# Patient Record
Sex: FEMALE | Race: BLACK OR AFRICAN AMERICAN | Employment: UNEMPLOYED | URBAN - METROPOLITAN AREA
[De-identification: names, ages, dates, MRNs, and addresses within clinical notes are randomized per-mention and may not be internally consistent; named-entity substitution may affect disease eponyms.]

---

## 2017-03-09 ENCOUNTER — OFFICE VISIT (OUTPATIENT)
Dept: SURGERY | Age: 57
End: 2017-03-09

## 2017-03-09 VITALS
BODY MASS INDEX: 42.17 KG/M2 | DIASTOLIC BLOOD PRESSURE: 76 MMHG | SYSTOLIC BLOOD PRESSURE: 132 MMHG | HEIGHT: 63 IN | TEMPERATURE: 95.6 F | WEIGHT: 238 LBS | HEART RATE: 89 BPM

## 2017-03-09 DIAGNOSIS — E66.01 MORBID OBESITY DUE TO EXCESS CALORIES (HCC): ICD-10-CM

## 2017-03-09 DIAGNOSIS — K21.9 GASTROESOPHAGEAL REFLUX DISEASE WITHOUT ESOPHAGITIS: ICD-10-CM

## 2017-03-09 DIAGNOSIS — R39.15 URINARY URGENCY: ICD-10-CM

## 2017-03-09 DIAGNOSIS — E11.9 CONTROLLED TYPE 2 DIABETES MELLITUS WITHOUT COMPLICATION, WITHOUT LONG-TERM CURRENT USE OF INSULIN (HCC): ICD-10-CM

## 2017-03-09 DIAGNOSIS — J45.20 MILD INTERMITTENT ASTHMA WITHOUT COMPLICATION: ICD-10-CM

## 2017-03-09 DIAGNOSIS — M19.90 ARTHRITIS: ICD-10-CM

## 2017-03-09 DIAGNOSIS — I10 ESSENTIAL HYPERTENSION: ICD-10-CM

## 2017-03-09 DIAGNOSIS — G47.33 OSA (OBSTRUCTIVE SLEEP APNEA): ICD-10-CM

## 2017-03-09 PROBLEM — J45.909 ASTHMA: Status: ACTIVE | Noted: 2017-03-09

## 2017-03-09 RX ORDER — VENLAFAXINE 75 MG/1
25 TABLET ORAL 3 TIMES DAILY
COMMUNITY
End: 2018-07-19 | Stop reason: ALTCHOICE

## 2017-03-09 RX ORDER — IBUPROFEN 800 MG/1
TABLET ORAL
COMMUNITY
End: 2018-08-07 | Stop reason: SDUPTHER

## 2017-03-09 RX ORDER — PIOGLITAZONEHYDROCHLORIDE 30 MG/1
TABLET ORAL DAILY
COMMUNITY
End: 2018-06-21 | Stop reason: SDUPTHER

## 2017-03-09 RX ORDER — PROPRANOLOL HYDROCHLORIDE 20 MG/1
TABLET ORAL 3 TIMES DAILY
COMMUNITY
End: 2018-06-21

## 2017-03-09 RX ORDER — METFORMIN HYDROCHLORIDE 1000 MG/1
1000 TABLET ORAL 2 TIMES DAILY WITH MEALS
COMMUNITY
End: 2018-08-07 | Stop reason: SDUPTHER

## 2017-03-09 NOTE — PROGRESS NOTES
Patient seen and examined independently. I agree with MAGUI Larsen's findings. We will plan to perform a lap gastric bypass.

## 2017-03-09 NOTE — PROGRESS NOTES
Initial Consultation for Bariatric Surgery     Corinne Buttery is a 49 y.o. female who comes into the office today for initial consultation for the surgical options for the treatment of morbid obesity. The patient initially identified obesity in her 21's. Corinne Buttery has tried a variety of unsupervised weight-loss attempts including behavior modification and unsupervised diets, but has yet to meet with lasting success. Maximum weight lost on a diet is about 10 lbs, but that the weight always seems to return. Today, the patient is Height: 5' 3\" (160 cm) , Weight: 108 kg (238 lb) for a BMI of Body mass index is 42.16 kg/(m^2). Candance Huger Maximum weight is 238 pounds. It is due to their severe obesity, which is further complicated by  adult onset diabetes mellitus, hypertension, GERD, DOMINIQUE and asthma that the patient is now seeking out bariatric surgery. Weight Loss Metrics 3/9/2017   Today's Wt 238 lb   BMI 42.16 kg/m2       Body mass index is 42.16 kg/(m^2). Past Medical History:   Diagnosis Date    Arthritis     Depression     [facility] [address]     Hypertension     Morbid obesity ([facility] [address]        Past Surgical History:   Procedure Laterality Date    EXCIS INFRATENT BRAIN TUMOR      HX CHOLECYSTECTOMY      HX GYN      NEUROLOGICAL PROCEDURE UNLISTED         Current Outpatient Prescriptions   Medication Sig Dispense Refill    propranolol (INDERAL) 20 mg tablet Take  by mouth three (3) times daily.  metFORMIN (GLUCOPHAGE) 1,000 mg tablet Take 1,000 mg by mouth two (2) times daily (with meals).  SITagliptin (JANUVIA) 25 mg tablet Take 25 mg by mouth daily.  venlafaxine (EFFEXOR) 75 mg tablet Take 25 mg by mouth three (3) times daily.  ibuprofen (MOTRIN) 800 mg tablet Take  by mouth.  pioglitazone (ACTOS) 30 mg tablet Take  by mouth daily.          No Known Allergies    Social History   Substance Use Topics    Smoking status: Former Smoker    Smokeless tobacco: None    Alcohol use No Family History   Problem Relation Age of Onset   [de-identified] Cancer Mother     Hypertension Mother     Diabetes Mother     Psychiatric Disorder Father     Lupus Sister        ROS, positive where in bold:    General: fevers, chills, night sweats, fatigue, weight loss, weight gain    GI: abdominal pain, nausea, vomiting, change in bowel habits, hematochezia, melena, GERD, constipation    Integumentary: dermatitis or abnormal moles.     HEENT:  visual changes, vertigo, epistaxis, dysphagia, hoarseness    Cardiac: chest pain, palpitations, hypertension, edema, varicosities    Resp:  cough, shortness of breath, wheezing, hemoptysis, snoring, reactive airway disease    : hematuria, dysuria, frequency, urgency, nocturia, stress urinary incontinence    MSK: weakness, joint pain, arthritis    Endocrine: diabetes, thyroid disease, polyuria, polydipsia, polyphagia, poor wound healing, heat intolerance,cold intolerance    Lymph/Heme: anemia, bruising, history of blood transfusions    Neuro: dizziness, headache, fainting, seizures, stroke    Psychiatry:  anxiety, depression, psychosis      Physical Exam:  Visit Vitals    /76    Pulse 89    Temp 95.6 °F (35.3 °C)    Ht 5' 3\" (1.6 m)    Wt 108 kg (238 lb)    BMI 42.16 kg/m2       General: Well developed, well nourished 49 y.o. female in no acute distress  ENMT: normocephalic, atraumatic mouth:clear, no overt lesions, oral mucosa pink and moist  Neck: supple, no masses, no adenopathy or carotid bruits, trachea midline  Skin: warm, smooth, dry and well perfused  Respiratory: clear to auscultation bilaterally, no wheeze, rhonchi or rales, excursions normal and symmetrical  Cardiovascular: Regular rate and rhythm, no murmurs, clicks, gallops or rubs, no edema or varicosities  Gastrointestinal: soft, nontender, nondistended, normoactive bowel sounds, no hernias, no hepatosplenomegaly, easily palpable costal margins, gynecoid distribution  Musculoskeletal: warm, well-perfused, no tenderness or swelling, normal gait/station  Neuro: sensation and strength grossly intact and symmetrical  Psych: alert and oriented to person, place and time      Impression:    Gonzalo Dorsey is a 49 y.o. female who is suffering from morbid obesity and its attendant comorbidities who would benefit from bariatric surgery. We have had an extensive discussion with regard to the risks, benefits and likely outcomes of both the sleeve and the gastric bypass. With regard to bypass, we have discussed the risks including bleeding, infection, need for reoperation, damage to surrounding structure, anastomotic leak, gastrogastric fistula ulcer and stricture, bowel obstruction due to internal hernia or adhesions, DVT, PE, heart attack, stroke and death. Patient also understands risks of inadequate weight loss, excess weight loss, vitamin insufficiency, protein malnutrition, excess skin, and loss of hair. We've discussed the restrictive nature of the sleeve gastrectomy. The patient understands the likelihood of losing approximately 65% of their excess weight in 12 to 18 months. Patient also understands the risks including but not limited to bleeding, infection, need for reoperation, leaks from staple line and strictures, bowel obstruction secondary to adhesions, DVT, PE, heart attack, stroke, and death. Patient also understands risks of inadequate weight loss, excess weight loss, vitamin insufficiency, protein malnutrition, excess skin, and loss of hair. We have reviewed the components of a successful postoperative course including requirement for a high protein, low carbohydrate diet, 60 oz a day of zero calorie liquids, daily vitamin supplementation, daily exercise, regular follow-up, and participation in support groups.  At this time we will enroll the patient in our bariatric program, undertake routine laboratory and radiographic evaluation, EKG, evaluation by nutritionist as well as psychologist and pending their satisfactory completion of the preop evaluation, plan to perform LGBP.

## 2017-03-15 ENCOUNTER — HOSPITAL ENCOUNTER (OUTPATIENT)
Dept: OTHER | Age: 57
Discharge: HOME | End: 2017-03-15
Payer: MEDICAID

## 2017-03-15 ENCOUNTER — HOSPITAL ENCOUNTER (OUTPATIENT)
Dept: LAB | Age: 57
Discharge: HOME | End: 2017-03-15
Payer: MEDICAID

## 2017-03-15 ENCOUNTER — HOSPITAL ENCOUNTER (OUTPATIENT)
Dept: PREADMISSION TESTING | Age: 57
Discharge: HOME | End: 2017-03-15
Payer: MEDICAID

## 2017-03-15 ENCOUNTER — HOSPITAL ENCOUNTER (OUTPATIENT)
Dept: LAB | Age: 57
Discharge: HOME | End: 2017-03-15

## 2017-03-15 DIAGNOSIS — E66.01 MORBID OBESITY DUE TO EXCESS CALORIES (HCC): ICD-10-CM

## 2017-03-15 DIAGNOSIS — K21.9 GASTROESOPHAGEAL REFLUX DISEASE WITHOUT ESOPHAGITIS: ICD-10-CM

## 2017-03-15 DIAGNOSIS — G47.33 OSA (OBSTRUCTIVE SLEEP APNEA): ICD-10-CM

## 2017-03-15 DIAGNOSIS — R39.15 URINARY URGENCY: ICD-10-CM

## 2017-03-15 DIAGNOSIS — M19.90 ARTHRITIS: ICD-10-CM

## 2017-03-15 DIAGNOSIS — J45.20 MILD INTERMITTENT ASTHMA WITHOUT COMPLICATION: ICD-10-CM

## 2017-03-15 DIAGNOSIS — E11.9 CONTROLLED TYPE 2 DIABETES MELLITUS WITHOUT COMPLICATION, WITHOUT LONG-TERM CURRENT USE OF INSULIN (HCC): ICD-10-CM

## 2017-03-15 DIAGNOSIS — I10 ESSENTIAL HYPERTENSION: ICD-10-CM

## 2017-03-15 LAB — SENTARA SPECIMEN COL,SENBCF: NORMAL

## 2017-03-15 PROCEDURE — 93005 ELECTROCARDIOGRAM TRACING: CPT

## 2017-03-15 PROCEDURE — 99001 SPECIMEN HANDLING PT-LAB: CPT | Performed by: PHYSICIAN ASSISTANT

## 2017-03-15 PROCEDURE — 71020 XR CHEST PA LAT: CPT

## 2017-03-16 LAB
ATRIAL RATE: 73 BPM
CALCULATED P AXIS, ECG09: 66 DEGREES
CALCULATED R AXIS, ECG10: 51 DEGREES
CALCULATED T AXIS, ECG11: -45 DEGREES
DIAGNOSIS, 93000: NORMAL
P-R INTERVAL, ECG05: 192 MS
Q-T INTERVAL, ECG07: 400 MS
QRS DURATION, ECG06: 66 MS
QTC CALCULATION (BEZET), ECG08: 440 MS
VENTRICULAR RATE, ECG03: 73 BPM

## 2017-03-29 ENCOUNTER — DOCUMENTATION ONLY (OUTPATIENT)
Dept: SURGERY | Age: 57
End: 2017-03-29

## 2017-03-29 NOTE — PROGRESS NOTES
[facility]  [address], [address] [place]    Patient's Name: Carlos Augustine                                  Age: 49 y.o. YOB: 1960                                          Sex: female  Date: 3/29/2017  Insurance: [employer]                          Session: 1 of 6               Surgeon:  Dr. Red Dallas    Height: 5'3\"                    Weight: 244#           Starting weight with surgeon: 238#      Overall pounds Gained: 6#    Do you smoke?: no    Alcohol Intake:   I do not drink at all:x    Class Guidelines    Pt. Understand that if they miss a month, depending on insurance, they may have to start over. Pt. Also understand that the expectation is to lose  Or maintain weight. Weight gain may result in delaying surgery until the weight is off. EATING HABITS AND BEHAVIORS:  Pt. Struggles With:  Liquid calories: x  Skipping breakfast: x  Eating foods with a high amount of carbohydrates: x  Eating High fat foods: x  Eating large portions: x  Making poor snack choices:x  Eating out frequently: x  Skipping meals: x  Reading labels: x  Drinking >48 oz fluids daily:   Getting sufficient physical activity/exercise:   Night time eating/snacking: x  Binge eating:   Eating often, even when not hungry (grazing):   Giving into peer pressure regarding eating/drinking:   Other:     Each class we spend time discussing the pre-op diet, diet progression and vitamin/mineral requirements as well as the Key Diet Principles. Each class we also cover lowering carbohydrate consumption. Keeping carbohydrates <25 grams per meal and avoiding added sugars is emphasized. Patient is educated on the effects that  carbohydrates and bad fats have on them. PHYSICAL ACTIVITY/EXERCISE:   Patient's activity is increasing because patient plans to or is:  Walking more: Other aerobic activity such as running, swimming, Leanna, kickboxing ect.:   Chair exercises:    Active in resistance training such as weight lifting:   Other:     Each class we spend time discussing the importance of increasing activity. Patient can start with 10 minutes of walking daily or chair exercises and build from there. BEHAVIOR MODIFICATION:  Making modifications to behavior, patient plans to or is:  Eating only when hungry not to treat stress, anger, tiredness or boredom:   Eating away from TV, computer and phone: x  Eating on a small plate:   Eats slowly, chewing food well: Other: We spend time in each class discussing the importance of breaking bad habits and how to do this. I encourage pt.s to self evaluate and look for those crucial moments in which they are making these poor choices. I recommend a food journal which can help identify when/where//why/who we are with when we compromise and make exceptions that are poor choices. ADDITIONAL INFORMATION:  Specific changes patient made since the last class:  1st class.       Ramon Thompson RD

## 2017-04-27 ENCOUNTER — DOCUMENTATION ONLY (OUTPATIENT)
Dept: SURGERY | Age: 57
End: 2017-04-27

## 2017-04-27 NOTE — PROGRESS NOTES
[facility]  [address], [address] [place]    Patient's Name: Maylene Felty                                  Age: 49 y.o. YOB: 1960                                          Sex: female  Date: 4/27/2017  Insurance: [employer]                          Session: 2 of 6               Surgeon:  Dr. Marino Mota    Height: 5'3\"                    Weight: 247#           Starting weight with surgeon: 238#      Overall pounds Gained: 9#    Do you smoke?: no    Alcohol Intake:   I do not drink at all:x      Class Guidelines    Pt. Understand that if they miss a month, depending on insurance, they may have to start over. Pt. Also understand that the expectation is to lose  Or maintain weight. Weight gain may result in delaying surgery until the weight is off. EATING HABITS AND BEHAVIORS:  Pt. Struggles With:  Liquid calories:   Skipping breakfast: x  Eating foods with a high amount of carbohydrates: x  Eating High fat foods: x  Eating large portions: x  Making poor snack choices:x  Eating out frequently: x  Skipping meals:   Reading labels: x  Drinking >48 oz fluids daily: x  Getting sufficient physical activity/exercise: x  Night time eating/snacking: x  Binge eating:   Eating often, even when not hungry (grazing):   Giving into peer pressure regarding eating/drinking:   Other:     Each class we spend time discussing the pre-op diet, diet progression and vitamin/mineral requirements as well as the Key Diet Principles. Each class we also cover lowering carbohydrate consumption. Keeping carbohydrates <25 grams per meal and avoiding added sugars is emphasized. Patient is educated on the effects that  carbohydrates and bad fats have on them. PHYSICAL ACTIVITY/EXERCISE:   Patient's activity is increasing because patient plans to or is:  Walking more: x  Other aerobic activity such as running, swimming, Leanna, kickboxing ect. : x  Chair exercises: x  Active in resistance training such as weight lifting:   Other:     Each class we spend time discussing the importance of increasing activity. Patient can start with 10 minutes of walking daily or chair exercises and build from there. BEHAVIOR MODIFICATION:  Making modifications to behavior, patient plans to or is:  Eating only when hungry not to treat stress, anger, tiredness or boredom: x  Eating away from TV, computer and phone: x  Eating on a small plate: x  Eats slowly, chewing food well: Other: We spend time in each class discussing the importance of breaking bad habits and how to do this. I encourage pt.s to self evaluate and look for those crucial moments in which they are making these poor choices. I recommend a food journal which can help identify when/where//why/who we are with when we compromise and make exceptions that are poor choices. ADDITIONAL INFORMATION:  Specific changes patient made since the last class:  Cutting back on all sweets. RD's concerns/opinion's:  Patient knows she needs to lose weight. She plan on eating on a smaller plate and increasing water.     Bairon Crum RD

## 2017-05-27 ENCOUNTER — DOCUMENTATION ONLY (OUTPATIENT)
Dept: SURGERY | Age: 57
End: 2017-05-27

## 2017-05-28 NOTE — PROGRESS NOTES
[facility]  [address] [facility]    Patient's Name: Maylene Felty                                  Age: 49 y.o. YOB: 1960                                          Sex: female  Date: 05/24/2017  Insurance: [employer]                          Session: 3 of 6               Surgeon:  Dr. Marino Mota    Height: 5'3\"                    Weight: 253#           Starting weight with surgeon: 238#      Overall pounds Gained: 15#    Do you smoke?: no    Alcohol Intake:   I do not drink at all:x      Class Guidelines    Pt. Understand that if they miss a month, depending on insurance, they may have to start over. Pt. Also understand that the expectation is to lose  Or maintain weight. Weight gain may result in delaying surgery until the weight is off. EATING HABITS AND BEHAVIORS:  Pt. Struggles With:  Liquid calories:   Skipping breakfast:   Eating foods with a high amount of carbohydrates: x  Eating High fat foods:   Eating large portions:   Making poor snack choices:x  Eating out frequently: x  Skipping meals:   Reading labels:   Drinking >48 oz fluids daily:   Getting sufficient physical activity/exercise: x  Night time eating/snacking: x  Binge eating: x  Eating often, even when not hungry (grazing): x  Giving into peer pressure regarding eating/drinking:   Other: Emotional eating. Each class we spend time discussing the pre-op diet, diet progression and vitamin/mineral requirements as well as the Key Diet Principles. Each class we also cover lowering carbohydrate consumption. Keeping carbohydrates <25 grams per meal and avoiding added sugars is emphasized. Patient is educated on the effects that  carbohydrates and bad fats have on them. PHYSICAL ACTIVITY/EXERCISE:   Patient's activity is increasing because patient plans to or is:  Walking more:    Other aerobic activity such as running, swimming, Leanna, kickboxing ect.:   Chair exercises: Active in resistance training such as weight lifting:   Other:     Each class we spend time discussing the importance of increasing activity. Patient can start with 10 minutes of walking daily or chair exercises and build from there. BEHAVIOR MODIFICATION:  Making modifications to behavior, patient plans to or is:  Eating only when hungry not to treat stress, anger, tiredness or boredom: x  Eating away from TV, computer and phone:   Eating on a small plate:   Eats slowly, chewing food well: x  Other: We spend time in each class discussing the importance of breaking bad habits and how to do this. I encourage pt.s to self evaluate and look for those crucial moments in which they are making these poor choices. I recommend a food journal which can help identify when/where//why/who we are with when we compromise and make exceptions that are poor choices. ADDITIONAL INFORMATION:  Specific changes patient made since the last class:  Patient states she struggles with emotional eating. RD's concerns/opinion's:  Patient is aware she needs to lose weight. She was highly disappointed in the 15# weight gain. Patient to see psychologists soon. RD recommended she discuss the emotional eating struggles.     Sarah Phan RD  05/24/2017

## 2017-06-29 ENCOUNTER — DOCUMENTATION ONLY (OUTPATIENT)
Dept: SURGERY | Age: 57
End: 2017-06-29

## 2017-06-30 NOTE — PROGRESS NOTES
[facility]  [address] [place]    Patient's Name: Juan Garcia                                  Age: 49 y.o. YOB: 1960                                          Sex: female  Date: 06/28/2017  Insurance: [employer]                          Session: 4 of 6               Surgeon:  Dr. Beau Magallon    Height: 5'3\"                    Weight: 250#           Starting weight with surgeon: 238#      Overall pounds Gained: 12#    Do you smoke?: no    Alcohol Intake:   I do not drink at all:x      Class Guidelines    Pt. Understand that if they miss a month, depending on insurance, they may have to start over. Pt. Also understand that the expectation is to lose  Or maintain weight. Weight gain may result in delaying surgery until the weight is off. EATING HABITS AND BEHAVIORS:  Pt. Struggles With:  Liquid calories:   Skipping breakfast:   Eating foods with a high amount of carbohydrates: x  Eating High fat foods: x  Eating large portions:   Making poor snack choices:x  Eating out frequently: x  Skipping meals: x  Reading labels:   Drinking >48 oz fluids daily:   Getting sufficient physical activity/exercise: x  Night time eating/snacking: x  Binge eating:   Eating often, even when not hungry (grazing): x  Giving into peer pressure regarding eating/drinking:   Other: emotional eating    Each class we spend time discussing the pre-op diet, diet progression and vitamin/mineral requirements as well as the Key Diet Principles. Each class we also cover lowering carbohydrate consumption. Keeping carbohydrates <25 grams per meal and avoiding added sugars is emphasized. Patient is educated on the effects that  carbohydrates and bad fats have on them.       PHYSICAL ACTIVITY/EXERCISE:   Patient's activity is increasing because patient plans to or is:  Walking more: x  Other aerobic activity such as running, swimming, Leanna, kickboxing ect.:   Chair exercises: Active in resistance training such as weight lifting:   Other:     Each class we spend time discussing the importance of increasing activity. Patient can start with 10 minutes of walking daily or chair exercises and build from there. BEHAVIOR MODIFICATION:  Making modifications to behavior, patient plans to or is:  Eating only when hungry not to treat stress, anger, tiredness or boredom: x  Eating away from TV, computer and phone: x  Eating on a small plate: x  Eats slowly, chewing food well: x  Other: We spend time in each class discussing the importance of breaking bad habits and how to do this. I encourage pt.s to self evaluate and look for those crucial moments in which they are making these poor choices. I recommend a food journal which can help identify when/where//why/who we are with when we compromise and make exceptions that are poor choices. ADDITIONAL INFORMATION:  Specific changes patient made since the last class:  Chew slowly, 25 times. RD's concerns/opinion's:  Patient is aware that she needs to lose weight on the WLT.     Patricia Montoya RD  06/28/2017

## 2017-07-24 ENCOUNTER — DOCUMENTATION ONLY (OUTPATIENT)
Dept: SURGERY | Age: 57
End: 2017-07-24

## 2017-07-24 NOTE — PROGRESS NOTES
[facility]  [address]    Patient's Name: Marisol Kellogg                                  Age: 49 y.o. YOB: 1960                                          Sex: female  Date: 07/19/2017  Insurance: [facility]                          Session: 5 of 6               Surgeon:  Dr. Man Dear    Height: 5'3\"                    Weight: 252#           Starting weight with surgeon: 238#      Overall pounds Gained: 14#    Do you smoke?: no    Alcohol Intake:   I do not drink at all:x      Class Guidelines    Pt. Understand that if they miss a month, depending on insurance, they may have to start over. Pt. Also understand that the expectation is to lose  Or maintain weight. Weight gain may result in delaying surgery until the weight is off. EATING HABITS AND BEHAVIORS:  Pt. Struggles With:  Liquid calories:   Skipping breakfast: x  Eating foods with a high amount of carbohydrates:   Eating High fat foods: x  Eating large portions:   Making poor snack choices:x  Eating out frequently:   Skipping meals: x  Reading labels:   Drinking >48 oz fluids daily:   Getting sufficient physical activity/exercise: x  Night time eating/snacking: x  Binge eating:   Eating often, even when not hungry (grazing):   Giving into peer pressure regarding eating/drinking:   Other:     Each class we spend time discussing the pre-op diet, diet progression and vitamin/mineral requirements as well as the Key Diet Principles. Each class we also cover lowering carbohydrate consumption. Keeping carbohydrates <25 grams per meal and avoiding added sugars is emphasized. Patient is educated on the effects that  carbohydrates and bad fats have on them. PHYSICAL ACTIVITY/EXERCISE:   Patient's activity is increasing because patient plans to or is:  Walking more: x  Other aerobic activity such as running, swimming, Leanna, kickboxing ect. : x  Chair exercises:    Active in resistance training such as weight lifting: x  Other:     Each class we spend time discussing the importance of increasing activity. Patient can start with 10 minutes of walking daily or chair exercises and build from there. BEHAVIOR MODIFICATION:  Making modifications to behavior, patient plans to or is:  Eating only when hungry not to treat stress, anger, tiredness or boredom: x  Eating away from TV, computer and phone: x  Eating on a small plate: x  Eats slowly, chewing food well: Other: We spend time in each class discussing the importance of breaking bad habits and how to do this. I encourage pt.s to self evaluate and look for those crucial moments in which they are making these poor choices. I recommend a food journal which can help identify when/where//why/who we are with when we compromise and make exceptions that are poor choices. ADDITIONAL INFORMATION:  Specific changes patient made since the last class:  Eat slower, put fork down, count calories. RD's concerns/opinion's:  Patient has gained 14# since she began the   WLT. She is aware that she needs to lose this weight. She often shares in class the tremendous amount of stress in her family life.       Keyla Figueroa RD  07/19/2017

## 2017-08-03 ENCOUNTER — OFFICE VISIT (OUTPATIENT)
Dept: SURGERY | Age: 57
End: 2017-08-03

## 2017-08-03 ENCOUNTER — HOSPITAL ENCOUNTER (OUTPATIENT)
Dept: LAB | Age: 57
Discharge: HOME | End: 2017-08-03

## 2017-08-03 ENCOUNTER — HOSPITAL ENCOUNTER (OUTPATIENT)
Dept: PREADMISSION TESTING | Age: 57
End: 2017-08-03

## 2017-08-03 VITALS
RESPIRATION RATE: 20 BRPM | BODY MASS INDEX: 44.69 KG/M2 | TEMPERATURE: 96.5 F | OXYGEN SATURATION: 99 % | DIASTOLIC BLOOD PRESSURE: 85 MMHG | SYSTOLIC BLOOD PRESSURE: 135 MMHG | HEIGHT: 63 IN | WEIGHT: 252.2 LBS | HEART RATE: 80 BPM

## 2017-08-03 DIAGNOSIS — I10 ESSENTIAL HYPERTENSION: ICD-10-CM

## 2017-08-03 DIAGNOSIS — E66.01 MORBID OBESITY DUE TO EXCESS CALORIES (HCC): ICD-10-CM

## 2017-08-03 DIAGNOSIS — E66.01 MORBID OBESITY DUE TO EXCESS CALORIES (HCC): Primary | ICD-10-CM

## 2017-08-03 DIAGNOSIS — E11.8 CONTROLLED TYPE 2 DIABETES MELLITUS WITH COMPLICATION, WITHOUT LONG-TERM CURRENT USE OF INSULIN (HCC): ICD-10-CM

## 2017-08-03 LAB — SENTARA SPECIMEN COL,SENBCF: NORMAL

## 2017-08-03 PROCEDURE — 99001 SPECIMEN HANDLING PT-LAB: CPT | Performed by: SURGERY

## 2017-08-03 NOTE — MR AVS SNAPSHOT
Visit Information Date & Time Provider Department Dept. Phone Encounter #  
 8/3/2017  2:00 PM Giovanny Lui MD [facility] [phone] [id number] Upcoming Health Maintenance Date Due Hepatitis C Screening 1960 HEMOGLOBIN A1C Q6M 1960 LIPID PANEL Q1 1960 FOOT EXAM Q1 12/9/1972 MICROALBUMIN Q1 12/9/1972 EYE EXAM RETINAL OR DILATED Q1 12/9/1972 Pneumococcal 19-64 Medium Risk (1 of 1 - PPSV23) 12/9/1981 DTaP/Tdap/Td series (1 - Tdap) 12/9/1983 BREAST CANCER SCRN MAMMOGRAM 12/9/2012 FOBT Q 1 YEAR AGE 50-75 12/9/2012 PAP AKA CERVICAL CYTOLOGY 6/20/2014 INFLUENZA AGE 9 TO ADULT 8/1/2017 Allergies as of 8/3/2017  Review Complete On: 8/3/2017 By: Dalton Avalos LPN No Known Allergies Current Immunizations  Never Reviewed No immunizations on file. Not reviewed this visit You Were Diagnosed With   
  
 Codes Comments Morbid obesity due to excess calories ([facility] [address]    -  Primary ICD-10-CM: E66.01 
ICD-9-CM: 278.01   
 BMI 40.0-44.9, adult (HCC)     ICD-10-CM: Z68.41 
ICD-9-CM: V85.41 Essential hypertension     ICD-10-CM: I10 
ICD-9-CM: 401.9 Controlled type 2 diabetes mellitus with complication, without long-term current use of insulin (HCC)     ICD-10-CM: E11.8 ICD-9-CM: 250.90 Vitals BP Pulse Temp Resp Height(growth percentile) Weight(growth percentile) 135/85 80 96.5 °F (35.8 °C) (Oral) 20 5' 3\" (1.6 m) 252 lb 3.2 oz (114.4 kg) SpO2 BMI OB Status Smoking Status 99% 44.68 kg/m2 Menopause Former Smoker Vitals History BMI and BSA Data Body Mass Index Body Surface Area 44.68 kg/m 2 2.26 m 2 Your Updated Medication List  
  
   
This list is accurate as of: 8/3/17  2:53 PM.  Always use your most recent med list.  
  
  
  
  
 ibuprofen 800 mg tablet Commonly known as:  MOTRIN Take  by mouth. JANUVIA 25 mg tablet Generic drug:  SITagliptin Take 25 mg by mouth daily. metFORMIN 1,000 mg tablet Commonly known as:  GLUCOPHAGE Take 1,000 mg by mouth two (2) times daily (with meals). pioglitazone 30 mg tablet Commonly known as:  ACTOS Take  by mouth daily. propranolol 20 mg tablet Commonly known as:  INDERAL Take  by mouth three (3) times daily. venlafaxine 75 mg tablet Commonly known as:  [facility] Take 25 mg by mouth three (3) times daily. Patient Instructions If you have any questions or concerns about today's appointment, the verbal and/or written instructions you were given for follow up care, please call our office at [phone]. [facility] - [address] 
 
[phone] office [phone] Introducing [facility] & HEALTH [facility]! [facility] introduces [employer] patient portal. Now you can access parts of your medical record, email your doctor's office, and request medication refills online. 1. In your internet browser, go to [email or web address] 2. Click on the First Time User? Click Here link in the Sign In box. You will see the New Member Sign Up page. 3. Enter your [employer] Access Code exactly as it appears below. You will not need to use this code after youve completed the sign-up process. If you do not sign up before the expiration date, you must request a new code. · [employer] Access Code: [email or web address] Expires: 10/17/2017  4:19 PM 
 
4. Enter the last four digits of your Social Security Number (xxxx) and Date of Birth (mm/dd/yyyy) as indicated and click Submit. You will be taken to the next sign-up page. 5. Create a [employer] ID. This will be your [employer] login ID and cannot be changed, so think of one that is secure and easy to remember. 6. Create a [employer] password. You can change your password at any time. 7. Enter your Password Reset Question and Answer. This can be used at a later time if you forget your password. 8. Enter your e-mail address. You will receive e-mail notification when new information is available in [address] 9. Click Sign Up. You can now view and download portions of your medical record. 10. Click the Download Summary menu link to download a portable copy of your medical information. If you have questions, please visit the Frequently Asked Questions section of the [employer] website. Remember, [employer] is NOT to be used for urgent needs. For medical emergencies, dial 911. Now available from your iPhone and Android! Please provide this summary of care documentation to your next provider. Your primary care clinician is listed as Froilan Goyal. If you have any questions after today's visit, please call [phone].

## 2017-08-03 NOTE — PROGRESS NOTES
1. Have you been to the ER, urgent care clinic since your last visit? Hospitalized since your last visit? Sciatic nerve pain     2. Have you seen or consulted any other health care providers outside of the [address] [place] [address] since your last visit? Include any pap smears or colon screening. No     Patient presents for midtrial appointment for [facility].

## 2017-08-03 NOTE — PATIENT INSTRUCTIONS
If you have any questions or concerns about today's appointment, the verbal and/or written instructions you were given for follow up care, please call our office at [phone].     [facility] - Lauren Glover [address] [place] [address]    [phone] office  [phone]

## 2017-08-03 NOTE — PROGRESS NOTES
Bariatric Preoperative Progress note:    Subjective:     Robert Perez is a 49 y.o. female who presents today for followup of their candidacy for bariatric surgery. Since last seen, has been very stressed out and notes that when she is stressed she doesn't eat well. She also had inflammation of her sciatic nerve which caused some pain and required steroids. She notes that since she had her meningioma removed, she doesn't seem to taste things and keeps eating to get herself to taste something. She also notes that she is an emotional eater and stress management is one of her goals. She is working with Jesus Vaca to learn how to eat breakfast, drink more water and learn about serving size. Past Medical History:   Diagnosis Date    Arthritis     Depression     Diabetes ([facility] [address]     Hypertension     Morbid obesity ([facility] [address]        Past Surgical History:   Procedure Laterality Date    EXCIS INFRATENT BRAIN TUMOR      HX CHOLECYSTECTOMY      HX GYN      NEUROLOGICAL PROCEDURE UNLISTED         Current Outpatient Prescriptions   Medication Sig Dispense Refill    propranolol (INDERAL) 20 mg tablet Take  by mouth three (3) times daily.  metFORMIN (GLUCOPHAGE) 1,000 mg tablet Take 1,000 mg by mouth two (2) times daily (with meals).  SITagliptin (JANUVIA) 25 mg tablet Take 25 mg by mouth daily.  venlafaxine (EFFEXOR) 75 mg tablet Take 25 mg by mouth three (3) times daily.  pioglitazone (ACTOS) 30 mg tablet Take  by mouth daily.  ibuprofen (MOTRIN) 800 mg tablet Take  by mouth.          No Known Allergies      Objective:     Physical Exam:  Visit Vitals    /85    Pulse 80    Temp 96.5 °F (35.8 °C) (Oral)    Resp 20    Ht 5' 3\" (1.6 m)    Wt 114.4 kg (252 lb 3.2 oz)    SpO2 99%    BMI 44.68 kg/m2       General: Well developed, well nourished 49 y.o. female in no acute distress  ENMT: normocephalic, atraumatic mouth:clear, no overt lesions, oral mucosa pink and moist  Neck: supple, no masses, no adenopathy or carotid bruits, trachea midline  Skin: warm, smooth, dry and well perfused  Respiratory: clear to auscultation bilaterally, no wheezes, rhonchi or rales, excursions normal and symmetrical  Cardiovascular: Regular rate and rhythm, no murmurs, clicks, gallops or rubs, no edema or varicosities  Gastrointestinal: soft, nontender, nondistended, normoactive bowel sounds, no hernias, no hepatosplenomegaly  Musculoskeletal: warm, well-perfused, no tenderness or swelling, normal gait/station  Neuro: sensation and strength grossly intact and symmetrical  Psych: alert and oriented to person, place and time      Studies to date:    Labs: significant for elevated A1c of 11.9 and H pylori positive (treated)    EKG: WNL    CXR: WNL    Nutritional evaluation: ongoing     Psychiatric evaluation:pending        Assessment:   Dori South is a 49 y.o. female who is progressing through the bariatric preoperative evaluation. At this time, they are not an appropriate candidate for weight loss surgery. We have discussed importance of measuring food and serving size. We have discussed strategies to decrease her consumption of carbs and sweets and processed food. Plan:   -complete remainder of preop evaluation including bringing down her A1c with her PCP and classes with Anahi Ahmadi and evaluation with Dr Basilio De Paz.  -Follow up once has completed entirety of weight loss workup to determine next steps.

## 2017-08-04 LAB
ANION GAP SERPL CALC-SCNC: 16 MMOL/L
BUN SERPL-MCNC: 10 MG/DL (ref 6–22)
CALCIUM SERPL-MCNC: 9.3 MG/DL (ref 8.4–10.5)
CHLORIDE SERPL-SCNC: 96 MMOL/L (ref 98–110)
CO2 SERPL-SCNC: 23 MMOL/L (ref 20–32)
CREAT SERPL-MCNC: 0.6 MG/DL (ref 0.5–1.2)
GFRAA, 66117: >60
GFRNA, 66118: >60
GLUCOSE SERPL-MCNC: 412 MG/DL (ref 65–99)
POTASSIUM SERPL-SCNC: 4.4 MMOL/L (ref 3.5–5.5)
SODIUM SERPL-SCNC: 135 MMOL/L (ref 133–145)

## 2017-08-05 LAB
ANION GAP SERPL CALC-SCNC: 16 MMOL/L
AVG GLU, 10930: 236 MG/DL (ref 91–123)
BUN SERPL-MCNC: 10 MG/DL (ref 6–22)
CALCIUM SERPL-MCNC: 9.3 MG/DL (ref 8.4–10.5)
CHLORIDE SERPL-SCNC: 96 MMOL/L (ref 98–110)
CO2 SERPL-SCNC: 23 MMOL/L (ref 20–32)
CREAT SERPL-MCNC: 0.6 MG/DL (ref 0.5–1.2)
GFRAA, 66117: >60
GFRNA, 66118: >60
GLUCOSE SERPL-MCNC: 412 MG/DL (ref 65–99)
HBA1C MFR BLD HPLC: 9.9 % (ref 4.8–5.9)
POTASSIUM SERPL-SCNC: 4.4 MMOL/L (ref 3.5–5.5)
SODIUM SERPL-SCNC: 135 MMOL/L (ref 133–145)

## 2017-08-07 NOTE — PROGRESS NOTES
Please notify patient she will need to continue to work with PCP to bring down A1c, however, it is much better than it was

## 2017-08-31 ENCOUNTER — DOCUMENTATION ONLY (OUTPATIENT)
Dept: SURGERY | Age: 57
End: 2017-08-31

## 2017-08-31 NOTE — PROGRESS NOTES
[facility]  [address]    Patient's Name: Carlos Reyes                                  Age: 49 y.o. YOB: 1960                                          Sex: female  Date: 08/30/2017  Insurance: [employer]                          Session: 6 of 6               Surgeon:  Dr. Ervin Ho    Height: 5'3\"                    Weight: 247#           Starting weight with surgeon: 238#      Overall pounds Gained: 9#    Do you smoke?: no    Alcohol Intake:   I do not drink at all:x      Class Guidelines    Pt. Understand that if they miss a month, depending on insurance, they may have to start over. Pt. Also understand that the expectation is to lose  Or maintain weight. Weight gain may result in delaying surgery until the weight is off. EATING HABITS AND BEHAVIORS:  Pt. Struggles With:  Liquid calories:   Skipping breakfast: x  Eating foods with a high amount of carbohydrates:   Eating High fat foods:   Eating large portions:   Making poor snack choices:x  Eating out frequently:   Skipping meals: x  Reading labels: x  Drinking >48 oz fluids daily: x  Getting sufficient physical activity/exercise: x  Night time eating/snacking:   Binge eating:   Eating often, even when not hungry (grazing):   Giving into peer pressure regarding eating/drinking:   Other:     Each class we spend time discussing the pre-op diet, diet progression and vitamin/mineral requirements as well as the Key Diet Principles. Each class we also cover lowering carbohydrate consumption. Keeping carbohydrates <25 grams per meal and avoiding added sugars is emphasized. Patient is educated on the effects that  carbohydrates and bad fats have on them. PHYSICAL ACTIVITY/EXERCISE:   Patient's activity is increasing because patient plans to or is:  Walking more: Other aerobic activity such as running, swimming, Leanna, kickboxing ect.:   Chair exercises:    Active in resistance training such as weight lifting:   Other:     Each class we spend time discussing the importance of increasing activity. Patient can start with 10 minutes of walking daily or chair exercises and build from there. BEHAVIOR MODIFICATION:  Making modifications to behavior, patient plans to or is:  Eating only when hungry not to treat stress, anger, tiredness or boredom:   Eating away from TV, computer and phone:   Eating on a small plate:   Eats slowly, chewing food well: x  Other: We spend time in each class discussing the importance of breaking bad habits and how to do this. I encourage pt.s to self evaluate and look for those crucial moments in which they are making these poor choices. I recommend a food journal which can help identify when/where//why/who we are with when we compromise and make exceptions that are poor choices. ADDITIONAL INFORMATION:  Specific changes patient made since the last class:  Patient shares that she has been doing her best to make needed changes. RD covered label reading again. This has been a struggle for her to understand. I was pleased to see a less stressed, more engaged and calm person during the WLT class. I have renewed hope in this client.     Kyle Posadas RD  08/30/2017

## 2017-09-27 ENCOUNTER — DOCUMENTATION ONLY (OUTPATIENT)
Dept: SURGERY | Age: 57
End: 2017-09-27

## 2017-09-27 NOTE — PROGRESS NOTES
Patient visits RD for weight check weighing 250#. Last months weight was 247#. Goal weight is the 1st weight with the surgeon 238#. Patient acted confused and asked specific questions regarding her food intake. Her next visit with me is October 18th.

## 2017-10-24 ENCOUNTER — DOCUMENTATION ONLY (OUTPATIENT)
Dept: SURGERY | Age: 57
End: 2017-10-24

## 2017-10-24 NOTE — PROGRESS NOTES
Patient visits RD weighing 251#. Last month she weighed 250# and the month before she weighed 247#. Goal weight is 238#. Her next visit is November 29th. Patient snacks on chex mix, popcorn, chips, peanuts. She still snacks in front of the TV. Patient shares that she is an emotional eater. I am grateful she will see the Psychologist soon.

## 2017-10-31 ENCOUNTER — TELEPHONE (OUTPATIENT)
Dept: SURGERY | Age: 57
End: 2017-10-31

## 2017-10-31 NOTE — TELEPHONE ENCOUNTER
Called pt. She forgot about her scheduled follow up appointment with Dr. Alden Tello. I gave her the phone number and she will call and reschedule. I reminded pt of her weight check appointment on 11/29/17 with Sharon. Patient will call me back after she reschedules with Dr. Alden Tello. When she calls back I will remind her that we need to get her A1C levels rechecked. Pt was confused and frustrated about missing her appointment with Dr. Marilin Meyer.

## 2017-12-15 ENCOUNTER — TELEPHONE (OUTPATIENT)
Dept: SURGERY | Age: 57
End: 2017-12-15

## 2017-12-15 NOTE — TELEPHONE ENCOUNTER
Called patient to see if she had r/s her appointment with Dr. Kennedy Santos. Patient stated that she has not, she got depressed because she feel as if she is trying her hardest but keep missing appointments. I inform patient that I am here for her and that we will get her back on track. I gave her the phone number to Dr. Kennedy Santos office and told patient to call them and schedule her appointment. Once she has completed that, she can then call me to let me know when it is schedule. I inform the patient that this will show me that she is trying to make sure she is doing everything that is needed for her surgery. Once she call me with this information I will then have her repeat her A1c test. Patient expressed understanding and thanked me for checking in with her.

## 2017-12-22 NOTE — TELEPHONE ENCOUNTER
lvm for patient to return my call to see if she has made her r/s appointment with Dr. Faraz Viramontes.  Can be reached at [phone]

## 2018-01-17 ENCOUNTER — HOSPITAL ENCOUNTER (EMERGENCY)
Age: 58
Discharge: HOME | End: 2018-01-18
Attending: EMERGENCY MEDICINE | Admitting: EMERGENCY MEDICINE
Payer: MEDICARE

## 2018-01-17 DIAGNOSIS — R07.9 ACUTE CHEST PAIN: ICD-10-CM

## 2018-01-17 DIAGNOSIS — R55 SYNCOPE AND COLLAPSE: Primary | ICD-10-CM

## 2018-01-17 DIAGNOSIS — E11.65 POORLY CONTROLLED TYPE 2 DIABETES MELLITUS (HCC): ICD-10-CM

## 2018-01-17 DIAGNOSIS — R73.9 ACUTE HYPERGLYCEMIA: ICD-10-CM

## 2018-01-17 PROCEDURE — 96374 THER/PROPH/DIAG INJ IV PUSH: CPT

## 2018-01-17 PROCEDURE — 99285 EMERGENCY DEPT VISIT HI MDM: CPT

## 2018-01-17 PROCEDURE — 96361 HYDRATE IV INFUSION ADD-ON: CPT

## 2018-01-17 RX ORDER — GUAIFENESIN 100 MG/5ML
162 LIQUID (ML) ORAL
Status: COMPLETED | OUTPATIENT
Start: 2018-01-17 | End: 2018-01-18

## 2018-01-17 NOTE — LETTER
NOTIFICATION RETURN TO WORK / SCHOOL 
 
1/18/2018 3:16 AM 
 
Ms. Helen Benavidez [address] [id number] To Whom It May Concern: 
 
Helen Benavidez is currently under the care of [facility] EMERGENCY DEPT. She will return to work/school on: 1/19/18 If there are questions or concerns please have the patient contact our office. Sincerely, Homero Walker MD

## 2018-01-18 ENCOUNTER — APPOINTMENT (OUTPATIENT)
Dept: GENERAL RADIOLOGY | Age: 58
End: 2018-01-18
Attending: EMERGENCY MEDICINE
Payer: MEDICARE

## 2018-01-18 VITALS
HEIGHT: 65 IN | HEART RATE: 88 BPM | BODY MASS INDEX: 43.32 KG/M2 | RESPIRATION RATE: 14 BRPM | DIASTOLIC BLOOD PRESSURE: 71 MMHG | WEIGHT: 260 LBS | OXYGEN SATURATION: 99 % | SYSTOLIC BLOOD PRESSURE: 123 MMHG | TEMPERATURE: 98 F

## 2018-01-18 LAB
ALBUMIN SERPL-MCNC: 3.6 G/DL (ref 3.4–5)
ALBUMIN/GLOB SERPL: 0.9 {RATIO} (ref 0.8–1.7)
ALP SERPL-CCNC: 124 U/L (ref 45–117)
ALT SERPL-CCNC: 27 U/L (ref 13–56)
ANION GAP SERPL CALC-SCNC: 9 MMOL/L (ref 3–18)
APPEARANCE UR: CLEAR
AST SERPL-CCNC: 18 U/L (ref 15–37)
ATRIAL RATE: 100 BPM
ATRIAL RATE: 81 BPM
BASOPHILS # BLD: 0 K/UL (ref 0–0.06)
BASOPHILS NFR BLD: 1 % (ref 0–2)
BILIRUB SERPL-MCNC: 0.3 MG/DL (ref 0.2–1)
BILIRUB UR QL: NEGATIVE
BUN SERPL-MCNC: 8 MG/DL (ref 7–18)
BUN/CREAT SERPL: 8 (ref 12–20)
CALCIUM SERPL-MCNC: 9.1 MG/DL (ref 8.5–10.1)
CALCULATED P AXIS, ECG09: 63 DEGREES
CALCULATED P AXIS, ECG09: 67 DEGREES
CALCULATED R AXIS, ECG10: 26 DEGREES
CALCULATED R AXIS, ECG10: 44 DEGREES
CALCULATED T AXIS, ECG11: -63 DEGREES
CALCULATED T AXIS, ECG11: 10 DEGREES
CHLORIDE SERPL-SCNC: 100 MMOL/L (ref 100–108)
CO2 SERPL-SCNC: 25 MMOL/L (ref 21–32)
COLOR UR: YELLOW
CREAT SERPL-MCNC: 1.06 MG/DL (ref 0.6–1.3)
D DIMER PPP FEU-MCNC: 0.43 UG/ML(FEU)
DIAGNOSIS, 93000: NORMAL
DIAGNOSIS, 93000: NORMAL
DIFFERENTIAL METHOD BLD: NORMAL
EOSINOPHIL # BLD: 0.1 K/UL (ref 0–0.4)
EOSINOPHIL NFR BLD: 1 % (ref 0–5)
ERYTHROCYTE [DISTWIDTH] IN BLOOD BY AUTOMATED COUNT: 12 % (ref 11.6–14.5)
GLOBULIN SER CALC-MCNC: 3.9 G/DL (ref 2–4)
GLUCOSE BLD STRIP.AUTO-MCNC: 275 MG/DL (ref 70–110)
GLUCOSE BLD STRIP.AUTO-MCNC: 540 MG/DL (ref 70–110)
GLUCOSE SERPL-MCNC: 503 MG/DL (ref 74–99)
GLUCOSE UR STRIP.AUTO-MCNC: >1000 MG/DL
HCG SERPL QL: NEGATIVE
HCG UR QL: NEGATIVE
HCT VFR BLD AUTO: 42.1 % (ref 35–45)
HGB BLD-MCNC: 13.7 G/DL (ref 12–16)
HGB UR QL STRIP: NEGATIVE
KETONES UR QL STRIP.AUTO: NEGATIVE MG/DL
LEUKOCYTE ESTERASE UR QL STRIP.AUTO: NEGATIVE
LYMPHOCYTES # BLD: 3 K/UL (ref 0.9–3.6)
LYMPHOCYTES NFR BLD: 43 % (ref 21–52)
MCH RBC QN AUTO: 26.3 PG (ref 24–34)
MCHC RBC AUTO-ENTMCNC: 32.5 G/DL (ref 31–37)
MCV RBC AUTO: 81 FL (ref 74–97)
MONOCYTES # BLD: 0.4 K/UL (ref 0.05–1.2)
MONOCYTES NFR BLD: 5 % (ref 3–10)
NEUTS SEG # BLD: 3.4 K/UL (ref 1.8–8)
NEUTS SEG NFR BLD: 50 % (ref 40–73)
NITRITE UR QL STRIP.AUTO: NEGATIVE
P-R INTERVAL, ECG05: 174 MS
P-R INTERVAL, ECG05: 186 MS
PH UR STRIP: 5 [PH] (ref 5–8)
PLATELET # BLD AUTO: 341 K/UL (ref 135–420)
PMV BLD AUTO: 9.8 FL (ref 9.2–11.8)
POTASSIUM SERPL-SCNC: 4.3 MMOL/L (ref 3.5–5.5)
PROT SERPL-MCNC: 7.5 G/DL (ref 6.4–8.2)
PROT UR STRIP-MCNC: NEGATIVE MG/DL
Q-T INTERVAL, ECG07: 356 MS
Q-T INTERVAL, ECG07: 380 MS
QRS DURATION, ECG06: 62 MS
QRS DURATION, ECG06: 68 MS
QTC CALCULATION (BEZET), ECG08: 441 MS
QTC CALCULATION (BEZET), ECG08: 459 MS
RBC # BLD AUTO: 5.2 M/UL (ref 4.2–5.3)
SODIUM SERPL-SCNC: 134 MMOL/L (ref 136–145)
SP GR UR REFRACTOMETRY: >1.03 (ref 1–1.03)
TROPONIN I SERPL-MCNC: <0.02 NG/ML (ref 0–0.04)
TROPONIN I SERPL-MCNC: <0.02 NG/ML (ref 0–0.04)
UROBILINOGEN UR QL STRIP.AUTO: 0.2 EU/DL (ref 0.2–1)
VENTRICULAR RATE, ECG03: 100 BPM
VENTRICULAR RATE, ECG03: 81 BPM
WBC # BLD AUTO: 6.8 K/UL (ref 4.6–13.2)

## 2018-01-18 PROCEDURE — 84703 CHORIONIC GONADOTROPIN ASSAY: CPT | Performed by: EMERGENCY MEDICINE

## 2018-01-18 PROCEDURE — 85379 FIBRIN DEGRADATION QUANT: CPT | Performed by: EMERGENCY MEDICINE

## 2018-01-18 PROCEDURE — 74011250636 HC RX REV CODE- 250/636: Performed by: EMERGENCY MEDICINE

## 2018-01-18 PROCEDURE — 82962 GLUCOSE BLOOD TEST: CPT

## 2018-01-18 PROCEDURE — 81025 URINE PREGNANCY TEST: CPT | Performed by: EMERGENCY MEDICINE

## 2018-01-18 PROCEDURE — 81003 URINALYSIS AUTO W/O SCOPE: CPT | Performed by: EMERGENCY MEDICINE

## 2018-01-18 PROCEDURE — 74011250637 HC RX REV CODE- 250/637: Performed by: EMERGENCY MEDICINE

## 2018-01-18 PROCEDURE — 84484 ASSAY OF TROPONIN QUANT: CPT | Performed by: EMERGENCY MEDICINE

## 2018-01-18 PROCEDURE — 80053 COMPREHEN METABOLIC PANEL: CPT | Performed by: EMERGENCY MEDICINE

## 2018-01-18 PROCEDURE — 74011636637 HC RX REV CODE- 636/637

## 2018-01-18 PROCEDURE — 85025 COMPLETE CBC W/AUTO DIFF WBC: CPT | Performed by: EMERGENCY MEDICINE

## 2018-01-18 PROCEDURE — 93005 ELECTROCARDIOGRAM TRACING: CPT

## 2018-01-18 PROCEDURE — 71045 X-RAY EXAM CHEST 1 VIEW: CPT

## 2018-01-18 RX ADMIN — ASPIRIN 81 MG 162 MG: 81 TABLET ORAL at 00:10

## 2018-01-18 RX ADMIN — SODIUM CHLORIDE 2000 ML: 900 INJECTION, SOLUTION INTRAVENOUS at 00:39

## 2018-01-18 RX ADMIN — HUMAN INSULIN 10 UNITS: 100 INJECTION, SOLUTION SUBCUTANEOUS at 00:39

## 2018-01-18 NOTE — ED TRIAGE NOTES
Patient presents to the ED with complaints of chest pain and SOB while in car with . Patient states she, \"passed out\" while in the vehicle.

## 2018-01-18 NOTE — ED NOTES
I have reviewed discharge instructions with the patient. The patient verbalized understanding. Patient armband removed and shredded. Patient discharged ambulatory to [facility] with daughter.

## 2018-01-18 NOTE — ED PROVIDER NOTES
HPI Comments: Brielle Lima is a 56 y.o. Female with h/o niddm who states she was in her car and had some abnl chest pressure, sensation, felt sob, then went to get out of car and got very lightheaded, went to ground, and passed out. No nvd, headache, abd pain. Per family had dec loc for about 5 min then woke up once medics arrived. Elevated glucose in route. ecg done. Pt with no pain currently. No recent cp, sob, cough, fever, polyuria, dipsia, numbness, weakness, edema, h/o vte, cad. States she has been compliant with her meds but not diet. Sx of passing out now resolved along with cp also resolved    The history is provided by the patient and the EMS personnel. Past Medical History:   Diagnosis Date    Arthritis     Depression     Diabetes ([facility]     Hypertension     Morbid obesity ([facility]        Past Surgical History:   Procedure Laterality Date    EXCIS INFRATENT BRAIN TUMOR      HX CHOLECYSTECTOMY      HX GYN      NEUROLOGICAL PROCEDURE UNLISTED           Family History:   Problem Relation Age of Onset   [facility] Cancer Mother     Hypertension Mother     Diabetes Mother     Psychiatric Disorder Father     Lupus Sister        Social History     Social History    Marital status: SINGLE     Spouse name: N/A    Number of children: N/A    Years of education: N/A     Occupational History    Not on file. Social History Main Topics    Smoking status: Former Smoker    Smokeless tobacco: Never Used    Alcohol use No    Drug use: Yes     Special: Cocaine, Marijuana, Heroin, Opiates    Sexual activity: Not on file     Other Topics Concern    Not on file     Social History Narrative         ALLERGIES: Review of patient's allergies indicates no known allergies. Review of Systems   Constitutional: Negative for fever. HENT: Negative for sore throat and trouble swallowing. Eyes: Negative for visual disturbance. Respiratory: Positive for shortness of breath. Negative for cough. Cardiovascular: Positive for chest pain. Negative for leg swelling. Gastrointestinal: Negative for abdominal pain and blood in stool. Endocrine: Negative for polyuria. Genitourinary: Negative for dysuria and hematuria. Musculoskeletal: Negative for gait problem. Skin: Negative for rash. Allergic/Immunologic: Negative for immunocompromised state. Neurological: Positive for syncope and light-headedness. Psychiatric/Behavioral: Negative for sleep disturbance. Vitals:    01/18/18 0025 01/18/18 0030 01/18/18 0153 01/18/18 0250   BP:  119/88 109/78 123/71   Pulse:  97 94 87   Resp:  17 17 20   Temp: 98 °F (36.7 °C)      SpO2:  99% 97% 98%   Weight: 117.9 kg (260 lb)      Height: 5' 5\" (1.651 m)               Physical Exam   Constitutional: She is oriented to person, place, and time. She appears well-developed and well-nourished. No distress. Obese     HENT:   Head: Normocephalic and atraumatic. Right Ear: External ear normal.   Left Ear: External ear normal.   Nose: Nose normal.   Mouth/Throat: Uvula is midline, oropharynx is clear and moist and mucous membranes are normal.   Eyes: Conjunctivae are normal. No scleral icterus. Neck: Neck supple. Cardiovascular: Normal rate, regular rhythm, normal heart sounds and intact distal pulses. Pulmonary/Chest: Effort normal and breath sounds normal.   Abdominal: Soft. There is no tenderness. Musculoskeletal: She exhibits no edema. Neurological: She is alert and oriented to person, place, and time. Gait normal.   Skin: Skin is warm and dry. She is not diaphoretic. Psychiatric: Her behavior is normal.   Nursing note and vitals reviewed.        [facility]  ED Course       Procedures    Vitals:  Patient Vitals for the past 12 hrs:   Temp Pulse Resp BP SpO2   01/18/18 0250 - 87 20 123/71 98 %   01/18/18 0153 - 94 17 109/78 97 %   01/18/18 0030 - 97 17 119/88 99 %   01/18/18 0025 98 °F (36.7 °C) - - - -   01/18/18 0019 - 97 15 - 100 %   01/18/18 0011 - - - 127/79 -         Medications ordered:   Medications   aspirin chewable tablet 162 mg (162 mg Oral Given 1/18/18 0010)   sodium chloride 0.9 % bolus infusion 2,000 mL (2,000 mL IntraVENous New Bag 1/18/18 0039)   insulin regular (NOVOLIN R, HUMULIN R) injection 10 Units (10 Units IntraVENous Given 1/18/18 0039)         Lab findings:  Recent Results (from the past 12 hour(s))   URINALYSIS W/ RFLX MICROSCOPIC    Collection Time: 01/18/18 12:05 AM   Result Value Ref Range    Color YELLOW      Appearance CLEAR      Specific gravity >1.030 (H) 1.005 - 1.030    pH (UA) 5.0 5.0 - 8.0      Protein NEGATIVE  NEG mg/dL    Glucose >1000 (A) NEG mg/dL    Ketone NEGATIVE  NEG mg/dL    Bilirubin NEGATIVE  NEG      Blood NEGATIVE  NEG      Urobilinogen 0.2 0.2 - 1.0 EU/dL    Nitrites NEGATIVE  NEG      Leukocyte Esterase NEGATIVE  NEG     HCG URINE, QL    Collection Time: 01/18/18 12:05 AM   Result Value Ref Range    HCG urine, Ql. NEGATIVE  NEG     CBC WITH AUTOMATED DIFF    Collection Time: 01/18/18 12:10 AM   Result Value Ref Range    WBC 6.8 4.6 - 13.2 K/uL    RBC 5.20 4. 20 - 5.30 M/uL    HGB 13.7 12.0 - 16.0 g/dL    HCT 42.1 35.0 - 45.0 %    MCV 81.0 74.0 - 97.0 FL    MCH 26.3 24.0 - 34.0 PG    MCHC 32.5 31.0 - 37.0 g/dL    RDW 12.0 11.6 - 14.5 %    PLATELET 220 239 - 366 K/uL    MPV 9.8 9.2 - 11.8 FL    NEUTROPHILS 50 40 - 73 %    LYMPHOCYTES 43 21 - 52 %    MONOCYTES 5 3 - 10 %    EOSINOPHILS 1 0 - 5 %    BASOPHILS 1 0 - 2 %    ABS. NEUTROPHILS 3.4 1.8 - 8.0 K/UL    ABS. LYMPHOCYTES 3.0 0.9 - 3.6 K/UL    ABS. MONOCYTES 0.4 0.05 - 1.2 K/UL    ABS. EOSINOPHILS 0.1 0.0 - 0.4 K/UL    ABS.  BASOPHILS 0.0 0.0 - 0.06 K/UL    DF AUTOMATED     METABOLIC PANEL, COMPREHENSIVE    Collection Time: 01/18/18 12:10 AM   Result Value Ref Range    Sodium 134 (L) 136 - 145 mmol/L    Potassium 4.3 3.5 - 5.5 mmol/L    Chloride 100 100 - 108 mmol/L    CO2 25 21 - 32 mmol/L    Anion gap 9 3.0 - 18 mmol/L    Glucose 503 (HH) 74 - 99 mg/dL    BUN 8 7.0 - 18 MG/DL    Creatinine 1.06 0.6 - 1.3 MG/DL    BUN/Creatinine ratio 8 (L) 12 - 20      GFR est AA >60 >60 ml/min/1.73m2    GFR est non-AA 54 (L) >60 ml/min/1.73m2    Calcium 9.1 8.5 - 10.1 MG/DL    Bilirubin, total 0.3 0.2 - 1.0 MG/DL    ALT (SGPT) 27 13 - 56 U/L    AST (SGOT) 18 15 - 37 U/L    Alk.  phosphatase 124 (H) 45 - 117 U/L    Protein, total 7.5 6.4 - 8.2 g/dL    Albumin 3.6 3.4 - 5.0 g/dL    Globulin 3.9 2.0 - 4.0 g/dL    A-G Ratio 0.9 0.8 - 1.7     TROPONIN I    Collection Time: 01/18/18 12:10 AM   Result Value Ref Range    Troponin-I, Qt. <0.02 0.0 - 0.045 NG/ML   D DIMER    Collection Time: 01/18/18 12:10 AM   Result Value Ref Range    D DIMER 0.43 <0.46 ug/ml(FEU)   HCG QL SERUM    Collection Time: 01/18/18 12:10 AM   Result Value Ref Range    HCG, Ql. NEGATIVE  NEG     EKG, 12 LEAD, INITIAL    Collection Time: 01/18/18 12:12 AM   Result Value Ref Range    Ventricular Rate 100 BPM    Atrial Rate 100 BPM    P-R Interval 174 ms    QRS Duration 62 ms    Q-T Interval 356 ms    QTC Calculation (Bezet) 459 ms    Calculated P Axis 63 degrees    Calculated R Axis 26 degrees    Calculated T Axis 10 degrees    Diagnosis       Normal sinus rhythm  Possible Left atrial enlargement  Borderline ECG  When compared with ECG of 15-MAR-2017 15:45,  T wave inversion no longer evident in Inferior leads  Nonspecific T wave abnormality no longer evident in Lateral leads     GLUCOSE, POC    Collection Time: 01/18/18 12:20 AM   Result Value Ref Range    Glucose (POC) 540 (HH) 70 - 110 mg/dL   GLUCOSE, POC    Collection Time: 01/18/18  1:57 AM   Result Value Ref Range    Glucose (POC) 275 (H) 70 - 110 mg/dL   EKG, 12 LEAD, SUBSEQUENT    Collection Time: 01/18/18  2:59 AM   Result Value Ref Range    Ventricular Rate 81 BPM    Atrial Rate 81 BPM    P-R Interval 186 ms    QRS Duration 68 ms    Q-T Interval 380 ms    QTC Calculation (Bezet) 441 ms    Calculated P Axis 67 degrees    Calculated R Axis 44 degrees    Calculated T Axis -63 degrees    Diagnosis       Normal sinus rhythm  Possible Left atrial enlargement  Nonspecific T wave abnormality  Abnormal ECG  When compared with ECG of 18-JAN-2018 00:12,  Nonspecific T wave abnormality now evident in Lateral leads     TROPONIN I    Collection Time: 01/18/18  3:00 AM   Result Value Ref Range    Troponin-I, Qt. <0.02 0.0 - 0.045 NG/ML       EKG interpretation by ED Physician:  Initial: nsr with ns tw abnl. No acute st changes  Rate 100, qtc 459, pr 174  tw appear slightly diff from previous    Repeat: nsr with ns tw changes not sig different  Rate 81, pr 186, qtc 441          X-Ray, CT or other radiology findings or impressions:  XR CHEST PORT    (Results Pending)   nothing acute per my interp    Progress notes, Consult notes or additional Procedure notes:   Feeling better. Serial trop below detectable level. No acute changes on serial ecg. No recurrent pain. Glucose sig improved. Doubt need for admission. Can f/u as outpt for eval.suspect sx sec to severe hyperglycemia  I have discussed with patient and/or family/sig other the results, interpretation of any imaging if performed, suspected diagnosis and treatment plan to include instructions regarding the diagnoses listed to which understanding was expressed with all questions answered      Reevaluation of patient:   Stable for dc    Disposition:  Diagnosis:   1. Syncope and collapse    2. Acute chest pain    3. Acute hyperglycemia    4.  Poorly controlled type 2 diabetes mellitus ([address]        Disposition: home      Follow-up Information     Follow up With Details Comments Contact Info    Lehman Heimlich, MD Schedule an appointment as soon as possible for a visit within next week for follow up on your ER visit to get your diabetes medications rechecked [address] [address]  [phone]      [facility] EMERGENCY DEPT  If symptoms worsen [address]  [phone]            Patient's Medications Start Taking    No medications on file   Continue Taking    IBUPROFEN (MOTRIN) 800 MG TABLET    Take  by mouth. METFORMIN (GLUCOPHAGE) 1,000 MG TABLET    Take 1,000 mg by mouth two (2) times daily (with meals). PIOGLITAZONE (ACTOS) 30 MG TABLET    Take  by mouth daily. PROPRANOLOL (INDERAL) 20 MG TABLET    Take  by mouth three (3) times daily. SITAGLIPTIN (JANUVIA) 25 MG TABLET    Take 25 mg by mouth daily. VENLAFAXINE (EFFEXOR) 75 MG TABLET    Take 25 mg by mouth three (3) times daily.    These Medications have changed    No medications on file   Stop Taking    No medications on file

## 2018-02-15 ENCOUNTER — TELEPHONE (OUTPATIENT)
Dept: SURGERY | Age: 58
End: 2018-02-15

## 2018-06-21 ENCOUNTER — OFFICE VISIT (OUTPATIENT)
Dept: FAMILY MEDICINE CLINIC | Age: 58
End: 2018-06-21

## 2018-06-21 VITALS
RESPIRATION RATE: 18 BRPM | TEMPERATURE: 96.4 F | OXYGEN SATURATION: 94 % | HEIGHT: 65 IN | DIASTOLIC BLOOD PRESSURE: 77 MMHG | SYSTOLIC BLOOD PRESSURE: 121 MMHG | WEIGHT: 246 LBS | BODY MASS INDEX: 40.98 KG/M2 | HEART RATE: 65 BPM

## 2018-06-21 DIAGNOSIS — J30.89 ENVIRONMENTAL AND SEASONAL ALLERGIES: ICD-10-CM

## 2018-06-21 DIAGNOSIS — E11.9 TYPE 2 DIABETES MELLITUS WITHOUT COMPLICATION, WITHOUT LONG-TERM CURRENT USE OF INSULIN (HCC): Primary | ICD-10-CM

## 2018-06-21 DIAGNOSIS — R42 VERTIGO: ICD-10-CM

## 2018-06-21 DIAGNOSIS — I10 ESSENTIAL HYPERTENSION: ICD-10-CM

## 2018-06-21 LAB — HBA1C MFR BLD HPLC: 7.2 %

## 2018-06-21 RX ORDER — PROPRANOLOL HYDROCHLORIDE 60 MG/1
60 CAPSULE, EXTENDED RELEASE ORAL DAILY
Qty: 90 CAP | Refills: 1 | Status: SHIPPED | OUTPATIENT
Start: 2018-06-21 | End: 2018-08-07 | Stop reason: SDUPTHER

## 2018-06-21 RX ORDER — DIAZEPAM 5 MG/1
5 TABLET ORAL
COMMUNITY
End: 2018-06-21 | Stop reason: ALTCHOICE

## 2018-06-21 RX ORDER — PROPRANOLOL HYDROCHLORIDE 60 MG/1
CAPSULE, EXTENDED RELEASE ORAL DAILY
COMMUNITY
End: 2018-06-21 | Stop reason: SDUPTHER

## 2018-06-21 RX ORDER — FLUCONAZOLE 200 MG/1
200 TABLET ORAL DAILY
COMMUNITY
End: 2019-02-14

## 2018-06-21 RX ORDER — MECLIZINE HYDROCHLORIDE 25 MG/1
25 TABLET ORAL 2 TIMES DAILY
Qty: 60 TAB | Refills: 1 | Status: SHIPPED | OUTPATIENT
Start: 2018-06-21 | End: 2019-01-11 | Stop reason: SDUPTHER

## 2018-06-21 RX ORDER — MONTELUKAST SODIUM 10 MG/1
10 TABLET ORAL DAILY
Qty: 90 TAB | Refills: 1 | Status: SHIPPED | OUTPATIENT
Start: 2018-06-21 | End: 2018-08-07 | Stop reason: SDUPTHER

## 2018-06-21 RX ORDER — PIOGLITAZONEHYDROCHLORIDE 30 MG/1
30 TABLET ORAL DAILY
Qty: 90 TAB | Refills: 1 | Status: SHIPPED | OUTPATIENT
Start: 2018-06-21 | End: 2018-08-07 | Stop reason: SDUPTHER

## 2018-06-21 NOTE — LETTER
6/21/2018 11:46 AM 
 
Ms. Clau Lindo [address] [phone] To Whom It May Concern: 
 
Clau Lindo is currently under the care of Shonna Go. She requires transportation to the [facility] on Mondays, Tuesdays, and Thursdays due to medical need. If there are questions or concerns please have the patient contact our office. Sincerely, Agapito Bishop MD

## 2018-06-21 NOTE — LETTER
6/21/2018 12:03 PM 
 
Ms. Carlos Reyes [address] [phone] To Whom It May Concern: 
 
Carlos Reyes is currently under the care of Shonna Go. She requires transportation to the [facility] on Mondays, Tuesdays, and Thursdays due to medical need. If there are questions or concerns please have the patient contact our office. Sincerely, Lakshmi Callahan MD

## 2018-06-21 NOTE — MR AVS SNAPSHOT
Randy Bradley Lima [address] [facility]. [address] [facility] [address] [id number] 
[phone] Patient: Tang Srinivasan MRN: [id number] :1960 Visit Information Date & Time Provider Department Dept. Phone Encounter #  
 2018 11:00 AM Saniya Lopez, [address] [place], [place] [id number] Follow-up Instructions Return in about 1 month (around 2018) for Needs appointment for fasting labs. Upcoming Health Maintenance Date Due Hepatitis C Screening 1960 LIPID PANEL Q1 1962 FOOT EXAM Q1 1972 MICROALBUMIN Q1 1972 EYE EXAM RETINAL OR DILATED Q1 1972 Pneumococcal 19-64 Medium Risk (1 of 1 - PPSV23) 1981 DTaP/Tdap/Td series (1 - Tdap) 1983 BREAST CANCER SCRN MAMMOGRAM 2012 FOBT Q 1 YEAR AGE 50-75 2012 PAP AKA CERVICAL CYTOLOGY 2014 HEMOGLOBIN A1C Q6M 2018 MEDICARE YEARLY EXAM 3/20/2018 Influenza Age 5 to Adult 2018 Allergies as of 2018  Review Complete On: 2018 By: Rachel Damian LPN No Known Allergies Current Immunizations  Never Reviewed No immunizations on file. Not reviewed this visit You Were Diagnosed With   
  
 Codes Comments Type 2 diabetes mellitus without complication, without long-term current use of insulin (HCC)    -  Primary ICD-10-CM: E11.9 ICD-9-CM: 250.00 Essential hypertension     ICD-10-CM: I10 
ICD-9-CM: 401.9 Vertigo     ICD-10-CM: H56 ICD-9-CM: 780.4 Environmental and seasonal allergies     ICD-10-CM: J30.89 ICD-9-CM: 477.8 Vitals BP Pulse Temp Resp Height(growth percentile) Weight(growth percentile) 121/77 (BP 1 Location: Left arm, BP Patient Position: Sitting) 65 96.4 °F (35.8 °C) (Oral) 18 5' 5\" (1.651 m) 246 lb (111.6 kg) SpO2 BMI OB Status Smoking Status 94% 40.94 kg/m2 Menopause Former Smoker Vitals History BMI and BSA Data Body Mass Index Body Surface Area 40.94 kg/m 2 2.26 m 2 Preferred Pharmacy Pharmacy Name Phone [address], [address] [phone] Your Updated Medication List  
  
   
This list is accurate as of 6/21/18 12:05 PM.  Always use your most recent med list.  
  
  
  
  
 diazePAM 5 mg tablet Commonly known as:  VALIUM Take 5 mg by mouth every six (6) hours as needed for Anxiety. fluconazole 200 mg tablet Commonly known as:  DIFLUCAN Take 200 mg by mouth daily. FDA advises cautious prescribing of oral fluconazole in pregnancy. ibuprofen 800 mg tablet Commonly known as:  MOTRIN Take  by mouth.  
  
 meclizine 25 mg tablet Commonly known as:  ANTIVERT Take 1 Tab by mouth two (2) times a day. metFORMIN 1,000 mg tablet Commonly known as:  GLUCOPHAGE Take 1,000 mg by mouth two (2) times daily (with meals). montelukast 10 mg tablet Commonly known as:  SINGULAIR Take 1 Tab by mouth daily. pioglitazone 30 mg tablet Commonly known as:  ACTOS Take 1 Tab by mouth daily. propranolol LA 60 mg SR capsule Commonly known as:  INDERAL LA Take 1 Cap by mouth daily. SITagliptin 100 mg tablet Commonly known as:  [facility] Take 1 Tab by mouth daily. venlafaxine 75 mg tablet Commonly known as:  [facility] Take 25 mg by mouth three (3) times daily. Prescriptions Sent to Pharmacy Refills  
 meclizine (ANTIVERT) 25 mg tablet 1 Sig: Take 1 Tab by mouth two (2) times a day. Class: Normal  
 Pharmacy: [facility] [address] PIYUSH COHN [address], [address] Ph #: [phone] Route: Oral  
 pioglitazone (ACTOS) 30 mg tablet 1 Sig: Take 1 Tab by mouth daily. Class: Normal  
 Pharmacy: [facility] [address]YUSH COHN [address], [address] Ph #: [phone] Route: Oral  
 propranolol LA (INDERAL LA) 60 mg SR capsule 1 Sig: Take 1 Cap by mouth daily. Class: Normal  
 Pharmacy: [facility] [address] PIYUSH COHN [address], [address] Ph #: [phone] Route: Oral  
 SITagliptin (JANUVIA) 100 mg tablet 1 Sig: Take 1 Tab by mouth daily. Class: Normal  
 Pharmacy: [facility] [address] PIYUSH COHN [address], [address] Ph #: [phone] Route: Oral  
 montelukast (SINGULAIR) 10 mg tablet 1 Sig: Take 1 Tab by mouth daily. Class: Normal  
 Pharmacy: [facility] [address] PIYUSH COHN [address], [address] Ph #: [phone] Route: Oral  
  
We Performed the Following AMB POC HEMOGLOBIN A1C [13679 CPT(R)] Follow-up Instructions Return in about 1 month (around 7/21/2018) for Needs appointment for fasting labs. To-Do List   
 06/21/2018 Lab:  CBC WITH AUTOMATED DIFF   
  
 06/21/2018 Lab:  LIPID PANEL   
  
 06/21/2018 Lab:  METABOLIC PANEL, COMPREHENSIVE   
  
 06/21/2018 Lab:  MICROALBUMIN, UR, RAND W/ MICROALB/CREAT RATIO Introducing [facility]! [facility] introduces iversity patient portal. Now you can access parts of your medical record, email your doctor's office, and request medication refills online. 1. In your internet browser, go to [email or web address] 2. Click on the First Time User? Click Here link in the Sign In box. You will see the New Member Sign Up page. 3. Enter your iversity Access Code exactly as it appears below. You will not need to use this code after youve completed the sign-up process. If you do not sign up before the expiration date, you must request a new code. · iversity Access Code: [email or web address] Expires: 9/19/2018 11:45 AM 
 
4. Enter the last four digits of your Social Security Number (xxxx) and Date of Birth (mm/dd/yyyy) as indicated and click Submit. You will be taken to the next sign-up page. 5. Create a MyChart ID. This will be your [employer] login ID and cannot be changed, so think of one that is secure and easy to remember. 6. Create a [facility] password. You can change your password at any time. 7. Enter your Password Reset Question and Answer. This can be used at a later time if you forget your password. 8. Enter your e-mail address. You will receive e-mail notification when new information is available in [address] 9. Click Sign Up. You can now view and download portions of your medical record. 10. Click the Download Summary menu link to download a portable copy of your medical information. If you have questions, please visit the Frequently Asked Questions section of the [employer] website. Remember, [facility] is NOT to be used for urgent needs. For medical emergencies, dial 911. Now available from your iPhone and Android! Please provide this summary of care documentation to your next provider. Your primary care clinician is listed as Del Davis. If you have any questions after today's visit, please call [phone].

## 2018-06-21 NOTE — PROGRESS NOTES
[facility]    CC: EOC for chronic disease management    HPI:     DMII:  - Has not been checking blood sugar at home  - Taking medication as prescribed  - Denies any side effects or issues with her medication  - Not follow any exercise regimen  - Wishes to go to gym to exercise, but needs to be driven to the gym      HTN:  - Taking medication as prescribed  - Denies any side effects or issues with her medication  - Does not check BP at home, but does check BP at [facility]  - Reports BP is in 100s/60s at [facility]      Vertigo:  - Started in 2015 after surgery for brain tumor  - Occurs at random  - Feels like room is spinning  - Occurs twice a week  - Was given diazepam, but she does not feel it works      ROS: Positive items marked in RED  CON: fever, chills  Cardiovascular: palpitations, CP  Resp: cough, SOB  GI: nausea, vomiting, diarrhea  : dysuria, hematuria      Past Medical History:   Diagnosis Date    Arthritis     Depression     [facility]     Hypertension     Morbid obesity ([facility]        Past Surgical History:   Procedure Laterality Date    EXCIS INFRATENT BRAIN TUMOR  08/16/2015    [facility]    HX CHOLECYSTECTOMY  2016    [facility]    HX TUBAL LIGATION Bilateral     NEUROLOGICAL PROCEDURE UNLISTED         Family History   Problem Relation Age of Onset    Cancer Mother 58     colon    Hypertension Mother     Diabetes Mother     Psychiatric Disorder Father     Lupus Sister        Social History     Social History    Marital status: UNKNOWN     Spouse name: N/A    Number of children: N/A    Years of education: N/A     Social History Main Topics    Smoking status: Former Smoker     Quit date: 1998    Smokeless tobacco: Never Used    Alcohol use No    Drug use: Yes     Special: Cocaine, Marijuana, Heroin, Opiates    Sexual activity: Not Asked     Other Topics Concern    None     Social History Narrative       No Known Allergies      Current Outpatient Prescriptions:     diazePAM (VALIUM) 5 mg tablet, Take 5 mg by mouth every six (6) hours as needed for Anxiety. , Disp: , Rfl:     fluconazole (DIFLUCAN) 200 mg tablet, Take 200 mg by mouth daily. FDA advises cautious prescribing of oral fluconazole in pregnancy. , Disp: , Rfl:     propranolol (INDERAL) 20 mg tablet, Take  by mouth three (3) times daily. , Disp: , Rfl:     metFORMIN (GLUCOPHAGE) 1,000 mg tablet, Take 1,000 mg by mouth two (2) times daily (with meals). , Disp: , Rfl:     SITagliptin (JANUVIA) 25 mg tablet, Take 25 mg by mouth daily. , Disp: , Rfl:     venlafaxine (EFFEXOR) 75 mg tablet, Take 25 mg by mouth three (3) times daily. , Disp: , Rfl:     pioglitazone (ACTOS) 30 mg tablet, Take  by mouth daily. , Disp: , Rfl:     ibuprofen (MOTRIN) 800 mg tablet, Take  by mouth., Disp: , Rfl:     Physical Exam:      /77 (BP 1 Location: Left arm, BP Patient Position: Sitting)  Pulse 65  Temp 96.4 °F (35.8 °C) (Oral)   Resp 18  Ht 5' 5\" (1.651 m)  Wt 246 lb (111.6 kg)  SpO2 94%  BMI 40.94 kg/m2    General:  Obese habitus, NAD  Eyes: sclera clear bilaterally, no discharge noted, eyelids normal in appearance  HENT: NCAT, nasal turbinates enlarged bilaterally, oropharynx clear, MMM  Lungs: CTAB, Normal respiratory effort and rate  CV: RRR, no MRGs  ABD: soft, non-tender, non-distended, normal bowel sounds  Ext: no peripheral edema or digital cyanosis noted  Skin: normal temperature, turgor, color, and texture  Psych: alert and oriented to person, place and time, normal affect  Neuro: speech normal, moving all extremities, gait normal    Results for Author Paul (MRN [id number]):   Ref.  Range 6/21/2018 11:51   Hemoglobin A1c (POC) Latest Units: % 7.2       Assessment/Plan     DMII:  - HGBA1c close to goal of <7%  - Encouraged to work on lifestyle changes, as it will help her to reach goal HGBA1c  - Will write letter, so patient can go to gym to exercise  - Will continue current medication regimen  - Will check CBC, CMP, fasting lipid panel, and urine microalbumin/cr  - Follow up in one month      HTN, Well Controlled:  - At goal BP of <130/80  - Will continue current medication regimen  - Will check CBC, CMP, fasting lipid panel, and urine microalbumin/cr  - Follow up in one month      Vertigo:  - Will stop diazepam regimen  - Will start on meclizine regimen  - Follow up in one month      Seasonal/Environmental Allergies:  - Will start on Singulair regimen  - Follow up in one month        Steven Durham MD  6/21/2018, 11:26 AM

## 2018-06-21 NOTE — PROGRESS NOTES
1. Have you been to the ER, urgent care clinic since your last visit? Hospitalized since your last visit? No    2. Have you seen or consulted any other health care providers outside of the [address] [place] [address] since your last visit? Include any pap smears or colon screening. No.     Last PAP in 2017. Last Mammo approximately 2 yrs ago. Colonoscopy date unknown. Approximately 3 yrs ago.     PHQ over the last two weeks 6/21/2018   Little interest or pleasure in doing things Several days   Feeling down, depressed or hopeless Not at all   Total Score PHQ 2 1     Chief Complaint   Patient presents with   [address] [place] [address] Establish Care     Visit Vitals    /77 (BP 1 Location: Left arm, BP Patient Position: Sitting)    Pulse 65    Temp 96.4 °F (35.8 °C) (Oral)    Resp 18    Ht 5' 5\" (1.651 m)    Wt 246 lb (111.6 kg)    SpO2 94%    BMI 40.94 kg/m2

## 2018-06-22 LAB
A-G RATIO,AGRAT: 1.4 RATIO (ref 1.1–2.6)
ABSOLUTE LYMPHOCYTE COUNT, 10803: 2.7 K/UL (ref 1–4.8)
ALBUMIN SERPL-MCNC: 4 G/DL (ref 3.5–5)
ALP SERPL-CCNC: 96 U/L (ref 25–115)
ALT SERPL-CCNC: 17 U/L (ref 5–40)
ANION GAP SERPL CALC-SCNC: 15 MMOL/L
AST SERPL W P-5'-P-CCNC: 15 U/L (ref 10–37)
BASOPHILS # BLD: 0 K/UL (ref 0–0.2)
BASOPHILS NFR BLD: 1 % (ref 0–2)
BILIRUB SERPL-MCNC: 0.4 MG/DL (ref 0.2–1.2)
BUN SERPL-MCNC: 8 MG/DL (ref 6–22)
CALCIUM SERPL-MCNC: 9.7 MG/DL (ref 8.4–10.5)
CHLORIDE SERPL-SCNC: 102 MMOL/L (ref 98–110)
CHOLEST SERPL-MCNC: 215 MG/DL (ref 110–200)
CO2 SERPL-SCNC: 25 MMOL/L (ref 20–32)
CREAT SERPL-MCNC: 0.7 MG/DL (ref 0.5–1.2)
CREATININE, URINE: 219 MG/DL
EOSINOPHIL # BLD: 0.1 K/UL (ref 0–0.5)
EOSINOPHIL NFR BLD: 2 % (ref 0–6)
ERYTHROCYTE [DISTWIDTH] IN BLOOD BY AUTOMATED COUNT: 12.6 % (ref 10–15.5)
GFRAA, 66117: >60
GFRNA, 66118: >60
GLOBULIN,GLOB: 2.8 G/DL (ref 2–4)
GLUCOSE SERPL-MCNC: 158 MG/DL (ref 70–99)
GRANULOCYTES,GRANS: 48 % (ref 40–75)
HCT VFR BLD AUTO: 41.8 % (ref 35.1–48)
HDLC SERPL-MCNC: 4.5 MG/DL (ref 0–5)
HDLC SERPL-MCNC: 48 MG/DL (ref 40–59)
HGB BLD-MCNC: 12.5 G/DL (ref 11.7–16)
LDLC SERPL CALC-MCNC: 141 MG/DL (ref 50–99)
LYMPHOCYTES, LYMLT: 44 % (ref 20–45)
MCH RBC QN AUTO: 26 PG (ref 26–34)
MCHC RBC AUTO-ENTMCNC: 30 G/DL (ref 31–36)
MCV RBC AUTO: 88 FL (ref 80–95)
MICROALB/CREAT RATIO, 140286: NORMAL MCG/MG OF CREATININE (ref 0–30)
MICROALBUMIN,URINE RANDOM 140054: <12 UG/ML (ref 0.1–17)
MONOCYTES # BLD: 0.4 K/UL (ref 0.1–1)
MONOCYTES NFR BLD: 6 % (ref 3–12)
NEUTROPHILS # BLD AUTO: 2.9 K/UL (ref 1.8–7.7)
PLATELET # BLD AUTO: 379 K/UL (ref 140–440)
PMV BLD AUTO: 9.9 FL (ref 9–13)
POTASSIUM SERPL-SCNC: 4.4 MMOL/L (ref 3.5–5.5)
PROT SERPL-MCNC: 6.8 G/DL (ref 6.4–8.3)
RBC # BLD AUTO: 4.75 M/UL (ref 3.8–5.2)
SODIUM SERPL-SCNC: 142 MMOL/L (ref 133–145)
TRIGL SERPL-MCNC: 133 MG/DL (ref 40–149)
VLDLC SERPL CALC-MCNC: 27 MG/DL (ref 8–30)
WBC # BLD AUTO: 6 K/UL (ref 4–11)

## 2018-07-19 ENCOUNTER — OFFICE VISIT (OUTPATIENT)
Dept: FAMILY MEDICINE CLINIC | Age: 58
End: 2018-07-19

## 2018-07-19 VITALS
BODY MASS INDEX: 41.99 KG/M2 | OXYGEN SATURATION: 98 % | TEMPERATURE: 97.1 F | HEIGHT: 65 IN | SYSTOLIC BLOOD PRESSURE: 120 MMHG | DIASTOLIC BLOOD PRESSURE: 74 MMHG | HEART RATE: 96 BPM | RESPIRATION RATE: 16 BRPM | WEIGHT: 252 LBS

## 2018-07-19 DIAGNOSIS — R42 VERTIGO: ICD-10-CM

## 2018-07-19 DIAGNOSIS — I10 ESSENTIAL HYPERTENSION: ICD-10-CM

## 2018-07-19 DIAGNOSIS — E78.5 HYPERLIPIDEMIA, UNSPECIFIED HYPERLIPIDEMIA TYPE: ICD-10-CM

## 2018-07-19 DIAGNOSIS — E11.9 TYPE 2 DIABETES MELLITUS WITHOUT COMPLICATION, WITHOUT LONG-TERM CURRENT USE OF INSULIN (HCC): ICD-10-CM

## 2018-07-19 DIAGNOSIS — F32.A DEPRESSION, UNSPECIFIED DEPRESSION TYPE: ICD-10-CM

## 2018-07-19 DIAGNOSIS — K21.9 GASTROESOPHAGEAL REFLUX DISEASE WITHOUT ESOPHAGITIS: Primary | ICD-10-CM

## 2018-07-19 RX ORDER — GUAIFENESIN 100 MG/5ML
81 LIQUID (ML) ORAL DAILY
Qty: 60 TAB | Refills: 1 | Status: SHIPPED | OUTPATIENT
Start: 2018-07-19 | End: 2018-12-14 | Stop reason: SDUPTHER

## 2018-07-19 RX ORDER — RANITIDINE 150 MG/1
150 TABLET, FILM COATED ORAL 2 TIMES DAILY
Qty: 120 TAB | Refills: 1 | Status: SHIPPED | OUTPATIENT
Start: 2018-07-19 | End: 2018-08-07 | Stop reason: SDUPTHER

## 2018-07-19 RX ORDER — ATORVASTATIN CALCIUM 40 MG/1
40 TABLET, FILM COATED ORAL DAILY
Qty: 60 TAB | Refills: 1 | Status: SHIPPED | OUTPATIENT
Start: 2018-07-19 | End: 2018-08-07 | Stop reason: SDUPTHER

## 2018-07-19 RX ORDER — BUPROPION HYDROCHLORIDE 150 MG/1
150 TABLET, EXTENDED RELEASE ORAL 2 TIMES DAILY
Qty: 60 TAB | Refills: 1 | Status: SHIPPED | OUTPATIENT
Start: 2018-07-19 | End: 2018-08-07 | Stop reason: SDUPTHER

## 2018-07-19 NOTE — PROGRESS NOTES
Chief Complaint   Patient presents with    Follow-up     1 month f/u.  Other     Patient would like to discuss diet pills and foot care for diabetes. 1. Have you been to the ER, urgent care clinic since your last visit? Hospitalized since your last visit? No    2. Have you seen or consulted any other health care providers outside of the [address] [place] [address] since your last visit? Include any pap smears or colon screening.  No     PHQ over the last two weeks 6/21/2018   Little interest or pleasure in doing things Several days   Feeling down, depressed, irritable, or hopeless Not at all   Total Score PHQ 2 1     Visit Vitals    /74 (BP 1 Location: Left arm, BP Patient Position: Sitting)    Pulse 96    Temp 97.1 °F (36.2 °C) (Oral)    Ht 5' 5\" (1.651 m)    Wt 252 lb (114.3 kg)    SpO2 98%    BMI 41.93 kg/m2

## 2018-07-19 NOTE — PATIENT INSTRUCTIONS
Gastroesophageal Reflux Disease (GERD): Care Instructions  Your Care Instructions    Gastroesophageal reflux disease (GERD) is the backward flow of stomach acid into the esophagus. The esophagus is the tube that leads from your throat to your stomach. A one-way valve prevents the stomach acid from moving up into this tube. When you have GERD, this valve does not close tightly enough. If you have mild GERD symptoms including heartburn, you may be able to control the problem with antacids or over-the-counter medicine. Changing your diet, losing weight, and making other lifestyle changes can also help reduce symptoms. Follow-up care is a key part of your treatment and safety. Be sure to make and go to all appointments, and call your doctor if you are having problems. It's also a good idea to know your test results and keep a list of the medicines you take. How can you care for yourself at home? · Take your medicines exactly as prescribed. Call your doctor if you think you are having a problem with your medicine. · Your doctor may recommend over-the-counter medicine. For mild or occasional indigestion, antacids, such as Tums, Gaviscon, Mylanta, or Maalox, may help. Your doctor also may recommend over-the-counter acid reducers, such as Pepcid AC, Tagamet HB, Zantac 75, or Prilosec. Read and follow all instructions on the label. If you use these medicines often, talk with your doctor. · Change your eating habits. ¨ It's best to eat several small meals instead of two or three large meals. ¨ After you eat, wait 2 to 3 hours before you lie down. ¨ Chocolate, mint, and alcohol can make GERD worse. ¨ Spicy foods, foods that have a lot of acid (like tomatoes and oranges), and coffee can make GERD symptoms worse in some people. If your symptoms are worse after you eat a certain food, you may want to stop eating that food to see if your symptoms get better.   · Do not smoke or chew tobacco. Smoking can make GERD worse. If you need help quitting, talk to your doctor about stop-smoking programs and medicines. These can increase your chances of quitting for good. · If you have GERD symptoms at night, raise the head of your bed 6 to 8 inches by putting the frame on blocks or placing a foam wedge under the head of your mattress. (Adding extra pillows does not work.)  · Do not wear tight clothing around your middle. · Lose weight if you need to. Losing just 5 to 10 pounds can help. When should you call for help? Call your doctor now or seek immediate medical care if:    · You have new or different belly pain.     · Your stools are black and tarlike or have streaks of blood.    Watch closely for changes in your health, and be sure to contact your doctor if:    · Your symptoms have not improved after 2 days.     · Food seems to catch in your throat or chest.   Where can you learn more? Go to http://kai-janet.info/. Enter I906 in the search box to learn more about \"Gastroesophageal Reflux Disease (GERD): Care Instructions. \"  Current as of: May 12, 2017  Content Version: 11.7  © 5812-1440 [employer]. Care instructions adapted under license by [employer] (which disclaims liability or warranty for this information). If you have questions about a medical condition or this instruction, always ask your healthcare professional. [address] any warranty or liability for your use of this information. Learning About Diabetes Food Guidelines  Your Care Instructions    Meal planning is important to manage diabetes. It helps keep your blood sugar at a target level (which you set with your doctor). You don't have to eat special foods. You can eat what your family eats, including sweets once in a while. But you do have to pay attention to how often you eat and how much you eat of certain foods.   You may want to work with a dietitian or a certified diabetes educator (CDE) to help you plan meals and snacks. A dietitian or CDE can also help you lose weight if that is one of your goals. What should you know about eating carbs? Managing the amount of carbohydrate (carbs) you eat is an important part of healthy meals when you have diabetes. Carbohydrate is found in many foods. · Learn which foods have carbs. And learn the amounts of carbs in different foods. ¨ Bread, cereal, pasta, and rice have about 15 grams of carbs in a serving. A serving is 1 slice of bread (1 ounce), ½ cup of cooked cereal, or 1/3 cup of cooked pasta or rice. ¨ Fruits have 15 grams of carbs in a serving. A serving is 1 small fresh fruit, such as an apple or orange; ½ of a banana; ½ cup of cooked or canned fruit; ½ cup of fruit juice; 1 cup of melon or raspberries; or 2 tablespoons of dried fruit. ¨ Milk and no-sugar-added yogurt have 15 grams of carbs in a serving. A serving is 1 cup of milk or 2/3 cup of no-sugar-added yogurt. ¨ Starchy vegetables have 15 grams of carbs in a serving. A serving is ½ cup of mashed potatoes or sweet potato; 1 cup winter squash; ½ of a small baked potato; ½ cup of cooked beans; or ½ cup cooked corn or green peas. · Learn how much carbs to eat each day and at each meal. A dietitian or CDE can teach you how to keep track of the amount of carbs you eat. This is called carbohydrate counting. · If you are not sure how to count carbohydrate grams, use the Plate Method to plan meals. It is a good, quick way to make sure that you have a balanced meal. It also helps you spread carbs throughout the day. ¨ Divide your plate by types of foods. Put non-starchy vegetables on half the plate, meat or other protein food on one-quarter of the plate, and a grain or starchy vegetable in the final quarter of the plate.  To this you can add a small piece of fruit and 1 cup of milk or yogurt, depending on how many carbs you are supposed to eat at a meal.  · Try to eat about the same amount of carbs at each meal. Do not \"save up\" your daily allowance of carbs to eat at one meal.  · Proteins have very little or no carbs per serving. Examples of proteins are beef, chicken, turkey, fish, eggs, tofu, cheese, cottage cheese, and peanut butter. A serving size of meat is 3 ounces, which is about the size of a deck of cards. Examples of meat substitute serving sizes (equal to 1 ounce of meat) are 1/4 cup of cottage cheese, 1 egg, 1 tablespoon of peanut butter, and ½ cup of tofu. How can you eat out and still eat healthy? · Learn to estimate the serving sizes of foods that have carbohydrate. If you measure food at home, it will be easier to estimate the amount in a serving of restaurant food. · If the meal you order has too much carbohydrate (such as potatoes, corn, or baked beans), ask to have a low-carbohydrate food instead. Ask for a salad or green vegetables. · If you use insulin, check your blood sugar before and after eating out to help you plan how much to eat in the future. · If you eat more carbohydrate at a meal than you had planned, take a walk or do other exercise. This will help lower your blood sugar. What else should you know? · Limit saturated fat, such as the fat from meat and dairy products. This is a healthy choice because people who have diabetes are at higher risk of heart disease. So choose lean cuts of meat and nonfat or low-fat dairy products. Use olive or canola oil instead of butter or shortening when cooking. · Don't skip meals. Your blood sugar may drop too low if you skip meals and take insulin or certain medicines for diabetes. · Check with your doctor before you drink alcohol. Alcohol can cause your blood sugar to drop too low. Alcohol can also cause a bad reaction if you take certain diabetes medicines. Follow-up care is a key part of your treatment and safety. Be sure to make and go to all appointments, and call your doctor if you are having problems.  It's also a good idea to know your test results and keep a list of the medicines you take. Where can you learn more? Go to http://kai-janet.info/. Enter D616 in the search box to learn more about \"Learning About Diabetes Food Guidelines. \"  Current as of: December 7, 2017  Content Version: 11.7  © 0167-2660 [employer]. Care instructions adapted under license by [employer] (which disclaims liability or warranty for this information). If you have questions about a medical condition or this instruction, always ask your healthcare professional. [address] any warranty or liability for your use of this information. Learning About Meal Planning for Diabetes  Why plan your meals? Meal planning can be a key part of managing diabetes. Planning meals and snacks with the right balance of carbohydrate, protein, and fat can help you keep your blood sugar at the target level you set with your doctor. You don't have to eat special foods. You can eat what your family eats, including sweets once in a while. But you do have to pay attention to how often you eat and how much you eat of certain foods. You may want to work with a dietitian or a certified diabetes educator. He or she can give you tips and meal ideas and can answer your questions about meal planning. This health professional can also help you reach a healthy weight if that is one of your goals. What plan is right for you? Your dietitian or diabetes educator may suggest that you start with the plate format or carbohydrate counting. The plate format  The plate format is a simple way to help you manage how you eat. You plan meals by learning how much space each food should take on a plate. Using the plate format helps you spread carbohydrate throughout the day. It can make it easier to keep your blood sugar level within your target range. It also helps you see if you're eating healthy portion sizes.   To use the plate format, you put non-starchy vegetables on half your plate. Add meat or meat substitutes on one-quarter of the plate. Put a grain or starchy vegetable (such as brown rice or a potato) on the final quarter of the plate. You can add a small piece of fruit and some low-fat or fat-free milk or yogurt, depending on your carbohydrate goal for each meal.  Here are some tips for using the plate format:  · Make sure that you are not using an oversized plate. A 9-inch plate is best. Many restaurants use larger plates. · Get used to using the plate format at home. Then you can use it when you eat out. · Write down your questions about using the plate format. Talk to your doctor, a dietitian, or a diabetes educator about your concerns. Carbohydrate counting  With carbohydrate counting, you plan meals based on the amount of carbohydrate in each food. Carbohydrate raises blood sugar higher and more quickly than any other nutrient. It is found in desserts, breads and cereals, and fruit. It's also found in starchy vegetables such as potatoes and corn, grains such as rice and pasta, and milk and yogurt. Spreading carbohydrate throughout the day helps keep your blood sugar levels within your target range. Your daily amount depends on several things, including your weight, how active you are, which diabetes medicines you take, and what your goals are for your blood sugar levels. A registered dietitian or diabetes educator can help you plan how much carbohydrate to include in each meal and snack. A guideline for your daily amount of carbohydrate is:  · 45 to 60 grams at each meal. That's about the same as 3 to 4 carbohydrate servings. · 15 to 20 grams at each snack. That's about the same as 1 carbohydrate serving. The Nutrition Facts label on packaged foods tells you how much carbohydrate is in a serving of the food. First, look at the serving size on the food label. Is that the amount you eat in a serving?  All of the nutrition information on a food label is based on that serving size. So if you eat more or less than that, you'll need to adjust the other numbers. Total carbohydrate is the next thing you need to look for on the label. If you count carbohydrate servings, one serving of carbohydrate is 15 grams. For foods that don't come with labels, such as fresh fruits and vegetables, you'll need a guide that lists carbohydrate in these foods. Ask your doctor, dietitian, or diabetes educator about books or other nutrition guides you can use. If you take insulin, you need to know how many grams of carbohydrate are in a meal. This lets you know how much rapid-acting insulin to take before you eat. If you use an insulin pump, you get a constant rate of insulin during the day. So the pump must be programmed at meals to give you extra insulin to cover the rise in blood sugar after meals. When you know how much carbohydrate you will eat, you can take the right amount of insulin. Or, if you always use the same amount of insulin, you need to make sure that you eat the same amount of carbohydrate at meals. If you need more help to understand carbohydrate counting and food labels, ask your doctor, dietitian, or diabetes educator. How do you get started with meal planning? Here are some tips to get started:  · Plan your meals a week at a time. Don't forget to include snacks too. · Use cookbooks or online recipes to plan several main meals. Plan some quick meals for busy nights. You also can double some recipes that freeze well. Then you can save half for other busy nights when you don't have time to cook. · Make sure you have the ingredients you need for your recipes. If you're running low on basic items, put these items on your shopping list too. · List foods that you use to make breakfasts, lunches, and snacks. List plenty of fruits and vegetables. · Post this list on the refrigerator.  Add to it as you think of more things you need.  · Take the list to the store to do your weekly shopping. Follow-up care is a key part of your treatment and safety. Be sure to make and go to all appointments, and call your doctor if you are having problems. It's also a good idea to know your test results and keep a list of the medicines you take. Where can you learn more? Go to http://kai-janet.info/. Jayla Necessary in the search box to learn more about \"Learning About Meal Planning for Diabetes. \"  Current as of: December 7, 2017  Content Version: 11.7  © 6527-7014 [employer]. Care instructions adapted under license by [employer] (which disclaims liability or warranty for this information). If you have questions about a medical condition or this instruction, always ask your healthcare professional. [address] any warranty or liability for your use of this information. Nutrition Tips for Diabetes: After Your Visit  Your Care Instructions  A healthy diet is important to manage diabetes. It helps you lose weight (if you need to) and keep it off. It gives you the nutrition and energy your body needs and helps prevent heart disease. But a diet for diabetes does not mean that you have to eat special foods. You can eat what your family eats, including occasional sweets and other favorites. But you do have to pay attention to how often you eat and how much you eat of certain foods. The right plan for you will give you meals that help you keep your blood sugar at healthy levels. Try to eat a variety of foods and to spread carbohydrate throughout the day. Carbohydrate raises blood sugar higher and more quickly than any other nutrient does. Carbohydrate is found in sugar, breads and cereals, fruit, starchy vegetables such as potatoes and corn, and milk and yogurt. You may want to work with a dietitian or diabetes educator to help you plan meals and snacks.  A dietitian or diabetes educator also can help you lose weight if that is one of your goals. The following tips can help you enjoy your meals and stay healthy. Follow-up care is a key part of your treatment and safety. Be sure to make and go to all appointments, and call your doctor if you are having problems. Its also a good idea to know your test results and keep a list of the medicines you take. How can you care for yourself at home? · Learn which foods have carbohydrate and how much carbohydrate to eat. A dietitian or diabetes educator can help you learn to keep track of how much carbohydrate you eat. · Spread carbohydrate throughout the day. Eat some carbohydrate at all meals, but do not eat too much at any one time. · Plan meals to include food from all the food groups. These are the food groups and some example portion sizes:  ¨ Grains: 1 slice of bread (1 ounce), ½ cup of cooked cereal, and 1/3 cup of cooked pasta or rice. These have about 15 grams of carbohydrate in a serving. Choose whole grains such as whole wheat bread or crackers, oatmeal, and brown rice more often than refined grains. ¨ Fruit: 1 small fresh fruit, such as an apple or orange; ½ of a banana; ½ cup of chopped, cooked, or canned fruit; ½ cup of fruit juice; 1 cup of melon or raspberries; and 2 tablespoons of dried fruit. These have about 15 grams of carbohydrate in a serving. ¨ Dairy: 1 cup of nonfat or low-fat milk and 2/3 cup of plain yogurt. These have about 15 grams of carbohydrate in a serving. ¨ Protein foods: Beef, chicken, turkey, fish, eggs, tofu, cheese, cottage cheese, and peanut butter. A serving size of meat is 3 ounces, which is about the size of a deck of cards. Examples of meat substitute serving sizes (equal to 1 ounce of meat) are 1/4 cup of cottage cheese, 1 egg, 1 tablespoon of peanut butter, and ½ cup of tofu. These have very little or no carbohydrate per serving.   ¨ Vegetables: Starchy vegetables such as ½ cup of cooked dried beans, peas, potatoes, or corn have about 15 grams of carbohydrate. Nonstarchy vegetables have very little carbohydrate, such as 1 cup of raw leafy vegetables (such as spinach), ½ cup of other vegetables (cooked or chopped), and 3/4 cup of vegetable juice. · Use the plate format to plan meals. It is a good, quick way to make sure that you have a balanced meal. It also helps you spread carbohydrate throughout the day. You divide your plate by types of foods. Put vegetables on half the plate, meat or meat substitutes on one-quarter of the plate, and a grain or starchy vegetable (such as brown rice or a potato) in the final quarter of the plate. To this you can add a small piece of fruit and 1 cup of milk or yogurt, depending on how much carbohydrate you are supposed to eat at a meal.  · Talk to your dietitian or diabetes educator about ways to add limited amounts of sweets into your meal plan. You can eat these foods now and then, as long as you include the amount of carbohydrate they have in your daily carbohydrate allowance. · If you drink alcohol, limit it to no more than 1 drink a day for women and 2 drinks a day for men. If you are pregnant, no amount of alcohol is known to be safe. · Protein, fat, and fiber do not raise blood sugar as much as carbohydrate does. If you eat a lot of these nutrients in a meal, your blood sugar will rise more slowly than it would otherwise. · Limit saturated fats, such as those from meat and dairy products. Try to replace it with monounsaturated fat, such as olive oil. This is a healthier choice because people who have diabetes are at higher-than-average risk of heart disease. But use a modest amount of olive oil. A tablespoon of olive oil has 14 grams of fat and 120 calories. · Exercise lowers blood sugar. If you take insulin by shots or pump, you can use less than you would if you were not exercising. Keep in mind that timing matters.  If you exercise within 1 hour after a meal, your body may need less insulin for that meal than it would if you exercised 3 hours after the meal. Test your blood sugar to find out how exercise affects your need for insulin. · Exercise on most days of the week. Aim for at least 30 minutes. Exercise helps you stay at a healthy weight and helps your body use insulin. Walking is an easy way to get exercise. Gradually increase the amount you walk every day. You also may want to swim, bike, or do other activities. When you eat out  · Learn to estimate the serving sizes of foods that have carbohydrate. If you measure food at home, it will be easier to estimate the amount in a serving of restaurant food. · If the meal you order has too much carbohydrate (such as potatoes, corn, or baked beans), ask to have a low-carbohydrate food instead. Ask for a salad or green vegetables. · If you use insulin, check your blood sugar before and after eating out to help you plan how much to eat in the future. · If you eat more carbohydrate at a meal than you had planned, take a walk or do other exercise. This will help lower your blood sugar. Where can you learn more? Go to [employer]  Enter W017 in the search box to learn more about \"Nutrition Tips for Diabetes: After Your Visit. \"   © 5943-1399 [employer]. Care instructions adapted under license by [facility] (which disclaims liability or warranty for this information). This care instruction is for use with your licensed healthcare professional. If you have questions about a medical condition or this instruction, always ask your healthcare professional. [place] [address] any warranty or liability for your use of this information.   Content Version: 18.0.648630; Current as of: June 4, 2014

## 2018-07-19 NOTE — MR AVS SNAPSHOT
Randy Bradley [place] [address] [facility] [place]. [address] [id number] 
[phone] Patient: Rhianna Zambrano MRN: [id number] :1960 Visit Information Date & Time Provider Department Dept. Phone Encounter #  
 2018 11:00 AM Jesús BustilloJenniffer [id number] Follow-up Instructions Return in about 2 months (around 2018) for chronic dx managment. Your Appointments 10/17/2018 11:00 AM  
Office Visit with MD Shonna Anna 23 ([facility]) Appt Note:   
 [address] [facility] [place]. [address]  
  
   
 [address] [facility] Upcoming Health Maintenance Date Due Hepatitis C Screening 1960 FOOT EXAM Q1 1972 EYE EXAM RETINAL OR DILATED Q1 1972 Pneumococcal 19-64 Medium Risk (1 of 1 - PPSV23) 1981 DTaP/Tdap/Td series (1 - Tdap) 1983 BREAST CANCER SCRN MAMMOGRAM 2012 FOBT Q 1 YEAR AGE 50-75 2012 PAP AKA CERVICAL CYTOLOGY 2014 Influenza Age 5 to Adult 2018 HEMOGLOBIN A1C Q6M 2018 MICROALBUMIN Q1 2019 LIPID PANEL Q1 2019 Allergies as of 2018  Review Complete On: 2018 By: Luke Null LPN No Known Allergies Current Immunizations  Never Reviewed No immunizations on file. Not reviewed this visit You Were Diagnosed With   
  
 Codes Comments Gastroesophageal reflux disease without esophagitis    -  Primary ICD-10-CM: K21.9 ICD-9-CM: 530.81 Type 2 diabetes mellitus without complication, without long-term current use of insulin (HCC)     ICD-10-CM: E11.9 ICD-9-CM: 250.00 Essential hypertension     ICD-10-CM: I10 
ICD-9-CM: 401.9 Hyperlipidemia, unspecified hyperlipidemia type     ICD-10-CM: E78.5 ICD-9-CM: 272.4 Vertigo     ICD-10-CM: S88 ICD-9-CM: 780.4 Vitals BP Pulse Temp Resp Height(growth percentile) Weight(growth percentile) 120/74 (BP 1 Location: Left arm, BP Patient Position: Sitting) 96 97.1 °F (36.2 °C) (Oral) 16 5' 5\" (1.651 m) 252 lb (114.3 kg) SpO2 BMI OB Status Smoking Status 98% 41.93 kg/m2 Menopause Former Smoker Vitals History BMI and BSA Data Body Mass Index Body Surface Area 41.93 kg/m 2 2.29 m 2 Preferred Pharmacy Pharmacy Name Phone Donnie Soulier [address], [address] [facility] [phone] Your Updated Medication List  
  
   
This list is accurate as of 7/19/18 12:08 PM.  Always use your most recent med list.  
  
  
  
  
 atorvastatin 40 mg tablet Commonly known as:  LIPITOR Take 1 Tab by mouth daily. fluconazole 200 mg tablet Commonly known as:  DIFLUCAN Take 200 mg by mouth daily. FDA advises cautious prescribing of oral fluconazole in pregnancy. ibuprofen 800 mg tablet Commonly known as:  MOTRIN Take  by mouth.  
  
 meclizine 25 mg tablet Commonly known as:  ANTIVERT Take 1 Tab by mouth two (2) times a day. metFORMIN 1,000 mg tablet Commonly known as:  GLUCOPHAGE Take 1,000 mg by mouth two (2) times daily (with meals). montelukast 10 mg tablet Commonly known as:  SINGULAIR Take 1 Tab by mouth daily. pioglitazone 30 mg tablet Commonly known as:  ACTOS Take 1 Tab by mouth daily. propranolol LA 60 mg SR capsule Commonly known as:  INDERAL LA Take 1 Cap by mouth daily. raNITIdine 150 mg tablet Commonly known as:  ZANTAC Take 1 Tab by mouth two (2) times a day. SITagliptin 100 mg tablet Commonly known as:  Debra [place] Take 1 Tab by mouth daily. venlafaxine 75 mg tablet Commonly known as:  [facility] Take 25 mg by mouth three (3) times daily. Prescriptions Sent to Pharmacy Refills  
 raNITIdine (ZANTAC) 150 mg tablet 1 Sig: Take 1 Tab by mouth two (2) times a day. Class: Normal  
 Pharmacy: [address], [address] Ph #: [phone] Route: Oral  
 atorvastatin (LIPITOR) 40 mg tablet 1 Sig: Take 1 Tab by mouth daily. Class: Normal  
 Pharmacy: [address], [address]  Ph #: [phone] Route: Oral  
  
We Performed the Following  DIABETES FOOT EXAM [[facility] Custom] Follow-up Instructions Return in about 2 months (around 9/19/2018) for chronic dx managment. To-Do List   
 07/19/2018 Lab:  MICROALBUMIN, UR, RAND W/ MICROALB/CREAT RATIO Patient Instructions Gastroesophageal Reflux Disease (GERD): Care Instructions Your Care Instructions Gastroesophageal reflux disease (GERD) is the backward flow of stomach acid into the esophagus. The esophagus is the tube that leads from your throat to your stomach. A one-way valve prevents the stomach acid from moving up into this tube. When you have GERD, this valve does not close tightly enough. If you have mild GERD symptoms including heartburn, you may be able to control the problem with antacids or over-the-counter medicine. Changing your diet, losing weight, and making other lifestyle changes can also help reduce symptoms. Follow-up care is a key part of your treatment and safety. Be sure to make and go to all appointments, and call your doctor if you are having problems. It's also a good idea to know your test results and keep a list of the medicines you take. How can you care for yourself at home? · Take your medicines exactly as prescribed. Call your doctor if you think you are having a problem with your medicine. · Your doctor may recommend over-the-counter medicine. For mild or occasional indigestion, antacids, such as Tums, Gaviscon, Mylanta, or Maalox, may help. Your doctor also may recommend over-the-counter acid reducers, such as Pepcid AC, Tagamet HB, Zantac 75, or Prilosec.  Read and follow all instructions on the label. If you use these medicines often, talk with your doctor. · Change your eating habits. ¨ It's best to eat several small meals instead of two or three large meals. ¨ After you eat, wait 2 to 3 hours before you lie down. ¨ Chocolate, mint, and alcohol can make GERD worse. ¨ Spicy foods, foods that have a lot of acid (like tomatoes and oranges), and coffee can make GERD symptoms worse in some people. If your symptoms are worse after you eat a certain food, you may want to stop eating that food to see if your symptoms get better. · Do not smoke or chew tobacco. Smoking can make GERD worse. If you need help quitting, talk to your doctor about stop-smoking programs and medicines. These can increase your chances of quitting for good. · If you have GERD symptoms at night, raise the head of your bed 6 to 8 inches by putting the frame on blocks or placing a foam wedge under the head of your mattress. (Adding extra pillows does not work.) · Do not wear tight clothing around your middle. · Lose weight if you need to. Losing just 5 to 10 pounds can help. When should you call for help? Call your doctor now or seek immediate medical care if: 
  · You have new or different belly pain.  
  · Your stools are black and tarlike or have streaks of blood.  
 Watch closely for changes in your health, and be sure to contact your doctor if: 
  · Your symptoms have not improved after 2 days.  
  · Food seems to catch in your throat or chest.  
Where can you learn more? Go to http://kai-janet.info/. Enter C844 in the search box to learn more about \"Gastroesophageal Reflux Disease (GERD): Care Instructions. \" Current as of: May 12, 2017 Content Version: 11.7 © 8371-4282 [employer]. Care instructions adapted under license by [employer] (which disclaims liability or warranty for this information).  If you have questions about a medical condition or this instruction, always ask your healthcare professional. [place] [address] any warranty or liability for your use of this information. Learning About Diabetes Food Guidelines Your Care Instructions Meal planning is important to manage diabetes. It helps keep your blood sugar at a target level (which you set with your doctor). You don't have to eat special foods. You can eat what your family eats, including sweets once in a while. But you do have to pay attention to how often you eat and how much you eat of certain foods. You may want to work with a dietitian or a certified diabetes educator (CDE) to help you plan meals and snacks. A dietitian or CDE can also help you lose weight if that is one of your goals. What should you know about eating carbs? Managing the amount of carbohydrate (carbs) you eat is an important part of healthy meals when you have diabetes. Carbohydrate is found in many foods. · Learn which foods have carbs. And learn the amounts of carbs in different foods. ¨ Bread, cereal, pasta, and rice have about 15 grams of carbs in a serving. A serving is 1 slice of bread (1 ounce), ½ cup of cooked cereal, or 1/3 cup of cooked pasta or rice. ¨ Fruits have 15 grams of carbs in a serving. A serving is 1 small fresh fruit, such as an apple or orange; ½ of a banana; ½ cup of cooked or canned fruit; ½ cup of fruit juice; 1 cup of melon or raspberries; or 2 tablespoons of dried fruit. ¨ Milk and no-sugar-added yogurt have 15 grams of carbs in a serving. A serving is 1 cup of milk or 2/3 cup of no-sugar-added yogurt. ¨ Starchy vegetables have 15 grams of carbs in a serving. A serving is ½ cup of mashed potatoes or sweet potato; 1 cup winter squash; ½ of a small baked potato; ½ cup of cooked beans; or ½ cup cooked corn or green peas. · Learn how much carbs to eat each day and at each meal. A dietitian or CDE can teach you how to keep track of the amount of carbs you eat.  This is called carbohydrate counting. · If you are not sure how to count carbohydrate grams, use the Plate Method to plan meals. It is a good, quick way to make sure that you have a balanced meal. It also helps you spread carbs throughout the day. ¨ Divide your plate by types of foods. Put non-starchy vegetables on half the plate, meat or other protein food on one-quarter of the plate, and a grain or starchy vegetable in the final quarter of the plate. To this you can add a small piece of fruit and 1 cup of milk or yogurt, depending on how many carbs you are supposed to eat at a meal. 
· Try to eat about the same amount of carbs at each meal. Do not \"save up\" your daily allowance of carbs to eat at one meal. 
· Proteins have very little or no carbs per serving. Examples of proteins are beef, chicken, turkey, fish, eggs, tofu, cheese, cottage cheese, and peanut butter. A serving size of meat is 3 ounces, which is about the size of a deck of cards. Examples of meat substitute serving sizes (equal to 1 ounce of meat) are 1/4 cup of cottage cheese, 1 egg, 1 tablespoon of peanut butter, and ½ cup of tofu. How can you eat out and still eat healthy? · Learn to estimate the serving sizes of foods that have carbohydrate. If you measure food at home, it will be easier to estimate the amount in a serving of restaurant food. · If the meal you order has too much carbohydrate (such as potatoes, corn, or baked beans), ask to have a low-carbohydrate food instead. Ask for a salad or green vegetables. · If you use insulin, check your blood sugar before and after eating out to help you plan how much to eat in the future. · If you eat more carbohydrate at a meal than you had planned, take a walk or do other exercise. This will help lower your blood sugar. What else should you know? · Limit saturated fat, such as the fat from meat and dairy products.  This is a healthy choice because people who have diabetes are at higher risk of heart disease. So choose lean cuts of meat and nonfat or low-fat dairy products. Use olive or canola oil instead of butter or shortening when cooking. · Don't skip meals. Your blood sugar may drop too low if you skip meals and take insulin or certain medicines for diabetes. · Check with your doctor before you drink alcohol. Alcohol can cause your blood sugar to drop too low. Alcohol can also cause a bad reaction if you take certain diabetes medicines. Follow-up care is a key part of your treatment and safety. Be sure to make and go to all appointments, and call your doctor if you are having problems. It's also a good idea to know your test results and keep a list of the medicines you take. Where can you learn more? Go to http://kai-janet.info/. Enter G106 in the search box to learn more about \"Learning About Diabetes Food Guidelines. \" Current as of: December 7, 2017 Content Version: 11.7 © 6388-8355 [employer]. Care instructions adapted under license by [employer] (which disclaims liability or warranty for this information). If you have questions about a medical condition or this instruction, always ask your healthcare professional. [address] any warranty or liability for your use of this information. Learning About Meal Planning for Diabetes Why plan your meals? Meal planning can be a key part of managing diabetes. Planning meals and snacks with the right balance of carbohydrate, protein, and fat can help you keep your blood sugar at the target level you set with your doctor. You don't have to eat special foods. You can eat what your family eats, including sweets once in a while. But you do have to pay attention to how often you eat and how much you eat of certain foods. You may want to work with a dietitian or a certified diabetes educator.  He or she can give you tips and meal ideas and can answer your questions about meal planning. This health professional can also help you reach a healthy weight if that is one of your goals. What plan is right for you? Your dietitian or diabetes educator may suggest that you start with the plate format or carbohydrate counting. The plate format The plate format is a simple way to help you manage how you eat. You plan meals by learning how much space each food should take on a plate. Using the plate format helps you spread carbohydrate throughout the day. It can make it easier to keep your blood sugar level within your target range. It also helps you see if you're eating healthy portion sizes. To use the plate format, you put non-starchy vegetables on half your plate. Add meat or meat substitutes on one-quarter of the plate. Put a grain or starchy vegetable (such as brown rice or a potato) on the final quarter of the plate. You can add a small piece of fruit and some low-fat or fat-free milk or yogurt, depending on your carbohydrate goal for each meal. 
Here are some tips for using the plate format: · Make sure that you are not using an oversized plate. A 9-inch plate is best. Many restaurants use larger plates. · Get used to using the plate format at home. Then you can use it when you eat out. · Write down your questions about using the plate format. Talk to your doctor, a dietitian, or a diabetes educator about your concerns. Carbohydrate counting With carbohydrate counting, you plan meals based on the amount of carbohydrate in each food. Carbohydrate raises blood sugar higher and more quickly than any other nutrient. It is found in desserts, breads and cereals, and fruit. It's also found in starchy vegetables such as potatoes and corn, grains such as rice and pasta, and milk and yogurt. Spreading carbohydrate throughout the day helps keep your blood sugar levels within your target range.  
Your daily amount depends on several things, including your weight, how active you are, which diabetes medicines you take, and what your goals are for your blood sugar levels. A registered dietitian or diabetes educator can help you plan how much carbohydrate to include in each meal and snack. A guideline for your daily amount of carbohydrate is: · 45 to 60 grams at each meal. That's about the same as 3 to 4 carbohydrate servings. · 15 to 20 grams at each snack. That's about the same as 1 carbohydrate serving. The Nutrition Facts label on packaged foods tells you how much carbohydrate is in a serving of the food. First, look at the serving size on the food label. Is that the amount you eat in a serving? All of the nutrition information on a food label is based on that serving size. So if you eat more or less than that, you'll need to adjust the other numbers. Total carbohydrate is the next thing you need to look for on the label. If you count carbohydrate servings, one serving of carbohydrate is 15 grams. For foods that don't come with labels, such as fresh fruits and vegetables, you'll need a guide that lists carbohydrate in these foods. Ask your doctor, dietitian, or diabetes educator about books or other nutrition guides you can use. If you take insulin, you need to know how many grams of carbohydrate are in a meal. This lets you know how much rapid-acting insulin to take before you eat. If you use an insulin pump, you get a constant rate of insulin during the day. So the pump must be programmed at meals to give you extra insulin to cover the rise in blood sugar after meals. When you know how much carbohydrate you will eat, you can take the right amount of insulin. Or, if you always use the same amount of insulin, you need to make sure that you eat the same amount of carbohydrate at meals. If you need more help to understand carbohydrate counting and food labels, ask your doctor, dietitian, or diabetes educator. How do you get started with meal planning? Here are some tips to get started: 
· Plan your meals a week at a time. Don't forget to include snacks too. · Use cookbooks or online recipes to plan several main meals. Plan some quick meals for busy nights. You also can double some recipes that freeze well. Then you can save half for other busy nights when you don't have time to cook. · Make sure you have the ingredients you need for your recipes. If you're running low on basic items, put these items on your shopping list too. · List foods that you use to make breakfasts, lunches, and snacks. List plenty of fruits and vegetables. · Post this list on the refrigerator. Add to it as you think of more things you need. · Take the list to the store to do your weekly shopping. Follow-up care is a key part of your treatment and safety. Be sure to make and go to all appointments, and call your doctor if you are having problems. It's also a good idea to know your test results and keep a list of the medicines you take. Where can you learn more? Go to http://kaiQzzrjanet.info/. Anselmo Joshi in the search box to learn more about \"Learning About Meal Planning for Diabetes. \" Current as of: December 7, 2017 Content Version: 11.7 © 3739-2090 [employer]. Care instructions adapted under license by [employer] (which disclaims liability or warranty for this information). If you have questions about a medical condition or this instruction, always ask your healthcare professional. [address] any warranty or liability for your use of this information. Nutrition Tips for Diabetes: After Your Visit Your Care Instructions A healthy diet is important to manage diabetes. It helps you lose weight (if you need to) and keep it off. It gives you the nutrition and energy your body needs and helps prevent heart disease. But a diet for diabetes does not mean that you have to eat special foods.  You can eat what your family eats, including occasional sweets and other favorites. But you do have to pay attention to how often you eat and how much you eat of certain foods. The right plan for you will give you meals that help you keep your blood sugar at healthy levels. Try to eat a variety of foods and to spread carbohydrate throughout the day. Carbohydrate raises blood sugar higher and more quickly than any other nutrient does. Carbohydrate is found in sugar, breads and cereals, fruit, starchy vegetables such as potatoes and corn, and milk and yogurt. You may want to work with a dietitian or diabetes educator to help you plan meals and snacks. A dietitian or diabetes educator also can help you lose weight if that is one of your goals. The following tips can help you enjoy your meals and stay healthy. Follow-up care is a key part of your treatment and safety. Be sure to make and go to all appointments, and call your doctor if you are having problems. Its also a good idea to know your test results and keep a list of the medicines you take. How can you care for yourself at home? · Learn which foods have carbohydrate and how much carbohydrate to eat. A dietitian or diabetes educator can help you learn to keep track of how much carbohydrate you eat. · Spread carbohydrate throughout the day. Eat some carbohydrate at all meals, but do not eat too much at any one time. · Plan meals to include food from all the food groups. These are the food groups and some example portion sizes: ¨ Grains: 1 slice of bread (1 ounce), ½ cup of cooked cereal, and 1/3 cup of cooked pasta or rice. These have about 15 grams of carbohydrate in a serving. Choose whole grains such as whole wheat bread or crackers, oatmeal, and brown rice more often than refined grains.  
¨ Fruit: 1 small fresh fruit, such as an apple or orange; ½ of a banana; ½ cup of chopped, cooked, or canned fruit; ½ cup of fruit juice; 1 cup of melon or raspberries; and 2 tablespoons of dried fruit. These have about 15 grams of carbohydrate in a serving. ¨ Dairy: 1 cup of nonfat or low-fat milk and 2/3 cup of plain yogurt. These have about 15 grams of carbohydrate in a serving. ¨ Protein foods: Beef, chicken, turkey, fish, eggs, tofu, cheese, cottage cheese, and peanut butter. A serving size of meat is 3 ounces, which is about the size of a deck of cards. Examples of meat substitute serving sizes (equal to 1 ounce of meat) are 1/4 cup of cottage cheese, 1 egg, 1 tablespoon of peanut butter, and ½ cup of tofu. These have very little or no carbohydrate per serving. ¨ Vegetables: Starchy vegetables such as ½ cup of cooked dried beans, peas, potatoes, or corn have about 15 grams of carbohydrate. Nonstarchy vegetables have very little carbohydrate, such as 1 cup of raw leafy vegetables (such as spinach), ½ cup of other vegetables (cooked or chopped), and 3/4 cup of vegetable juice. · Use the plate format to plan meals. It is a good, quick way to make sure that you have a balanced meal. It also helps you spread carbohydrate throughout the day. You divide your plate by types of foods. Put vegetables on half the plate, meat or meat substitutes on one-quarter of the plate, and a grain or starchy vegetable (such as brown rice or a potato) in the final quarter of the plate. To this you can add a small piece of fruit and 1 cup of milk or yogurt, depending on how much carbohydrate you are supposed to eat at a meal. 
· Talk to your dietitian or diabetes educator about ways to add limited amounts of sweets into your meal plan. You can eat these foods now and then, as long as you include the amount of carbohydrate they have in your daily carbohydrate allowance. · If you drink alcohol, limit it to no more than 1 drink a day for women and 2 drinks a day for men. If you are pregnant, no amount of alcohol is known to be safe. · Protein, fat, and fiber do not raise blood sugar as much as carbohydrate does. If you eat a lot of these nutrients in a meal, your blood sugar will rise more slowly than it would otherwise. · Limit saturated fats, such as those from meat and dairy products. Try to replace it with monounsaturated fat, such as olive oil. This is a healthier choice because people who have diabetes are at higher-than-average risk of heart disease. But use a modest amount of olive oil. A tablespoon of olive oil has 14 grams of fat and 120 calories. · Exercise lowers blood sugar. If you take insulin by shots or pump, you can use less than you would if you were not exercising. Keep in mind that timing matters. If you exercise within 1 hour after a meal, your body may need less insulin for that meal than it would if you exercised 3 hours after the meal. Test your blood sugar to find out how exercise affects your need for insulin. · Exercise on most days of the week. Aim for at least 30 minutes. Exercise helps you stay at a healthy weight and helps your body use insulin. Walking is an easy way to get exercise. Gradually increase the amount you walk every day. You also may want to swim, bike, or do other activities. When you eat out · Learn to estimate the serving sizes of foods that have carbohydrate. If you measure food at home, it will be easier to estimate the amount in a serving of restaurant food. · If the meal you order has too much carbohydrate (such as potatoes, corn, or baked beans), ask to have a low-carbohydrate food instead. Ask for a salad or green vegetables. · If you use insulin, check your blood sugar before and after eating out to help you plan how much to eat in the future. · If you eat more carbohydrate at a meal than you had planned, take a walk or do other exercise. This will help lower your blood sugar. Where can you learn more? Go to DealExplorer.be Enter W349 in the search box to learn more about \"Nutrition Tips for Diabetes: After Your Visit. \"  
© 7276-8259 Healthwise, Incorporated. Care instructions adapted under license by [employer] (which disclaims liability or warranty for this information). This care instruction is for use with your licensed healthcare professional. If you have questions about a medical condition or this instruction, always ask your healthcare professional. [place] [address] any warranty or liability for your use of this information. Content Version: 43.1.942608; Current as of: June 4, 2014 Introducing [facility]! [address] [place] [address] introduces Guruji patient portal. Now you can access parts of your medical record, email your doctor's office, and request medication refills online. 1. In your internet browser, go to [email or web address] [email or web address]. [email or web address]Your Truman Show 2. Click on the First Time User? Click Here link in the Sign In box. You will see the New Member Sign Up page. 3. Enter your Guruji Access Code exactly as it appears below. You will not need to use this code after youve completed the sign-up process. If you do not sign up before the expiration date, you must request a new code. · Guruji Access Code: [email or web address] Expires: 9/19/2018 11:45 AM 
 
4. Enter the last four digits of your Social Security Number (xxxx) and Date of Birth (mm/dd/yyyy) as indicated and click Submit. You will be taken to the next sign-up page. 5. Create a Guruji ID. This will be your Guruji login ID and cannot be changed, so think of one that is secure and easy to remember. 6. Create a Guruji password. You can change your password at any time. 7. Enter your Password Reset Question and Answer. This can be used at a later time if you forget your password. 8. Enter your e-mail address. You will receive e-mail notification when new information is available in [address] 9. Click Sign Up. You can now view and download portions of your medical record. 10. Click the Download Summary menu link to download a portable copy of your medical information. If you have questions, please visit the Frequently Asked Questions section of the zPerfectGift website. Remember, zPerfectGift is NOT to be used for urgent needs. For medical emergencies, dial 911. Now available from your iPhone and Android! Please provide this summary of care documentation to your next provider. Your primary care clinician is listed as Nadege Milian. If you have any questions after today's visit, please call [phone].

## 2018-07-19 NOTE — PROGRESS NOTES
[facility]    CC: Chronic Disease Management    HPI:     DM II:  - Got lab work as requested  - Checks blood sugar at home and reports range of 120-220  - Currently exercising 3-4 days a week for 1 hour   - Currently taking all medications as prescribed  - No side effects or other issues with medications  - Has never had diabetic foot exam   - Reports eye exam last year  - Planning to schedule eye exam for this year        Depression:  - Pt thinks mood is playing a role in over eating  - Reports food provides her comfort  - Mood does seem to get worse during seasonal change  - Would like to start medicine to help with her problem given desire to lose weight  - Is currently on venlafaxine, primarily to address post menopausal symptoms        HTN:  - Got lab work as requested  - Does not check her bp at home  - Currently taking all medications as prescribed  - No side effects or other issues with medications        Vertigo  - Reports that vertigo is well controlled with meclizine  - Taking meclizine as prescribed  - No side effects or other issues with medication        ROS: Positive items marked in RED  CON: fever, chills  Cardiovascular: palpitations, non exertional L sided chest discomfort, does not radiate, pain is sharp and lasts for a long time (was told she has reflux, not currently taking medication)  Resp: WALSH, cough  GI: vomiting, diarrhea  : dysuria, hematuria      Past Medical History:   Diagnosis Date    Arthritis     Depression     Diabetes ([address]     Hot flashes     Hypertension     Morbid obesity ([address]     Vertigo        Past Surgical History:   Procedure Laterality Date    EXCIS INFRATENT BRAIN TUMOR  08/16/2015    [facility]    [facility]    HX TUBAL LIGATION Bilateral     NEUROLOGICAL PROCEDURE UNLISTED         Family History   Problem Relation Age of Onset    Cancer Mother 58     colon    Hypertension Mother     Diabetes Mother     Psychiatric Disorder Father     Lupus Sister        Social History     Social History    Marital status:      Spouse name: N/A    Number of children: N/A    Years of education: N/A     Social History Main Topics    Smoking status: Former Smoker     Quit date: 1998    Smokeless tobacco: Never Used    Alcohol use No    Drug use: Yes     Special: Cocaine, Marijuana, Heroin, Opiates    Sexual activity: Not Asked     Other Topics Concern    None     Social History Narrative       No Known Allergies      Current Outpatient Prescriptions:     fluconazole (DIFLUCAN) 200 mg tablet, Take 200 mg by mouth daily. FDA advises cautious prescribing of oral fluconazole in pregnancy. , Disp: , Rfl:     pioglitazone (ACTOS) 30 mg tablet, Take 1 Tab by mouth daily. , Disp: 90 Tab, Rfl: 1    propranolol LA (INDERAL LA) 60 mg SR capsule, Take 1 Cap by mouth daily. , Disp: 90 Cap, Rfl: 1    SITagliptin (JANUVIA) 100 mg tablet, Take 1 Tab by mouth daily. , Disp: 90 Tab, Rfl: 1    montelukast (SINGULAIR) 10 mg tablet, Take 1 Tab by mouth daily. , Disp: 90 Tab, Rfl: 1    metFORMIN (GLUCOPHAGE) 1,000 mg tablet, Take 1,000 mg by mouth two (2) times daily (with meals). , Disp: , Rfl:     venlafaxine (EFFEXOR) 75 mg tablet, Take 25 mg by mouth three (3) times daily. , Disp: , Rfl:     meclizine (ANTIVERT) 25 mg tablet, Take 1 Tab by mouth two (2) times a day., Disp: 60 Tab, Rfl: 1    ibuprofen (MOTRIN) 800 mg tablet, Take  by mouth., Disp: , Rfl:     Physical Exam:      /74 (BP 1 Location: Left arm, BP Patient Position: Sitting)  Pulse 96  Temp 97.1 °F (36.2 °C) (Oral)   Ht 5' 5\" (1.651 m)  Wt 252 lb (114.3 kg)  SpO2 98%  BMI 41.93 kg/m2    General: obese habitus, NAD, conversant  HENT: NCAT  Lungs: CTAB, normal respiratory effort and rate  CV: RRR, no MRGs  ABD: soft, non-tender, non-distended, normal bowel sounds  Ext: no peripheral edema or digital cyanosis noted  Skin: normal temperature, turgor, color, and texture  Psych: alert and oriented to person, place and time, normal affect  Neuro: speech normal, moving all extremities, gait normal    Diabetic foot exam:     Left Foot:   Visual Exam: normal      Pulse DP: 2+ (normal)   Filament test: normal sensation    Vibratory sensation: normal    Proprioception: normal      Right Foot:   Visual Exam: normal    Pulse DP: 2+ (normal)   Filament test: normal sensation    Vibratory sensation: normal    Proprioception: normal      Assessment/Plan     DM II, likely inadequately controlled:  - Check Hgb A1c at next visit  - Pt encouraged to follow dietary recommendations  - Gave pt several handouts on diabetic diet  - Diabetic foot exam done today  - Pt encouraged to make diabetic eye exam appointment  - F/u 2 mo        Hyperlipidemia, inadequately controlled:  - ASCVD risk calculated today of 11.1%  - Begin high intensity statin therapy, as recommended by AHA/ACC  - Begin daily aspirin   - F/u in 2 mo        GERD:  - Suspected cause of reported chest discomfort  - Begin pt on ranitidine  - Handout given on care of GERD  - F/u 2 mo        Depression:  - D/C venlafaxine  - Will start bupropion (advised to take one pill daily for first week; if well tolerated, take BID after that)  - F/u 2 mo         HTN, well controlled:  - At goal of <130/80  - Continue current regimen  - F/u in 2 mo        Vertigo, well controlled:  - Continue with current meclizine regimen  - F/u as needed        Francisco Kitchen MD  7/19/2018, 11:30 AM

## 2018-07-20 ENCOUNTER — TELEPHONE (OUTPATIENT)
Dept: FAMILY MEDICINE CLINIC | Age: 58
End: 2018-07-20

## 2018-07-20 DIAGNOSIS — N64.4 PAIN OF RIGHT BREAST: Primary | ICD-10-CM

## 2018-07-20 NOTE — TELEPHONE ENCOUNTER
During appointment yesterday patient forgot to mention to you that she has been experiencing right breast pain that started a couple of months ago. Patient denies feeling any lumps. Patient would like a referral to have a mammogram.    Patient said, \"when I'm in a cold environment, it feels like something is sticking to my head\". Patient also said when she is in the heat, she said \"it feels like something is sticking to my head and I'm sweating too\". Patient would  like to know what are the cause of those symptoms. Nurse unable to get a clear understanding of what patient is trying to describe regarding exposure to heat or cold temperatures. Do you want patient to schedule a appointment to discuss breast pain and cold/heat issue? Please advise.

## 2018-07-25 ENCOUNTER — TELEPHONE (OUTPATIENT)
Dept: FAMILY MEDICINE CLINIC | Age: 58
End: 2018-07-25

## 2018-07-31 NOTE — TELEPHONE ENCOUNTER
Spoke with patient to inform her that she must follow up with Dr. Ambar Aden to discuss weight loss medication. Patient stated understanding.

## 2018-08-07 DIAGNOSIS — I10 ESSENTIAL HYPERTENSION: ICD-10-CM

## 2018-08-07 DIAGNOSIS — E78.5 HYPERLIPIDEMIA, UNSPECIFIED HYPERLIPIDEMIA TYPE: ICD-10-CM

## 2018-08-07 DIAGNOSIS — K21.9 GASTROESOPHAGEAL REFLUX DISEASE WITHOUT ESOPHAGITIS: ICD-10-CM

## 2018-08-07 DIAGNOSIS — E11.9 TYPE 2 DIABETES MELLITUS WITHOUT COMPLICATION, WITHOUT LONG-TERM CURRENT USE OF INSULIN (HCC): ICD-10-CM

## 2018-08-07 DIAGNOSIS — F32.A DEPRESSION, UNSPECIFIED DEPRESSION TYPE: ICD-10-CM

## 2018-08-07 DIAGNOSIS — J30.89 ENVIRONMENTAL AND SEASONAL ALLERGIES: ICD-10-CM

## 2018-08-07 RX ORDER — ATORVASTATIN CALCIUM 40 MG/1
40 TABLET, FILM COATED ORAL DAILY
Qty: 60 TAB | Refills: 1 | Status: SHIPPED | OUTPATIENT
Start: 2018-08-07 | End: 2018-08-30 | Stop reason: SDUPTHER

## 2018-08-07 RX ORDER — METFORMIN HYDROCHLORIDE 1000 MG/1
1000 TABLET ORAL 2 TIMES DAILY WITH MEALS
Qty: 180 TAB | Refills: 1 | Status: SHIPPED | OUTPATIENT
Start: 2018-08-07 | End: 2018-12-19 | Stop reason: SDUPTHER

## 2018-08-07 RX ORDER — RANITIDINE 150 MG/1
150 TABLET, FILM COATED ORAL 2 TIMES DAILY
Qty: 120 TAB | Refills: 1 | Status: SHIPPED | OUTPATIENT
Start: 2018-08-07 | End: 2018-12-19 | Stop reason: SDUPTHER

## 2018-08-07 RX ORDER — MONTELUKAST SODIUM 10 MG/1
10 TABLET ORAL DAILY
Qty: 90 TAB | Refills: 1 | Status: SHIPPED | OUTPATIENT
Start: 2018-08-07 | End: 2018-08-30 | Stop reason: SDUPTHER

## 2018-08-07 RX ORDER — PIOGLITAZONEHYDROCHLORIDE 30 MG/1
30 TABLET ORAL DAILY
Qty: 90 TAB | Refills: 1 | Status: SHIPPED | OUTPATIENT
Start: 2018-08-07 | End: 2018-08-30 | Stop reason: SDUPTHER

## 2018-08-07 RX ORDER — IBUPROFEN 800 MG/1
800 TABLET ORAL
Qty: 180 TAB | Refills: 1 | Status: SHIPPED | OUTPATIENT
Start: 2018-08-07 | End: 2018-12-19 | Stop reason: SDUPTHER

## 2018-08-07 RX ORDER — BUPROPION HYDROCHLORIDE 150 MG/1
150 TABLET, EXTENDED RELEASE ORAL 2 TIMES DAILY
Qty: 60 TAB | Refills: 1 | Status: SHIPPED | OUTPATIENT
Start: 2018-08-07 | End: 2018-12-19 | Stop reason: SDUPTHER

## 2018-08-07 RX ORDER — PROPRANOLOL HYDROCHLORIDE 60 MG/1
60 CAPSULE, EXTENDED RELEASE ORAL DAILY
Qty: 90 CAP | Refills: 1 | Status: SHIPPED | OUTPATIENT
Start: 2018-08-07 | End: 2018-10-19 | Stop reason: SDUPTHER

## 2018-08-07 NOTE — TELEPHONE ENCOUNTER
Fax came through from [email or web address] (patient's new pharmacy), requesting that refills be faxed to [phone] so that patient can receive medications through mail order.

## 2018-08-09 ENCOUNTER — OFFICE VISIT (OUTPATIENT)
Dept: FAMILY MEDICINE CLINIC | Age: 58
End: 2018-08-09

## 2018-08-09 ENCOUNTER — HOSPITAL ENCOUNTER (OUTPATIENT)
Dept: LAB | Age: 58
Discharge: HOME | End: 2018-08-09
Payer: MEDICAID

## 2018-08-09 VITALS
DIASTOLIC BLOOD PRESSURE: 76 MMHG | SYSTOLIC BLOOD PRESSURE: 117 MMHG | HEART RATE: 79 BPM | BODY MASS INDEX: 41.65 KG/M2 | TEMPERATURE: 96.9 F | WEIGHT: 250 LBS | RESPIRATION RATE: 16 BRPM | OXYGEN SATURATION: 97 % | HEIGHT: 65 IN

## 2018-08-09 DIAGNOSIS — R60.0 BILATERAL LEG EDEMA: Primary | ICD-10-CM

## 2018-08-09 DIAGNOSIS — R60.0 BILATERAL LEG EDEMA: ICD-10-CM

## 2018-08-09 LAB
ALBUMIN SERPL-MCNC: 3.5 G/DL (ref 3.4–5)
ALBUMIN/GLOB SERPL: 1.1 {RATIO} (ref 0.8–1.7)
ALP SERPL-CCNC: 132 U/L (ref 45–117)
ALT SERPL-CCNC: 22 U/L (ref 13–56)
ANION GAP SERPL CALC-SCNC: 7 MMOL/L (ref 3–18)
AST SERPL-CCNC: 10 U/L (ref 15–37)
BASOPHILS # BLD: 0 K/UL (ref 0–0.1)
BASOPHILS NFR BLD: 1 % (ref 0–2)
BILIRUB SERPL-MCNC: 0.3 MG/DL (ref 0.2–1)
BNP SERPL-MCNC: 65 PG/ML (ref 0–900)
BUN SERPL-MCNC: 9 MG/DL (ref 7–18)
BUN/CREAT SERPL: 12 (ref 12–20)
CALCIUM SERPL-MCNC: 9 MG/DL (ref 8.5–10.1)
CHLORIDE SERPL-SCNC: 103 MMOL/L (ref 100–108)
CO2 SERPL-SCNC: 28 MMOL/L (ref 21–32)
CREAT SERPL-MCNC: 0.75 MG/DL (ref 0.6–1.3)
DIFFERENTIAL METHOD BLD: NORMAL
EOSINOPHIL # BLD: 0.1 K/UL (ref 0–0.4)
EOSINOPHIL NFR BLD: 1 % (ref 0–5)
ERYTHROCYTE [DISTWIDTH] IN BLOOD BY AUTOMATED COUNT: 12 % (ref 11.6–14.5)
GLOBULIN SER CALC-MCNC: 3.3 G/DL (ref 2–4)
GLUCOSE SERPL-MCNC: 328 MG/DL (ref 74–99)
HCT VFR BLD AUTO: 38.7 % (ref 35–45)
HGB BLD-MCNC: 12.4 G/DL (ref 12–16)
LYMPHOCYTES # BLD: 2.3 K/UL (ref 0.9–3.6)
LYMPHOCYTES NFR BLD: 37 % (ref 21–52)
MCH RBC QN AUTO: 26.7 PG (ref 24–34)
MCHC RBC AUTO-ENTMCNC: 32 G/DL (ref 31–37)
MCV RBC AUTO: 83.4 FL (ref 74–97)
MONOCYTES # BLD: 0.4 K/UL (ref 0.05–1.2)
MONOCYTES NFR BLD: 6 % (ref 3–10)
NEUTS SEG # BLD: 3.5 K/UL (ref 1.8–8)
NEUTS SEG NFR BLD: 55 % (ref 40–73)
PLATELET # BLD AUTO: 345 K/UL (ref 135–420)
PMV BLD AUTO: 9.8 FL (ref 9.2–11.8)
POTASSIUM SERPL-SCNC: 4.5 MMOL/L (ref 3.5–5.5)
PROT SERPL-MCNC: 6.8 G/DL (ref 6.4–8.2)
RBC # BLD AUTO: 4.64 M/UL (ref 4.2–5.3)
SODIUM SERPL-SCNC: 138 MMOL/L (ref 136–145)
WBC # BLD AUTO: 6.3 K/UL (ref 4.6–13.2)

## 2018-08-09 PROCEDURE — 83880 ASSAY OF NATRIURETIC PEPTIDE: CPT | Performed by: FAMILY MEDICINE

## 2018-08-09 PROCEDURE — 36415 COLL VENOUS BLD VENIPUNCTURE: CPT | Performed by: FAMILY MEDICINE

## 2018-08-09 PROCEDURE — 80053 COMPREHEN METABOLIC PANEL: CPT | Performed by: FAMILY MEDICINE

## 2018-08-09 PROCEDURE — 85025 COMPLETE CBC W/AUTO DIFF WBC: CPT | Performed by: FAMILY MEDICINE

## 2018-08-09 NOTE — PATIENT INSTRUCTIONS
Leg and Ankle Edema: Care Instructions  Your Care Instructions  Swelling in the legs, ankles, and feet is called edema. It is common after you sit or stand for a while. Long plane flights or car rides often cause swelling in the legs and feet. You may also have swelling if you have to stand for long periods of time at your job. Problems with the veins in the legs (varicose veins) and changes in hormones can also cause swelling. Sometimes the swelling in the ankles and feet is caused by a more serious problem, such as heart failure, infection, blood clots, or liver or kidney disease. Follow-up care is a key part of your treatment and safety. Be sure to make and go to all appointments, and call your doctor if you are having problems. It's also a good idea to know your test results and keep a list of the medicines you take. How can you care for yourself at home? · If your doctor gave you medicine, take it as prescribed. Call your doctor if you think you are having a problem with your medicine. · Whenever you are resting, raise your legs up. Try to keep the swollen area higher than the level of your heart. · Take breaks from standing or sitting in one position. ¨ Walk around to increase the blood flow in your lower legs. ¨ Move your feet and ankles often while you stand, or tighten and relax your leg muscles. · Wear support stockings. Put them on in the morning, before swelling gets worse. · Eat a balanced diet. Lose weight if you need to. · Limit the amount of salt (sodium) in your diet. Salt holds fluid in the body and may increase swelling. When should you call for help? Call 911 anytime you think you may need emergency care. For example, call if:    · You have symptoms of a blood clot in your lung (called a pulmonary embolism). These may include:  ¨ Sudden chest pain. ¨ Trouble breathing.   ¨ Coughing up blood.    Call your doctor now or seek immediate medical care if:    · You have signs of a blood clot, such as:  ¨ Pain in your calf, back of the knee, thigh, or groin. ¨ Redness and swelling in your leg or groin.     · You have symptoms of infection, such as:  ¨ Increased pain, swelling, warmth, or redness. ¨ Red streaks or pus. ¨ A fever.    Watch closely for changes in your health, and be sure to contact your doctor if:    · Your swelling is getting worse.     · You have new or worsening pain in your legs.     · You do not get better as expected. Where can you learn more? Go to http://kai-janet.info/. Enter Y938 in the search box to learn more about \"Leg and Ankle Edema: Care Instructions. \"  Current as of: November 20, 2017  Content Version: 11.7  © 0027-1391 Touch[employer]. Care instructions adapted under license by [employer] (which disclaims liability or warranty for this information). If you have questions about a medical condition or this instruction, always ask your healthcare professional. [address] any warranty or liability for your use of this information.

## 2018-08-09 NOTE — MR AVS SNAPSHOT
Randy Beebe [address] [place]. [phone] 
[phone] Patient: Roger Barth MRN: [id number] :1960 Visit Information Date & Time Provider Department Dept. Phone Encounter #  
 2018 10:00 AM Shukri Zaidi [id number] Follow-up Instructions Return in about 1 week (around 2018). Your Appointments 2018 10:15 AM  
Follow Up with MD Shonna Zaidi 23 ([address]) Appt Note: follow-up 30 min [facility] [place]. [address]  
  
   
 [address] [phone]  
  
    
 10/17/2018 11:00 AM  
Office Visit with MD Shonna Zaidi 23 [address]) Appt Note:   
 [address] [facility] [place]. [address]  
  
   
 [address] [place] Upcoming Health Maintenance Date Due Hepatitis C Screening 1960 EYE EXAM RETINAL OR DILATED Q1 1972 Pneumococcal 19-64 Medium Risk (1 of 1 - PPSV23) 1981 DTaP/Tdap/Td series (1 - Tdap) 1983 BREAST CANCER SCRN MAMMOGRAM 2012 FOBT Q 1 YEAR AGE 50-75 2012 PAP AKA CERVICAL CYTOLOGY 2014 MEDICARE YEARLY EXAM 2018 Influenza Age 5 to Adult 2018 HEMOGLOBIN A1C Q6M 2018 MICROALBUMIN Q1 2019 LIPID PANEL Q1 2019 FOOT EXAM Q1 2019 Allergies as of 2018  Review Complete On: 2018 By: Anthony Hussein LPN No Known Allergies Current Immunizations  Never Reviewed No immunizations on file. Not reviewed this visit You Were Diagnosed With   
  
 Codes Comments Bilateral leg edema    -  Primary ICD-10-CM: R60.0 ICD-9-CM: 858. 3 Vitals BP Pulse Temp Resp Height(growth percentile) Weight(growth percentile) 117/76 (BP 1 Location: Left arm, BP Patient Position: Sitting) 79 96.9 °F (36.1 °C) (Oral) 16 5' 5\" (1.651 m) 250 lb (113.4 kg) SpO2 BMI OB Status Smoking Status 97% 41.6 kg/m2 Menopause Former Smoker Vitals History BMI and BSA Data Body Mass Index Body Surface Area  
 41.6 kg/m 2 2.28 m 2 Preferred Pharmacy Pharmacy Name Phone [address], [address] [phone] Your Updated Medication List  
  
   
This list is accurate as of 8/9/18 10:28 AM.  Always use your most recent med list.  
  
  
  
  
 aspirin 81 mg chewable tablet Take 1 Tab by mouth daily. atorvastatin 40 mg tablet Commonly known as:  LIPITOR Take 1 Tab by mouth daily. buPROPion  mg SR tablet Commonly known as:  Isis Gilson Take 1 Tab by mouth two (2) times a day. fluconazole 200 mg tablet Commonly known as:  DIFLUCAN Take 200 mg by mouth daily. FDA advises cautious prescribing of oral fluconazole in pregnancy. ibuprofen 800 mg tablet Commonly known as:  MOTRIN Take 1 Tab by mouth every six (6) hours as needed for Pain. meclizine 25 mg tablet Commonly known as:  ANTIVERT Take 1 Tab by mouth two (2) times a day. metFORMIN 1,000 mg tablet Commonly known as:  GLUCOPHAGE Take 1 Tab by mouth two (2) times daily (with meals). montelukast 10 mg tablet Commonly known as:  SINGULAIR Take 1 Tab by mouth daily. pioglitazone 30 mg tablet Commonly known as:  ACTOS Take 1 Tab by mouth daily. propranolol LA 60 mg SR capsule Commonly known as:  INDERAL LA Take 1 Cap by mouth daily. raNITIdine 150 mg tablet Commonly known as:  ZANTAC Take 1 Tab by mouth two (2) times a day. SITagliptin 100 mg tablet Commonly known as:  Merline Pesa Take 1 Tab by mouth daily. Follow-up Instructions Return in about 1 week (around 8/16/2018). To-Do List   
 08/09/2018 Lab:  CBC WITH AUTOMATED DIFF   
  
 08/09/2018 Lab:  METABOLIC PANEL, COMPREHENSIVE   
  
 08/09/2018 Lab:  NT-PRO BNP Patient Instructions Leg and Ankle Edema: Care Instructions Your Care Instructions Swelling in the legs, ankles, and feet is called edema. It is common after you sit or stand for a while. Long plane flights or car rides often cause swelling in the legs and feet. You may also have swelling if you have to stand for long periods of time at your job. Problems with the veins in the legs (varicose veins) and changes in hormones can also cause swelling. Sometimes the swelling in the ankles and feet is caused by a more serious problem, such as heart failure, infection, blood clots, or liver or kidney disease. Follow-up care is a key part of your treatment and safety. Be sure to make and go to all appointments, and call your doctor if you are having problems. It's also a good idea to know your test results and keep a list of the medicines you take. How can you care for yourself at home? · If your doctor gave you medicine, take it as prescribed. Call your doctor if you think you are having a problem with your medicine. · Whenever you are resting, raise your legs up. Try to keep the swollen area higher than the level of your heart. · Take breaks from standing or sitting in one position. ¨ Walk around to increase the blood flow in your lower legs. ¨ Move your feet and ankles often while you stand, or tighten and relax your leg muscles. · Wear support stockings. Put them on in the morning, before swelling gets worse. · Eat a balanced diet. Lose weight if you need to. · Limit the amount of salt (sodium) in your diet. Salt holds fluid in the body and may increase swelling. When should you call for help? Call 911 anytime you think you may need emergency care.  For example, call if: 
  · You have symptoms of a blood clot in your lung (called a pulmonary embolism). These may include: 
¨ Sudden chest pain. ¨ Trouble breathing. ¨ Coughing up blood.  
 Call your doctor now or seek immediate medical care if: 
  · You have signs of a blood clot, such as: 
¨ Pain in your calf, back of the knee, thigh, or groin. ¨ Redness and swelling in your leg or groin.  
  · You have symptoms of infection, such as: 
¨ Increased pain, swelling, warmth, or redness. ¨ Red streaks or pus. ¨ A fever.  
 Watch closely for changes in your health, and be sure to contact your doctor if: 
  · Your swelling is getting worse.  
  · You have new or worsening pain in your legs.  
  · You do not get better as expected. Where can you learn more? Go to http://kai-janet.info/. Enter F607 in the search box to learn more about \"Leg and Ankle Edema: Care Instructions. \" Current as of: November 20, 2017 Content Version: 11.7 © 2685-3271 Brandfolder. Care instructions adapted under license by [employer] (which disclaims liability or warranty for this information). If you have questions about a medical condition or this instruction, always ask your healthcare professional. [place] [address] any warranty or liability for your use of this information. Introducing [facility]! [employer] introduces [employer] patient portal. Now you can access parts of your medical record, email your doctor's office, and request medication refills online. 1. In your internet browser, go to [email or web address] 2. Click on the First Time User? Click Here link in the Sign In box. You will see the New Member Sign Up page. 3. Enter your [employer] Access Code exactly as it appears below. You will not need to use this code after youve completed the sign-up process. If you do not sign up before the expiration date, you must request a new code. · [employer] Access Code: [email or web address] Expires: 9/19/2018 11:45 AM 
 
 4. Enter the last four digits of your Social Security Number (xxxx) and Date of Birth (mm/dd/yyyy) as indicated and click Submit. You will be taken to the next sign-up page. 5. Create a Sirin Mobile Technologies ID. This will be your [employer] login ID and cannot be changed, so think of one that is secure and easy to remember. 6. Create a [employer] password. You can change your password at any time. 7. Enter your Password Reset Question and Answer. This can be used at a later time if you forget your password. 8. Enter your e-mail address. You will receive e-mail notification when new information is available in [address] 9. Click Sign Up. You can now view and download portions of your medical record. 10. Click the Download Summary menu link to download a portable copy of your medical information. If you have questions, please visit the Frequently Asked Questions section of the [employer] website. Remember, [employer] is NOT to be used for urgent needs. For medical emergencies, dial 911. Now available from your iPhone and Android! Please provide this summary of care documentation to your next provider. Your primary care clinician is listed as Lauren Shay. If you have any questions after today's visit, please call [phone].

## 2018-08-09 NOTE — PROGRESS NOTES
Chief Complaint   Patient presents with    Other     Fluid in legs. 1. Have you been to the ER, urgent care clinic since your last visit? Hospitalized since your last visit? No    2. Have you seen or consulted any other health care providers outside of the [facility] since your last visit? Include any pap smears or colon screening. Patient  Mammogram was performed 1 week ago.     Visit Vitals    /76 (BP 1 Location: Left arm, BP Patient Position: Sitting)    Pulse 79    Temp 96.9 °F (36.1 °C) (Oral)    Resp 16    Ht 5' 5\" (1.651 m)    Wt 250 lb (113.4 kg)    SpO2 97%    BMI 41.6 kg/m2

## 2018-08-09 NOTE — PROGRESS NOTES
[facility]    CC: Leg Swelling    HPI:     - Timing/onset: Grandnghia squeezed her leg last week and she noticed they were swollen (Unable to say when it started exactly)  - Location: Lower legs bilaterally  - Quality: Pitting  - Progression/Course: Unchanged  - Associated Symptoms/signs: None  - Has not experienced anything like this in the past      ROS: Positive items marked in RED  CON: fever, chills  Cardiovascular: palpitations, CP  Resp: cough, wheezing  GI: nausea, vomiting, diarrhea  : dysuria, hematuria      Past Medical History:   Diagnosis Date    Arthritis     Depression     [facility]     Hot flashes     Hypertension     Morbid obesity ([facility]     Vertigo        Past Surgical History:   Procedure Laterality Date    EXCIS INFRATENT BRAIN TUMOR  08/16/2015    SNG    HX CHOLECYSTECTOMY  2016    [address]    HX TUBAL LIGATION Bilateral     NEUROLOGICAL PROCEDURE UNLISTED         Family History   Problem Relation Age of Onset    Cancer Mother 58     colon    Hypertension Mother     Diabetes Mother     Psychiatric Disorder Father     Lupus Sister        Social History     Social History    Marital status:      Spouse name: N/A    Number of children: N/A    Years of education: N/A     Social History Main Topics    Smoking status: Former Smoker     Quit date: 1998    Smokeless tobacco: Never Used    Alcohol use No    Drug use: Yes     Special: Cocaine, Marijuana, Heroin, Opiates    Sexual activity: Not Asked     Other Topics Concern    None     Social History Narrative       No Known Allergies      Current Outpatient Prescriptions:     SITagliptin (JANUVIA) 100 mg tablet, Take 1 Tab by mouth daily. , Disp: 90 Tab, Rfl: 1    metFORMIN (GLUCOPHAGE) 1,000 mg tablet, Take 1 Tab by mouth two (2) times daily (with meals). , Disp: 180 Tab, Rfl: 1    buPROPion SR (WELLBUTRIN SR) 150 mg SR tablet, Take 1 Tab by mouth two (2) times a day., Disp: 60 Tab, Rfl: 1   atorvastatin (LIPITOR) 40 mg tablet, Take 1 Tab by mouth daily. , Disp: 60 Tab, Rfl: 1    pioglitazone (ACTOS) 30 mg tablet, Take 1 Tab by mouth daily. , Disp: 90 Tab, Rfl: 1    raNITIdine (ZANTAC) 150 mg tablet, Take 1 Tab by mouth two (2) times a day., Disp: 120 Tab, Rfl: 1    montelukast (SINGULAIR) 10 mg tablet, Take 1 Tab by mouth daily. , Disp: 90 Tab, Rfl: 1    propranolol LA (INDERAL LA) 60 mg SR capsule, Take 1 Cap by mouth daily. , Disp: 90 Cap, Rfl: 1    fluconazole (DIFLUCAN) 200 mg tablet, Take 200 mg by mouth daily. FDA advises cautious prescribing of oral fluconazole in pregnancy. , Disp: , Rfl:     meclizine (ANTIVERT) 25 mg tablet, Take 1 Tab by mouth two (2) times a day., Disp: 60 Tab, Rfl: 1    ibuprofen (MOTRIN) 800 mg tablet, Take 1 Tab by mouth every six (6) hours as needed for Pain., Disp: 180 Tab, Rfl: 1    aspirin 81 mg chewable tablet, Take 1 Tab by mouth daily. , Disp: 60 Tab, Rfl: 1    Physical Exam:      /76 (BP 1 Location: Left arm, BP Patient Position: Sitting)  Pulse 79  Temp 96.9 °F (36.1 °C) (Oral)   Resp 16  Ht 5' 5\" (1.651 m)  Wt 250 lb (113.4 kg)  SpO2 97%  BMI 41.6 kg/m2    General:  WD, WN, NAD, conversant  Eyes: sclera clear bilaterally, no discharge noted, eyelids normal in appearance  HENT: NCAT  Lungs: CTAB, normal respiratory effort and rate  CV: RRR, no MRGs  ABD: soft, non-tender, non-distended, normal bowel sounds  Ext: 2+ pitting edema in LE bilaterally, no calf tenderness bilaterally, no digital cyanosis noted  Skin: normal temperature, color, and texture  Psych: alert and oriented to person, place and time, normal affect  Neuro: speech normal, moving all extremities, gait normal      Assessment/Plan     LE Edema:  - Suspect venous insufficiency      - DDx includes cardiac, renal, hepatic, and infectious etiology  - Will check NT- proBNP, CBC, and CMP  - Counseled on suspected cause and management of venous insufficiency, including elevation, compression stockings, etc.  - Plan to discuss vascular evaluation at follow up if blood work is negative and patient has had no improvement in edema with conservative management  - Handout given on leg edema care  - F/U in 1 week to review results of blood work and re-evaluate status of LE edema         Mariposa Alvarez MD  8/9/2018, 10:16 AM

## 2018-08-15 ENCOUNTER — OFFICE VISIT (OUTPATIENT)
Dept: FAMILY MEDICINE CLINIC | Age: 58
End: 2018-08-15

## 2018-08-15 VITALS
TEMPERATURE: 97.9 F | DIASTOLIC BLOOD PRESSURE: 74 MMHG | RESPIRATION RATE: 18 BRPM | HEIGHT: 65 IN | SYSTOLIC BLOOD PRESSURE: 116 MMHG | HEART RATE: 73 BPM | OXYGEN SATURATION: 98 %

## 2018-08-15 DIAGNOSIS — E66.01 MORBID OBESITY DUE TO EXCESS CALORIES (HCC): Primary | ICD-10-CM

## 2018-08-15 DIAGNOSIS — M79.604 RIGHT LEG PAIN: ICD-10-CM

## 2018-08-15 DIAGNOSIS — E11.8 CONTROLLED TYPE 2 DIABETES MELLITUS WITH COMPLICATION, WITHOUT LONG-TERM CURRENT USE OF INSULIN (HCC): ICD-10-CM

## 2018-08-15 DIAGNOSIS — I87.2 EDEMA OF BOTH LOWER EXTREMITIES DUE TO PERIPHERAL VENOUS INSUFFICIENCY: ICD-10-CM

## 2018-08-15 RX ORDER — INSULIN PUMP SYRINGE, 3 ML
EACH MISCELLANEOUS
Qty: 1 KIT | Refills: 0 | Status: SHIPPED | OUTPATIENT
Start: 2018-08-15 | End: 2021-05-26

## 2018-08-15 RX ORDER — LANCETS
EACH MISCELLANEOUS
Qty: 1 EACH | Refills: 11 | Status: SHIPPED | OUTPATIENT
Start: 2018-08-15 | End: 2018-10-23 | Stop reason: SDUPTHER

## 2018-08-15 NOTE — MR AVS SNAPSHOT
Randy Beebe [address] [facility] [place]. [address] [id number] 
[phone] Patient: Brayden Rhoades MRN: [id number] :1960 Visit Information Date & Time Provider Department Dept. Phone Encounter #  
 8/15/2018  3:15 [id number] Follow-up Instructions Return in about 1 week (around 2018) for or after leg tests are done. Your Appointments 10/17/2018 11:00 AM  
Office Visit with Kelli Sanford MD  
[facility] ([facility]) Appt Note:   
 [address] [place]  
  
   
 [address] Upcoming Health Maintenance Date Due Hepatitis C Screening 1962 EYE EXAM RETINAL OR DILATED Q1 1972 Pneumococcal 19-64 Medium Risk (1 of 1 - PPSV23) 1981 DTaP/Tdap/Td series (1 - Tdap) 1983 BREAST CANCER SCRN MAMMOGRAM 2012 FOBT Q 1 YEAR AGE 50-75 2012 PAP AKA CERVICAL CYTOLOGY 2014 MEDICARE YEARLY EXAM 2018 Influenza Age 5 to Adult 2018 HEMOGLOBIN A1C Q6M 2018 MICROALBUMIN Q1 2019 LIPID PANEL Q1 2019 FOOT EXAM Q1 2019 Allergies as of 8/15/2018  Review Complete On: 2018 By: Kelli Sanford MD  
 No Known Allergies Current Immunizations  Never Reviewed No immunizations on file. Not reviewed this visit You Were Diagnosed With   
  
 Codes Comments Morbid obesity due to excess calories ([facility]    -  Primary ICD-10-CM: E66.01 
ICD-9-CM: 278.01 Right leg pain     ICD-10-CM: M79.604 ICD-9-CM: 729.5 Edema of both lower extremities due to peripheral venous insufficiency     ICD-10-CM: I87.2, R60.0 ICD-9-CM: 459.81, 782.3 Controlled type 2 diabetes mellitus with complication, without long-term current use of insulin (HCC)     ICD-10-CM: E11.8 ICD-9-CM: 250.90 Vitals BP Pulse Temp Resp Height(growth percentile) SpO2  
 116/74 (BP 1 Location: Right arm, BP Patient Position: Sitting) 73 97.9 °F (36.6 °C) (Oral) 18 5' 5\" (1.651 m) 98% OB Status Smoking Status Menopause Former Smoker Preferred Pharmacy Pharmacy Name Phone [address], [place] [phone] Your Updated Medication List  
  
   
This list is accurate as of 8/15/18  4:23 PM.  Always use your most recent med list.  
  
  
  
  
 aspirin 81 mg chewable tablet Take 1 Tab by mouth daily. atorvastatin 40 mg tablet Commonly known as:  LIPITOR Take 1 Tab by mouth daily. Blood-Glucose Meter monitoring kit As allowed by insurance buPROPion  mg SR tablet Commonly known as:  [place] Shayne Take 1 Tab by mouth two (2) times a day. fluconazole 200 mg tablet Commonly known as:  DIFLUCAN Take 200 mg by mouth daily. FDA advises cautious prescribing of oral fluconazole in pregnancy. glucose blood VI test strips strip Commonly known as:  ASCENSIA AUTODISC VI, ONE TOUCH ULTRA TEST VI Compatible with glucose meter  
  
 ibuprofen 800 mg tablet Commonly known as:  MOTRIN Take 1 Tab by mouth every six (6) hours as needed for Pain. Lancets Misc Check glucose before meals and at bedtime  
  
 meclizine 25 mg tablet Commonly known as:  ANTIVERT Take 1 Tab by mouth two (2) times a day. metFORMIN 1,000 mg tablet Commonly known as:  GLUCOPHAGE Take 1 Tab by mouth two (2) times daily (with meals). montelukast 10 mg tablet Commonly known as:  SINGULAIR Take 1 Tab by mouth daily. pioglitazone 30 mg tablet Commonly known as:  ACTOS Take 1 Tab by mouth daily. propranolol LA 60 mg SR capsule Commonly known as:  INDERAL LA Take 1 Cap by mouth daily. raNITIdine 150 mg tablet Commonly known as:  ZANTAC Take 1 Tab by mouth two (2) times a day. SITagliptin 100 mg tablet Commonly known as:  Bill Legato Take 1 Tab by mouth daily. Prescriptions Sent to Pharmacy Refills Blood-Glucose Meter monitoring kit 0 Sig: As allowed by insurance Class: Normal  
 Pharmacy: [address], [address] Ph #: [phone] Lancets misc 11 Sig: Check glucose before meals and at bedtime Class: Normal  
 Pharmacy: [address], [address] Ph #: [phone]  
 glucose blood VI test strips (ASCENSIA AUTODISC VI, ONE TOUCH ULTRA TEST VI) strip 6 Sig: Compatible with glucose meter Class: Normal  
 Pharmacy: [address], [address] Ph #: [phone] We Performed the Following AMB SUPPLY ORDER [[id number] Custom] Comments:  
 Compression hose Follow-up Instructions Return in about 1 week (around 8/22/2018) for or after leg tests are done. To-Do List   
 08/15/2018 Vascular/US:  DUPLEX LOWER EXT VENOUS BILAT Introducing [facility]! Carol Amos introduces ARI patient portal. Now you can access parts of your medical record, email your doctor's office, and request medication refills online. 1. In your internet browser, go to [email or web address] 2. Click on the First Time User? Click Here link in the Sign In box. You will see the New Member Sign Up page. 3. Enter your ARI Access Code exactly as it appears below. You will not need to use this code after youve completed the sign-up process. If you do not sign up before the expiration date, you must request a new code. · ARI Access Code: [email or web address] Expires: 9/19/2018 11:45 AM 
 
4. Enter the last four digits of your Social Security Number (xxxx) and Date of Birth (mm/dd/yyyy) as indicated and click Submit. You will be taken to the next sign-up page. 5. Create a reBounces ID. This will be your reBounces login ID and cannot be changed, so think of one that is secure and easy to remember. 6. Create a reBounces password. You can change your password at any time. 7. Enter your Password Reset Question and Answer. This can be used at a later time if you forget your password. 8. Enter your e-mail address. You will receive e-mail notification when new information is available in [address] 9. Click Sign Up. You can now view and download portions of your medical record. 10. Click the Download Summary menu link to download a portable copy of your medical information. If you have questions, please visit the Frequently Asked Questions section of the reBounces website. Remember, reBounces is NOT to be used for urgent needs. For medical emergencies, dial 911. Now available from your iPhone and Android! Please provide this summary of care documentation to your next provider. Your primary care clinician is listed as Tessa Maravilla. If you have any questions after today's visit, please call [phone].

## 2018-08-15 NOTE — PROGRESS NOTES
Chief Complaint   Patient presents with    Follow-up     1 week f/u regarding leg swelling    Other     Review of mammogram results.      Visit Vitals    /74 (BP 1 Location: Right arm, BP Patient Position: Sitting)    Pulse 73    Temp 97.9 °F (36.6 °C) (Oral)    Resp 18    Ht 5' 5\" (1.651 m)    SpO2 98%

## 2018-08-20 ENCOUNTER — RESEARCH ENCOUNTER (OUTPATIENT)
Dept: FAMILY MEDICINE CLINIC | Age: 58
End: 2018-08-20

## 2018-08-20 NOTE — PROGRESS NOTES
Heath Lowery is a 56 y.o. female and presents with Follow-up (1 week f/u regarding leg swelling) and Other (Review of mammogram results.)         Subjective:    Did not get the compression stockings that were recommended on her last visit. States her legs are still with edema but stated on the right leg she felt a pain with a pulling sensation. ROS:     Review of Systems   Respiratory: Negative. Cardiovascular: Positive for leg swelling. Musculoskeletal:        Pain, pulling sensation when walking on RLE   Skin: Negative. Neurological: Negative. The problem list was updated as a part of today's visit. Patient Active Problem List   Diagnosis Code    Essential hypertension I10    GERD (gastroesophageal reflux disease) K21.9    DOMINIQUE (obstructive sleep apnea) G47.33    Arthritis M19.90    Diabetes mellitus type 2, controlled ([facility] E11.9    Asthma J45.909    Urinary urgency R39.15    Morbid obesity due to excess calories (HCC) E66.01    BMI 40.0-44.9, adult (HCC) Z68.41       The PSH, FH were reviewed. SH:  Social History   Substance Use Topics    Smoking status: Former Smoker     Quit date: 1998    Smokeless tobacco: Never Used    Alcohol use No       Medications/Allergies:  Current Outpatient Prescriptions on File Prior to Visit   Medication Sig Dispense Refill    SITagliptin (JANUVIA) 100 mg tablet Take 1 Tab by mouth daily. 90 Tab 1    metFORMIN (GLUCOPHAGE) 1,000 mg tablet Take 1 Tab by mouth two (2) times daily (with meals). 180 Tab 1    buPROPion SR (WELLBUTRIN SR) 150 mg SR tablet Take 1 Tab by mouth two (2) times a day. 60 Tab 1    ibuprofen (MOTRIN) 800 mg tablet Take 1 Tab by mouth every six (6) hours as needed for Pain. 180 Tab 1    atorvastatin (LIPITOR) 40 mg tablet Take 1 Tab by mouth daily. 60 Tab 1    pioglitazone (ACTOS) 30 mg tablet Take 1 Tab by mouth daily. 90 Tab 1    raNITIdine (ZANTAC) 150 mg tablet Take 1 Tab by mouth two (2) times a day.  120 Tab 1  montelukast (SINGULAIR) 10 mg tablet Take 1 Tab by mouth daily. 90 Tab 1    propranolol LA (INDERAL LA) 60 mg SR capsule Take 1 Cap by mouth daily. 90 Cap 1    aspirin 81 mg chewable tablet Take 1 Tab by mouth daily. 60 Tab 1    fluconazole (DIFLUCAN) 200 mg tablet Take 200 mg by mouth daily. FDA advises cautious prescribing of oral fluconazole in pregnancy.  meclizine (ANTIVERT) 25 mg tablet Take 1 Tab by mouth two (2) times a day. 60 Tab 1     No current facility-administered medications on file prior to visit. No Known Allergies    Objective:  Visit Vitals    /74 (BP 1 Location: Right arm, BP Patient Position: Sitting)    Pulse 73    Temp 97.9 °F (36.6 °C) (Oral)    Resp 18    Ht 5' 5\" (1.651 m)    SpO2 98%    There is no height or weight on file to calculate BMI. Physical assessment  Physical Exam   Constitutional: She is oriented to person, place, and time and well-developed, well-nourished, and in no distress. Cardiovascular: Normal rate, regular rhythm, normal heart sounds and intact distal pulses. Pulmonary/Chest: Effort normal and breath sounds normal.   Musculoskeletal: She exhibits edema. Neurological: She is alert and oriented to person, place, and time. She has normal strength.  Gait normal.         Labwork and Ancillary Studies:    CBC w/Diff  Lab Results   Component Value Date/Time    WBC 6.3 08/09/2018 10:41 AM    HGB 12.4 08/09/2018 10:41 AM    PLATELET 611 81/38/0128 10:41 AM         Basic Metabolic Profile  Lab Results   Component Value Date/Time    Sodium 138 08/09/2018 10:41 AM    Potassium 4.5 08/09/2018 10:41 AM    Chloride 103 08/09/2018 10:41 AM    CO2 28 08/09/2018 10:41 AM    Anion gap 7 08/09/2018 10:41 AM    Glucose 328 (H) 08/09/2018 10:41 AM    BUN 9 08/09/2018 10:41 AM    Creatinine 0.75 08/09/2018 10:41 AM    BUN/Creatinine ratio 12 08/09/2018 10:41 AM    GFR est AA >60 08/09/2018 10:41 AM    GFR est non-AA >60 08/09/2018 10:41 AM    Calcium 9.0 08/09/2018 10:41 AM        Cholesterol  Lab Results   Component Value Date/Time    Cholesterol, total 215 (H) 06/21/2018 12:10 PM    HDL Cholesterol 48 06/21/2018 12:10 PM    LDL, calculated 141 (H) 06/21/2018 12:10 PM    Triglyceride 133 06/21/2018 12:10 PM       Assessment/Plan:    Diagnoses and all orders for this visit:    1. Morbid obesity due to excess calories ([address]    2. Right leg pain  -     DUPLEX LOWER EXT VENOUS BILAT; Future    3. Edema of both lower extremities due to peripheral venous insufficiency  -     AMB SUPPLY ORDER  -     DUPLEX LOWER EXT VENOUS BILAT; Future    4. Controlled type 2 diabetes mellitus with complication, without long-term current use of insulin ([facility])  -     Blood-Glucose Meter monitoring kit; As allowed by insurance  -     Lancets misc; Check glucose before meals and at bedtime  -     glucose blood VI test strips (ASCENSIA AUTODISC VI, ONE TOUCH ULTRA TEST VI) strip; Compatible with glucose meter    still with LE edema, she did not get the compression stocking as suggested at her last visit. Re educated on the purpose and need for compression stockings and encouraged her to get and use them. Now with pain/pulling in RLE- will get PVL to r/o DVT  Glucose monitoring supplies ordered. Health Maintenance:   Health Maintenance   Topic Date Due    Hepatitis C Screening  1960    EYE EXAM RETINAL OR DILATED Q1  12/09/1972    Pneumococcal 19-64 Medium Risk (1 of 1 - PPSV23) 12/09/1981    DTaP/Tdap/Td series (1 - Tdap) 12/09/1983    BREAST CANCER SCRN MAMMOGRAM  12/09/2012    FOBT Q 1 YEAR AGE 50-75  12/09/2012    PAP AKA CERVICAL CYTOLOGY  06/20/2014    MEDICARE YEARLY EXAM  07/19/2018    Influenza Age 5 to Adult  08/01/2018    HEMOGLOBIN A1C Q6M  12/21/2018    MICROALBUMIN Q1  06/21/2019    LIPID PANEL Q1  06/21/2019    FOOT EXAM Q1  07/19/2019         I have discussed the diagnosis with the patient and the intended plan as seen in the above orders.   The patient has received an After-Visit Summary and questions were answered concerning future plans. An After Visit Summary was printed and given to the patient. All diagnosis have been discussed with the patient and all of the patient's questions have been answered. Follow-up Disposition:  Return in about 1 week (around 8/22/2018) for or after leg tests are done. Ygnacio Peabody, AGNP-BC  [address]  [address]

## 2018-08-30 ENCOUNTER — TELEPHONE (OUTPATIENT)
Dept: FAMILY MEDICINE CLINIC | Age: 58
End: 2018-08-30

## 2018-08-30 DIAGNOSIS — J30.89 ENVIRONMENTAL AND SEASONAL ALLERGIES: ICD-10-CM

## 2018-08-30 DIAGNOSIS — E11.9 TYPE 2 DIABETES MELLITUS WITHOUT COMPLICATION, WITHOUT LONG-TERM CURRENT USE OF INSULIN (HCC): ICD-10-CM

## 2018-08-30 DIAGNOSIS — E78.5 HYPERLIPIDEMIA, UNSPECIFIED HYPERLIPIDEMIA TYPE: ICD-10-CM

## 2018-08-30 RX ORDER — MONTELUKAST SODIUM 10 MG/1
10 TABLET ORAL DAILY
Qty: 90 TAB | Refills: 1 | Status: SHIPPED | OUTPATIENT
Start: 2018-08-30 | End: 2018-12-19 | Stop reason: SDUPTHER

## 2018-08-30 RX ORDER — ATORVASTATIN CALCIUM 40 MG/1
40 TABLET, FILM COATED ORAL DAILY
Qty: 60 TAB | Refills: 1 | Status: SHIPPED | OUTPATIENT
Start: 2018-08-30 | End: 2018-12-19 | Stop reason: SDUPTHER

## 2018-08-30 RX ORDER — PIOGLITAZONEHYDROCHLORIDE 30 MG/1
30 TABLET ORAL DAILY
Qty: 90 TAB | Refills: 1 | Status: SHIPPED | OUTPATIENT
Start: 2018-08-30 | End: 2018-12-19 | Stop reason: SDUPTHER

## 2018-08-30 NOTE — TELEPHONE ENCOUNTER
[address] faxed over a \"Need For Clarification\" form requesting a new Rx for Lipitor 40mg for 90 days to increase compliance.

## 2018-09-07 ENCOUNTER — TELEPHONE (OUTPATIENT)
Dept: FAMILY MEDICINE CLINIC | Age: 58
End: 2018-09-07

## 2018-09-07 ENCOUNTER — HOSPITAL ENCOUNTER (OUTPATIENT)
Dept: VASCULAR SURGERY | Age: 58
Discharge: HOME | End: 2018-09-07
Attending: NURSE PRACTITIONER
Payer: MEDICARE

## 2018-09-07 DIAGNOSIS — I87.2 EDEMA OF BOTH LOWER EXTREMITIES DUE TO PERIPHERAL VENOUS INSUFFICIENCY: ICD-10-CM

## 2018-09-07 DIAGNOSIS — M79.604 RIGHT LEG PAIN: ICD-10-CM

## 2018-09-07 PROCEDURE — 93970 EXTREMITY STUDY: CPT

## 2018-09-07 NOTE — PROGRESS NOTES
Procedure Technique   Duplex images were obtained using 2-D gray scale, color flow, and spectral Doppler analysis. Vitals   Weight Height BSA (calculated - sq m) BP         Interpretation Summary   No evidence of deep venous thrombosis in the lower extremities bilaterally. Lower Extremity Venous Findings   Right Lower Venous   The common femoral, greater saphenous, small saphenous, profunda femoral, proximal femoral, mid femoral, distal femoral, popliteal, posterior tibial, peroneal and saphenous femoral junction vein(s) were imaged in the transverse and longitudinal planes. The vessels showed normal color filling and compressibility. Doppler interrogation of the veins showed phasic and spontaneous flow. Left Lower Venous   The common femoral, greater saphenous, saphenous femoral junction, small saphenous, profunda femoral, proximal femoral, mid femoral, distal femoral, popliteal, posterior tibial and peroneal vein(s) were imaged in the transverse and longitudinal planes. The vessels showed normal color filling and compressibility. Doppler interrogation of the veins showed phasic and spontaneous flow.           Procedure Staff   Technologist/Clinician: Maria Alfredo  Supporting Staff: None  Performing Physician/Midlevel: None

## 2018-09-07 NOTE — TELEPHONE ENCOUNTER
Called patient at [phone] per MAR Sandra NP; patient made aware venous doppler results negative and to continue to wear compression stocking during waking hours. Patient verbalize she understand.

## 2018-09-07 NOTE — PROGRESS NOTES
Please let mrs trammell know her test was negative for blood clots in her legs.  Tell her to continue to wear the compression stockings during her waking hours

## 2018-10-05 ENCOUNTER — OFFICE VISIT (OUTPATIENT)
Dept: FAMILY MEDICINE CLINIC | Age: 58
End: 2018-10-05

## 2018-10-05 VITALS
HEART RATE: 67 BPM | BODY MASS INDEX: 41.38 KG/M2 | TEMPERATURE: 97.4 F | DIASTOLIC BLOOD PRESSURE: 63 MMHG | OXYGEN SATURATION: 98 % | SYSTOLIC BLOOD PRESSURE: 112 MMHG | HEIGHT: 65 IN | WEIGHT: 248.4 LBS | RESPIRATION RATE: 18 BRPM

## 2018-10-05 DIAGNOSIS — R35.0 URINARY FREQUENCY: ICD-10-CM

## 2018-10-05 DIAGNOSIS — E11.42 TYPE 2 DIABETES MELLITUS WITH DIABETIC POLYNEUROPATHY, WITHOUT LONG-TERM CURRENT USE OF INSULIN (HCC): Primary | ICD-10-CM

## 2018-10-05 LAB
BILIRUB UR QL STRIP: NORMAL
GLUCOSE UR-MCNC: NORMAL MG/DL
HBA1C MFR BLD HPLC: 10.3 %
KETONES P FAST UR STRIP-MCNC: NEGATIVE MG/DL
PH UR STRIP: 6 [PH] (ref 4.6–8)
PROT UR QL STRIP: NORMAL
SP GR UR STRIP: 1.03 (ref 1–1.03)
UA UROBILINOGEN AMB POC: NORMAL (ref 0.2–1)
URINALYSIS CLARITY POC: CLEAR
URINALYSIS COLOR POC: YELLOW
URINE BLOOD POC: NEGATIVE
URINE LEUKOCYTES POC: NEGATIVE
URINE NITRITES POC: NEGATIVE

## 2018-10-05 NOTE — PROGRESS NOTES
Ragini Russo    CC: Toe Pain    HPI:     - Timing/onset: 2 weeks:   - Location: big toes bilaterally  - Quality: Tingling  - Context: Has not been taking diabetic medication as prescribed or following diabetic diet  - Associated Symptoms/signs: polydipsia, polyuria, and blurry vision      ROS: Positive items marked in RED  CON: fever, chills  Cardiovascular: palpitations, CP  Resp: SOB, cough  GI: nausea, vomiting, diarrhea  : dysuria, hematuria      Past Medical History:   Diagnosis Date    Arthritis     Depression     Diabetes ([address]     Hot flashes     Hypertension     Morbid obesity ([address]     Vertigo        Past Surgical History:   Procedure Laterality Date    EXCIS INFRATENT BRAIN TUMOR  08/16/2015    [facility]    [facility]LE[facility]    HX TUBAL LIGATION Bilateral     NEUROLOGICAL PROCEDURE UNLISTED         Family History   Problem Relation Age of Onset    Cancer Mother 58     colon    Hypertension Mother     Diabetes Mother     Psychiatric Disorder Father     Lupus Sister        Social History     Social History    Marital status:      Spouse name: N/A    Number of children: N/A    Years of education: N/A     Social History Main Topics    Smoking status: Former Smoker     Quit date: 1998    Smokeless tobacco: Never Used    Alcohol use No    Drug use: Yes     Special: Cocaine, Marijuana, Heroin, Opiates    Sexual activity: Not Asked     Other Topics Concern    None     Social History Narrative       No Known Allergies      Current Outpatient Prescriptions:     atorvastatin (LIPITOR) 40 mg tablet, Take 1 Tab by mouth daily. , Disp: 60 Tab, Rfl: 1    pioglitazone (ACTOS) 30 mg tablet, Take 1 Tab by mouth daily. , Disp: 90 Tab, Rfl: 1    Blood-Glucose Meter monitoring kit, As allowed by insurance, Disp: 1 Kit, Rfl: 0    Lancets misc, Check glucose before meals and at bedtime, Disp: 1 Each, Rfl: 11    glucose blood VI test strips (ASCENSIA AUTODISC VI, ONE TOUCH ULTRA TEST VI) strip, Compatible with glucose meter, Disp: 100 Strip, Rfl: 6    SITagliptin (JANUVIA) 100 mg tablet, Take 1 Tab by mouth daily. , Disp: 90 Tab, Rfl: 1    metFORMIN (GLUCOPHAGE) 1,000 mg tablet, Take 1 Tab by mouth two (2) times daily (with meals). , Disp: 180 Tab, Rfl: 1    buPROPion SR (WELLBUTRIN SR) 150 mg SR tablet, Take 1 Tab by mouth two (2) times a day., Disp: 60 Tab, Rfl: 1    propranolol LA (INDERAL LA) 60 mg SR capsule, Take 1 Cap by mouth daily. , Disp: 90 Cap, Rfl: 1    aspirin 81 mg chewable tablet, Take 1 Tab by mouth daily. , Disp: 60 Tab, Rfl: 1    meclizine (ANTIVERT) 25 mg tablet, Take 1 Tab by mouth two (2) times a day., Disp: 60 Tab, Rfl: 1    montelukast (SINGULAIR) 10 mg tablet, Take 1 Tab by mouth daily. , Disp: 90 Tab, Rfl: 1    ibuprofen (MOTRIN) 800 mg tablet, Take 1 Tab by mouth every six (6) hours as needed for Pain., Disp: 180 Tab, Rfl: 1    raNITIdine (ZANTAC) 150 mg tablet, Take 1 Tab by mouth two (2) times a day., Disp: 120 Tab, Rfl: 1    fluconazole (DIFLUCAN) 200 mg tablet, Take 200 mg by mouth daily. FDA advises cautious prescribing of oral fluconazole in pregnancy. , Disp: , Rfl:     Physical Exam:      /63 (BP 1 Location: Left arm, BP Patient Position: Sitting)  Pulse 67  Temp 97.4 °F (36.3 °C) (Oral)   Resp 18  Ht 5' 5\" (1.651 m)  Wt 248 lb 6.4 oz (112.7 kg)  SpO2 98%  BMI 41.34 kg/m2    General: obese habitus, NAD, conversant  Eyes: sclera clear bilaterally, no discharge noted, eyelids normal in appearance  HENT: NCAT  Lungs: CTAB, normal respiratory effort and rate  CV: RRR, no MRGs  ABD: soft, non-tender, non-distended, normal bowel sounds  Ext: no peripheral edema or digital cyanosis noted  Skin: normal temperature, turgor, color, and texture  Psych: alert and oriented to person, place and situation, normal affect  Neuro: speech normal, moving all extremities, gait normal    Results for Lucia Villegas (MRN [id number]):   Ref.  Range 10/5/2018 15:20   Color (UA POC) Unknown Yellow   Clarity (UA POC) Unknown Clear   Specific gravity (UA POC) Latest Ref Range: 1.001 - 1.035  1.030   pH (UA POC) Latest Ref Range: 4.6 - 8.0  6.0   Protein (UA POC) Latest Ref Range: Negative  1+   Glucose (UA POC) Latest Ref Range: Negative  1+   Ketones (UA POC) Latest Ref Range: Negative  Negative   Blood (UA POC) Latest Ref Range: Negative  Negative   Bilirubin (UA POC) Latest Ref Range: Negative  1+   Urobilinogen (UA POC) Latest Ref Range: 0.2 - 1  1 mg/dL   Nitrites (UA POC) Latest Ref Range: Negative  Negative   Leukocyte esterase (UA POC) Latest Ref Range: Negative  Negative      Ref. Range 10/5/2018 15:30   Hemoglobin A1c (POC) Latest Units: % 10.3       Assessment/Plan     Uncontrolled DMII presenting with peripheral neuropathy:  - Diabetes is uncontrolled due to poor compliance w/ her medication  - Discussed with patient that current symptoms were due to her diabetes being uncontrolled  - Advised patient to resume her prior medication regimen as it had her diabetes better controlled.  She was also instructed to follow a diabetic diet.  - Handout given on diabetic neuropathy care  - F/U in 2 weeks        Verna Landa MD  10/5/2018, 3:11 PM

## 2018-10-05 NOTE — MR AVS SNAPSHOT
[address] [place]. [address] [id number] 
[phone] Patient: Karan Golden MRN: [id number] :1960 Visit Information Date & Time Provider Department Dept. Phone Encounter #  
 10/5/2018  2:30 PM Chela Pacheco, [address] [facility] [phone] [id number] Follow-up Instructions Return in about 2 weeks (around 10/19/2018). Your Appointments 10/8/2018  1:00 PM  
GASTRIC BYPASS VISIT with Katherin Ramos MD  
[address] [place] [address]) Appt Note: Would like to re-enter program-[facility]medic[facility]-video sent [address] [address]  
  
   
 [address] [facility] [address]  
  
    
 10/17/2018 11:00 AM  
Office Visit with Chela Pacheco MD  
[place] [address] [address] [facility]) Appt Note:   
 [address] [facility] [place]. [address]  
  
   
 [address] Upcoming Health Maintenance Date Due Hepatitis C Screening 1960 EYE EXAM RETINAL OR DILATED Q1 1972 Pneumococcal 19-64 Medium Risk (1 of 1 - PPSV23) 1981 DTaP/Tdap/Td series (1 - Tdap) 1983 Shingrix Vaccine Age 50> (1 of 2) 2012 BREAST CANCER SCRN MAMMOGRAM 2012 FOBT Q 1 YEAR AGE 50-75 2012 PAP AKA CERVICAL CYTOLOGY 2014 MEDICARE YEARLY EXAM 2018 Influenza Age 5 to Adult 2018 HEMOGLOBIN A1C Q6M 2018 MICROALBUMIN Q1 2019 LIPID PANEL Q1 2019 FOOT EXAM Q1 2019 Allergies as of 10/5/2018  Review Complete On: 10/5/2018 By: Sixto Rivas LPN No Known Allergies Current Immunizations  Never Reviewed No immunizations on file. Not reviewed this visit You Were Diagnosed With   
  
 Codes Comments Type 2 diabetes mellitus with diabetic polyneuropathy, without long-term current use of insulin (HCC)    -  Primary ICD-10-CM: E11.42 
ICD-9-CM: 250.60, 357.2 Urinary frequency     ICD-10-CM: R35.0 ICD-9-CM: 788.41 Vitals BP Pulse Temp Resp Height(growth percentile) Weight(growth percentile) 112/63 (BP 1 Location: Left arm, BP Patient Position: Sitting) 67 97.4 °F (36.3 °C) (Oral) 18 5' 5\" (1.651 m) 248 lb 6.4 oz (112.7 kg) SpO2 BMI OB Status Smoking Status 98% 41.34 kg/m2 Menopause Former Smoker Vitals History BMI and BSA Data Body Mass Index Body Surface Area  
 41.34 kg/m 2 2.27 m 2 Preferred Pharmacy Pharmacy Name Phone [address], [address] [phone] Your Updated Medication List  
  
   
This list is accurate as of 10/5/18  3:32 PM.  Always use your most recent med list.  
  
  
  
  
 aspirin 81 mg chewable tablet Take 1 Tab by mouth daily. atorvastatin 40 mg tablet Commonly known as:  LIPITOR Take 1 Tab by mouth daily. Blood-Glucose Meter monitoring kit As allowed by insurance buPROPion  mg SR tablet Commonly known as:  Jayy Comment Take 1 Tab by mouth two (2) times a day. fluconazole 200 mg tablet Commonly known as:  DIFLUCAN Take 200 mg by mouth daily. FDA advises cautious prescribing of oral fluconazole in pregnancy. glucose blood VI test strips strip Commonly known as:  ASCENSIA AUTODISC VI, ONE TOUCH ULTRA TEST VI Compatible with glucose meter  
  
 ibuprofen 800 mg tablet Commonly known as:  MOTRIN Take 1 Tab by mouth every six (6) hours as needed for Pain. Lancets Misc Check glucose before meals and at bedtime  
  
 meclizine 25 mg tablet Commonly known as:  ANTIVERT Take 1 Tab by mouth two (2) times a day. metFORMIN 1,000 mg tablet Commonly known as:  GLUCOPHAGE  
 Take 1 Tab by mouth two (2) times daily (with meals). montelukast 10 mg tablet Commonly known as:  SINGULAIR Take 1 Tab by mouth daily. pioglitazone 30 mg tablet Commonly known as:  ACTOS Take 1 Tab by mouth daily. propranolol LA 60 mg SR capsule Commonly known as:  INDERAL LA Take 1 Cap by mouth daily. raNITIdine 150 mg tablet Commonly known as:  ZANTAC Take 1 Tab by mouth two (2) times a day. SITagliptin 100 mg tablet Commonly known as:  Orrie Seashore Take 1 Tab by mouth daily. We Performed the Following AMB POC HEMOGLOBIN A1C [04001 CPT(R)] AMB POC URINALYSIS DIP STICK AUTO W/O MICRO [33652 CPT(R)] Follow-up Instructions Return in about 2 weeks (around 10/19/2018). Patient Instructions Diabetic Neuropathy: Care Instructions Your Care Instructions When you have diabetes, your blood sugar level may get too high. Over time, high blood sugar levels can damage nerves. This is called diabetic neuropathy. Nerve damage can cause pain, burning, tingling, and numbness and may leave you feeling weak. The feet are often affected. When you have nerve damage in your feet, you cannot feel your feet and toes as well as normal and may not notice cuts or sores. Even a small injury can lead to a serious infection. It is very important that you follow your doctor's advice on foot care. Sometimes diabetes damages nerves that help the body function. If this happens, your blood pressure, sweating, digestion, and urination might be affected. Your doctor may give you a target blood sugar level that is higher or lower than you are used to. Try to keep your blood sugar very close to this target level to prevent more damage. Follow-up care is a key part of your treatment and safety. Be sure to make and go to all appointments, and call your doctor if you are having problems.  It's also a good idea to know your test results and keep a list of the medicines you take. How can you care for yourself at home? · Take your medicines exactly as prescribed. Call your doctor if you think you are having a problem with your medicine. It is very important that you take your insulin or diabetes pills as your doctor tells you. · Try to keep blood sugar at your target level. ¨ Eat a variety of healthy foods, with carbohydrate spread out in your meals. A dietitian can help you plan meals. ¨ Try to get at least 30 minutes of exercise on most days. ¨ Check your blood sugar as many times each day as your doctor recommends. · Take and record your blood pressure at home if your doctor tells you to. Learn the importance of the two measures of blood pressure (such as 130 over 80, or 130/80). To take your blood pressure at home: ¨ Ask your doctor to check your blood pressure monitor to be sure it is accurate and the cuff fits you. Also ask your doctor to watch you to make sure that you are using it right. ¨ Do not use medicine known to raise blood pressure (such as some nasal decongestant sprays) before taking your blood pressure. ¨ Avoid taking your blood pressure if you have just exercised or are nervous or upset. Rest at least 15 minutes before you take a reading. · Take pain medicines exactly as directed. ¨ If the doctor gave you a prescription medicine for pain, take it as prescribed. ¨ If you are not taking a prescription pain medicine, ask your doctor if you can take an over-the-counter medicine. · Do not smoke. Smoking can increase your chance for a heart attack or stroke. If you need help quitting, talk to your doctor about stop-smoking programs and medicines. These can increase your chances of quitting for good. · Limit alcohol to 2 drinks a day for men and 1 drink a day for women. Too much alcohol can cause health problems. · Eat small meals often, rather than 2 or 3 large meals a day. To care for your feet · Prevent injury by wearing shoes at all times, even when you are indoors. · Do foot care as part of your daily routine. Wash your feet and then rub lotion on your feet, but not between your toes. Use a handheld mirror or magnifying mirror to inspect your feet for blisters, cuts, cracks, or sores. · Have your toenails trimmed and filed straight across. · Wear shoes and socks that fit well. Soft shoes that have good support and that fit well (such as tennis shoes) are best for your feet. · Check your shoes for any loose objects or rough edges before you put them on. · Ask your doctor to check your feet during each visit. Your doctor may notice a foot problem you have missed. · Get early treatment for any foot problem, even a minor one. When should you call for help? Call your doctor now or seek immediate medical care if: 
  · You have symptoms of infection, such as: 
¨ Increased pain, swelling, warmth, or redness. ¨ Red streaks leading from the area. ¨ Pus draining from the area. ¨ A fever.  
  · You have new or worse numbness, pain, or tingling in any part of your body.  
 Watch closely for changes in your health, and be sure to contact your doctor if: 
  · You have a new problem with your feet, such as: ¨ A new sore or ulcer. ¨ A break in the skin that is not healing after several days. ¨ Bleeding corns or calluses. ¨ An ingrown toenail.  
  · You do not get better as expected. Where can you learn more? Go to http://kai-janet.info/. Enter D974 in the search box to learn more about \"Diabetic Neuropathy: Care Instructions. \" Current as of: December 7, 2017 Content Version: 11.8 © 3211-5206 [employer]. Care instructions adapted under license by [employer] (which disclaims liability or warranty for this information).  If you have questions about a medical condition or this instruction, always ask your healthcare professional. [employer] disclaims any warranty or liability for your use of this information. Introducing [facility]! [address] introduces SeeMe patient portal. Now you can access parts of your medical record, email your doctor's office, and request medication refills online. 1. In your internet browser, go to [email or web address] 2. Click on the First Time User? Click Here link in the Sign In box. You will see the New Member Sign Up page. 3. Enter your SeeMe Access Code exactly as it appears below. You will not need to use this code after youve completed the sign-up process. If you do not sign up before the expiration date, you must request a new code. · SeeMe Access Code: [email or web address] Expires: 1/3/2019  3:20 PM 
 
4. Enter the last four digits of your Social Security Number (xxxx) and Date of Birth (mm/dd/yyyy) as indicated and click Submit. You will be taken to the next sign-up page. 5. Create a SeeMe ID. This will be your SeeMe login ID and cannot be changed, so think of one that is secure and easy to remember. 6. Create a SeeMe password. You can change your password at any time. 7. Enter your Password Reset Question and Answer. This can be used at a later time if you forget your password. 8. Enter your e-mail address. You will receive e-mail notification when new information is available in [address] 9. Click Sign Up. You can now view and download portions of your medical record. 10. Click the Download Summary menu link to download a portable copy of your medical information. If you have questions, please visit the Frequently Asked Questions section of the SeeMe website. Remember, SeeMe is NOT to be used for urgent needs. For medical emergencies, dial 911. Now available from your iPhone and Android! Please provide this summary of care documentation to your next provider. Your primary care clinician is listed as Liana Crandall. If you have any questions after today's visit, please call [phone].

## 2018-10-05 NOTE — PATIENT INSTRUCTIONS
Diabetic Neuropathy: Care Instructions  Your Care Instructions    When you have diabetes, your blood sugar level may get too high. Over time, high blood sugar levels can damage nerves. This is called diabetic neuropathy. Nerve damage can cause pain, burning, tingling, and numbness and may leave you feeling weak. The feet are often affected. When you have nerve damage in your feet, you cannot feel your feet and toes as well as normal and may not notice cuts or sores. Even a small injury can lead to a serious infection. It is very important that you follow your doctor's advice on foot care. Sometimes diabetes damages nerves that help the body function. If this happens, your blood pressure, sweating, digestion, and urination might be affected. Your doctor may give you a target blood sugar level that is higher or lower than you are used to. Try to keep your blood sugar very close to this target level to prevent more damage. Follow-up care is a key part of your treatment and safety. Be sure to make and go to all appointments, and call your doctor if you are having problems. It's also a good idea to know your test results and keep a list of the medicines you take. How can you care for yourself at home? · Take your medicines exactly as prescribed. Call your doctor if you think you are having a problem with your medicine. It is very important that you take your insulin or diabetes pills as your doctor tells you. · Try to keep blood sugar at your target level. ¨ Eat a variety of healthy foods, with carbohydrate spread out in your meals. A dietitian can help you plan meals. ¨ Try to get at least 30 minutes of exercise on most days. ¨ Check your blood sugar as many times each day as your doctor recommends. · Take and record your blood pressure at home if your doctor tells you to. Learn the importance of the two measures of blood pressure (such as 130 over 80, or 130/80).  To take your blood pressure at home:  ¨ Ask your doctor to check your blood pressure monitor to be sure it is accurate and the cuff fits you. Also ask your doctor to watch you to make sure that you are using it right. ¨ Do not use medicine known to raise blood pressure (such as some nasal decongestant sprays) before taking your blood pressure. ¨ Avoid taking your blood pressure if you have just exercised or are nervous or upset. Rest at least 15 minutes before you take a reading. · Take pain medicines exactly as directed. ¨ If the doctor gave you a prescription medicine for pain, take it as prescribed. ¨ If you are not taking a prescription pain medicine, ask your doctor if you can take an over-the-counter medicine. · Do not smoke. Smoking can increase your chance for a heart attack or stroke. If you need help quitting, talk to your doctor about stop-smoking programs and medicines. These can increase your chances of quitting for good. · Limit alcohol to 2 drinks a day for men and 1 drink a day for women. Too much alcohol can cause health problems. · Eat small meals often, rather than 2 or 3 large meals a day. To care for your feet  · Prevent injury by wearing shoes at all times, even when you are indoors. · Do foot care as part of your daily routine. Wash your feet and then rub lotion on your feet, but not between your toes. Use a handheld mirror or magnifying mirror to inspect your feet for blisters, cuts, cracks, or sores. · Have your toenails trimmed and filed straight across. · Wear shoes and socks that fit well. Soft shoes that have good support and that fit well (such as tennis shoes) are best for your feet. · Check your shoes for any loose objects or rough edges before you put them on. · Ask your doctor to check your feet during each visit. Your doctor may notice a foot problem you have missed. · Get early treatment for any foot problem, even a minor one. When should you call for help?   Call your doctor now or seek immediate medical care if:    · You have symptoms of infection, such as:  ¨ Increased pain, swelling, warmth, or redness. ¨ Red streaks leading from the area. ¨ Pus draining from the area. ¨ A fever.     · You have new or worse numbness, pain, or tingling in any part of your body.    Watch closely for changes in your health, and be sure to contact your doctor if:    · You have a new problem with your feet, such as:  ¨ A new sore or ulcer. ¨ A break in the skin that is not healing after several days. ¨ Bleeding corns or calluses. ¨ An ingrown toenail.     · You do not get better as expected. Where can you learn more? Go to http://kai-janet.info/. Enter A013 in the search box to learn more about \"Diabetic Neuropathy: Care Instructions. \"  Current as of: December 7, 2017  Content Version: 11.8  © 9897-1773 [employer]. Care instructions adapted under license by [employer] (which disclaims liability or warranty for this information). If you have questions about a medical condition or this instruction, always ask your healthcare professional. [place] [address] any warranty or liability for your use of this information.

## 2018-10-05 NOTE — PROGRESS NOTES
Chief Complaint   Patient presents with    Toe Pain    Leg Pain    Head Pain    Abdominal Pain     RLQ      1. Have you been to the ER, urgent care clinic since your last visit? Hospitalized since your last visit? No    2. Have you seen or consulted any other health care providers outside of the [address] [place] [address] since your last visit? Include any pap smears or colon screening. No     Visit Vitals    /63 (BP 1 Location: Left arm, BP Patient Position: Sitting)    Pulse 67    Temp 97.4 °F (36.3 °C) (Oral)    Resp 18    Ht 5' 5\" (1.651 m)    Wt 248 lb 6.4 oz (112.7 kg)    SpO2 98%    BMI 41.34 kg/m2     * Patient declined flu vaccination.

## 2018-10-08 ENCOUNTER — TELEPHONE (OUTPATIENT)
Dept: FAMILY MEDICINE CLINIC | Age: 58
End: 2018-10-08

## 2018-10-08 NOTE — TELEPHONE ENCOUNTER
Pt. Wants to talk to someone about her pain and why Dr. Nazanin Jauregui did not send her any pain medication to the pharmacy and wants an answer today. Please call pt.

## 2018-10-10 NOTE — TELEPHONE ENCOUNTER
Spoke with patient on 10/9/2018 regarding request for pain medication. Patient was informed that PCP will be out of the office for 1 week. Patient stated understanding. PCP office note is not complete. On 10/5/18, Patient was informed that PCP stated that he cannot go over all complaints or concerns within 15 minute for visit. Patient stated understanding. Unsure if patient was supposed to schedule another appointment to address pain issue.

## 2018-10-17 ENCOUNTER — TELEPHONE (OUTPATIENT)
Dept: FAMILY MEDICINE CLINIC | Age: 58
End: 2018-10-17

## 2018-10-17 ENCOUNTER — OFFICE VISIT (OUTPATIENT)
Dept: FAMILY MEDICINE CLINIC | Age: 58
End: 2018-10-17

## 2018-10-17 VITALS
SYSTOLIC BLOOD PRESSURE: 126 MMHG | HEART RATE: 55 BPM | OXYGEN SATURATION: 100 % | BODY MASS INDEX: 41.52 KG/M2 | HEIGHT: 65 IN | TEMPERATURE: 97.5 F | RESPIRATION RATE: 16 BRPM | WEIGHT: 249.2 LBS | DIASTOLIC BLOOD PRESSURE: 80 MMHG

## 2018-10-17 DIAGNOSIS — Z28.21 PNEUMOCOCCAL VACCINATION DECLINED BY PATIENT: ICD-10-CM

## 2018-10-17 DIAGNOSIS — Z11.59 SCREENING FOR VIRAL DISEASE: ICD-10-CM

## 2018-10-17 DIAGNOSIS — E11.42 DIABETIC POLYNEUROPATHY ASSOCIATED WITH TYPE 2 DIABETES MELLITUS (HCC): ICD-10-CM

## 2018-10-17 DIAGNOSIS — Z00.00 INITIAL MEDICARE ANNUAL WELLNESS VISIT: Primary | ICD-10-CM

## 2018-10-17 DIAGNOSIS — E11.40 TYPE 2 DIABETES MELLITUS WITH DIABETIC NEUROPATHY, WITHOUT LONG-TERM CURRENT USE OF INSULIN (HCC): ICD-10-CM

## 2018-10-17 DIAGNOSIS — Z28.21 TETANUS, DIPHTHERIA, AND ACELLULAR PERTUSSIS (TDAP) VACCINATION DECLINED: ICD-10-CM

## 2018-10-17 DIAGNOSIS — Z28.21 INFLUENZA VACCINATION DECLINED: ICD-10-CM

## 2018-10-17 RX ORDER — PROMETHAZINE HYDROCHLORIDE 25 MG/1
25 TABLET ORAL
COMMUNITY
End: 2019-02-07 | Stop reason: SDUPTHER

## 2018-10-17 RX ORDER — GABAPENTIN 300 MG/1
300 CAPSULE ORAL 3 TIMES DAILY
Qty: 90 CAP | Refills: 1 | Status: SHIPPED | OUTPATIENT
Start: 2018-10-17 | End: 2019-02-07 | Stop reason: SDUPTHER

## 2018-10-17 NOTE — PROGRESS NOTES
This is an Initial Medicare Annual Wellness Exam (AWV) (Performed 12 months after IPPE or effective date of Medicare Part B enrollment, Once in a lifetime)    I have reviewed the patient's medical history in detail and updated the computerized patient record. History     Past Medical History:   Diagnosis Date    Arthritis     Depression     Diabetes [facility] [facility]     Hot flashes     Hypertension     Morbid obesity ([facility] [address] [facility]     Vertigo       Past Surgical History:   Procedure Laterality Date    EXCIS INFRATENT BRAIN TUMOR  08/16/2015    [facility]    [facility] CHOLECY[facility]ECTOMY  2016    [facility]    HX TUBAL LIGATION Bilateral     NEUROLOGICAL PROCEDURE UNLISTED       Current Outpatient Medications   Medication Sig Dispense Refill    atorvastatin (LIPITOR) 40 mg tablet Take 1 Tab by mouth daily. 60 Tab 1    pioglitazone (ACTOS) 30 mg tablet Take 1 Tab by mouth daily. 90 Tab 1    montelukast (SINGULAIR) 10 mg tablet Take 1 Tab by mouth daily. 90 Tab 1    Blood-Glucose Meter monitoring kit As allowed by insurance 1 Kit 0    Lancets misc Check glucose before meals and at bedtime 1 Each 11    glucose blood VI test strips (ASCENSIA AUTODISC VI, ONE TOUCH ULTRA TEST VI) strip Compatible with glucose meter 100 Strip 6    SITagliptin (JANUVIA) 100 mg tablet Take 1 Tab by mouth daily. 90 Tab 1    metFORMIN (GLUCOPHAGE) 1,000 mg tablet Take 1 Tab by mouth two (2) times daily (with meals). 180 Tab 1    buPROPion SR (WELLBUTRIN SR) 150 mg SR tablet Take 1 Tab by mouth two (2) times a day. 60 Tab 1    ibuprofen (MOTRIN) 800 mg tablet Take 1 Tab by mouth every six (6) hours as needed for Pain. 180 Tab 1    raNITIdine (ZANTAC) 150 mg tablet Take 1 Tab by mouth two (2) times a day. 120 Tab 1    propranolol LA (INDERAL LA) 60 mg SR capsule Take 1 Cap by mouth daily. 90 Cap 1    aspirin 81 mg chewable tablet Take 1 Tab by mouth daily. 60 Tab 1    fluconazole (DIFLUCAN) 200 mg tablet Take 200 mg by mouth daily.  FDA advises cautious prescribing of oral fluconazole in pregnancy.  meclizine (ANTIVERT) 25 mg tablet Take 1 Tab by mouth two (2) times a day. 60 Tab 1    promethazine (PHENERGAN) 25 mg tablet Take 25 mg by mouth every six (6) hours as needed for Nausea. No Known Allergies  Family History   Problem Relation Age of Onset   [facility] Cancer Mother 58        colon   [facility] Hypertension Mother     Diabetes Mother     Psychiatric Disorder Father     Lupus Sister      Social History     Tobacco Use    Smoking status: Former Smoker     Last attempt to quit:      Years since quittin.8    Smokeless tobacco: Never Used   Substance Use Topics    Alcohol use: No     Patient Active Problem List   Diagnosis Code    Essential hypertension I10    GERD (gastroesophageal reflux disease) K21.9    DOMINIQUE (obstructive sleep apnea) G47.33    Arthritis M19.90    Diabetes mellitus type 2, controlled ([facility] [address] E11.9    Asthma J45.909    Urinary urgency R39.15    Morbid obesity due to excess calories ([facility] [address] E66.01    BMI 40.0-44.9, adult ([facility] [address] Z68.41       Depression Risk Factor Screening:     PHQ over the last two weeks 2018   Little interest or pleasure in doing things Several days   Feeling down, depressed, irritable, or hopeless Not at all   Total Score PHQ 2 1     Alcohol Risk Factor Screening: You do not drink alcohol or very rarely. Functional Ability and Level of Safety:     Hearing Loss  Hearing is good. Activities of Daily Living  The home contains: no safety equipment.   Patient does total self care    Fall Risk  Low risk    Abuse Screen  Patient is not abused    Cognitive Screening   Evaluation of Cognitive Function:  Has your family/caregiver stated any concerns about your memory: yes  Normal    Patient Care Team   Patient Care Team:  Nola Boggs MD as PCP - General (Family Practice)  Sommer Khan MD (General Surgery)    Assessment/Plan   Education and counseling provided:  Are appropriate based on today's review and evaluation  End-of-Life planning (with patient's consent)  Pneumococcal Vaccine  Influenza Vaccine    Diagnoses and all orders for this visit:    1. Initial Medicare annual wellness visit  -     FULL CODE    2. Diabetic polyneuropathy associated with type 2 diabetes mellitus (HCC)  -     gabapentin (NEURONTIN) 300 mg capsule; Take 1 Cap by mouth three (3) times daily. 3. Type 2 diabetes mellitus with diabetic neuropathy, without long-term current use of insulin ([facility] [address]  -     REFERRAL TO OPHTHALMOLOGY    4. Screening for viral disease  -     HEPATITIS C AB; Future    5. Influenza vaccination declined    6. Pneumococcal vaccination declined by patient    7.  Tetanus, diphtheria, and acellular pertussis (Tdap) vaccination declined         Health Maintenance Due   Topic Date Due    Hepatitis C Screening  1960    EYE EXAM RETINAL OR DILATED Q1  12/09/1972    Pneumococcal 19-64 Medium Risk (1 of 1 - PPSV23) 12/09/1981    DTaP/Tdap/Td series (1 - Tdap) 12/09/1983    Shingrix Vaccine Age 50> (1 of 2) 12/09/2012    BREAST CANCER SCRN MAMMOGRAM  12/09/2012    FOBT Q 1 YEAR AGE 50-75  12/09/2012    PAP AKA CERVICAL CYTOLOGY  06/20/2014    MEDICARE YEARLY EXAM  07/19/2018    Influenza Age 9 to Adult  08/01/2018     Declined flu shot  Has not had her Diabetic eye exam   Mammogram this year  TDAP none  Had colonoscpoy done across from hospitals

## 2018-10-17 NOTE — ACP (ADVANCE CARE PLANNING)
Advance Care Planning (ACP) Provider Note - Comprehensive     Date of ACP Conversation: 10/17/18  Persons included in Conversation:  patient  Length of ACP Conversation in minutes:  <16 minutes (Non-Billable)    Authorized Decision Maker (if patient is incapable of making informed decisions):    This person is:    Gill Melara ACP for ALL Patients with Decision Making Capacity:   Importance of advance care planning, including choosing a healthcare agent to communicate patient's healthcare decisions if patient lost the ability to make decisions, such as after a sudden illness or accident    Review of Existing Advance Directive:  N/A    For Serious or Chronic Illness:  No known illness    Interventions Provided:  Recommended completion of Advance Directive form after review of ACP materials and conversation with prospective healthcare agent

## 2018-10-17 NOTE — PROGRESS NOTES
Chief Complaint   Patient presents with   Fidencio Leong Annual Wellness Visit     1. Have you been to the ER, urgent care clinic since your last visit? Hospitalized since your last visit? Yes When: 10/15/2018 at [facility]    2. Have you seen or consulted any other health care providers outside of the [address] [place] [address] since your last visit? Include any pap smears or colon screening.  No     Visit Vitals  /80 (BP 1 Location: Left arm, BP Patient Position: Sitting)   Pulse (!) 55   Temp 97.5 °F (36.4 °C) (Oral)   Resp 16   Ht 5' 5\" (1.651 m)   Wt 249 lb 3.2 oz (113 kg)   SpO2 100%   BMI 41.47 kg/m²

## 2018-10-17 NOTE — PATIENT INSTRUCTIONS
Medicare Wellness Visit, Female     The best way to live healthy is to have a lifestyle where you eat a well-balanced diet, exercise regularly, limit alcohol use, and quit all forms of tobacco/nicotine, if applicable. Regular preventive services are another way to keep healthy. Preventive services (vaccines, screening tests, monitoring & exams) can help personalize your care plan, which helps you manage your own care. Screening tests can find health problems at the earliest stages, when they are easiest to treat. Gigi Jose follows the current, evidence-based guidelines published by the [facility] Anast[facility] Chris ([facility]) when recommending preventive services for our patients. Because we follow these guidelines, sometimes recommendations change over time as research supports it. (For example, mammograms used to be recommended annually. Even though Medicare will still pay for an annual mammogram, the newer guidelines recommend a mammogram every two years for women of average risk.)  Of course, you and your doctor may decide to screen more often for some diseases, based on your risk and your health status. Preventive services for you include:  - Medicare offers their members a free annual wellness visit, which is time for you and your primary care provider to discuss and plan for your preventive service needs. Take advantage of this benefit every year!  -All adults over the age of 72 should receive the recommended pneumonia vaccines. Current USPSTF guidelines recommend a series of two vaccines for the best pneumonia protection.   -All adults should have a flu vaccine yearly and a tetanus vaccine every 10 years. All adults age 61 and older should receive a shingles vaccine once in their lifetime.    -A bone mass density test is recommended when a woman turns 65 to screen for osteoporosis. This test is only recommended one time, as a screening.  Some providers will use this same test as a disease monitoring tool if you already have osteoporosis. -All adults age 38-68 who are overweight should have a diabetes screening test once every three years.   -Other screening tests and preventive services for persons with diabetes include: an eye exam to screen for diabetic retinopathy, a kidney function test, a foot exam, and stricter control over your cholesterol.   -Cardiovascular screening for adults with routine risk involves an electrocardiogram (ECG) at intervals determined by your doctor.   -Colorectal cancer screenings should be done for adults age 54-65 with no increased risk factors for colorectal cancer. There are a number of acceptable methods of screening for this type of cancer. Each test has its own benefits and drawbacks. Discuss with your doctor what is most appropriate for you during your annual wellness visit. The different tests include: colonoscopy (considered the best screening method), a fecal occult blood test, a fecal DNA test, and sigmoidoscopy. -Breast cancer screenings are recommended every other year for women of normal risk, age 54-69.  -Cervical cancer screenings for women over age 72 are only recommended with certain risk factors.   -All adults born between [facility] should be screened once for Hepatitis C.      Here is a list of your current Health Maintenance items (your personalized list of preventive services) with a due date:  Health Maintenance Due   Topic Date Due    Hepatitis C Test  1960    Eye Exam  12/09/1972    Pneumococcal Vaccine (1 of 1 - PPSV23) 12/09/1981    DTaP/Tdap/Td  (1 - Tdap) 12/09/1983    Shingles Vaccine (1 of 2) 12/09/2012    Breast Cancer Screening  12/09/2012    Stool testing for trace blood  12/09/2012    Cervical Cancer Screening  06/20/2014    Annual Well Visit  07/19/2018    Flu Vaccine  08/01/2018            Advance Directives: Care Instructions  Your Care Instructions  An advance directive is a legal way to state your wishes at the end of your life. It tells your family and your doctor what to do if you can no longer say what you want. There are two main types of advance directives. You can change them any time that your wishes change. · A living will tells your family and your doctor your wishes about life support and other treatment. · A durable power of  for health care lets you name a person to make treatment decisions for you when you can't speak for yourself. This person is called a health care agent. If you do not have an advance directive, decisions about your medical care may be made by a doctor or a  who doesn't know you. It may help to think of an advance directive as a gift to the people who care for you. If you have one, they won't have to make tough decisions by themselves. Follow-up care is a key part of your treatment and safety. Be sure to make and go to all appointments, and call your doctor if you are having problems. It's also a good idea to know your test results and keep a list of the medicines you take. How can you care for yourself at home? · Discuss your wishes with your loved ones and your doctor. This way, there are no surprises. · Many states have a unique form. Or you might use a universal form that has been approved by many states. This kind of form can sometimes be completed and stored online. Your electronic copy will then be available wherever you have a connection to the Internet. In most cases, doctors will respect your wishes even if you have a form from a different state. · You don't need a  to do an advance directive. But you may want to get legal advice. · Think about these questions when you prepare an advance directive:  ? Who do you want to make decisions about your medical care if you are not able to? Many people choose a family member or close friend. ? Do you know enough about life support methods that might be used?  If not, talk to your doctor so you understand. ? What are you most afraid of that might happen? You might be afraid of having pain, losing your independence, or being kept alive by machines. ? Where would you prefer to die? Choices include your home, a hospital, or a nursing home. ? Would you like to have information about hospice care to support you and your family? ? Do you want to donate organs when you die? ? Do you want certain Yazdanism practices performed before you die? If so, put your wishes in the advance directive. · Read your advance directive every year, and make changes as needed. When should you call for help? Be sure to contact your doctor if you have any questions. Where can you learn more? Go to http://kai-janet.info/. Enter R264 in the search box to learn more about \"Advance Directives: Care Instructions. \"  Current as of: April 19, 2018  Content Version: 11.8  © 2790-6128 [employer]. Care instructions adapted under license by [employer] (which disclaims liability or warranty for this information). If you have questions about a medical condition or this instruction, always ask your healthcare professional. [place] [address] any warranty or liability for your use of this information. Learning About Living Kathie Castellanos  What is a living will? A living will is a legal form you use to write down the kind of care you want at the end of your life. It is used by the health professionals who will treat you if you aren't able to decide for yourself. If you put your wishes in writing, your loved ones and others will know what kind of care you want. They won't need to guess. This can ease your mind and be helpful to others. A living will is not the same as an estate or property will. An estate will explains what you want to happen with your money and property after you die. Is a living will a legal document? A living will is a legal document.  Each state has its own laws about living morgan. If you move to another state, make sure that your living will is legal in the state where you now live. Or you might use a universal form that has been approved by many states. This kind of form can sometimes be completed and stored online. Your electronic copy will then be available wherever you have a connection to the Internet. In most cases, doctors will respect your wishes even if you have a form from a different state. · You don't need an  to complete a living will. But legal advice can be helpful if your state's laws are unclear, your health history is complicated, or your family can't agree on what should be in your living will. · You can change your living will at any time. Some people find that their wishes about end-of-life care change as their health changes. · In addition to making a living will, think about completing a medical power of  form. This form lets you name the person you want to make end-of-life treatment decisions for you (your \"health care agent\") if you're not able to. Many hospitals and nursing homes will give you the forms you need to complete a living will and a medical power of . · Your living will is used only if you can't make or communicate decisions for yourself anymore. If you become able to make decisions again, you can accept or refuse any treatment, no matter what you wrote in your living will. · Your state may offer an online registry. This is a place where you can store your living will online so the doctors and nurses who need to treat you can find it right away. What should you think about when creating a living will? Talk about your end-of-life wishes with your family members and your doctor. Let them know what you want. That way the people making decisions for you won't be surprised by your choices.   Think about these questions as you make your living will:  · Do you know enough about life support methods that might be used? If not, talk to your doctor so you know what might be done if you can't breathe on your own, your heart stops, or you're unable to swallow. · What things would you still want to be able to do after you receive life-support methods? Would you want to be able to walk? To speak? To eat on your own? To live without the help of machines? · If you have a choice, where do you want to be cared for? In your home? At a hospital or nursing home? · Do you want certain Episcopalian practices performed if you become very ill? · If you have a choice at the end of your life, where would you prefer to die? At home? In a hospital or nursing home? Somewhere else? · Would you prefer to be buried or cremated? · Do you want your organs to be donated after you die? What should you do with your living will? · Make sure that your family members and your health care agent have copies of your living will. · Give your doctor a copy of your living will to keep in your medical record. If you have more than one doctor, make sure that each one has a copy. · You may want to put a copy of your living will where it can be easily found. Where can you learn more? Go to http://kai-janet.info/. Enter T866 in the search box to learn more about \"Learning About Living [place]. \"  Current as of: April 19, 2018  Content Version: 11.8  © 4995-6515 [employer]. Care instructions adapted under license by [employer] (which disclaims liability or warranty for this information). If you have questions about a medical condition or this instruction, always ask your healthcare professional. [place] [address] any warranty or liability for your use of this information. Learning About Durable Power of  for Health Care  What is a durable power of  for health care?     A durable power of  for health care is one type of the legal forms called advance directives. It lets you decide who you want to make treatment decisions for you if you cannot speak or decide for yourself. The person you choose is called your health care agent. Another type of advance directive is a living will. It lets you write down what kinds of treatment or life support you want or do not want. What should you think about when choosing a health care agent? Choose your health care agent carefully. This person may or may not be a family member. Talk to the person before you make your final decision. Make sure he or she is comfortable with this responsibility. It's a good idea to choose someone who:  · Is at least 25years old. · Knows you well and understands what makes life meaningful for you. · Understands your Baptism and moral values. · Will do what you want, not what he or she wants. · Will be able to make difficult choices at a stressful time. · Will be able to refuse or stop treatment, if that is what you would want, even if you could die. · Will be firm and confident with health professionals if needed. · Will ask questions to get necessary information. · Lives near you or agrees to travel to you if needed. Your family may help you make medical decisions while you can still be part of that process. But it is important to choose one person to be your health care agent in case you are not able to make decisions for yourself. If you don't fill out the legal form and name a health care agent, the decisions your family can make may be limited. Who will make decisions for you if you do not have a health care agent? If you don't have a health care agent or a living will, your family members may disagree about your medical care. And then some medical professionals who may not know you as well might have to make decisions for you. In some cases, a  makes the decisions.   When you name a health care agent, it is very clear who has the power to make health decisions for you.  How do you name a health care agent? You name your health care agent on a legal form. It is usually called a durable power of  for health care. Ask your hospital, state bar association, or office on aging where to find these forms. You must sign the form to make it legal. Some states require you to get the form notarized. This means that a person called a  watches you sign the form and then he or she signs the form. Some states also require that two or more witnesses sign the form. Be sure to tell your family members and doctors who your health care agent is. Keep your forms in a safe place. But make sure that your loved ones know where the forms are. This could be in your desk where you keep other important papers. Make sure your doctor has a copy of your forms. Where can you learn more? Go to http://kai-janet.info/. Enter [id number] in the search box to learn more about \"Learning About Durable Power of  for [facility]. \"  Current as of: April 19, 2018  Content Version: 11.8  © 9741-5669 [employer]. Care instructions adapted under license by [employer] (which disclaims liability or warranty for this information). If you have questions about a medical condition or this instruction, always ask your healthcare professional. [address] any warranty or liability for your use of this information.

## 2018-10-19 ENCOUNTER — OFFICE VISIT (OUTPATIENT)
Dept: FAMILY MEDICINE CLINIC | Age: 58
End: 2018-10-19

## 2018-10-19 VITALS
HEART RATE: 70 BPM | WEIGHT: 248 LBS | RESPIRATION RATE: 18 BRPM | OXYGEN SATURATION: 98 % | SYSTOLIC BLOOD PRESSURE: 128 MMHG | HEIGHT: 65 IN | BODY MASS INDEX: 41.32 KG/M2 | TEMPERATURE: 97.5 F | DIASTOLIC BLOOD PRESSURE: 70 MMHG

## 2018-10-19 DIAGNOSIS — E11.40 TYPE 2 DIABETES MELLITUS WITH DIABETIC NEUROPATHY, WITHOUT LONG-TERM CURRENT USE OF INSULIN (HCC): Primary | ICD-10-CM

## 2018-10-19 DIAGNOSIS — I10 ESSENTIAL HYPERTENSION: ICD-10-CM

## 2018-10-19 NOTE — PROGRESS NOTES
Chief Complaint   Patient presents with    Follow-up     1. Have you been to the ER, urgent care clinic since your last visit? Hospitalized since your last visit? No    2. Have you seen or consulted any other health care providers outside of the [address] [place] [address] since your last visit? Include any pap smears or colon screening.  No     Visit Vitals  /70 (BP 1 Location: Left arm, BP Patient Position: Sitting)   Pulse 70   Temp 97.5 °F (36.4 °C) (Oral)   Resp 18   Ht 5' 5\" (1.651 m)   Wt 248 lb (112.5 kg)   SpO2 98%   BMI 41.27 kg/m²

## 2018-10-19 NOTE — PROGRESS NOTES
[facility]    CC: F/U of diabetes    HPI:     - Taking diabetic medication as prescribed  - Denies any side effects to her medications  - Reports FBS of 161-189  - Would like to start Victoza, as she is interested in weight loss    ROS: Positive items marked in RED  CON: fever, chills  Cardiovascular: palpitations, CP  Resp: SOB, cough  GI: nausea, vomiting, diarrhea  : dysuria, hematuria      Past Medical History:   Diagnosis Date    Arthritis     Depression     [facility]     Hot flashes     Hypertension     Morbid obesity ([facility]     Vertigo        Past Surgical History:   Procedure Laterality Date    EXC[facility]FRATENT [facility]IN [facility]UMOR  2015    [facility]    [facility]    HX TUBAL LIGATION Bilateral     NEUROLOGICAL PROCEDURE UNLISTED         Family History   Problem Relation Age of Onset    Cancer Mother 58        colon    Hypertension Mother     Diabetes Mother     Psychiatric Disorder Father     Lupus Sister        Social History     Socioeconomic History    Marital status:      Spouse name: Not on file    Number of children: Not on file    Years of education: Not on file    Highest education level: Not on file   Social Needs    Financial resource strain: Not on file    Food insecurity - worry: Not on file    Food insecurity - inability: Not on file   Aternity needs - medical: Not on file   Aternity needs - non-medical: Not on file   Occupational History    Not on file   Tobacco Use    Smoking status: Former Smoker     Last attempt to quit:      Years since quittin.8    Smokeless tobacco: Never Used   Substance and Sexual Activity    Alcohol use: No    Drug use: Yes     Types: Cocaine, Marijuana, Heroin, Opiates    Sexual activity: Not on file   Other Topics Concern    Not on file   Social History Narrative    Not on file       No Known Allergies      Current Outpatient Medications:     promethazine (PHENERGAN) 25 mg tablet, Take 25 mg by mouth every six (6) hours as needed for Nausea., Disp: , Rfl:     gabapentin (NEURONTIN) 300 mg capsule, Take 1 Cap by mouth three (3) times daily. , Disp: 90 Cap, Rfl: 1    atorvastatin (LIPITOR) 40 mg tablet, Take 1 Tab by mouth daily. , Disp: 60 Tab, Rfl: 1    pioglitazone (ACTOS) 30 mg tablet, Take 1 Tab by mouth daily. , Disp: 90 Tab, Rfl: 1    montelukast (SINGULAIR) 10 mg tablet, Take 1 Tab by mouth daily. , Disp: 90 Tab, Rfl: 1    Blood-Glucose Meter monitoring kit, As allowed by insurance, Disp: 1 Kit, Rfl: 0    Lancets misc, Check glucose before meals and at bedtime, Disp: 1 Each, Rfl: 11    glucose blood VI test strips (ASCENSIA AUTODISC VI, ONE TOUCH ULTRA TEST VI) strip, Compatible with glucose meter, Disp: 100 Strip, Rfl: 6    SITagliptin (JANUVIA) 100 mg tablet, Take 1 Tab by mouth daily. , Disp: 90 Tab, Rfl: 1    metFORMIN (GLUCOPHAGE) 1,000 mg tablet, Take 1 Tab by mouth two (2) times daily (with meals). , Disp: 180 Tab, Rfl: 1    buPROPion SR (WELLBUTRIN SR) 150 mg SR tablet, Take 1 Tab by mouth two (2) times a day., Disp: 60 Tab, Rfl: 1    ibuprofen (MOTRIN) 800 mg tablet, Take 1 Tab by mouth every six (6) hours as needed for Pain., Disp: 180 Tab, Rfl: 1    raNITIdine (ZANTAC) 150 mg tablet, Take 1 Tab by mouth two (2) times a day., Disp: 120 Tab, Rfl: 1    propranolol LA (INDERAL LA) 60 mg SR capsule, Take 1 Cap by mouth daily. , Disp: 90 Cap, Rfl: 1    aspirin 81 mg chewable tablet, Take 1 Tab by mouth daily. , Disp: 60 Tab, Rfl: 1    fluconazole (DIFLUCAN) 200 mg tablet, Take 200 mg by mouth daily. FDA advises cautious prescribing of oral fluconazole in pregnancy. , Disp: , Rfl:     meclizine (ANTIVERT) 25 mg tablet, Take 1 Tab by mouth two (2) times a day., Disp: 60 Tab, Rfl: 1    Physical Exam:      /70 (BP 1 Location: Left arm, BP Patient Position: Sitting)   Pulse 70   Temp 97.5 °F (36.4 °C) (Oral)   Resp 18   Ht 5' 5\" (1.651 m)   Wt 248 lb (112.5 kg)   SpO2 98%   BMI 41.27 kg/m²     General:  Obese habitus, NAD, conversant  Eyes: sclera clear bilaterally, no discharge noted, eyelids normal in appearance  HENT: NCAT  Lungs: CTAB, normal respiratory effort and rate  CV: RRR, no MRGs  ABD: soft, non-tender, non-distended, normal bowel sounds  Ext: no peripheral edema or digital cyanosis noted  Skin: normal temperature, turgor, color, and texture  Psych: alert and oriented to person, place and situation, normal affect  Neuro: speech normal, moving all extremities, gait normal      Assessment/Plan     DMII, Improving:  - Will stop Januvia and start on Victoza  - Follow up in one month      Makenzie King MD  10/19/2018, 10:30 AM

## 2018-10-21 RX ORDER — PROPRANOLOL HYDROCHLORIDE 60 MG/1
60 CAPSULE, EXTENDED RELEASE ORAL DAILY
Qty: 90 CAP | Refills: 1 | Status: SHIPPED | OUTPATIENT
Start: 2018-10-21 | End: 2018-12-14 | Stop reason: SDUPTHER

## 2018-10-22 ENCOUNTER — DOCUMENTATION ONLY (OUTPATIENT)
Dept: FAMILY MEDICINE CLINIC | Age: 58
End: 2018-10-22

## 2018-10-22 NOTE — PROGRESS NOTES
[address] to let them know that Dr Marcus Nicholson approved patient to receive 1 box with 3 pens syringe.

## 2018-10-23 DIAGNOSIS — E11.40 TYPE 2 DIABETES MELLITUS WITH DIABETIC NEUROPATHY, WITHOUT LONG-TERM CURRENT USE OF INSULIN (HCC): Primary | ICD-10-CM

## 2018-10-23 DIAGNOSIS — E11.8 CONTROLLED TYPE 2 DIABETES MELLITUS WITH COMPLICATION, WITHOUT LONG-TERM CURRENT USE OF INSULIN (HCC): ICD-10-CM

## 2018-10-23 RX ORDER — LANCETS
EACH MISCELLANEOUS
Qty: 1 EACH | Refills: 11 | Status: SHIPPED | OUTPATIENT
Start: 2018-10-23 | End: 2019-02-07 | Stop reason: SDUPTHER

## 2018-11-19 ENCOUNTER — HOSPITAL ENCOUNTER (OUTPATIENT)
Dept: LAB | Age: 58
Discharge: HOME | End: 2018-11-19
Payer: MEDICARE

## 2018-11-19 ENCOUNTER — OFFICE VISIT (OUTPATIENT)
Dept: FAMILY MEDICINE CLINIC | Age: 58
End: 2018-11-19

## 2018-11-19 VITALS
OXYGEN SATURATION: 99 % | HEART RATE: 71 BPM | BODY MASS INDEX: 41.82 KG/M2 | HEIGHT: 65 IN | RESPIRATION RATE: 16 BRPM | WEIGHT: 251 LBS | DIASTOLIC BLOOD PRESSURE: 64 MMHG | SYSTOLIC BLOOD PRESSURE: 122 MMHG | TEMPERATURE: 97.4 F

## 2018-11-19 DIAGNOSIS — E11.40 TYPE 2 DIABETES MELLITUS WITH DIABETIC NEUROPATHY, WITHOUT LONG-TERM CURRENT USE OF INSULIN (HCC): Primary | ICD-10-CM

## 2018-11-19 DIAGNOSIS — Z11.59 NEED FOR HEPATITIS C SCREENING TEST: ICD-10-CM

## 2018-11-19 DIAGNOSIS — R22.9 SINGLE SKIN NODULE: ICD-10-CM

## 2018-11-19 PROCEDURE — 86803 HEPATITIS C AB TEST: CPT

## 2018-11-19 PROCEDURE — 36415 COLL VENOUS BLD VENIPUNCTURE: CPT

## 2018-11-19 NOTE — PROGRESS NOTES
[facility]    CC: F/U of diabetes    HPI:   Patient is a poor historian. Has difficultly remember dates and doesn't know the names of any of her doctors, other than her prior PCP Dr. Gerardo Mares. DMII:  - Did not make appointment for diabetic eye as she forgot  - Taking diabetic medication as prescribed  - Denies any side effects or issues with her medication  - Has been checking her blood sugar at home. No log brought in for review. - Reports her FBS has been in low 100s   - Has been trying to follow diabetic diet  - Has not been exercising      Skin Nodule:  - Painful left shoulder nodule  - Has been an issue for more than 6 months  - Has been applying heat and ice to it with no improvement  - Reports that Dr. Lola Escobar told her it was an abscess and to apply heat to it so it can drain  - Patient states that it never drained      Health Maintenance:  - Reports she had a colonoscopy a year ago. Does not know name of Physician that did procedure. Prior PCP's office contacted and reported the only GI specialist she was seeing was Dr. Kee Hernandez. Dr. Janell Coon office contacted and reported they did not do any colonoscopy  - Reports PAP smear 6 months ago. Contacted gynecologist's office and they reported last PAP smear was in 12/22/2016.       ROS: Positive items marked in RED  CON: fever, chills  Cardiovascular: palpitations, CP  Resp: SOB, cough  GI: nausea, vomiting, diarrhea  : dysuria, hematuria        Past Medical History:   Diagnosis Date    Arthritis     Depression     Diabetes ([address]     Hot flashes     Hypertension     Morbid obesity ([address]     Vertigo        Past Surgical History:   Procedure Laterality Date    [facility]    [facility]    HX TUBAL LIGATION Bilateral     NEUROLOGICAL PROCEDURE UNLISTED         Family History   Problem Relation Age of Onset   [facility] Cancer Mother 58        colon    Hypertension Mother  Diabetes Mother     Psychiatric Disorder Father     Lupus Sister        Social History     Socioeconomic History    Marital status:      Spouse name: Not on file    Number of children: Not on file    Years of education: Not on file    Highest education level: Not on file   Social Needs    Financial resource strain: Not on file    Food insecurity - worry: Not on file    Food insecurity - inability: Not on file    Transportation needs - medical: Not on file   [employer] needs - non-medical: Not on file   Occupational History    Not on file   Tobacco Use    Smoking status: Former Smoker     Last attempt to quit:      Years since quittin.8    Smokeless tobacco: Never Used   Substance and Sexual Activity    Alcohol use: No    Drug use: Yes     Types: Cocaine, Marijuana, Heroin, Opiates    Sexual activity: Not on file   Other Topics Concern    Not on file   Social History Narrative    Not on file       No Known Allergies      Current Outpatient Medications:     glucose blood VI test strips (ASCENSIA AUTODISC VI, ONE TOUCH ULTRA TEST VI) strip, Use to check blood sugar daily, Disp: 100 Strip, Rfl: 6    Lancets misc, Check glucose before meals and at bedtime, Disp: 1 Each, Rfl: 11    pen needle, diabetic (NOVOFINE PLUS) 32 gauge x 1/6\" ndle, Use daily to inject Victoza subcutaneously, Disp: 100 Pen Needle, Rfl: 12    propranolol LA (INDERAL LA) 60 mg SR capsule, Take 1 Cap by mouth daily. , Disp: 90 Cap, Rfl: 1    Liraglutide (VICTOZA) 0.6 mg/0.1 mL (18 mg/3 mL) pnij, 1.2 mg by SubCUTAneous route daily. Initially 0.6 mg once daily for 1 week; then increase to 1.2 mg once daily. , Disp: 1 Adjustable Dose Pre-filled Pen Syringe, Rfl: 6    promethazine (PHENERGAN) 25 mg tablet, Take 25 mg by mouth every six (6) hours as needed for Nausea., Disp: , Rfl:     gabapentin (NEURONTIN) 300 mg capsule, Take 1 Cap by mouth three (3) times daily. , Disp: 90 Cap, Rfl: 1    atorvastatin (LIPITOR) 40 mg tablet, Take 1 Tab by mouth daily. , Disp: 60 Tab, Rfl: 1    pioglitazone (ACTOS) 30 mg tablet, Take 1 Tab by mouth daily. , Disp: 90 Tab, Rfl: 1    montelukast (SINGULAIR) 10 mg tablet, Take 1 Tab by mouth daily. , Disp: 90 Tab, Rfl: 1    Blood-Glucose Meter monitoring kit, As allowed by insurance, Disp: 1 Kit, Rfl: 0    metFORMIN (GLUCOPHAGE) 1,000 mg tablet, Take 1 Tab by mouth two (2) times daily (with meals). , Disp: 180 Tab, Rfl: 1    buPROPion SR (WELLBUTRIN SR) 150 mg SR tablet, Take 1 Tab by mouth two (2) times a day., Disp: 60 Tab, Rfl: 1    ibuprofen (MOTRIN) 800 mg tablet, Take 1 Tab by mouth every six (6) hours as needed for Pain., Disp: 180 Tab, Rfl: 1    raNITIdine (ZANTAC) 150 mg tablet, Take 1 Tab by mouth two (2) times a day., Disp: 120 Tab, Rfl: 1    aspirin 81 mg chewable tablet, Take 1 Tab by mouth daily. , Disp: 60 Tab, Rfl: 1    fluconazole (DIFLUCAN) 200 mg tablet, Take 200 mg by mouth daily. FDA advises cautious prescribing of oral fluconazole in pregnancy. , Disp: , Rfl:     meclizine (ANTIVERT) 25 mg tablet, Take 1 Tab by mouth two (2) times a day., Disp: 60 Tab, Rfl: 1    Physical Exam:      Ht 5' 5\" (1.651 m)   Wt 251 lb (113.9 kg)   BMI 41.77 kg/m²     General: obese habitus, NAD, conversant  Eyes: sclera clear bilaterally, no discharge noted, eyelids normal in appearance  HENT: NCAT  Lungs: CTAB, normal respiratory effort and rate  CV: RRR, no MRGs  ABD: soft, non-tender, non-distended, normal bowel sounds  Skin: Left shoulder with tender nodule.  normal temperature, turgor, color, and texture  Psych: alert and oriented to person, place and situation, normal affect  Neuro: speech normal, moving all extremities, gait normal      Assessment/Plan     DMII, Well Controlled:  - Reported FBS at goal of < 130  - Will continue current medication regimen  - Patient instructed to make appointment for her diabetic eye exam  - Plan for HGBA1c at next appointment  - F/U in 2 months      Skin Nodule:  - Possible cyst  - Will refer to dermatology for evaluation  - F/U as needed      Health Maintenance:  - Hepatitis C screening test ordered today  - Obtained copy of last PAP smear and mammogram. Results sent for scanning and HM updated.   - [facility] contacted and patient's Care Everywhere ID was obtained        Windy Gould MD  11/19/2018, 11:12 AM

## 2018-11-19 NOTE — PROGRESS NOTES
Chief Complaint   Patient presents with    Pain (Chronic)     left shoulder    Follow Up Chronic Condition     wants to change the monitor for blood sugar         1. Have you been to the ER, urgent care clinic since your last visit? Hospitalized since your last visit? No    2. Have you seen or consulted any other health care providers outside of the [address] [place] [address] since your last visit? Include any pap smears or colon screening.  No     Visit Vitals  Ht 5' 5\" (1.651 m)   Wt 251 lb (113.9 kg)   BMI 41.77 kg/m²

## 2018-11-20 LAB
HCV AB SER IA-ACNC: 0.34 INDEX
HCV AB SERPL QL IA: NEGATIVE
HCV COMMENT,HCGAC: NORMAL

## 2018-11-29 DIAGNOSIS — N64.4 PAIN OF RIGHT BREAST: ICD-10-CM

## 2018-12-14 ENCOUNTER — OFFICE VISIT (OUTPATIENT)
Dept: FAMILY MEDICINE CLINIC | Age: 58
End: 2018-12-14

## 2018-12-14 VITALS
DIASTOLIC BLOOD PRESSURE: 73 MMHG | OXYGEN SATURATION: 99 % | RESPIRATION RATE: 22 BRPM | HEART RATE: 75 BPM | TEMPERATURE: 97.7 F | HEIGHT: 65 IN | SYSTOLIC BLOOD PRESSURE: 107 MMHG | WEIGHT: 255.6 LBS | BODY MASS INDEX: 42.59 KG/M2

## 2018-12-14 DIAGNOSIS — I10 ESSENTIAL HYPERTENSION: ICD-10-CM

## 2018-12-14 DIAGNOSIS — J31.0 RHINITIS, UNSPECIFIED TYPE: Primary | ICD-10-CM

## 2018-12-14 DIAGNOSIS — E11.40 TYPE 2 DIABETES MELLITUS WITH DIABETIC NEUROPATHY, WITHOUT LONG-TERM CURRENT USE OF INSULIN (HCC): ICD-10-CM

## 2018-12-14 RX ORDER — PROPRANOLOL HYDROCHLORIDE 60 MG/1
60 CAPSULE, EXTENDED RELEASE ORAL DAILY
Qty: 90 CAP | Refills: 1 | Status: SHIPPED | OUTPATIENT
Start: 2018-12-14 | End: 2019-02-07 | Stop reason: SDUPTHER

## 2018-12-14 RX ORDER — FLUTICASONE PROPIONATE 50 MCG
2 SPRAY, SUSPENSION (ML) NASAL DAILY
Qty: 1 BOTTLE | Refills: 6 | Status: SHIPPED | OUTPATIENT
Start: 2018-12-14 | End: 2020-04-09 | Stop reason: SDDI

## 2018-12-14 RX ORDER — BLOOD-GLUCOSE METER
EACH MISCELLANEOUS
Qty: 1 EACH | Refills: 0 | Status: SHIPPED | OUTPATIENT
Start: 2018-12-14 | End: 2021-10-13 | Stop reason: SDUPTHER

## 2018-12-14 RX ORDER — GUAIFENESIN 100 MG/5ML
81 LIQUID (ML) ORAL DAILY
Qty: 60 TAB | Refills: 1 | Status: SHIPPED | OUTPATIENT
Start: 2018-12-14 | End: 2019-03-27 | Stop reason: SDUPTHER

## 2018-12-14 RX ORDER — AZELASTINE 1 MG/ML
1 SPRAY, METERED NASAL 2 TIMES DAILY
Qty: 1 BOTTLE | Refills: 6 | Status: SHIPPED | OUTPATIENT
Start: 2018-12-14 | End: 2018-12-14 | Stop reason: SDUPTHER

## 2018-12-14 RX ORDER — AZELASTINE 1 MG/ML
1 SPRAY, METERED NASAL 2 TIMES DAILY
Qty: 1 BOTTLE | Refills: 6 | Status: SHIPPED | OUTPATIENT
Start: 2018-12-14 | End: 2020-04-09 | Stop reason: SDDI

## 2018-12-14 RX ORDER — LANCETS
EACH MISCELLANEOUS
Qty: 100 EACH | Refills: 11 | Status: SHIPPED | OUTPATIENT
Start: 2018-12-14 | End: 2021-06-02 | Stop reason: SDUPTHER

## 2018-12-14 RX ORDER — FLUTICASONE PROPIONATE 50 MCG
2 SPRAY, SUSPENSION (ML) NASAL DAILY
Qty: 1 BOTTLE | Refills: 6 | Status: SHIPPED | OUTPATIENT
Start: 2018-12-14 | End: 2018-12-14 | Stop reason: SDUPTHER

## 2018-12-14 NOTE — PROGRESS NOTES
[facility]    CC: Loss of Taste (has multiple complaints)    HPI:   Patient notably a poor historian. Loss of Taste:  - Has been an issue for 1-2 weeks (has difficulty specifying specifically when issue began)  - Associated with tinnitus, runny nose, headache, muffled hearing, occasional ear pain, and decreased smell  -Went to ED on 2018 due to concern of her multiple symptoms  -CT scan was done and nothing was found   -Patient advised to follow-up with neurology      ROS: Positive items marked in RED  CON: fever, chills  Cardiovascular: palpitations, CP  Resp: SOB, cough  GI: nausea, vomiting, diarrhea  : dysuria, hematuria      Past Medical History:   Diagnosis Date    Arthritis     Depression     Diabetes ([address]     Hot flashes     Hypertension     Morbid obesity ([address]     Vertigo        Past Surgical History:   Procedure Laterality Date    [facility]OR  [facility]    [facility]    HX TUBAL LIGATION Bilateral     NEUROLOGICAL PROCEDURE UNLISTED         Family History   Problem Relation Age of Onset    Cancer Mother 58        colon    Hypertension Mother     Diabetes Mother     Psychiatric Disorder Father     Lupus Sister        Social History     Socioeconomic History    Marital status:      Spouse name: Not on file    Number of children: Not on file    Years of education: Not on file    Highest education level: Not on file   Tobacco Use    Smoking status: Former Smoker     Last attempt to quit:      Years since quittin.9    Smokeless tobacco: Never Used   Substance and Sexual Activity    Alcohol use: No    Drug use: Yes     Types: Cocaine, Marijuana, Heroin, Opiates       No Known Allergies      Current Outpatient Medications:     propranolol LA (INDERAL LA) 60 mg SR capsule, Take 1 Cap by mouth daily. , Disp: 90 Cap, Rfl: 1    aspirin 81 mg chewable tablet, Take 1 Tab by mouth daily. , Disp: 60 Tab, Rfl: 1    Blood-Glucose Meter (ACCU-CHEK JUNI PLUS METER) [facility], Use to check blood sugar twice a day, Disp: 1 Each, Rfl: 0    glucose blood VI test strips (ACCU-CHEK JUNI PLUS TEST STRP) strip, Use to check blood sugar twice a day, Disp: 100 Strip, Rfl: 1    lancets (ACCU-CHEK SOFTCLIX LANCETS) misc, Use to check blood sugar twice a day, Disp: 100 Each, Rfl: 11    azelastine (ASTELIN) 137 mcg (0.1 %) nasal spray, 1 [place] by Both Nostrils route two (2) times a day. Use in each nostril as directed, Disp: 1 Bottle, Rfl: 6    fluticasone (FLONASE) 50 mcg/actuation nasal spray, 2 Sprays by Both Nostrils route daily. , Disp: 1 Bottle, Rfl: 6    glucose blood VI test strips (ASCENSIA AUTODISC VI, ONE TOUCH ULTRA TEST VI) strip, Use to check blood sugar daily, Disp: 100 Strip, Rfl: 6    Lancets misc, Check glucose before meals and at bedtime, Disp: 1 Each, Rfl: 11    pen needle, diabetic (NOVOFINE PLUS) 32 gauge x 1/6\" ndle, Use daily to inject Victoza subcutaneously, Disp: 100 Pen Needle, Rfl: 12    Liraglutide (VICTOZA) 0.6 mg/0.1 mL (18 mg/3 mL) pnij, 1.2 mg by SubCUTAneous route daily. Initially 0.6 mg once daily for 1 week; then increase to 1.2 mg once daily. , Disp: 1 Adjustable Dose Pre-filled Pen Syringe, Rfl: 6    promethazine (PHENERGAN) 25 mg tablet, Take 25 mg by mouth every six (6) hours as needed for Nausea., Disp: , Rfl:     gabapentin (NEURONTIN) 300 mg capsule, Take 1 Cap by mouth three (3) times daily. , Disp: 90 Cap, Rfl: 1    atorvastatin (LIPITOR) 40 mg tablet, Take 1 Tab by mouth daily. , Disp: 60 Tab, Rfl: 1    pioglitazone (ACTOS) 30 mg tablet, Take 1 Tab by mouth daily. , Disp: 90 Tab, Rfl: 1    montelukast (SINGULAIR) 10 mg tablet, Take 1 Tab by mouth daily. , Disp: 90 Tab, Rfl: 1    Blood-Glucose Meter monitoring kit, As allowed by insurance, Disp: 1 Kit, Rfl: 0    metFORMIN (GLUCOPHAGE) 1,000 mg tablet, Take 1 Tab by mouth two (2) times daily (with meals). , Disp: 180 Tab, Rfl: 1   buPROPion SR (WELLBUTRIN SR) 150 mg SR tablet, Take 1 Tab by mouth two (2) times a day., Disp: 60 Tab, Rfl: 1    ibuprofen (MOTRIN) 800 mg tablet, Take 1 Tab by mouth every six (6) hours as needed for Pain., Disp: 180 Tab, Rfl: 1    raNITIdine (ZANTAC) 150 mg tablet, Take 1 Tab by mouth two (2) times a day., Disp: 120 Tab, Rfl: 1    fluconazole (DIFLUCAN) 200 mg tablet, Take 200 mg by mouth daily. FDA advises cautious prescribing of oral fluconazole in pregnancy. , Disp: , Rfl:     meclizine (ANTIVERT) 25 mg tablet, Take 1 Tab by mouth two (2) times a day., Disp: 60 Tab, Rfl: 1    Physical Exam:      /73   Pulse 75   Temp 97.7 °F (36.5 °C) (Oral)   Resp 22   Ht 5' 5\" (1.651 m)   Wt 255 lb 9.6 oz (115.9 kg)   SpO2 99%   BMI 42.53 kg/m²     General: Obese habitus, NAD, conversant  Eyes: sclera clear bilaterally, no discharge noted, eyelids normal in appearance  HENT: NCAT, no sinus tenderness, tympanic membranes clear bilaterally, nasal turbinates significantly enlarged bilaterally, MMM  Lungs: CTAB, normal respiratory effort and rate  CV: RRR, no MRGs  ABD: soft, non-tender, non-distended, normal bowel sounds  Skin: normal temperature, turgor, color, and texture  Psych: alert and oriented to person, place and situation, normal affect  Neuro: speech normal, moving all extremities, gait normal      Assessment/Plan     Rhinitis:  -Allergic versus viral etiology  -Discussed with patient that her symptoms are all likely related directly to her rhinitis. Educated on how nasal congestion impairs her sense of smell, which thus affects her taste. Explained that her rhinitis likely has been affecting her sinuses, which is why she has been having headaches.   Rhinitis is likely also causing eustachian tube dysfunction, thus causing her ear symptoms.  -At this time would not recommend evaluation by neurologist  -Recommended that treatment be focused on her notable rhinitis  -Will start on Azelastine and Flonase regimen  -Handouts given on rhinitis care, eustachian tube disorder care, and sinus rinse  -Follow-up as needed if symptoms fail to improve or worsen        Windy Gould MD  12/14/2018, 2:37 PM

## 2018-12-14 NOTE — PATIENT INSTRUCTIONS
Rhinitis: Care Instructions  Your Care Instructions  Rhinitis is swelling and irritation in the nose. Allergies and infections are often the cause. Your nose may run or feel stuffy. Other symptoms are itchy and sore eyes, ears, throat, and mouth. If allergies are the cause, your doctor may do tests to find out what you are allergic to. You may be able to stop symptoms if you avoid the things that cause them. Your doctor may suggest or prescribe medicine to ease your symptoms. Follow-up care is a key part of your treatment and safety. Be sure to make and go to all appointments, and call your doctor if you are having problems. It's also a good idea to know your test results and keep a list of the medicines you take. How can you care for yourself at home? · If your rhinitis is caused by allergies, try to find out what sets off (triggers) your symptoms. Take steps to avoid these triggers. ? Avoid yard work. It can stir up both pollen and mold. ? Do not smoke or allow others to smoke around you. If you need help quitting, talk to your doctor about stop-smoking programs and medicines. These can increase your chances of quitting for good. ? Do not use aerosol sprays, cleaning products, or perfumes. ? If pollen is one of your triggers, close your house and car windows during blooming season. ? Clean your house often to control dust.  ? Keep pets outside. · If your doctor recommends over-the-counter medicines to relieve symptoms, take your medicines exactly as prescribed. Call your doctor if you think you are having a problem with your medicine. · Use saline (saltwater) nasal washes to help keep your nasal passages open and wash out mucus and bacteria. You can buy saline nose drops at a grocery store or drugstore. Or you can make your own at home by adding 1 teaspoon of salt and 1 teaspoon of baking soda to 2 cups of distilled water.  If you make your own, fill a bulb syringe with the solution, insert the tip into your nostril, and squeeze gently. Caroline Mon your nose. When should you call for help? Call your doctor now or seek immediate medical care if:    · You are having trouble breathing.    Watch closely for changes in your health, and be sure to contact your doctor if:    · Mucus from your nose gets thicker (like pus) or has new blood in it.     · You have new or worse symptoms.     · You do not get better as expected. Where can you learn more? Go to http://kai-janet.info/. Enter M030 in the search box to learn more about \"Rhinitis: Care Instructions. \"  Current as of: March 28, 2018  Content Version: 11.8  © 5584-1590 [employer]. Care instructions adapted under license by [employer] (which disclaims liability or warranty for this information). If you have questions about a medical condition or this instruction, always ask your healthcare professional. [place] [address] any warranty or liability for your use of this information. Eustachian Tube Problems: Care Instructions  Your Care Instructions    The eustachian (say \"you-STAY-shee-un\") tubes run between the inside of the ears and the throat. They keep air pressure stable in the ears. If your eustachian tubes become blocked, the air pressure in your ears changes. The fluids from a cold can clog eustachian tubes, causing pain in the ears. A quick change in air pressure can cause eustachian tubes to close up. This might happen when an airplane changes altitude or when a  goes up or down underwater. Eustachian tube problems often clear up on their own or after antibiotic treatment. If your tubes continue to be blocked, you may need surgery. Follow-up care is a key part of your treatment and safety. Be sure to make and go to all appointments, and call your doctor if you are having problems. It's also a good idea to know your test results and keep a list of the medicines you take.   How can you care for yourself at home? · To ease ear pain, apply a warm washcloth or a heating pad set on low. There may be some drainage from the ear when the heat melts earwax. Put a cloth between the heat source and your skin. Do not use a heating pad with children. · If your doctor prescribed antibiotics, take them as directed. Do not stop taking them just because you feel better. You need to take the full course of antibiotics. · Your doctor may recommend over-the-counter medicine. Be safe with medicines. Oral or nasal decongestants may relieve ear pain. Avoid decongestants that are combined with antihistamines, which tend to cause more blockage. But if allergies seem to be the problem, your doctor may recommend a combination. Be careful with cough and cold medicines. Don't give them to children younger than 6, because they don't work for children that age and can even be harmful. For children 6 and older, always follow all the instructions carefully. Make sure you know how much medicine to give and how long to use it. And use the dosing device if one is included. When should you call for help? Call your doctor now or seek immediate medical care if:    · You develop sudden, complete hearing loss.     · You have severe pain or feel dizzy.     · You have new or increasing pus or blood draining from your ear.     · You have redness, swelling, or pain around or behind the ear.    Watch closely for changes in your health, and be sure to contact your doctor if:    · You do not get better after 2 weeks.     · You have any new symptoms, such as itching or a feeling of fullness in the ear. Where can you learn more? Go to http://kai-janet.info/. Enter Y822 in the search box to learn more about \"Eustachian Tube Problems: Care Instructions. \"  Current as of: March 28, 2018  Content Version: 11.8  © 4795-3152 [employer].  Care instructions adapted under license by [facility] (which disclaims liability or warranty for this information). If you have questions about a medical condition or this instruction, always ask your healthcare professional. [address] any warranty or liability for your use of this information. Saline Nasal Washes: Care Instructions  Your Care Instructions  Saline nasal washes help keep the nasal passages open by washing out thick or dried mucus. This simple remedy can help relieve symptoms of allergies, sinusitis, and colds. It also can make the nose feel more comfortable by keeping the mucous membranes moist. You may notice a little burning sensation in your nose the first few times you use the solution, but this usually gets better in a few days. Follow-up care is a key part of your treatment and safety. Be sure to make and go to all appointments, and call your doctor if you are having problems. It's also a good idea to know your test results and keep a list of the medicines you take. How can you care for yourself at home? · You can buy premixed saline solution in a squeeze bottle or other sinus rinse products at a drugstore. Read and follow the instructions on the label. · You also can make your own saline solution by adding 1 teaspoon of salt and 1 teaspoon of baking soda to 2 cups of distilled water. · If you use a homemade solution, pour a small amount into a clean bowl. Using a rubber bulb syringe, squeeze the syringe and place the tip in the salt water. Pull a small amount of the salt water into the syringe by relaxing your hand. · Sit down with your head tilted slightly back. Do not lie down. Put the tip of the bulb syringe or the squeeze bottle a little way into one of your nostrils. Gently drip or squirt a few drops into the nostril. Repeat with the other nostril. Some sneezing and gagging are normal at first.  · Gently blow your nose. · Wipe the syringe or bottle tip clean after each use.   · Repeat this 2 or 3 times a day. · Use nasal washes gently if you have nosebleeds often. When should you call for help? Watch closely for changes in your health, and be sure to contact your doctor if:    · You often get nosebleeds.     · You have problems doing the nasal washes. Where can you learn more? Go to http://kai-janet.info/. Enter 206 [id number] in the search box to learn more about \"Saline Nasal Washes: Care Instructions. \"  Current as of: March 28, 2018  Content Version: 11.8  © 7976-2749 [employer]. Care instructions adapted under license by [employer] (which disclaims liability or warranty for this information). If you have questions about a medical condition or this instruction, always ask your healthcare professional. [address] any warranty or liability for your use of this information.

## 2018-12-14 NOTE — PROGRESS NOTES
1. Have you been to the ER, urgent care clinic since your last visit? Hospitalized since your last visit? Yes [facility] ER for headache last week. 2. Have you seen or consulted any other health care providers outside of the [address] [place] [address] since your last visit? Include any pap smears or colon screening.  No     Chief Complaint   Patient presents with    Migraine     Lost simms, smell, noise in her eyes     Visit Vitals  /73   Pulse 75   Temp 97.7 °F (36.5 °C) (Oral)   Resp 22   Ht 5' 5\" (1.651 m)   Wt 255 lb 9.6 oz (115.9 kg)   SpO2 99%   BMI 42.53 kg/m²

## 2018-12-19 DIAGNOSIS — K21.9 GASTROESOPHAGEAL REFLUX DISEASE WITHOUT ESOPHAGITIS: ICD-10-CM

## 2018-12-19 DIAGNOSIS — J30.89 ENVIRONMENTAL AND SEASONAL ALLERGIES: ICD-10-CM

## 2018-12-19 DIAGNOSIS — E11.9 TYPE 2 DIABETES MELLITUS WITHOUT COMPLICATION, WITHOUT LONG-TERM CURRENT USE OF INSULIN (HCC): ICD-10-CM

## 2018-12-19 DIAGNOSIS — E78.5 HYPERLIPIDEMIA, UNSPECIFIED HYPERLIPIDEMIA TYPE: ICD-10-CM

## 2018-12-19 DIAGNOSIS — F32.A DEPRESSION, UNSPECIFIED DEPRESSION TYPE: ICD-10-CM

## 2018-12-19 RX ORDER — PIOGLITAZONEHYDROCHLORIDE 30 MG/1
30 TABLET ORAL DAILY
Qty: 90 TAB | Refills: 1 | Status: SHIPPED | OUTPATIENT
Start: 2018-12-19 | End: 2019-02-07 | Stop reason: SDUPTHER

## 2018-12-19 RX ORDER — MONTELUKAST SODIUM 10 MG/1
10 TABLET ORAL DAILY
Qty: 90 TAB | Refills: 1 | Status: SHIPPED | OUTPATIENT
Start: 2018-12-19 | End: 2019-02-07 | Stop reason: SDUPTHER

## 2018-12-19 RX ORDER — IBUPROFEN 800 MG/1
800 TABLET ORAL
Qty: 180 TAB | Refills: 1 | Status: SHIPPED | OUTPATIENT
Start: 2018-12-19 | End: 2020-08-14 | Stop reason: ALTCHOICE

## 2018-12-19 RX ORDER — BUPROPION HYDROCHLORIDE 150 MG/1
150 TABLET, EXTENDED RELEASE ORAL 2 TIMES DAILY
Qty: 180 TAB | Refills: 1 | Status: SHIPPED | OUTPATIENT
Start: 2018-12-19 | End: 2019-02-07

## 2018-12-19 RX ORDER — METFORMIN HYDROCHLORIDE 1000 MG/1
1000 TABLET ORAL 2 TIMES DAILY WITH MEALS
Qty: 180 TAB | Refills: 1 | Status: SHIPPED | OUTPATIENT
Start: 2018-12-19 | End: 2019-02-07 | Stop reason: SDUPTHER

## 2018-12-19 RX ORDER — RANITIDINE 150 MG/1
150 TABLET, FILM COATED ORAL 2 TIMES DAILY
Qty: 180 TAB | Refills: 1 | Status: SHIPPED | OUTPATIENT
Start: 2018-12-19 | End: 2019-02-07 | Stop reason: SDUPTHER

## 2018-12-19 RX ORDER — ATORVASTATIN CALCIUM 40 MG/1
40 TABLET, FILM COATED ORAL DAILY
Qty: 90 TAB | Refills: 1 | Status: SHIPPED | OUTPATIENT
Start: 2018-12-19 | End: 2019-02-07 | Stop reason: SDUPTHER

## 2018-12-20 ENCOUNTER — OFFICE VISIT (OUTPATIENT)
Dept: FAMILY MEDICINE CLINIC | Age: 58
End: 2018-12-20

## 2018-12-20 VITALS
WEIGHT: 256 LBS | BODY MASS INDEX: 42.65 KG/M2 | OXYGEN SATURATION: 98 % | DIASTOLIC BLOOD PRESSURE: 70 MMHG | HEIGHT: 65 IN | HEART RATE: 79 BPM | SYSTOLIC BLOOD PRESSURE: 128 MMHG | TEMPERATURE: 97.2 F | RESPIRATION RATE: 22 BRPM

## 2018-12-20 DIAGNOSIS — E11.40 TYPE 2 DIABETES MELLITUS WITH DIABETIC NEUROPATHY, WITHOUT LONG-TERM CURRENT USE OF INSULIN (HCC): ICD-10-CM

## 2018-12-20 DIAGNOSIS — M17.0 PRIMARY OSTEOARTHRITIS OF BOTH KNEES: Primary | ICD-10-CM

## 2018-12-20 DIAGNOSIS — M79.661 TENDERNESS OF RIGHT CALF: ICD-10-CM

## 2018-12-20 DIAGNOSIS — E66.01 MORBID OBESITY (HCC): ICD-10-CM

## 2018-12-20 RX ORDER — CYCLOBENZAPRINE HCL 10 MG
10 TABLET ORAL ONCE
COMMUNITY
Start: 2018-12-17 | End: 2019-02-07 | Stop reason: SDUPTHER

## 2018-12-20 RX ORDER — DICLOFENAC SODIUM 10 MG/G
4 GEL TOPICAL AS NEEDED
COMMUNITY
Start: 2018-12-17 | End: 2020-04-09 | Stop reason: SDDI

## 2018-12-20 RX ORDER — TRAMADOL HYDROCHLORIDE 50 MG/1
50 TABLET ORAL ONCE
COMMUNITY
Start: 2018-12-17 | End: 2018-12-20 | Stop reason: ALTCHOICE

## 2018-12-20 RX ORDER — DICLOFENAC POTASSIUM 50 MG/1
50 TABLET, FILM COATED ORAL 3 TIMES DAILY
Qty: 90 TAB | Refills: 5 | Status: SHIPPED | OUTPATIENT
Start: 2018-12-20 | End: 2019-02-07 | Stop reason: SDUPTHER

## 2018-12-20 RX ORDER — CEPHALEXIN 500 MG/1
500 CAPSULE ORAL ONCE
COMMUNITY
Start: 2018-12-11 | End: 2019-02-14

## 2018-12-20 NOTE — PROGRESS NOTES
1. Have you been to the ER, urgent care clinic since your last visit? Hospitalized since your last visit? Yes [facility] ER on dec 17th    2. Have you seen or consulted any other health care providers outside of the [address] [place] [address] since your last visit? Include any pap smears or colon screening.  No     Chief Complaint   Patient presents with    Leg Pain     Visit Vitals  /70   Pulse 79   Temp 97.2 °F (36.2 °C) (Oral)   Resp 22   Ht 5' 5\" (1.651 m)   Wt 256 lb (116.1 kg)   SpO2 98%   BMI 42.60 kg/m²

## 2018-12-20 NOTE — PROGRESS NOTES
Shukri Russo    CC: OA of knee    HPI:   Patient notably a poor historian.     OA of Knee:  - Has been an issue since the 2000s  - Reports having osteoarthritis of both knees  - Has been worsening  - Reports that the pain in her right knee is worse than the left  - Went to ED on 12/17/2018 due to how severe her right knee pain has become  - Reports that ED discharged her with tramadol regimen, but she states that tramadol has not helped with her pain  - States it is aggravated by the cold weather  - Has notably affected her ability to go up stairs      Right Calf Pain:  - Noticed it today with examination of her knee  - States the pain is isolated to the medial side of the back of her calf  - Denies having this issue when she was seen at the ED  - Feels like a pulling  - Pain is severe  - Triggered/aggravated by touch      Morbid Obesity:  - Is disappointed that she has not been losing weight on Victoza  - Has been gaining weight since her last visit  - Trying to eat more healthy  - Would like to have bariatric surgery  - Wishes to enter weight loss program  - Thinks her mood is possibly causing her to eat more recently      ROS: Positive items marked in RED  CON: fever, chills  Cardiovascular: palpitations, CP  Resp: SOB, cough  GI: nausea, vomiting, diarrhea  : dysuria, hematuria      Past Medical History:   Diagnosis Date    Arthritis     Depression     Diabetes ([address]     Hot flashes     Hypertension     Morbid obesity ([address]     Vertigo        Past Surgical History:   Procedure Laterality Date    EXCIS INFRATENT BRAIN TUMOR  08/16/2015    [facility]    HX CHOLECYSTECTOMY  2016    [address] [address]    HX TUBAL LIGATION Bilateral     NEUROLOGICAL PROCEDURE UNLISTED         Family History   Problem Relation Age of Onset    Cancer Mother 58        colon    Hypertension Mother     Diabetes Mother     Psychiatric Disorder Father     Lupus Sister        Social History     Socioeconomic History    Marital status:      Spouse name: Not on file    Number of children: Not on file    Years of education: Not on file    Highest education level: Not on file   Tobacco Use    Smoking status: Former Smoker     Last attempt to quit:      Years since quittin.9    Smokeless tobacco: Never Used   Substance and Sexual Activity    Alcohol use: No    Drug use: Yes     Types: Cocaine, Marijuana, Heroin, Opiates       No Known Allergies      Current Outpatient Medications:     cephALEXin (KEFLEX) 500 mg capsule, Take 500 mg by mouth once., Disp: , Rfl:     cyclobenzaprine (FLEXERIL) 10 mg tablet, Take 10 mg by mouth once., Disp: , Rfl:     diclofenac (VOLTAREN) 1 % gel, Apply 4 g to affected area as needed. , Disp: , Rfl:     traMADol (ULTRAM) 50 mg tablet, Take 50 mg by mouth once., Disp: , Rfl:     metFORMIN (GLUCOPHAGE) 1,000 mg tablet, Take 1 Tab by mouth two (2) times daily (with meals). , Disp: 180 Tab, Rfl: 1    buPROPion SR (WELLBUTRIN SR) 150 mg SR tablet, Take 1 Tab by mouth two (2) times a day., Disp: 180 Tab, Rfl: 1    atorvastatin (LIPITOR) 40 mg tablet, Take 1 Tab by mouth daily. , Disp: 90 Tab, Rfl: 1    ibuprofen (MOTRIN) 800 mg tablet, Take 1 Tab by mouth every six (6) hours as needed for Pain., Disp: 180 Tab, Rfl: 1    montelukast (SINGULAIR) 10 mg tablet, Take 1 Tab by mouth daily. , Disp: 90 Tab, Rfl: 1    pioglitazone (ACTOS) 30 mg tablet, Take 1 Tab by mouth daily. , Disp: 90 Tab, Rfl: 1    raNITIdine (ZANTAC) 150 mg tablet, Take 1 Tab by mouth two (2) times a day., Disp: 180 Tab, Rfl: 1    propranolol LA (INDERAL LA) 60 mg SR capsule, Take 1 Cap by mouth daily. , Disp: 90 Cap, Rfl: 1    aspirin 81 mg chewable tablet, Take 1 Tab by mouth daily. , Disp: 60 Tab, Rfl: 1    azelastine (ASTELIN) 137 mcg (0.1 %) nasal spray, 1 [place] by Both Nostrils route two (2) times a day.  Use in each nostril as directed, Disp: 1 Bottle, Rfl: 6    fluticasone (FLONASE) 50 mcg/actuation nasal spray, 2 Sprays by Both Nostrils route daily. , Disp: 1 Bottle, Rfl: 6    pen needle, diabetic (NOVOFINE PLUS) 32 gauge x 1/6\" ndle, Use daily to inject Victoza subcutaneously, Disp: 100 Pen Needle, Rfl: 12    Liraglutide (VICTOZA) 0.6 mg/0.1 mL (18 mg/3 mL) pnij, 1.2 mg by SubCUTAneous route daily. Initially 0.6 mg once daily for 1 week; then increase to 1.2 mg once daily. , Disp: 1 Adjustable Dose Pre-filled Pen Syringe, Rfl: 6    promethazine (PHENERGAN) 25 mg tablet, Take 25 mg by mouth every six (6) hours as needed for Nausea., Disp: , Rfl:     gabapentin (NEURONTIN) 300 mg capsule, Take 1 Cap by mouth three (3) times daily. , Disp: 90 Cap, Rfl: 1    fluconazole (DIFLUCAN) 200 mg tablet, Take 200 mg by mouth daily. FDA advises cautious prescribing of oral fluconazole in pregnancy. , Disp: , Rfl:     meclizine (ANTIVERT) 25 mg tablet, Take 1 Tab by mouth two (2) times a day., Disp: 60 Tab, Rfl: 1    Blood-Glucose Meter (ACCU-CHEK JUNI PLUS METER) misc, Use to check blood sugar twice a day, Disp: 1 Each, Rfl: 0    glucose blood VI test strips (ACCU-CHEK JUNI PLUS TEST STRP) strip, Use to check blood sugar twice a day, Disp: 100 Strip, Rfl: 1    lancets (ACCU-CHEK SOFTCLIX LANCETS) misc, Use to check blood sugar twice a day, Disp: 100 Each, Rfl: 11    glucose blood VI test strips (ASCENSIA AUTODISC VI, ONE TOUCH ULTRA TEST VI) strip, Use to check blood sugar daily, Disp: 100 Strip, Rfl: 6    Lancets misc, Check glucose before meals and at bedtime, Disp: 1 Each, Rfl: 11    Blood-Glucose Meter monitoring kit, As allowed by insurance, Disp: 1 Kit, Rfl: 0    Physical Exam:      /70   Pulse 79   Temp 97.2 °F (36.2 °C) (Oral)   Resp 22   Ht 5' 5\" (1.651 m)   Wt 256 lb (116.1 kg)   SpO2 98%   BMI 42.60 kg/m²     General: obese habitus, NAD, conversant  Eyes: sclera clear bilaterally, no discharge noted, eyelids normal in appearance  HENT: NCAT  Lungs: CTAB, normal respiratory effort and rate  CV: RRR, no MRGs  ABD: soft, non-tender, non-distended, normal bowel sounds  Ext: no peripheral edema or digital cyanosis noted. Mild crepitus noted in both knees. Pain reported with passive extension of right knee. Notable point tenderness on back of right calf on the medial side. (Patient began to cry from pain)  Skin: normal temperature, turgor, color, and texture  Psych: alert and oriented to person, place and situation, normal affect  Neuro: speech normal, moving all extremities, antalgic gait      KNEE COMPLETE RT 4 VIEWS (12/17/2018): Final Result     Moderate osteoarthritic changes of the right knee perhaps with minimal progression from 2016. Small right knee effusion. No acute fracture. Assessment/Plan     Bilateral OA of Knees:  - Counseled on importance of weight loss to help reduce progression of condition  - Will D/C Tramadol and start on diclofenac regimen  - Will refer to Sports Medicine  -Handouts given knee arthritis care knee arthritis exercises  -Follow-up to be determined based on results of PVL      Right Calf Tenderness:  -Etiology unclear.   Concern for possible DVT considering risk factors of obesity and inactivity secondary to knee pain  -PVL ordered of right lower extremity  -Follow-up to be determined based on results of PVL      Morbid Obesity, Worsening:  -Has gained 8 pounds since 11/19/2018  -Discussed the importance of reducing the amount of calories in her diet  -Will refer to nutrition for counseling  -Advised to get information on [facility] weight loss program  -Follow-up to be determined based on results of PVL        Ankita Arias MD  12/20/2018, 12:59 PM

## 2018-12-20 NOTE — PATIENT INSTRUCTIONS
Knee Arthritis: Care Instructions  Your Care Instructions    Knee arthritis is a breakdown of the cartilage that cushions your knee joint. When the cartilage wears down, your bones rub against each other. This causes pain and stiffness. Knee arthritis tends to get worse with time. Treatment for knee arthritis involves reducing pain, making the leg muscles stronger, and staying at a healthy body weight. The treatment usually does not improve the health of the cartilage, but it can reduce pain and improve how well your knee works. You can take simple measures to protect your knee joints, ease your pain, and help you stay active. Follow-up care is a key part of your treatment and safety. Be sure to make and go to all appointments, and call your doctor if you are having problems. It's also a good idea to know your test results and keep a list of the medicines you take. How can you care for yourself at home? · Know that knee arthritis will cause more pain on some days than on others. · Stay at a healthy weight. Lose weight if you are overweight. When you stand up, the pressure on your knees from every pound of body weight is multiplied four times. So if you lose 10 pounds, you will reduce the pressure on your knees by 40 pounds. · Talk to your doctor or physical therapist about exercises that will help ease joint pain. ? Stretch to help prevent stiffness and to prevent injury before you exercise. You may enjoy gentle forms of yoga to help keep your knee joints and muscles flexible. ? Walk instead of jog.  ? Ride a bike. This makes your thigh muscles stronger and takes pressure off your knee. ? Wear well-fitting and comfortable shoes. ? Exercise in chest-deep water. This can help you exercise longer with less pain. ? Avoid exercises that include squatting or kneeling. They can put a lot of strain on your knees.   ? Talk to your doctor to make sure that the exercise you do is not making the arthritis worse.  · Do not sit for long periods of time. Try to walk once in a while to keep your knee from getting stiff. · Ask your doctor or physical therapist whether shoe inserts may reduce your arthritis pain. · If you can afford it, get new athletic shoes at least every year. This can help reduce the strain on your knees. · Use a device to help you do everyday activities. ? A cane or walking stick can help you keep your balance when you walk. Hold the cane or walking stick in the hand opposite the painful knee. ? If you feel like you may fall when you walk, try using crutches or a front-wheeled walker. These can prevent falls that could cause more damage to your knee. ? A knee brace may help keep your knee stable and prevent pain. ? You also can use other things to make life easier, such as a higher toilet seat and handrails in the bathtub or shower. · Take pain medicines exactly as directed. ? Do not wait until you are in severe pain. You will get better results if you take it sooner. ? If you are not taking a prescription pain medicine, take an over-the-counter medicine such as acetaminophen (Tylenol), ibuprofen (Advil, Motrin), or naproxen (Aleve). Read and follow all instructions on the label. ? Do not take two or more pain medicines at the same time unless the doctor told you to. Many pain medicines have acetaminophen, which is Tylenol. Too much acetaminophen (Tylenol) can be harmful. ? Tell your doctor if you take a blood thinner, have diabetes, or have allergies to shellfish. · Ask your doctor if you might benefit from a shot of steroid medicine into your knee. This may provide pain relief for several months. · Many people take the supplements glucosamine and chondroitin for osteoarthritis. Some people feel they help, but the medical research does not show that they work. Talk to your doctor before you take these supplements. When should you call for help?   Call your doctor now or seek immediate medical care if:    · You have sudden swelling, warmth, or pain in your knee.     · You have knee pain and a fever or rash.     · You have such bad pain that you cannot use your knee.    Watch closely for changes in your health, and be sure to contact your doctor if you have any problems. Where can you learn more? Go to http://kai-janet.info/. Enter N189 in the search box to learn more about \"Knee Arthritis: Care Instructions. \"  Current as of: June 11, 2018  Content Version: 11.8  © 1320-3855 [employer]. Care instructions adapted under license by [employer] (which disclaims liability or warranty for this information). If you have questions about a medical condition or this instruction, always ask your healthcare professional. [address] any warranty or liability for your use of this information. Knee Arthritis: Exercises  Your Care Instructions  Here are some examples of exercises for knee arthritis. Start each exercise slowly. Ease off the exercise if you start to have pain. Your doctor or physical therapist will tell you when you can start these exercises and which ones will work best for you. How to do the exercises  Knee flexion with heel slide    1. Lie on your back with your knees bent. 2. Slide your heel back by bending your affected knee as far as you can. Then hook your other foot around your ankle to help pull your heel even farther back. 3. Hold for about 6 seconds, then rest for up to 10 seconds. 4. Repeat 8 to 12 times. 5. Switch legs and repeat steps 1 through 4, even if only one knee is sore. Quad sets    1. Sit with your affected leg straight and supported on the floor or a firm bed. Place a small, rolled-up towel under your knee. Your other leg should be bent, with that foot flat on the floor. 2. Tighten the thigh muscles of your affected leg by pressing the back of your knee down into the towel.   3. Hold for about 6 seconds, then rest for up to 10 seconds. 4. Repeat 8 to 12 times. 5. Switch legs and repeat steps 1 through 4, even if only one knee is sore. Straight-leg raises to the front    1. Lie on your back with your good knee bent so that your foot rests flat on the floor. Your affected leg should be straight. Make sure that your low back has a normal curve. You should be able to slip your hand in between the floor and the small of your back, with your palm touching the floor and your back touching the back of your hand. 2. Tighten the thigh muscles in your affected leg by pressing the back of your knee flat down to the floor. Hold your knee straight. 3. Keeping the thigh muscles tight and your leg straight, lift your affected leg up so that your heel is about 12 inches off the floor. Hold for about 6 seconds, then lower slowly. 4. Relax for up to 10 seconds between repetitions. 5. Repeat 8 to 12 times. 6. Switch legs and repeat steps 1 through 5, even if only one knee is sore. Active knee flexion    1. Lie on your stomach with your knees straight. If your kneecap is uncomfortable, roll up a washcloth and put it under your leg just above your kneecap. 2. Lift the foot of your affected leg by bending the knee so that you bring the foot up toward your buttock. If this motion hurts, try it without bending your knee quite as far. This may help you avoid any painful motion. 3. Slowly move your leg up and down. 4. Repeat 8 to 12 times. 5. Switch legs and repeat steps 1 through 4, even if only one knee is sore. Quadriceps stretch (facedown)    1. Lie flat on your stomach, and rest your face on the floor. 2. Wrap a towel or belt strap around the lower part of your affected leg. Then use the towel or belt strap to slowly pull your heel toward your buttock until you feel a stretch. 3. Hold for about 15 to 30 seconds, then relax your leg against the towel or belt strap.   4. Repeat 2 to 4 times.  5. Switch legs and repeat steps 1 through 4, even if only one knee is sore. Stationary exercise bike    1. If you do not have a stationary exercise bike at home, you can find one to ride at your local health club or community center. 2. Adjust the height of the bike seat so that your knee is slightly bent when your leg is extended downward. If your knee hurts when the pedal reaches the top, you can raise the seat so that your knee does not bend as much. 3. Start slowly. At first, try to do 5 to 10 minutes of cycling with little to no resistance. Then increase your time and the resistance bit by bit until you can do 20 to 30 minutes without pain. 4. If you start to have pain, rest your knee until your pain gets back to the level that is normal for you. Or cycle for less time or with less effort. Follow-up care is a key part of your treatment and safety. Be sure to make and go to all appointments, and call your doctor if you are having problems. It's also a good idea to know your test results and keep a list of the medicines you take. Where can you learn more? Go to http://kai-janet.info/. Enter C159 in the search box to learn more about \"Knee Arthritis: Exercises. \"  Current as of: November 29, 2017  Content Version: 11.8  © 4972-4382 [employer]. Care instructions adapted under license by [employer] (which disclaims liability or warranty for this information). If you have questions about a medical condition or this instruction, always ask your healthcare professional. [address] any warranty or liability for your use of this information.

## 2019-01-03 ENCOUNTER — HOSPITAL ENCOUNTER (OUTPATIENT)
Dept: VASCULAR SURGERY | Age: 59
Discharge: HOME | End: 2019-01-03
Attending: FAMILY MEDICINE
Payer: MEDICARE

## 2019-01-03 DIAGNOSIS — M79.661 TENDERNESS OF RIGHT CALF: ICD-10-CM

## 2019-01-03 PROCEDURE — 93971 EXTREMITY STUDY: CPT

## 2019-01-11 DIAGNOSIS — R42 VERTIGO: ICD-10-CM

## 2019-01-15 RX ORDER — MECLIZINE HYDROCHLORIDE 25 MG/1
25 TABLET ORAL 2 TIMES DAILY
Qty: 60 TAB | Refills: 1 | Status: SHIPPED | OUTPATIENT
Start: 2019-01-15 | End: 2019-02-07 | Stop reason: SDUPTHER

## 2019-02-07 ENCOUNTER — OFFICE VISIT (OUTPATIENT)
Dept: FAMILY MEDICINE CLINIC | Age: 59
End: 2019-02-07

## 2019-02-07 VITALS
BODY MASS INDEX: 42.49 KG/M2 | OXYGEN SATURATION: 97 % | RESPIRATION RATE: 22 BRPM | HEART RATE: 72 BPM | DIASTOLIC BLOOD PRESSURE: 85 MMHG | TEMPERATURE: 97.7 F | HEIGHT: 65 IN | SYSTOLIC BLOOD PRESSURE: 131 MMHG | WEIGHT: 255 LBS

## 2019-02-07 DIAGNOSIS — K21.9 GASTROESOPHAGEAL REFLUX DISEASE WITHOUT ESOPHAGITIS: ICD-10-CM

## 2019-02-07 DIAGNOSIS — F32.A DEPRESSION, UNSPECIFIED DEPRESSION TYPE: ICD-10-CM

## 2019-02-07 DIAGNOSIS — R42 VERTIGO: ICD-10-CM

## 2019-02-07 DIAGNOSIS — J30.89 ENVIRONMENTAL AND SEASONAL ALLERGIES: ICD-10-CM

## 2019-02-07 DIAGNOSIS — R47.89 WORD FINDING PROBLEM: Primary | ICD-10-CM

## 2019-02-07 DIAGNOSIS — E11.42 DIABETIC POLYNEUROPATHY ASSOCIATED WITH TYPE 2 DIABETES MELLITUS (HCC): ICD-10-CM
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
## 2019-02-07 DIAGNOSIS — M17.0 BILATERAL PRIMARY OSTEOARTHRITIS OF KNEE: ICD-10-CM

## 2019-02-07 DIAGNOSIS — I10 ESSENTIAL HYPERTENSION: ICD-10-CM

## 2019-02-07 DIAGNOSIS — E11.40 TYPE 2 DIABETES MELLITUS WITH DIABETIC NEUROPATHY, WITHOUT LONG-TERM CURRENT USE OF INSULIN (HCC): ICD-10-CM

## 2019-02-07 DIAGNOSIS — M62.838 MUSCLE SPASM: ICD-10-CM

## 2019-02-07 DIAGNOSIS — E78.5 HYPERLIPIDEMIA, UNSPECIFIED HYPERLIPIDEMIA TYPE: ICD-10-CM

## 2019-02-07 RX ORDER — MECLIZINE HYDROCHLORIDE 25 MG/1
25 TABLET ORAL 2 TIMES DAILY
Qty: 60 TAB | Refills: 1 | Status: SHIPPED | OUTPATIENT
Start: 2019-02-07 | End: 2019-03-27 | Stop reason: SDUPTHER

## 2019-02-07 RX ORDER — METFORMIN HYDROCHLORIDE 1000 MG/1
1000 TABLET ORAL 2 TIMES DAILY WITH MEALS
Qty: 180 TAB | Refills: 1 | Status: SHIPPED | OUTPATIENT
Start: 2019-02-07 | End: 2020-07-31 | Stop reason: SDUPTHER

## 2019-02-07 RX ORDER — CYCLOBENZAPRINE HCL 10 MG
10 TABLET ORAL
Qty: 90 TAB | Refills: 1 | Status: SHIPPED | OUTPATIENT
Start: 2019-02-07 | End: 2019-03-27 | Stop reason: SDUPTHER

## 2019-02-07 RX ORDER — BUPROPION HYDROCHLORIDE 150 MG/1
150 TABLET, EXTENDED RELEASE ORAL 2 TIMES DAILY
Qty: 180 TAB | Refills: 1 | Status: CANCELLED | OUTPATIENT
Start: 2019-02-07

## 2019-02-07 RX ORDER — LANCETS
EACH MISCELLANEOUS
Qty: 1 EACH | Refills: 11 | Status: SHIPPED | OUTPATIENT
Start: 2019-02-07 | End: 2019-10-04 | Stop reason: SDUPTHER

## 2019-02-07 RX ORDER — CYCLOBENZAPRINE HCL 10 MG
10 TABLET ORAL ONCE
Qty: 1 TAB | Refills: 0 | Status: CANCELLED | OUTPATIENT
Start: 2019-02-07 | End: 2019-02-07

## 2019-02-07 RX ORDER — GABAPENTIN 300 MG/1
300 CAPSULE ORAL 3 TIMES DAILY
Qty: 90 CAP | Refills: 1 | Status: SHIPPED | OUTPATIENT
Start: 2019-02-07 | End: 2019-03-27 | Stop reason: SDUPTHER

## 2019-02-07 RX ORDER — PROPRANOLOL HYDROCHLORIDE 60 MG/1
60 CAPSULE, EXTENDED RELEASE ORAL DAILY
Qty: 90 CAP | Refills: 1 | Status: SHIPPED | OUTPATIENT
Start: 2019-02-07 | End: 2019-08-13 | Stop reason: ALTCHOICE

## 2019-02-07 RX ORDER — RANITIDINE 150 MG/1
150 TABLET, FILM COATED ORAL 2 TIMES DAILY
Qty: 180 TAB | Refills: 1 | Status: SHIPPED | OUTPATIENT
Start: 2019-02-07 | End: 2019-11-10 | Stop reason: SDUPTHER

## 2019-02-07 RX ORDER — DICLOFENAC SODIUM 10 MG/G
4 GEL TOPICAL AS NEEDED
Status: CANCELLED | OUTPATIENT
Start: 2019-02-07

## 2019-02-07 RX ORDER — PIOGLITAZONEHYDROCHLORIDE 30 MG/1
30 TABLET ORAL DAILY
Qty: 90 TAB | Refills: 1 | Status: SHIPPED | OUTPATIENT
Start: 2019-02-07 | End: 2019-12-06 | Stop reason: SDUPTHER

## 2019-02-07 RX ORDER — PROMETHAZINE HYDROCHLORIDE 25 MG/1
25 TABLET ORAL
Qty: 90 TAB | Refills: 1 | Status: SHIPPED | OUTPATIENT
Start: 2019-02-07 | End: 2019-03-07 | Stop reason: ALTCHOICE

## 2019-02-07 RX ORDER — MONTELUKAST SODIUM 10 MG/1
10 TABLET ORAL DAILY
Qty: 90 TAB | Refills: 1 | Status: SHIPPED | OUTPATIENT
Start: 2019-02-07 | End: 2020-03-10 | Stop reason: SDUPTHER

## 2019-02-07 RX ORDER — ATORVASTATIN CALCIUM 40 MG/1
40 TABLET, FILM COATED ORAL DAILY
Qty: 90 TAB | Refills: 1 | Status: SHIPPED | OUTPATIENT
Start: 2019-02-07 | End: 2020-03-10 | Stop reason: SDUPTHER

## 2019-02-07 RX ORDER — DICLOFENAC POTASSIUM 50 MG/1
50 TABLET, FILM COATED ORAL 3 TIMES DAILY
Qty: 90 TAB | Refills: 5 | Status: SHIPPED | OUTPATIENT
Start: 2019-02-07 | End: 2019-10-04

## 2019-02-07 NOTE — PROGRESS NOTES
[facility]    CC: Confusion    HPI:   Patient is a poor historian (Chronic issue)    Confusion:  - Has trouble finding her words (Does word substitution)  - Feels like she is in a tube  - Has a lot on her mind  - Reports two friends recently passed away  - Admits to depressed mood (Chronic issue)  - Reports fall (Difficulty clarifying cause).  Denies any head injury, but did hit back of knee  PMH significant for vertigo, DMII, HLD, and HTN      OA in Knees:  - Bilateral  - Taking diclofenac for pain  - Pain in right is worse than left  - Knee pain has been gradually getting worse  - Trying to lose weight  - Wishes to see a specialists      HTN:  - Does not check her BP at home  - Currently taking all medications as prescribed  - No side effects or other issues with medications      ROS: Positive items marked in RED  CON: fever, chills  Cardiovascular: palpitations, CP  Resp: SOB, cough  GI: nausea, vomiting, diarrhea  : dysuria, hematuria      Past Medical History:   Diagnosis Date    Arthritis     Depression     [facility] [address]     Hot flashes     Hypertension     Morbid obesity ([facility] [address]     Vertigo        Past Surgical History:   Procedure Laterality Date    EXCIS INFRATENT BRAIN TUMOR  2015    [facility]    HX CHOLECYSTECTOMY  2016    [address]    HX TUBAL LIGATION Bilateral     NEUROLOGICAL PROCEDURE UNLISTED         Family History   Problem Relation Age of Onset    Cancer Mother 58        colon    Hypertension Mother     Diabetes Mother     Psychiatric Disorder Father     Lupus Sister        Social History     Socioeconomic History    Marital status:      Spouse name: Not on file    Number of children: Not on file    Years of education: Not on file    Highest education level: Not on file   Tobacco Use    Smoking status: Former Smoker     Last attempt to quit:      Years since quittin.1    Smokeless tobacco: Never Used   Substance and Sexual Activity    Alcohol use: No    Drug use: Yes     Types: Cocaine, Marijuana, Heroin, Opiates       No Known Allergies      Current Outpatient Medications:     meclizine (ANTIVERT) 25 mg tablet, Take 1 Tab by mouth two (2) times a day., Disp: 60 Tab, Rfl: 1    cephALEXin (KEFLEX) 500 mg capsule, Take 500 mg by mouth once., Disp: , Rfl:     cyclobenzaprine (FLEXERIL) 10 mg tablet, Take 10 mg by mouth once., Disp: , Rfl:     diclofenac (VOLTAREN) 1 % gel, Apply 4 g to affected area as needed. , Disp: , Rfl:     diclofenac potassium (CATAFLAM) 50 mg tablet, Take 1 Tab by mouth three (3) times daily. , Disp: 90 Tab, Rfl: 5    metFORMIN (GLUCOPHAGE) 1,000 mg tablet, Take 1 Tab by mouth two (2) times daily (with meals). , Disp: 180 Tab, Rfl: 1    buPROPion SR (WELLBUTRIN SR) 150 mg SR tablet, Take 1 Tab by mouth two (2) times a day., Disp: 180 Tab, Rfl: 1    atorvastatin (LIPITOR) 40 mg tablet, Take 1 Tab by mouth daily. , Disp: 90 Tab, Rfl: 1    ibuprofen (MOTRIN) 800 mg tablet, Take 1 Tab by mouth every six (6) hours as needed for Pain., Disp: 180 Tab, Rfl: 1    montelukast (SINGULAIR) 10 mg tablet, Take 1 Tab by mouth daily. , Disp: 90 Tab, Rfl: 1    pioglitazone (ACTOS) 30 mg tablet, Take 1 Tab by mouth daily. , Disp: 90 Tab, Rfl: 1    raNITIdine (ZANTAC) 150 mg tablet, Take 1 Tab by mouth two (2) times a day., Disp: 180 Tab, Rfl: 1    propranolol LA (INDERAL LA) 60 mg SR capsule, Take 1 Cap by mouth daily. , Disp: 90 Cap, Rfl: 1    aspirin 81 mg chewable tablet, Take 1 Tab by mouth daily. , Disp: 60 Tab, Rfl: 1    Blood-Glucose Meter (ACCU-CHEK JUNI PLUS METER) misc, Use to check blood sugar twice a day, Disp: 1 Each, Rfl: 0    glucose blood VI test strips (ACCU-CHEK JUNI PLUS TEST STRP) strip, Use to check blood sugar twice a day, Disp: 100 Strip, Rfl: 1    lancets (ACCU-CHEK SOFTCLIX LANCETS) misc, Use to check blood sugar twice a day, Disp: 100 Each, Rfl: 11    azelastine (ASTELIN) 137 mcg (0.1 %) nasal spray, 1 Spray by Both Nostrils route two (2) times a day. Use in each nostril as directed, Disp: 1 Bottle, Rfl: 6    fluticasone (FLONASE) 50 mcg/actuation nasal spray, 2 Sprays by Both Nostrils route daily. , Disp: 1 Bottle, Rfl: 6    glucose blood VI test strips (ASCENSIA AUTODISC VI, ONE TOUCH ULTRA TEST VI) strip, Use to check blood sugar daily, Disp: 100 Strip, Rfl: 6    Lancets misc, Check glucose before meals and at bedtime, Disp: 1 Each, Rfl: 11    pen needle, diabetic (NOVOFINE PLUS) 32 gauge x 1/6\" ndle, Use daily to inject Victoza subcutaneously, Disp: 100 Pen Needle, Rfl: 12    Liraglutide (VICTOZA) 0.6 mg/0.1 mL (18 mg/3 mL) pnij, 1.2 mg by SubCUTAneous route daily. Initially 0.6 mg once daily for 1 week; then increase to 1.2 mg once daily. , Disp: 1 Adjustable Dose Pre-filled Pen Syringe, Rfl: 6    promethazine (PHENERGAN) 25 mg tablet, Take 25 mg by mouth every six (6) hours as needed for Nausea., Disp: , Rfl:     gabapentin (NEURONTIN) 300 mg capsule, Take 1 Cap by mouth three (3) times daily. , Disp: 90 Cap, Rfl: 1    Blood-Glucose Meter monitoring kit, As allowed by insurance, Disp: 1 Kit, Rfl: 0    fluconazole (DIFLUCAN) 200 mg tablet, Take 200 mg by mouth daily. FDA advises cautious prescribing of oral fluconazole in pregnancy. , Disp: , Rfl:     Physical Exam:      /85   Pulse 72   Temp 97.7 °F (36.5 °C) (Oral)   Resp 22   Ht 5' 5\" (1.651 m)   Wt 255 lb (115.7 kg)   SpO2 97%   BMI 42.43 kg/m²     General: obese habitus, NAD, conversant  Eyes: sclera clear bilaterally, no discharge noted, eyelids normal in appearance  HENT: NCAT  Lungs: CTAB, normal respiratory effort and rate  CV: RRR, no MRGs  ABD: soft, non-tender, non-distended, normal bowel sounds  Ext: Bilateral crepitus of knees noted.  Pain reported with passive ROM (Right notable worse than left)  Skin: normal temperature, turgor, color, and texture  Psych: alert and oriented to person, place and situation, normal affect  Neuro: speech normal, moving all extremities      Assessment/Plan     Word Finding Problem:  - Suspect it is symptom of depression due to deaths of friends  -DDX includes CVA (Patient with multiple risk factors of HTN, HLD, DMII, and Obesity)  -CT of head ordered  -F/U in one month      OA of Knees:  -Encouraged to continue working on weight loss  -Will continue diclofenac regimen  -Will refer to orthopedists  -F/U in one month      HTN:  -Currently not at goal BP of <130/80 (Previously has been at goal on current regimen  -BP likely exacerbated by pain and/or mood  -Will continue current BP medication regimen  - F/U in one month        Lakshmi Callahan MD  2/7/2019, 10:54 AM

## 2019-02-07 NOTE — PROGRESS NOTES
1. Have you been to the ER, urgent care clinic since your last visit? Hospitalized since your last visit? No    2. Have you seen or consulted any other health care providers outside of the [address] [place] [address] since your last visit? Include any pap smears or colon screening. No       Visit Vitals  /85   Pulse 72   Temp 97.7 °F (36.5 °C) (Oral)   Resp 22   Ht 5' 5\" (1.651 m)   Wt 255 lb (115.7 kg)   SpO2 97%   BMI 42.43 kg/m²       Chief Complaint   Patient presents with    Follow-up    Other     fall and vertigo, confussion when talks, medication refills.

## 2019-02-14 PROBLEM — M17.0 BILATERAL PRIMARY OSTEOARTHRITIS OF KNEE: Status: ACTIVE | Noted: 2019-02-14

## 2019-02-14 PROBLEM — R42 VERTIGO: Status: ACTIVE | Noted: 2019-02-14

## 2019-02-14 PROBLEM — J30.89 ENVIRONMENTAL AND SEASONAL ALLERGIES: Status: ACTIVE | Noted: 2019-02-14

## 2019-02-14 PROBLEM — E78.5 HYPERLIPIDEMIA: Status: ACTIVE | Noted: 2019-02-14

## 2019-02-14 PROBLEM — E11.42 DIABETIC POLYNEUROPATHY ASSOCIATED WITH TYPE 2 DIABETES MELLITUS (HCC): Status: ACTIVE | Noted: 2019-02-14

## 2019-02-19 ENCOUNTER — HOSPITAL ENCOUNTER (OUTPATIENT)
Dept: CT IMAGING | Age: 59
Discharge: HOME | End: 2019-02-19
Attending: FAMILY MEDICINE
Payer: MEDICARE
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
## 2019-02-19 PROCEDURE — 70450 CT HEAD/BRAIN W/O DYE: CPT

## 2019-03-05 ENCOUNTER — CLINICAL SUPPORT (OUTPATIENT)
Dept: FAMILY MEDICINE CLINIC | Age: 59
End: 2019-03-05

## 2019-03-05 DIAGNOSIS — Z11.1 PPD SCREENING TEST: Primary | ICD-10-CM

## 2019-03-05 NOTE — PROGRESS NOTES
Tuberculin skin test applied to Left ventral forearm. Explained how to read the test, measuring induration not just erythema; she will come into office if test appears positive. Patient instructed to come back for PPD reading on 3/7/2019 at 3:30pm. Patient verbalized understanding.      Manufactured by: [employer]  Lot #: [id number]  Expiration Date: 3/14/2021  [address] #: [id number]

## 2019-03-07 ENCOUNTER — CLINICAL SUPPORT (OUTPATIENT)
Dept: FAMILY MEDICINE CLINIC | Age: 59
End: 2019-03-07

## 2019-03-07 ENCOUNTER — OFFICE VISIT (OUTPATIENT)
Dept: FAMILY MEDICINE CLINIC | Age: 59
End: 2019-03-07

## 2019-03-07 VITALS
HEART RATE: 77 BPM | HEIGHT: 65 IN | RESPIRATION RATE: 16 BRPM | DIASTOLIC BLOOD PRESSURE: 87 MMHG | TEMPERATURE: 96.9 F | OXYGEN SATURATION: 99 % | BODY MASS INDEX: 41.82 KG/M2 | WEIGHT: 251 LBS | SYSTOLIC BLOOD PRESSURE: 136 MMHG

## 2019-03-07 DIAGNOSIS — F32.9 REACTIVE DEPRESSION: Primary | ICD-10-CM

## 2019-03-07 DIAGNOSIS — Z11.1 ENCOUNTER FOR PPD SKIN TEST READING: Primary | ICD-10-CM

## 2019-03-07 DIAGNOSIS — M17.0 PRIMARY OSTEOARTHRITIS OF BOTH KNEES: ICD-10-CM

## 2019-03-07 DIAGNOSIS — I10 ESSENTIAL HYPERTENSION: ICD-10-CM

## 2019-03-07 LAB
MM INDURATION POC: 0 MM (ref 0–5)
PPD POC: NEGATIVE NEGATIVE

## 2019-03-07 RX ORDER — LISINOPRIL 2.5 MG/1
2.5 TABLET ORAL DAILY
Qty: 30 TAB | Refills: 1 | Status: SHIPPED | OUTPATIENT
Start: 2019-03-07 | End: 2019-07-30 | Stop reason: SDUPTHER

## 2019-03-07 RX ORDER — ONDANSETRON 4 MG/1
4 TABLET, ORALLY DISINTEGRATING ORAL
Qty: 30 TAB | Refills: 1 | Status: SHIPPED | OUTPATIENT
Start: 2019-03-07 | End: 2019-03-07 | Stop reason: SDUPTHER

## 2019-03-07 RX ORDER — ONDANSETRON 4 MG/1
4 TABLET, ORALLY DISINTEGRATING ORAL
Qty: 30 TAB | Refills: 1 | Status: SHIPPED | OUTPATIENT
Start: 2019-03-07 | End: 2020-12-01

## 2019-03-07 NOTE — PROGRESS NOTES
Chief Complaint   Patient presents with    Follow Up Chronic Condition     1 month follow up on HTN, OA of knees and word finding problem     1. Have you been to the ER, urgent care clinic since your last visit? Hospitalized since your last visit? No.    2. Have you seen or consulted any other health care providers outside of the [address] [place] [address] since your last visit? Include any pap smears or colon screening. No.    Health Maintenance:  · Dilated eye exam not scheduled  · Colon cancer screening was 2 years ago. Patient stated that she is due for another screening.     Visit Vitals  /86   Pulse 77   Temp 96.9 °F (36.1 °C) (Oral)   Resp 16   Ht 5' 5\" (1.651 m)   Wt 251 lb (113.9 kg)   SpO2 99%   BMI 41.77 kg/m²     Visit Vitals  /87   Pulse 77   Temp 96.9 °F (36.1 °C) (Oral)   Resp 16   Ht 5' 5\" (1.651 m)   Wt 251 lb (113.9 kg)   SpO2 99%   BMI 41.77 kg/m²

## 2019-03-07 NOTE — PATIENT INSTRUCTIONS
Recovering From Depression: Care Instructions  Your Care Instructions    Taking good care of yourself is important as you recover from depression. In time, your symptoms will fade as your treatment takes hold. Do not give up. Instead, focus your energy on getting better. Your mood will improve. It just takes some time. Focus on things that can help you feel better, such as being with friends and family, eating well, and getting enough rest. But take things slowly. Do not do too much too soon. You will begin to feel better gradually. Follow-up care is a key part of your treatment and safety. Be sure to make and go to all appointments, and call your doctor if you are having problems. It's also a good idea to know your test results and keep a list of the medicines you take. How can you care for yourself at home? Be realistic  · If you have a large task to do, break it up into smaller steps you can handle, and just do what you can. · You may want to put off important decisions until your depression has lifted. If you have plans that will have a major impact on your life, such as marriage, divorce, or a job change, try to wait a bit. Talk it over with friends and loved ones who can help you look at the overall picture first.  · Reaching out to people for help is important. Do not isolate yourself. Let your family and friends help you. Find someone you can trust and confide in, and talk to that person. · Be patient, and be kind to yourself. Remember that depression is not your fault and is not something you can overcome with willpower alone. Treatment is necessary for depression, just like for any other illness. Feeling better takes time, and your mood will improve little by little. Stay active  · Stay busy and get outside. Take a walk, or try some other light exercise. · Talk with your doctor about an exercise program. Exercise can help with mild depression. · Go to a movie or concert.  Take part in a [employer] activity or other social gathering. Go to a [employer] game. · Ask a friend to have dinner with you. Take care of yourself  · Eat a balanced diet with plenty of fresh fruits and vegetables, whole grains, and lean protein. If you have lost your appetite, eat small snacks rather than large meals. · Avoid drinking alcohol or using illegal drugs. Do not take medicines that have not been prescribed for you. They may interfere with medicines you may be taking for depression, or they may make your depression worse. · Take your medicines exactly as they are prescribed. You may start to feel better within 1 to 3 weeks of taking antidepressant medicine. But it can take as many as 6 to 8 weeks to see more improvement. If you have questions or concerns about your medicines, or if you do not notice any improvement by 3 weeks, talk to your doctor. · If you have any side effects from your medicine, tell your doctor. Antidepressants can make you feel tired, dizzy, or nervous. Some people have dry mouth, constipation, headaches, sexual problems, or diarrhea. Many of these side effects are mild and will go away on their own after you have been taking the medicine for a few weeks. Some may last longer. Talk to your doctor if side effects are bothering you too much. You might be able to try a different medicine. · Get enough sleep. If you have problems sleeping:  ? Go to bed at the same time every night, and get up at the same time every morning. ? Keep your bedroom dark and quiet. ? Do not exercise after 5:00 p.m.  ? Avoid drinks with caffeine after 5:00 p.m. · Avoid sleeping pills unless they are prescribed by the doctor treating your depression. Sleeping pills may make you groggy during the day, and they may interact with other medicine you are taking. · If you have any other illnesses, such as diabetes, heart disease, or high blood pressure, make sure to continue with your treatment.  Tell your doctor about all of the medicines you take, including those with or without a prescription. · Keep the numbers for these national suicide hotlines: 8-524-230-TALK (7-980.682.4950) and 4-954-FEAONVC (8-258.324.3009). If you or someone you know talks about suicide or feeling hopeless, get help right away. When should you call for help? Call 911 anytime you think you may need emergency care. For example, call if:    · You feel like hurting yourself or someone else.     · Someone you know has depression and is about to attempt or is attempting suicide.   [facility] your doctor now or seek immediate medical care if:    · You hear voices.     · Someone you know has depression and:  ? Starts to give away his or her possessions. ? Uses illegal drugs or drinks alcohol heavily. ? Talks or writes about death, including writing suicide notes or talking about guns, knives, or pills. ? Starts to spend a lot of time alone. ? Acts very aggressively or suddenly appears calm.    Watch closely for changes in your health, and be sure to contact your doctor if:    · You do not get better as expected. Where can you learn more? Go to http://kai-janet.info/. Enter W879 in the search box to learn more about \"Recovering From Depression: Care Instructions. \"  Current as of: September 11, 2018  Content Version: 11.9  © 1327-7835 [employer]. Care instructions adapted under license by [employer] (which disclaims liability or warranty for this information). If you have questions about a medical condition or this instruction, always ask your healthcare professional. [place] [address] any warranty or liability for your use of this information. Grief (Actual/Anticipated): Care Instructions  Your Care Instructions    Grief is your emotional reaction to a major loss. The words \"sorrow\" and \"heartache\" often are used to describe feelings of grief.  You feel grief when you lose a beloved person, pet, place, or thing. It is also natural to feel grief when you lose a valued way of life, such as a job, marriage, or good health. You may begin to grieve before a loss occurs. You may grieve for a loved one who is sick and dying. Children and adults often feel the pain of loss before a big move or divorce. This type of grief helps you get ready for a loss. Grief is different for each person. There is no \"normal\" or \"expected\" period of time for grieving. Some people adjust to their loss within a couple of months. Others may take 2 years or longer, especially if their lives were changed a lot or if the loss was sudden and shocking. Grieving can cause problems such as headaches, loss of appetite, and trouble with thinking or sleeping. You may withdraw from friends and family and behave in ways that are unusual for you. Grief may cause you to question your beliefs and views about life. Grief is natural and does not require medical treatment. But if you have trouble sleeping, it may help to take sleeping pills for a short time. It may help to talk with people who have been through or are going through similar losses. You may also want to talk to a counselor about your feelings. Talking about your loss, sharing your cares and concerns, and getting support from others are important parts of healthy grieving. Follow-up care is a key part of your treatment and safety. Be sure to make and go to all appointments, and call your doctor if you are having problems. It's also a good idea to know your test results and keep a list of the medicines you take. How can you care for yourself at home? · Get enough sleep. Your mind helps make sense of your life while you sleep. Missing sleep can lead to illness and make it harder for you to deal with your grief. · Eat healthy foods. Try to avoid eating only foods that give you comfort. Ask someone to join you for a meal if you do not like eating alone.  Consider taking a multivitamin every day.  · Get some exercise every day. Even a walk can help you deal with your grief. Other exercises, such as yoga, can also help you manage stress. · Comfort yourself. Take time to look at photos or use special items that make you feel better. · Stay involved in your life. Do not withdraw from the activities you enjoy. People you know at work, [employer], clubs, or other groups can help you get through your period of grief. · Think about joining a support group to help you deal with your grief. There are many support groups to help people recover from grief. When should you call for help? Call 911 anytime you think you may need emergency care. For example, call if:    · You feel you cannot stop from hurting yourself or someone else.    Watch closely for changes in your health, and be sure to contact your doctor if:    · You think you may be depressed.     · You do not get better as expected. Where can you learn more? Go to http://kai-janet.info/. Enter H249 in the search box to learn more about \"Grief (Actual/Anticipated): Care Instructions. \"  Current as of: April 18, 2018  Content Version: 11.9  © 2992-0020 [employer]. Care instructions adapted under license by [employer] (which disclaims liability or warranty for this information). If you have questions about a medical condition or this instruction, always ask your healthcare professional. [place] [address] any warranty or liability for your use of this information.

## 2019-03-07 NOTE — PROGRESS NOTES
[facility]    CC: Follow-up for word finding problem, OA, and HTN    HPI:     Word Finding Problem:  -Got requested Head CT  -States it is unchanged  -Admits to still being depressed  -Currently does not want to start medication for mood and would like to see if it improves with time        OA of Knees:  -Has appointment with orthopedist on 3/14/2019  -Taking diclofenac as prescribed  -Denies any issues or side effects of the medication  -States her pain has been well controlled with the medication      Hypertension:  -Taking blood pressure medication as prescribed  -Denies any side effects or issues the medication  -Has not been exercising  -Does not check BP at home      ROS: Positive items marked in RED  CON: fever, chills  Cardiovascular: palpitations, CP  Resp: SOB, cough  GI: nausea, vomiting, diarrhea  : dysuria, hematuria      Past Medical History:   Diagnosis Date    Arthritis     Depression     Diabetes [facility]     Hot flashes     Hypertension     Morbid obesity ([facility]     Vertigo        Past Surgical History:   Procedure Laterality Date    EXCIS INFRATENT BRAIN TUMOR  2015    [facility]    [facility]    HX TUBAL LIGATION Bilateral     NEUROLOGICAL PROCEDURE UNLISTED         Family History   Problem Relation Age of Onset    Cancer Mother 58        colon    Hypertension Mother     Diabetes Mother     Psychiatric Disorder Father     Lupus Sister        Social History     Socioeconomic History    Marital status:      Spouse name: Not on file    Number of children: Not on file    Years of education: Not on file    Highest education level: Not on file   Tobacco Use    Smoking status: Former Smoker     Last attempt to quit:      Years since quittin.1    Smokeless tobacco: Never Used   Substance and Sexual Activity    Alcohol use: No    Drug use: Yes     Types: Cocaine, Marijuana, Heroin, Opiates       No Known Allergies      Current Outpatient Medications:     raNITIdine (ZANTAC) 150 mg tablet, Take 1 Tab by mouth two (2) times a day., Disp: 180 Tab, Rfl: 1    propranolol LA (INDERAL LA) 60 mg SR capsule, Take 1 Cap by mouth daily. , Disp: 90 Cap, Rfl: 1    promethazine (PHENERGAN) 25 mg tablet, Take 1 Tab by mouth every six (6) hours as needed for Nausea., Disp: 90 Tab, Rfl: 1    pioglitazone (ACTOS) 30 mg tablet, Take 1 Tab by mouth daily. , Disp: 90 Tab, Rfl: 1    pen needle, diabetic (NOVOFINE PLUS) 32 gauge x 1/6\" ndle, Use daily to inject Victoza subcutaneously, Disp: 100 Pen Needle, Rfl: 12    montelukast (SINGULAIR) 10 mg tablet, Take 1 Tab by mouth daily. , Disp: 90 Tab, Rfl: 1    metFORMIN (GLUCOPHAGE) 1,000 mg tablet, Take 1 Tab by mouth two (2) times daily (with meals). , Disp: 180 Tab, Rfl: 1    meclizine (ANTIVERT) 25 mg tablet, Take 1 Tab by mouth two (2) times a day., Disp: 60 Tab, Rfl: 1    liraglutide (VICTOZA) 0.6 mg/0.1 mL (18 mg/3 mL) pnij, 1.2 mg by SubCUTAneous route daily. , Disp: 1 Adjustable Dose Pre-filled Pen Syringe, Rfl: 6    lancets misc, Check glucose before meals and at bedtime, Disp: 1 Each, Rfl: 11    glucose blood VI test strips (ASCENSIA AUTODISC VI, ONE TOUCH ULTRA TEST VI) strip, Use to check blood sugar daily, Disp: 100 Strip, Rfl: 6    glucose blood VI test strips (ACCU-CHEK JUNI PLUS TEST STRP) strip, Use to check blood sugar twice a day, Disp: 100 Strip, Rfl: 1    gabapentin (NEURONTIN) 300 mg capsule, Take 1 Cap by mouth three (3) times daily. , Disp: 90 Cap, Rfl: 1    diclofenac potassium (CATAFLAM) 50 mg tablet, Take 1 Tab by mouth three (3) times daily. , Disp: 90 Tab, Rfl: 5    atorvastatin (LIPITOR) 40 mg tablet, Take 1 Tab by mouth daily. , Disp: 90 Tab, Rfl: 1    cyclobenzaprine (FLEXERIL) 10 mg tablet, Take 1 Tab by mouth three (3) times daily (with meals). , Disp: 90 Tab, Rfl: 1    diclofenac (VOLTAREN) 1 % gel, Apply 4 g to affected area as needed. , Disp: , Rfl:     ibuprofen (MOTRIN) 800 mg tablet, Take 1 Tab by mouth every six (6) hours as needed for Pain., Disp: 180 Tab, Rfl: 1    aspirin 81 mg chewable tablet, Take 1 Tab by mouth daily. , Disp: 60 Tab, Rfl: 1    Blood-Glucose Meter (ACCU-CHEK JUNI PLUS METER) [facility], Use to check blood sugar twice a day, Disp: 1 Each, Rfl: 0    lancets (ACCU-CHEK SOFTCLIX LANCETS) misc, Use to check blood sugar twice a day, Disp: 100 Each, Rfl: 11    azelastine (ASTELIN) 137 mcg (0.1 %) nasal spray, 1 [place] by Both Nostrils route two (2) times a day. Use in each nostril as directed, Disp: 1 Bottle, Rfl: 6    fluticasone (FLONASE) 50 mcg/actuation nasal spray, 2 Sprays by Both Nostrils route daily. , Disp: 1 Bottle, Rfl: 6    Blood-Glucose Meter monitoring kit, As allowed by insurance, Disp: 1 Kit, Rfl: 0    Physical Exam:      /86   Pulse 77   Temp 96.9 °F (36.1 °C) (Oral)   Resp 16   Ht 5' 5\" (1.651 m)   Wt 251 lb (113.9 kg)   SpO2 99%   BMI 41.77 kg/m²     General: Obese habitus, NAD, conversant  Eyes: sclera clear bilaterally, no discharge noted, eyelids normal in appearance  HENT: NCAT  Lungs: CTAB, normal respiratory effort and rate  CV: RRR, no MRGs  ABD: soft, non-tender, non-distended, normal bowel sounds  Skin: normal temperature, turgor, color, and texture  Psych: alert and oriented to person, place and situation, normal affect  Neuro: speech normal, moving all extremities, gait normal      CT HEAD WO CONTRAST (2/19/2019): IMPRESSION:   1.No acute intracranial abnormalities. No hemorrhage, mass effect or CT evident  acute cortical infarction. 2. Left-sided craniotomy. Likely chronic surgical changes anterior left temporal  lobe. Comparison to any previous examination is recommended if available. 3. No other significant intracranial abnormality is appreciated.       Assessment/Plan     Reactive Depression, Unchanged/Stable:  -Secondary to recent deaths  -Likely cause of concentration/memory/word finding issues  -Patient will try to control mood with lifestyle changes and will discuss whether she feels need to start medication at next appointment  -Handout given on depression care and grief care  -Follow-up in 1 month for chronic disease management      Hypertension, inadequately controlled:  -Currently not at goal BP of <130/80 (Previously has been at goal on current regimen_  -Unclear why BP has worsened  -Will start on low-dose of lisinopril  -Patient advised to start logging her home blood pressure and to bring log to next appointment  - F/U in one month for chronic disease management      Osteoarthritis in knees:  -Will continue current diclofenac regimen  -Will defer further evaluation/management to sports medicine  -Follow-up in 1 month for chronic disease management        Mariela Hough MD  3/7/2019, 10:00 AM

## 2019-03-27 DIAGNOSIS — E11.42 DIABETIC POLYNEUROPATHY ASSOCIATED WITH TYPE 2 DIABETES MELLITUS (HCC): ICD-10-CM

## 2019-03-27 DIAGNOSIS — I10 ESSENTIAL HYPERTENSION: ICD-10-CM

## 2019-03-27 DIAGNOSIS — M62.838 MUSCLE SPASM: ICD-10-CM

## 2019-03-27 DIAGNOSIS — R42 VERTIGO: ICD-10-CM

## 2019-03-27 RX ORDER — MECLIZINE HYDROCHLORIDE 25 MG/1
25 TABLET ORAL 2 TIMES DAILY
Qty: 60 TAB | Refills: 0 | Status: SHIPPED | OUTPATIENT
Start: 2019-03-27 | End: 2020-03-10 | Stop reason: SDUPTHER

## 2019-03-27 RX ORDER — GABAPENTIN 300 MG/1
300 CAPSULE ORAL 3 TIMES DAILY
Qty: 90 CAP | Refills: 0 | Status: SHIPPED | OUTPATIENT
Start: 2019-03-27 | End: 2020-04-09

## 2019-03-27 RX ORDER — CYCLOBENZAPRINE HCL 10 MG
10 TABLET ORAL
Qty: 90 TAB | Refills: 0 | Status: SHIPPED | OUTPATIENT
Start: 2019-03-27 | End: 2020-03-10 | Stop reason: ALTCHOICE

## 2019-03-27 RX ORDER — GUAIFENESIN 100 MG/5ML
81 LIQUID (ML) ORAL DAILY
Qty: 60 TAB | Refills: 0 | Status: SHIPPED | OUTPATIENT
Start: 2019-03-27 | End: 2020-03-10 | Stop reason: SDUPTHER

## 2019-07-23 DIAGNOSIS — I10 ESSENTIAL HYPERTENSION: ICD-10-CM

## 2019-07-25 ENCOUNTER — OFFICE VISIT (OUTPATIENT)
Dept: FAMILY MEDICINE CLINIC | Age: 59
End: 2019-07-25

## 2019-07-25 VITALS
TEMPERATURE: 96.7 F | OXYGEN SATURATION: 95 % | SYSTOLIC BLOOD PRESSURE: 135 MMHG | HEART RATE: 104 BPM | HEIGHT: 65 IN | RESPIRATION RATE: 16 BRPM | WEIGHT: 245 LBS | BODY MASS INDEX: 40.82 KG/M2 | DIASTOLIC BLOOD PRESSURE: 73 MMHG

## 2019-07-25 DIAGNOSIS — R11.0 NAUSEA: ICD-10-CM

## 2019-07-25 NOTE — PROGRESS NOTES
Basia Sweeney is a 66 y.o. female and presents with Nausea (upset stomach. Gets Full fast. ) and Abdominal Pain       Subjective:    Has nausea- chronic for months. Come and goes. No n/v. No f/c. Takes nausea medication (zofran) which does not help much. Unable to relate to time of day or food or other factors. Concerned about medications affecting her stomach lining. No fevers or chills. No melena. No diarrhea. No hematochezia. Diabetes- uncontrolled- last a1c 10.5% 10/2018. bs last checked over a week ago- 190. No polyuria or polydipsia. Assessment/Plan:      Diagnoses and all orders for this visit:    1. Diabetes mellitus type 2, uncontrolled, without complications (HCC)-uncontrolled-importance of regular follow-ups and A1c checks every 3 to 6 months stressed with patient and she indicates understanding. We reviewed potential complications of blindness heart attack limb loss that kidney failure nerve damage from diabetes and significantly increased chance with uncontrolled blood sugars. Importance of regular sugar checks stressed and patient asked to make a log of her blood sugar daily and write them down to bring to her next visit. -     METABOLIC PANEL, COMPREHENSIVE; Future  -     LIPID PANEL; Future  -     HEMOGLOBIN A1C WITH EAG; Future  -     MICROALBUMIN, UR, RAND W/ MICROALB/CREAT RATIO; Future    2. Nausea discussed in detail with patient- multiple potential causes including uncontrolled diabetes and Victoza. We discussed considering switching the Victoza to insulin but as she has not had a hemoglobin A1c done in 9 months or so we need labs prior to proceeding  -     CBC W/O DIFF;  Future         Orders Placed This Encounter    METABOLIC PANEL, COMPREHENSIVE     Standing Status:   Future     Standing Expiration Date:   7/25/2020    LIPID PANEL     Standing Status:   Future     Standing Expiration Date:   7/25/2020    HEMOGLOBIN A1C WITH EAG     Standing Status:   Future     Standing Expiration Date:   2020    CBC W/O DIFF     Standing Status:   Future     Standing Expiration Date:   2020    MICROALBUMIN, UR, RAND W/ MICROALB/CREAT RATIO     Standing Status:   Future     Standing Expiration Date:   2020               ROS:  Negative except as mentioned above  Cardiac-  Pulmonary-  GI-    SH:  Social History     Tobacco Use    Smoking status: Former Smoker     Last attempt to quit:      Years since quittin.5    Smokeless tobacco: Never Used   Substance Use Topics    Alcohol use: No    Drug use: Yes     Types: Cocaine, Marijuana, Heroin, Opiates         Medications/Allergies:  Current Outpatient Medications on File Prior to Visit   Medication Sig Dispense Refill    aspirin 81 mg chewable tablet Take 1 Tab by mouth daily. 60 Tab 0    cyclobenzaprine (FLEXERIL) 10 mg tablet Take 1 Tab by mouth three (3) times daily (with meals). 90 Tab 0    meclizine (ANTIVERT) 25 mg tablet Take 1 Tab by mouth two (2) times a day. 60 Tab 0    gabapentin (NEURONTIN) 300 mg capsule Take 1 Cap by mouth three (3) times daily. 90 Cap 0    lisinopril (PRINIVIL, ZESTRIL) 2.5 mg tablet Take 1 Tab by mouth daily. 30 Tab 1    ondansetron (ZOFRAN ODT) 4 mg disintegrating tablet Take 1 Tab by mouth every eight (8) hours as needed for Nausea. 30 Tab 1    raNITIdine (ZANTAC) 150 mg tablet Take 1 Tab by mouth two (2) times a day. 180 Tab 1    propranolol LA (INDERAL LA) 60 mg SR capsule Take 1 Cap by mouth daily. 90 Cap 1    pioglitazone (ACTOS) 30 mg tablet Take 1 Tab by mouth daily. 90 Tab 1    pen needle, diabetic (NOVOFINE PLUS) 32 gauge x 1/6\" ndle Use daily to inject Victoza subcutaneously 100 Pen Needle 12    montelukast (SINGULAIR) 10 mg tablet Take 1 Tab by mouth daily. 90 Tab 1    metFORMIN (GLUCOPHAGE) 1,000 mg tablet Take 1 Tab by mouth two (2) times daily (with meals). 180 Tab 1    liraglutide (VICTOZA) 0.6 mg/0.1 mL (18 mg/3 mL) pnij 1.2 mg by SubCUTAneous route daily.  1 Adjustable Dose Pre-filled Pen Syringe 6    lancets misc Check glucose before meals and at bedtime 1 Each 11    glucose blood VI test strips (ASCENSIA AUTODISC VI, ONE TOUCH ULTRA TEST VI) strip Use to check blood sugar daily 100 Strip 6    glucose blood VI test strips (ACCU-CHEK JUNI PLUS TEST STRP) strip Use to check blood sugar twice a day 100 Strip 1    diclofenac potassium (CATAFLAM) 50 mg tablet Take 1 Tab by mouth three (3) times daily. 90 Tab 5    atorvastatin (LIPITOR) 40 mg tablet Take 1 Tab by mouth daily. 90 Tab 1    diclofenac (VOLTAREN) 1 % gel Apply 4 g to affected area as needed.  ibuprofen (MOTRIN) 800 mg tablet Take 1 Tab by mouth every six (6) hours as needed for Pain. 180 Tab 1    Blood-Glucose Meter (ACCU-CHEK JUNI PLUS METER) misc Use to check blood sugar twice a day 1 Each 0    lancets (ACCU-CHEK SOFTCLIX LANCETS) misc Use to check blood sugar twice a day 100 Each 11    azelastine (ASTELIN) 137 mcg (0.1 %) nasal spray 1 [place] by Both Nostrils route two (2) times a day. Use in each nostril as directed 1 Bottle 6    fluticasone (FLONASE) 50 mcg/actuation nasal spray 2 Sprays by Both Nostrils route daily. 1 Bottle 6    Blood-Glucose Meter monitoring kit As allowed by insurance 1 Kit 0     No current facility-administered medications on file prior to visit. No Known Allergies    Objective:  Visit Vitals  /73   Pulse (!) 104   Temp 96.7 °F (35.9 °C) (Oral)   Resp 16   Ht 5' 5\" (1.651 m)   Wt 245 lb (111.1 kg)   SpO2 95%   BMI 40.77 kg/m²    Body mass index is 40.77 kg/m². Constitutional: Well developed, nourished, no distress, alert   HENT: Exterior ears and tympanic membranes normal bilaterally. Supple neck. No thyromegaly or lymphadenopathy. Oropharynx clear and moist mucous membranes. Eyes: Conjunctiva normal. PERRL. CV: S1, S2.  RRR. No murmurs/rubs. No thrills palpated. No carotid bruits. Intact distal pulses. No edema.    Pulm: No abnormalities on inspection. Clear to auscultation bilaterally. No wheezing/rhonchi. Normal effort. GI: Soft, nontender, nondistended. Normal active bowel sounds. No  masses on palpation. No hepatosplenomegaly.

## 2019-07-25 NOTE — PROGRESS NOTES
1. Have you been to the ER, urgent care clinic since your last visit? Hospitalized since your last visit? No.     2. Have you seen or consulted any other health care providers outside of the [address] [place] [address] since your last visit? Include any pap smears or colon screening. Yes, saw her Ophthalmologist on 7/17/2019. Chief Complaint   Patient presents with    Nausea     upset stomach.  Gets Full fast.     Abdominal Pain

## 2019-07-25 NOTE — PATIENT INSTRUCTIONS
Type 2 Diabetes: Care Instructions  Your Care Instructions    Type 2 diabetes is a disease that develops when the body's tissues cannot use insulin properly. Over time, the pancreas cannot make enough insulin. Insulin is a hormone that helps the body's cells use sugar (glucose) for energy. It also helps the body store extra sugar in muscle, fat, and liver cells. Without insulin, the sugar cannot get into the cells to do its work. It stays in the blood instead. This can cause high blood sugar levels. A person has diabetes when the blood sugar stays too high too much of the time. Over time, diabetes can lead to diseases of the heart, blood vessels, nerves, kidneys, and eyes. You may be able to control your blood sugar by losing weight, eating a healthy diet, and getting daily exercise. You may also have to take insulin or other diabetes medicine. Follow-up care is a key part of your treatment and safety. Be sure to make and go to all appointments. Call your doctor if you are having problems. It's also a good idea to know your test results and keep a list of the medicines you take. How can you care for yourself at home? · Keep your blood sugar at a target level (which you set with your doctor). ? Eat a good diet that spreads carbohydrate throughout the day. Carbohydrate--the body's main source of fuel--affects blood sugar more than any other nutrient. Carbohydrate is in fruits, vegetables, milk, and yogurt. It also is in breads, cereals, vegetables such as potatoes and corn, and sugary foods such as candy and cakes. ? Aim for 30 minutes of exercise on most, preferably all, days of the week. Walking is a good choice. You also may want to do other activities, such as running, swimming, cycling, or playing tennis or team sports. If your doctor says it's okay, do muscle-strengthening exercises at least 2 times a week. ? Take your medicines exactly as prescribed.  Call your doctor if you think you are having a problem with your medicine. You will get more details on the specific medicines your doctor prescribes. · Check your blood sugar as often as your doctor recommends. It is important to keep track of any symptoms you have, such as low blood sugar. Also tell your doctor if you have any changes in your activities, diet, or insulin use. · Talk to your doctor before you start taking aspirin every day. Aspirin can help certain people lower their risk of a heart attack or stroke. But taking aspirin isn't right for everyone, because it can cause serious bleeding. · Do not smoke. If you need help quitting, talk to your doctor about stop-smoking programs and medicines. These can increase your chances of quitting for good. · Keep your cholesterol and blood pressure at normal levels. You may need to take one or more medicines to reach your goals. Take them exactly as directed. Do not stop or change a medicine without talking to your doctor first.  When should you call for help? Call 911 anytime you think you may need emergency care. For example, call if:    · You passed out (lost consciousness), or you suddenly become very sleepy or confused. (You may have very low blood sugar.)    Call your doctor now or seek immediate medical care if:    · Your blood sugar is 300 mg/dL or is higher than the level your doctor has set for you.     · You have symptoms of low blood sugar, such as:  ? Sweating. ? Feeling nervous, shaky, and weak. ? Extreme hunger and slight nausea. ? Dizziness and headache.  ? Blurred vision. ? Confusion.    Watch closely for changes in your health, and be sure to contact your doctor if:    · You often have problems controlling your blood sugar.     · You have symptoms of long-term diabetes problems, such as:  ? New vision changes. ? New pain, numbness, or tingling in your hands or feet. ? Skin problems. Where can you learn more? Go to http://kai-janet.info/.   Enter C553 in the search box to learn more about \"Type 2 Diabetes: Care Instructions. \"  Current as of: July 25, 2018  Content Version: 12.1  © 8663-0646 [employer]. Care instructions adapted under license by [employer] (which disclaims liability or warranty for this information). If you have questions about a medical condition or this instruction, always ask your healthcare professional. [address] any warranty or liability for your use of this information. Learning About Type 2 Diabetes  What is type 2 diabetes? Insulin is a hormone that helps your body use sugar from your food as energy. Type 2 diabetes happens when your body can't use insulin the right way. Over time, the pancreas can't make enough insulin. If you don't have enough insulin, too much sugar stays in your blood. If you are overweight, get little or no exercise, or have type 2 diabetes in your family, you are more likely to have problems with the way insulin works in your body.  Americans, Hispanics, Native Americans,  Americans, and Pacific Islanders have a higher risk for type 2 diabetes. Type 2 diabetes can be prevented or delayed with a healthy lifestyle, which includes staying at a healthy weight, making smart food choices, and getting regular exercise. What can you expect with type 2 diabetes? Beth Civil keep hearing about how important it is to keep your blood sugar within a target range. That's because over time, high blood sugar can lead to serious problems. It can:  · Harm your eyes, nerves, and kidneys. · Damage your blood vessels, leading to heart disease and stroke. · Reduce blood flow and cause nerve damage to parts of your body, especially your feet. This can cause slow healing and pain when you walk. · Make your immune system weak and less able to fight infections. When people hear the word \"diabetes,\" they often think of problems like these.  But daily care and treatment can help prevent or delay these problems. The goal is to keep your blood sugar in a target range. That's the best way to reduce your chance of having more problems from diabetes. What are the symptoms? Some people who have type 2 diabetes may not have any symptoms early on. Many people with the disease don't even know they have it at first. But with time, diabetes starts to cause symptoms. You experience most symptoms of type 2 diabetes when your blood sugar is either too high or too low. The most common symptoms of high blood sugar include:  · Thirst.  · Frequent urination. · Weight loss. · Blurry vision. The symptoms of low blood sugar include:  · Sweating. · Shakiness. · Weakness. · Hunger. · Confusion. How can you prevent type 2 diabetes? The best way to prevent or delay type 2 diabetes is to adopt healthy habits, which include:  · Staying at a healthy weight. · Exercising regularly. · Eating healthy foods. How is type 2 diabetes treated? If you have type 2 diabetes, here are the most important things you can do. · Take your diabetes medicines. · Check your blood sugar as often as your doctor recommends. Also, get a hemoglobin A1c test at least every 6 months. · Try to eat a variety of foods and to spread carbohydrate throughout the day. Carbohydrate raises blood sugar higher and more quickly than any other nutrient does. Carbohydrate is found in sugar, breads and cereals, fruit, starchy vegetables such as potatoes and corn, and milk and yogurt. · Get at least 30 minutes of exercise on most days of the week. Walking is a good choice. You also may want to do other activities, such as running, swimming, cycling, or playing tennis or team sports. If your doctor says it's okay, do muscle-strengthening exercises at least 2 times a week. · See your doctor for checkups and tests on a regular schedule.   · If you have high blood pressure or high cholesterol, take the medicines as prescribed by your doctor. · Do not smoke. Smoking can make health problems worse. This includes problems you might have with type 2 diabetes. If you need help quitting, talk to your doctor about stop-smoking programs and medicines. These can increase your chances of quitting for good. Follow-up care is a key part of your treatment and safety. Be sure to make and go to all appointments, and call your doctor if you are having problems. It's also a good idea to know your test results and keep a list of the medicines you take. Where can you learn more? Go to http://kai-janet.info/. Enter P384 in the search box to learn more about \"Learning About Type 2 Diabetes. \"  Current as of: July 25, 2018  Content Version: 12.1  © 9139-0038 [employer]. Care instructions adapted under license by [employer] (which disclaims liability or warranty for this information). If you have questions about a medical condition or this instruction, always ask your healthcare professional. [place] [address] any warranty or liability for your use of this information.

## 2019-07-26 ENCOUNTER — HOSPITAL ENCOUNTER (OUTPATIENT)
Dept: LAB | Age: 59
Discharge: HOME | End: 2019-07-26
Payer: MEDICARE

## 2019-07-26 DIAGNOSIS — R11.0 NAUSEA: ICD-10-CM

## 2019-07-26 LAB
ALBUMIN SERPL-MCNC: 3.6 G/DL (ref 3.4–5)
ALBUMIN/GLOB SERPL: 1.3 {RATIO} (ref 0.8–1.7)
ALP SERPL-CCNC: 131 U/L (ref 45–117)
ALT SERPL-CCNC: 25 U/L (ref 13–56)
ANION GAP SERPL CALC-SCNC: 5 MMOL/L (ref 3–18)
AST SERPL-CCNC: 13 U/L (ref 10–38)
BILIRUB SERPL-MCNC: 0.6 MG/DL (ref 0.2–1)
BUN SERPL-MCNC: 12 MG/DL (ref 7–18)
BUN/CREAT SERPL: 16 (ref 12–20)
CALCIUM SERPL-MCNC: 9 MG/DL (ref 8.5–10.1)
CHLORIDE SERPL-SCNC: 105 MMOL/L (ref 100–111)
CHOLEST SERPL-MCNC: 154 MG/DL
CO2 SERPL-SCNC: 28 MMOL/L (ref 21–32)
CREAT SERPL-MCNC: 0.73 MG/DL (ref 0.6–1.3)
ERYTHROCYTE [DISTWIDTH] IN BLOOD BY AUTOMATED COUNT: 12.2 % (ref 11.6–14.5)
EST. AVERAGE GLUCOSE BLD GHB EST-MCNC: 229 MG/DL
GLOBULIN SER CALC-MCNC: 2.8 G/DL (ref 2–4)
GLUCOSE SERPL-MCNC: 195 MG/DL (ref 74–99)
HBA1C MFR BLD: 9.6 % (ref 4.2–5.6)
HCT VFR BLD AUTO: 38.7 % (ref 35–45)
HDLC SERPL-MCNC: 52 MG/DL (ref 40–60)
HDLC SERPL: 3 {RATIO} (ref 0–5)
HGB BLD-MCNC: 11.8 G/DL (ref 12–16)
LDLC SERPL CALC-MCNC: 83.8 MG/DL (ref 0–100)
LIPID PROFILE,FLP: NORMAL
MCH RBC QN AUTO: 26.3 PG (ref 24–34)
MCHC RBC AUTO-ENTMCNC: 30.5 G/DL (ref 31–37)
MCV RBC AUTO: 86.2 FL (ref 74–97)
PLATELET # BLD AUTO: 373 K/UL (ref 135–420)
PMV BLD AUTO: 9.7 FL (ref 9.2–11.8)
POTASSIUM SERPL-SCNC: 4.2 MMOL/L (ref 3.5–5.5)
PROT SERPL-MCNC: 6.4 G/DL (ref 6.4–8.2)
RBC # BLD AUTO: 4.49 M/UL (ref 4.2–5.3)
SODIUM SERPL-SCNC: 138 MMOL/L (ref 136–145)
TRIGL SERPL-MCNC: 91 MG/DL (ref ?–150)
VLDLC SERPL CALC-MCNC: 18.2 MG/DL
WBC # BLD AUTO: 7.1 K/UL (ref 4.6–13.2)

## 2019-07-26 PROCEDURE — 85027 COMPLETE CBC AUTOMATED: CPT

## 2019-07-26 PROCEDURE — 80053 COMPREHEN METABOLIC PANEL: CPT

## 2019-07-26 PROCEDURE — 80061 LIPID PANEL: CPT

## 2019-07-26 PROCEDURE — 83036 HEMOGLOBIN GLYCOSYLATED A1C: CPT

## 2019-07-26 PROCEDURE — 82043 UR ALBUMIN QUANTITATIVE: CPT

## 2019-07-26 PROCEDURE — 36415 COLL VENOUS BLD VENIPUNCTURE: CPT

## 2019-07-27 LAB
CREAT UR-MCNC: 181 MG/DL (ref 30–125)
MICROALBUMIN UR-MCNC: 0.9 MG/DL (ref 0–3)
MICROALBUMIN/CREAT UR-RTO: 5 MG/G (ref 0–30)

## 2019-07-30 DIAGNOSIS — I10 ESSENTIAL HYPERTENSION: ICD-10-CM

## 2019-07-30 NOTE — TELEPHONE ENCOUNTER
Pharmacy Refill request -   Lisinopril 2.5mg last filled 7/16/19    Last seen -7/25/2019  Next appt - 8/13/2019

## 2019-08-01 RX ORDER — LISINOPRIL 2.5 MG/1
2.5 TABLET ORAL DAILY
Qty: 30 TAB | Refills: 1 | Status: SHIPPED | OUTPATIENT
Start: 2019-08-01 | End: 2019-08-13 | Stop reason: ALTCHOICE

## 2019-08-01 RX ORDER — LISINOPRIL 2.5 MG/1
TABLET ORAL
Qty: 90 TAB | Refills: 1 | Status: SHIPPED | OUTPATIENT
Start: 2019-08-01 | End: 2019-08-13 | Stop reason: ALTCHOICE

## 2019-08-13 ENCOUNTER — OFFICE VISIT (OUTPATIENT)
Dept: FAMILY MEDICINE CLINIC | Age: 59
End: 2019-08-13

## 2019-08-13 VITALS
WEIGHT: 247 LBS | DIASTOLIC BLOOD PRESSURE: 83 MMHG | TEMPERATURE: 97.2 F | HEART RATE: 84 BPM | SYSTOLIC BLOOD PRESSURE: 123 MMHG | RESPIRATION RATE: 14 BRPM | BODY MASS INDEX: 41.15 KG/M2 | OXYGEN SATURATION: 99 % | HEIGHT: 65 IN

## 2019-08-13 DIAGNOSIS — I10 ESSENTIAL HYPERTENSION WITH GOAL BLOOD PRESSURE LESS THAN 130/80: ICD-10-CM

## 2019-08-13 DIAGNOSIS — R45.89 DEPRESSED MOOD: ICD-10-CM

## 2019-08-13 DIAGNOSIS — Z23 NEED FOR 23-POLYVALENT PNEUMOCOCCAL POLYSACCHARIDE VACCINE: ICD-10-CM

## 2019-08-13 DIAGNOSIS — F43.9 STRESS AT HOME: ICD-10-CM

## 2019-08-13 RX ORDER — LISINOPRIL 5 MG/1
5 TABLET ORAL DAILY
Qty: 30 TAB | Refills: 3 | Status: SHIPPED | OUTPATIENT
Start: 2019-08-13 | End: 2019-11-06 | Stop reason: SDUPTHER

## 2019-08-13 NOTE — PROGRESS NOTES
1. Have you been to the ER, urgent care clinic since your last visit? Hospitalized since your last visit? No    2. Have you seen or consulted any other health care providers outside of the [address] [place] [address] since your last visit? Include any pap smears or colon screening.  No

## 2019-08-13 NOTE — PATIENT INSTRUCTIONS
Recovering From Depression: Care Instructions  Your Care Instructions    Taking good care of yourself is important as you recover from depression. In time, your symptoms will fade as your treatment takes hold. Do not give up. Instead, focus your energy on getting better. Your mood will improve. It just takes some time. Focus on things that can help you feel better, such as being with friends and family, eating well, and getting enough rest. But take things slowly. Do not do too much too soon. You will begin to feel better gradually. Follow-up care is a key part of your treatment and safety. Be sure to make and go to all appointments, and call your doctor if you are having problems. It's also a good idea to know your test results and keep a list of the medicines you take. How can you care for yourself at home? Be realistic  · If you have a large task to do, break it up into smaller steps you can handle, and just do what you can. · You may want to put off important decisions until your depression has lifted. If you have plans that will have a major impact on your life, such as marriage, divorce, or a job change, try to wait a bit. Talk it over with friends and loved ones who can help you look at the overall picture first.  · Reaching out to people for help is important. Do not isolate yourself. Let your family and friends help you. Find someone you can trust and confide in, and talk to that person. · Be patient, and be kind to yourself. Remember that depression is not your fault and is not something you can overcome with willpower alone. Treatment is necessary for depression, just like for any other illness. Feeling better takes time, and your mood will improve little by little. Stay active  · Stay busy and get outside. Take a walk, or try some other light exercise. · Talk with your doctor about an exercise program. Exercise can help with mild depression. · Go to a movie or concert.  Take part in a Judaism activity or other social gathering. Go to a [employer] game. · Ask a friend to have dinner with you. Take care of yourself  · Eat a balanced diet with plenty of fresh fruits and vegetables, whole grains, and lean protein. If you have lost your appetite, eat small snacks rather than large meals. · Avoid drinking alcohol or using illegal drugs. Do not take medicines that have not been prescribed for you. They may interfere with medicines you may be taking for depression, or they may make your depression worse. · Take your medicines exactly as they are prescribed. You may start to feel better within 1 to 3 weeks of taking antidepressant medicine. But it can take as many as 6 to 8 weeks to see more improvement. If you have questions or concerns about your medicines, or if you do not notice any improvement by 3 weeks, talk to your doctor. · If you have any side effects from your medicine, tell your doctor. Antidepressants can make you feel tired, dizzy, or nervous. Some people have dry mouth, constipation, headaches, sexual problems, or diarrhea. Many of these side effects are mild and will go away on their own after you have been taking the medicine for a few weeks. Some may last longer. Talk to your doctor if side effects are bothering you too much. You might be able to try a different medicine. · Get enough sleep. If you have problems sleeping:  ? Go to bed at the same time every night, and get up at the same time every morning. ? Keep your bedroom dark and quiet. ? Do not exercise after 5:00 p.m.  ? Avoid drinks with caffeine after 5:00 p.m. · Avoid sleeping pills unless they are prescribed by the doctor treating your depression. Sleeping pills may make you groggy during the day, and they may interact with other medicine you are taking. · If you have any other illnesses, such as diabetes, heart disease, or high blood pressure, make sure to continue with your treatment.  Tell your doctor about all of the medicines you take, including those with or without a prescription. · Keep the numbers for these national suicide hotlines: 1-668-031-TALK (1-229.881.2123) and 0-398-DDQDDJV (9-438.134.2689). If you or someone you know talks about suicide or feeling hopeless, get help right away. When should you call for help? Call 911 anytime you think you may need emergency care. For example, call if:    · You feel like hurting yourself or someone else.     · Someone you know has depression and is about to attempt or is attempting suicide.   [facility] your doctor now or seek immediate medical care if:    · You hear voices.     · Someone you know has depression and:  ? Starts to give away his or her possessions. ? Uses illegal drugs or drinks alcohol heavily. ? Talks or writes about death, including writing suicide notes or talking about guns, knives, or pills. ? Starts to spend a lot of time alone. ? Acts very aggressively or suddenly appears calm.    Watch closely for changes in your health, and be sure to contact your doctor if:    · You do not get better as expected. Where can you learn more? Go to http://kai-janet.info/. Enter M615 in the search box to learn more about \"Recovering From Depression: Care Instructions. \"  Current as of: September 11, 2018  Content Version: 12.1  © 6311-2499 Healthwise, Incorporated. Care instructions adapted under license by [employer] (which disclaims liability or warranty for this information). If you have questions about a medical condition or this instruction, always ask your healthcare professional. [address] any warranty or liability for your use of this information. Learning About Stress  What is stress? Stress is what you feel when you have to handle more than you are used to. Stress is a fact of life for most people, and it affects everyone differently. What causes stress for you may not be stressful for someone else.   A lot of things can cause stress. You may feel stress when you go on a job interview, take a test, or run a race. This kind of short-term stress is normal and even useful. It can help you if you need to work hard or react quickly. For example, stress can help you finish an important job on time. Stress also can last a long time. Long-term stress is caused by stressful situations or events. Examples of long-term stress include long-term health problems, ongoing problems at work, or conflicts in your family. Long-term stress can harm your health. How does stress affect your health? When you are stressed, your body responds as though you are in danger. It makes hormones that speed up your heart, make you breathe faster, and give you a burst of energy. This is called the fight-or-flight stress response. If the stress is over quickly, your body goes back to normal and no harm is done. But if stress happens too often or lasts too long, it can have bad effects. Long-term stress can make you more likely to get sick, and it can make symptoms of some diseases worse. If you tense up when you are stressed, you may develop neck, shoulder, or low back pain. Stress is linked to high blood pressure and heart disease. Stress also harms your emotional health. It can make you lance, tense, or depressed. Your relationships may suffer, and you may not do well at work or school. What can you do to manage stress? How to relax your mind  · Write. It may help to write about things that are bothering you. This helps you find out how much stress you feel and what is causing it. When you know this, you can find better ways to cope. · Let your feelings out. Talk, laugh, cry, and express anger when you need to. Talking with friends, family, a counselor, or a member of the clergy about your feelings is a healthy way to relieve stress. · Do something you enjoy. For example, listen to music or go to a movie.  Practice your hobby or do volunteer work. · Meditate. This can help you relax, because you are not worrying about what happened before or what may happen in the future. · Do guided imagery. Imagine yourself in any setting that helps you feel calm. You can use audiotapes, books, or a teacher to guide you. How to relax your body  · Do something active. Exercise or activity can help reduce stress. Walking is a great way to get started. Even everyday activities such as housecleaning or yard work can help. · Do breathing exercises. For example:  ? From a standing position, bend forward from the waist with your knees slightly bent. Let your arms dangle close to the floor. ? Breathe in slowly and deeply as you return to a standing position. Roll up slowly and lift your head last.  ? Hold your breath for just a few seconds in the standing position. ? Breathe out slowly and bend forward from the waist.  · Try yoga or elizabeth chi. These techniques combine exercise and meditation. You may need some training at first to learn them. What can you do to prevent stress? · Manage your time. This helps you find time to do the things you want and need to do. · Get enough sleep. Your body recovers from the stresses of the day while you are sleeping. · Get support. Your family, friends, and community can make a difference in how you experience stress. Where can you learn more? Go to http://kai-janet.info/. Enter D064 in the search box to learn more about \"Learning About Stress. \"  Current as of: June 28, 2018  Content Version: 12.1  © 0242-2421 Healthwise, Incorporated. Care instructions adapted under license by [employer] (which disclaims liability or warranty for this information). If you have questions about a medical condition or this instruction, always ask your healthcare professional. [address] any warranty or liability for your use of this information.          Learning About Guided Imagery for Stress  What are guided imagery and stress? Stress is what you feel when you have to handle more than you are used to. A lot of things can cause stress. You may feel stress when you go on a job interview, take a test, or run a race. This kind of short-term stress is normal and even useful. It can help you if you need to work hard or react quickly. Stress also can last a long time. Long-term stress is caused by stressful situations or events. Examples of long-term stress include long-term health problems, ongoing problems at work, and conflicts in your family. Long-term stress can harm your health. Guided imagery is a technique of directed thoughts and suggestions that guide your mind toward a relaxed, focused state. This technique helps you use your mind to take you to a calm, peaceful place. You can use it to relax, which can lower blood pressure and reduce other problems related to stress. How does guided imagery help to relieve stress? Because of the way the mind and body are connected, guided imagery can make you feel like you are experiencing something just by imagining it. You can achieve a relaxed state when you imagine all the details of a safe, comfortable place, such as a beach or a garden. This relaxed state may help you feel more in control of your emotions and thought processes. Feeling in control may improve your attitude, health, and sense of well-being. How do you do guided imagery? You can use a smartphone christos or a video to lead you through the process. You use all of your senses in guided imagery. For example, if you want a tropical setting, you can imagine the warm breeze on your skin, the bright blue of the water, the sound of the surf, the sweet scent of tropical flowers, and the taste of coconut. Imagining those things can make you actually feel like you're there. But you don't have to imagine the tropics to feel peace.  Guided imagery can take you to your own restful place. To give guided imagery a try, follow these steps:  · Lean back comfortably in your chair. If you can, close your eyes. Put your arms on the armrests, or fold your hands in your lap. · Take a deep breath through your nose. Breathe in slowly, and then let the air out completely through your mouth. · Do it again slowly. Keep breathing like this. Gather up any tension in your body, and send it out with every breath. · Let a feeling of warmth spread from your lungs to your neck and head, down your arms to your fingertips, through your body and into your legs, all the way down to your toes. Stay this way for a moment. · Now imagine a pleasant day. You're at a park or at a place you've visited in the past where you felt at peace. · In your mind's eye, look at what lies in front of you. Maybe you see the sun, filtered through trees. Maybe clouds are drifting by. · Look to one side, and then the other. Notice the feel of the air around you. Notice how it feels on your face and on your arms. · Stay here for a while. Let it become real for you. Follow-up care is a key part of your treatment and safety. Be sure to make and go to all appointments, and call your doctor if you are having problems. It's also a good idea to know your test results and keep a list of the medicines you take. Where can you learn more? Go to http://kai-janet.info/. Enter N291 in the search box to learn more about \"Learning About Guided Imagery for Stress. \"  Current as of: June 28, 2018  Content Version: 12.1  © 3782-6875 Healthwise, Incorporated. Care instructions adapted under license by "[employer] (which disclaims liability or warranty for this information). If you have questions about a medical condition or this instruction, always ask your healthcare professional. [place] [address] any warranty or liability for your use of this information.          Learning About Mindfulness for Stress  What are mindfulness and stress? Stress is what you feel when you have to handle more than you are used to. A lot of things can cause stress. You may feel stress when you go on a job interview, take a test, or run a race. This kind of short-term stress is normal and even useful. It can help you if you need to work hard or react quickly. Stress also can last a long time. Long-term stress is caused by stressful situations or events. Examples of long-term stress include long-term health problems, ongoing problems at work, and conflicts in your family. Long-term stress can harm your health. Mindfulness is a focus only on things happening in the present moment. It's a process of purposefully paying attention to and being aware of your surroundings, your emotions, your thoughts, and how your body feels. You are aware of these things, but you aren't judging these experiences as \"good\" or \"bad. \" Mindfulness can help you learn to calm your mind and body to help you cope with illness, pain, and stress. How does mindfulness help to relieve stress? Mindfulness can help quiet your mind and relax your body. Studies show that it can help some people sleep better, feel less anxious, and bring their blood pressure down. And it's been shown to help some people live and cope better with certain health problems like heart disease, depression, chronic pain, and cancer. How do you practice mindfulness? To be mindful is to pay attention, to be present, and to be accepting. · When you're mindful, you do just one thing and you pay close attention to that one thing. For example, you may sit quietly and notice your emotions or how your food tastes and smells. · When you're present, you focus on the things that are happening right now. You let go of your thoughts about the past and the future. When you dwell on the past or the future, you miss moments that can heal and strengthen you.  You may miss moments like hearing a child laugh or seeing a friendly face when you think you're all alone. · When you're accepting, you don't  the present moment. Instead you accept your thoughts and feelings as they come. You can practice anytime, anywhere, and in any way you choose. You can practice in many ways. Here are a few ideas:  · While doing your chores, like washing the dishes, let your mind focus on what's in your hand. What does the dish feel like? Is the water warm or cold? · Go outside and take a few deep breaths. What is the air like? Is it warm or cold? · When you can, take some time at the start of your day to sit alone and think. · Take a slow walk by yourself. Count your steps while you breathe in and out. · Try yoga breathing exercises, stretches, and poses to strengthen and relax your muscles. · At work, if you can, try to stop for a few moments each hour. Note how your body feels. Let yourself regroup and let your mind settle before you return to what you were doing. · If you struggle with anxiety or \"worry thoughts,\" imagine your mind as a blue donovan and your worry thoughts as clouds. Now imagine those worry thoughts floating across your mind's donovan. Just let them pass by as you watch. Follow-up care is a key part of your treatment and safety. Be sure to make and go to all appointments, and call your doctor if you are having problems. It's also a good idea to know your test results and keep a list of the medicines you take. Where can you learn more? Go to http://kai-janet.info/. Enter G804 in the search box to learn more about \"Learning About Mindfulness for Stress. \"  Current as of: June 28, 2018  Content Version: 12.1  © 6789-3834 Healthwise, Incorporated. Care instructions adapted under license by [employer] (which disclaims liability or warranty for this information).  If you have questions about a medical condition or this instruction, always ask your healthcare professional. [employer], [facility] disclaims any warranty or liability for your use of this information. Learning About Progressive Muscle Relaxation for Stress  What are progressive muscle relaxation and stress? Stress is what you feel when you have to handle more than you are used to. A lot of things can cause stress. You may feel stress when you go on a job interview, take a test, or run a race. This kind of short-term stress is normal and even useful. It can help you if you need to work hard or react quickly. Stress also can last a long time. Long-term stress is caused by stressful situations or events. Examples of long-term stress include long-term health problems, ongoing problems at work, and conflicts in your family. Long-term stress can harm your health. When you have anxiety or stress in your life, one of the ways your body responds is with muscle tension. Progressive muscle relaxation is a method that helps relieve that tension. How does progressive muscle relaxation reduce stress? The body responds to stress with muscle tension, which can cause pain or discomfort. In turn, tense muscles relay to the body that it's stressed. That keeps the cycle of stress and muscle tension going. Progressive muscle relaxation helps break this cycle by reducing muscle tension and general mental anxiety. This method often helps people get to sleep. How do you do progressive muscle relaxation? To do progressive muscle relaxation, first you tense a group of muscles as you breathe in. Then you relax them as you breathe out. Notice how your muscles feel when you relax them. You work on your muscle groups in a certain order. Through practice, you can learn to feel the difference between a tensed muscle and a relaxed muscle. Then you can learn how to turn on this relaxed state at the first sign of a muscle tensing up due to stress. Practicing this method for a few weeks will help you get better at this skill.  In time you'll be able to use this method to relieve stress. When you first start, it may help to use an audio recording until you learn all the muscle groups in order. Check online for these recordings. Choose a place where you won't be interrupted. It should have space for you to lie down on your back and stretch out comfortably, such as on a carpeted floor. Follow-up care is a key part of your treatment and safety. Be sure to make and go to all appointments, and call your doctor if you are having problems. It's also a good idea to know your test results and keep a list of the medicines you take. Where can you learn more? Go to http://kai-janet.info/. Enter N400 in the search box to learn more about \"Learning About Progressive Muscle Relaxation for Stress. \"  Current as of: June 28, 2018  Content Version: 12.1  © 8413-8611 Healthwise, Incorporated. Care instructions adapted under license by [employer] (which disclaims liability or warranty for this information). If you have questions about a medical condition or this instruction, always ask your healthcare professional. [address] any warranty or liability for your use of this information.

## 2019-08-13 NOTE — PROGRESS NOTES
[facility]    CC: DMII    HPI:     DMII:  -Not taking diabetic medication as prescribed  -Denies any side effects from her medication  -Does not check blood sugar at home  -Does not follow a regular exercise regimen  -Diet is unchanged since last visit      HTN:  -Not taking lisinopril  -Wants to be on fewer medications  -Does not follow a regular exercise regimen  -Diet is unchanged since last visit      Depressed:  -Stressed due to housing issues and dealing with family issue  -States that she does not feel motivated      ROS: Positive items marked in RED  CON: fever, chills  Cardiovascular: palpitations, CP  Resp: SOB, cough  GI: nausea, vomiting, diarrhea  : dysuria, hematuria      Past Medical History:   Diagnosis Date    Arthritis     Depression     [facility] [address]     Hot flashes     Hypertension     Morbid obesity ([facility] [address]     Vertigo        Past Surgical History:   Procedure Laterality Date    EXCIS INFRATENT BRAIN TUMOR  2015    [facility]    [facility]    HX TUBAL LIGATION Bilateral     NEUROLOGICAL PROCEDURE UNLISTED         Family History   Problem Relation Age of Onset    Cancer Mother 58        colon    Hypertension Mother     Diabetes Mother     Psychiatric Disorder Father     Lupus Sister        Social History     Socioeconomic History    Marital status:      Spouse name: Not on file    Number of children: Not on file    Years of education: Not on file    Highest education level: Not on file   Tobacco Use    Smoking status: Former Smoker     Last attempt to quit:      Years since quittin.6    Smokeless tobacco: Never Used   Substance and Sexual Activity    Alcohol use: No    Drug use: Yes     Types: Cocaine, Marijuana, Heroin, Opiates       No Known Allergies      Current Outpatient Medications:     lisinopril (PRINIVIL, ZESTRIL) 2.5 mg tablet, TAKE 1 TABLET BY MOUTH ONCE DAILY, Disp: 90 Tab, Rfl: 1    lisinopril (PRINIVIL, ZESTRIL) 2.5 mg tablet, Take 1 Tab by mouth daily. , Disp: 30 Tab, Rfl: 1    aspirin 81 mg chewable tablet, Take 1 Tab by mouth daily. , Disp: 60 Tab, Rfl: 0    cyclobenzaprine (FLEXERIL) 10 mg tablet, Take 1 Tab by mouth three (3) times daily (with meals). , Disp: 90 Tab, Rfl: 0    meclizine (ANTIVERT) 25 mg tablet, Take 1 Tab by mouth two (2) times a day., Disp: 60 Tab, Rfl: 0    gabapentin (NEURONTIN) 300 mg capsule, Take 1 Cap by mouth three (3) times daily. , Disp: 90 Cap, Rfl: 0    ondansetron (ZOFRAN ODT) 4 mg disintegrating tablet, Take 1 Tab by mouth every eight (8) hours as needed for Nausea., Disp: 30 Tab, Rfl: 1    raNITIdine (ZANTAC) 150 mg tablet, Take 1 Tab by mouth two (2) times a day., Disp: 180 Tab, Rfl: 1    propranolol LA (INDERAL LA) 60 mg SR capsule, Take 1 Cap by mouth daily. , Disp: 90 Cap, Rfl: 1    pioglitazone (ACTOS) 30 mg tablet, Take 1 Tab by mouth daily. , Disp: 90 Tab, Rfl: 1    pen needle, diabetic (NOVOFINE PLUS) 32 gauge x 1/6\" ndle, Use daily to inject Victoza subcutaneously, Disp: 100 Pen Needle, Rfl: 12    montelukast (SINGULAIR) 10 mg tablet, Take 1 Tab by mouth daily. , Disp: 90 Tab, Rfl: 1    metFORMIN (GLUCOPHAGE) 1,000 mg tablet, Take 1 Tab by mouth two (2) times daily (with meals). , Disp: 180 Tab, Rfl: 1    liraglutide (VICTOZA) 0.6 mg/0.1 mL (18 mg/3 mL) pnij, 1.2 mg by SubCUTAneous route daily. , Disp: 1 Adjustable Dose Pre-filled Pen Syringe, Rfl: 6    lancets misc, Check glucose before meals and at bedtime, Disp: 1 Each, Rfl: 11    glucose blood VI test strips (ASCENSIA AUTODISC VI, ONE TOUCH ULTRA TEST VI) strip, Use to check blood sugar daily, Disp: 100 Strip, Rfl: 6    glucose blood VI test strips (ACCU-CHEK JUNI PLUS TEST STRP) strip, Use to check blood sugar twice a day, Disp: 100 Strip, Rfl: 1    diclofenac potassium (CATAFLAM) 50 mg tablet, Take 1 Tab by mouth three (3) times daily. , Disp: 90 Tab, Rfl: 5    atorvastatin (LIPITOR) 40 mg tablet, Take 1 Tab by mouth daily. , Disp: 90 Tab, Rfl: 1    diclofenac (VOLTAREN) 1 % gel, Apply 4 g to affected area as needed. , Disp: , Rfl:     ibuprofen (MOTRIN) 800 mg tablet, Take 1 Tab by mouth every six (6) hours as needed for Pain., Disp: 180 Tab, Rfl: 1    Blood-Glucose Meter (ACCU-CHEK JUNI PLUS METER) misc, Use to check blood sugar twice a day, Disp: 1 Each, Rfl: 0    lancets (ACCU-CHEK SOFTCLIX LANCETS) misc, Use to check blood sugar twice a day, Disp: 100 Each, Rfl: 11    azelastine (ASTELIN) 137 mcg (0.1 %) nasal spray, 1 [place] by Both Nostrils route two (2) times a day. Use in each nostril as directed, Disp: 1 Bottle, Rfl: 6    fluticasone (FLONASE) 50 mcg/actuation nasal spray, 2 Sprays by Both Nostrils route daily. , Disp: 1 Bottle, Rfl: 6    Blood-Glucose Meter monitoring kit, As allowed by insurance, Disp: 1 Kit, Rfl: 0    Physical Exam:      /83   Pulse 84   Temp 97.2 °F (36.2 °C) (Oral)   Resp 14   Ht 5' 5\" (1.651 m)   Wt 247 lb (112 kg)   SpO2 99%   BMI 41.10 kg/m²     General:  WD, WN, NAD, conversant  Eyes: sclera clear bilaterally, no discharge noted, eyelids normal in appearance  HENT: NCAT, nasal turbinates, oropharynx clear, MMM  Neck: supple, no lymphadenopathy  Lungs: CTAB, normal respiratory effort and rate  CV: RRR, no MRGs  ABD: soft, non-tender, non-distended, normal bowel sounds  Ext: no peripheral edema or digital cyanosis noted  Skin: normal temperature, turgor, color, and texture  Psych: alert and oriented to person, place and situation, normal affect  Neuro: speech normal, moving all extremities, gait normal    Diabetic foot exam:     Left Foot:   Visual Exam: normal    Pulse DP: 2+ (normal)   Filament test: normal sensation    Vibratory sensation: normal    Proprioception: Normal    Right Foot:   Visual Exam: normal    Pulse DP: 2+ (normal)   Filament test: normal sensation    Vibratory sensation: normal   Perception: Normal    Results for Deonna Spring (MRN [id number]):   Ref. Range 7/26/2019 10:00   WBC Latest Ref Range: 4.6 - 13.2 K/uL 7.1   RBC Latest Ref Range: 4.20 - 5.30 M/uL 4.49   HGB Latest Ref Range: 12.0 - 16.0 g/dL 11.8 (L)   HCT Latest Ref Range: 35.0 - 45.0 % 38.7   MCV Latest Ref Range: 74.0 - 97.0 FL 86.2   MCH Latest Ref Range: 24.0 - 34.0 PG 26.3   MCHC Latest Ref Range: 31.0 - 37.0 g/dL 30.5 (L)   RDW Latest Ref Range: 11.6 - 14.5 % 12.2   PLATELET Latest Ref Range: 135 - 420 K/uL 373   MPV Latest Ref Range: 9.2 - 11.8 FL 9.7   Sodium Latest Ref Range: 136 - 145 mmol/L 138   Potassium Latest Ref Range: 3.5 - 5.5 mmol/L 4.2   Chloride Latest Ref Range: 100 - 111 mmol/L 105   CO2 Latest Ref Range: 21 - 32 mmol/L 28   Anion gap Latest Ref Range: 3.0 - 18 mmol/L 5   Glucose Latest Ref Range: 74 - 99 mg/dL 195 (H)   BUN Latest Ref Range: 7.0 - 18 MG/DL 12   Creatinine Latest Ref Range: 0.6 - 1.3 MG/DL 0.73   BUN/Creatinine ratio Latest Ref Range: 12 - 20   16   Calcium Latest Ref Range: 8.5 - 10.1 MG/DL 9.0   GFR est non-AA Latest Ref Range: >60 ml/min/1.73m2 >60   GFR est AA Latest Ref Range: >60 ml/min/1.73m2 >60   Bilirubin, total Latest Ref Range: 0.2 - 1.0 MG/DL 0.6   Protein, total Latest Ref Range: 6.4 - 8.2 g/dL 6.4   Albumin Latest Ref Range: 3.4 - 5.0 g/dL 3.6   Globulin Latest Ref Range: 2.0 - 4.0 g/dL 2.8   A-G Ratio Latest Ref Range: 0.8 - 1.7   1.3   ALT (SGPT) Latest Ref Range: 13 - 56 U/L 25   AST Latest Ref Range: 10 - 38 U/L 13   Alk.  phosphatase Latest Ref Range: 45 - 117 U/L 131 (H)   Triglyceride Latest Ref Range: <150 MG/DL 91   Cholesterol, total Latest Ref Range: <200 MG/   HDL Cholesterol Latest Ref Range: 40 - 60 MG/DL 52   CHOL/HDL Ratio Latest Ref Range: 0 - 5.0   3.0   LDL, calculated Latest Ref Range: 0 - 100 MG/DL 83.8   VLDL, calculated Latest Units: MG/DL 18.2   Hemoglobin A1c, (calculated) Latest Ref Range: 4.2 - 5.6 % 9.6 (H)   Est. average glucose Latest Units: mg/dL 229   Creatinine, urine Latest Ref Range: 30 - 125 mg/dL 181.00 (H)   Microalbumin,urine random Latest Ref Range: 0 - 3.0 MG/DL 0.90   Microalbumin/Creat.  Ratio Latest Ref Range: 0 - 30 mg/g 5       Assessment/Plan     DMII, inadequately controlled:  -Hemoglobin A1c not at goal of less than 7%  -Discussed with patient importance of taking medications prescribed  -Diabetic foot exam done  -Pneumovax 23 given  -Follow-up in 1 month      HTN, Inadequately Controlled:  -Not at goal BP of <130/80  -Advised on importance of good BP control   -Started on 5 mg of lisinopril, as she has not be at goal all year  -Follow-up in 1 month      Depressed Mood   -Secondary to stress at home  -Depressed mood likely cause of her poor compliance with medications  -Patient declined starting on any medication for her mood  -Handout given on depression care, stress, guided imagery, mindfulness, and progressive muscle relaxation  -Follow-up in 1 month        Kana Mercado MD  8/13/2019, 1:26 PM

## 2019-10-04 ENCOUNTER — OFFICE VISIT (OUTPATIENT)
Dept: FAMILY MEDICINE CLINIC | Age: 59
End: 2019-10-04

## 2019-10-04 VITALS
WEIGHT: 247 LBS | BODY MASS INDEX: 41.15 KG/M2 | RESPIRATION RATE: 12 BRPM | TEMPERATURE: 97.2 F | SYSTOLIC BLOOD PRESSURE: 118 MMHG | HEART RATE: 78 BPM | OXYGEN SATURATION: 99 % | DIASTOLIC BLOOD PRESSURE: 67 MMHG | HEIGHT: 65 IN

## 2019-10-04 DIAGNOSIS — I10 ESSENTIAL HYPERTENSION WITH GOAL BLOOD PRESSURE LESS THAN 130/80: ICD-10-CM

## 2019-10-04 DIAGNOSIS — R45.89 DEPRESSED MOOD: ICD-10-CM

## 2019-10-04 NOTE — PROGRESS NOTES
[facility]    CC: F/U for Chronic Disease Management    HPI:     DMII:  -Taking diabetic medication as prescribed  -Denies any side effects or issues with diabetic medication  -Has not been checking blood sugar at home  -Has improved her diet since her last visit  -Has purchased an elliptical machine and plans to start exercising      HTN:  -Taking BP medication as prescribed denies any side effects or issue with BP medication  -Does not check BP at home\\  -Has improved her diet since her last visit  -Has purchased an elliptical machine and plans to start exercising      Depressed Mood:  -On no medication for issue  -Stressors are no longer an issue  -States that her depressed mood has resolved      ROS: Positive items marked in RED  CON: fever, chills  Cardiovascular: palpitations, CP  Resp: SOB, cough  GI: nausea, vomiting, diarrhea  : dysuria, hematuria      Past Medical History:   Diagnosis Date    Arthritis     Depression     [facility] [address]     Hot flashes     Hypertension     Morbid obesity ([facility] [address]     Vertigo        Past Surgical History:   Procedure Laterality Date    [facility]UMOR  [facility]    [facility]    [facility]    HX TUBAL LIGATION Bilateral     NEUROLOGICAL PROCEDURE UNLISTED         Family History   Problem Relation Age of Onset    Cancer Mother 58        colon    Hypertension Mother     Diabetes Mother     Psychiatric Disorder Father     Lupus Sister        Social History     Socioeconomic History    Marital status:      Spouse name: Not on file    Number of children: Not on file    Years of education: Not on file    Highest education level: Not on file   Tobacco Use    Smoking status: Former Smoker     Last attempt to quit:      Years since quittin.7    Smokeless tobacco: Never Used   Substance and Sexual Activity    Alcohol use: No    Drug use: Yes     Types: Cocaine, Marijuana, Heroin, Opiates       No Known Allergies      Current Outpatient Medications:     lisinopril (PRINIVIL, ZESTRIL) 5 mg tablet, Take 1 Tab by mouth daily. , Disp: 30 Tab, Rfl: 3    aspirin 81 mg chewable tablet, Take 1 Tab by mouth daily. , Disp: 60 Tab, Rfl: 0    cyclobenzaprine (FLEXERIL) 10 mg tablet, Take 1 Tab by mouth three (3) times daily (with meals). , Disp: 90 Tab, Rfl: 0    meclizine (ANTIVERT) 25 mg tablet, Take 1 Tab by mouth two (2) times a day., Disp: 60 Tab, Rfl: 0    gabapentin (NEURONTIN) 300 mg capsule, Take 1 Cap by mouth three (3) times daily. , Disp: 90 Cap, Rfl: 0    ondansetron (ZOFRAN ODT) 4 mg disintegrating tablet, Take 1 Tab by mouth every eight (8) hours as needed for Nausea., Disp: 30 Tab, Rfl: 1    raNITIdine (ZANTAC) 150 mg tablet, Take 1 Tab by mouth two (2) times a day., Disp: 180 Tab, Rfl: 1    pioglitazone (ACTOS) 30 mg tablet, Take 1 Tab by mouth daily. , Disp: 90 Tab, Rfl: 1    pen needle, diabetic (NOVOFINE PLUS) 32 gauge x 1/6\" ndle, Use daily to inject Victoza subcutaneously, Disp: 100 Pen Needle, Rfl: 12    montelukast (SINGULAIR) 10 mg tablet, Take 1 Tab by mouth daily. , Disp: 90 Tab, Rfl: 1    metFORMIN (GLUCOPHAGE) 1,000 mg tablet, Take 1 Tab by mouth two (2) times daily (with meals). , Disp: 180 Tab, Rfl: 1    liraglutide (VICTOZA) 0.6 mg/0.1 mL (18 mg/3 mL) pnij, 1.2 mg by SubCUTAneous route daily. , Disp: 1 Adjustable Dose Pre-filled Pen Syringe, Rfl: 6    glucose blood VI test strips (ASCENSIA AUTODISC VI, ONE TOUCH ULTRA TEST VI) strip, Use to check blood sugar daily, Disp: 100 Strip, Rfl: 6    glucose blood VI test strips (ACCU-CHEK JUNI PLUS TEST STRP) strip, Use to check blood sugar twice a day, Disp: 100 Strip, Rfl: 1    diclofenac (VOLTAREN) 1 % gel, Apply 4 g to affected area as needed. , Disp: , Rfl:     Blood-Glucose Meter (ACCU-CHEK JUNI PLUS METER) misc, Use to check blood sugar twice a day, Disp: 1 Each, Rfl: 0    lancets (ACCU-CHEK SOFTCLIX LANCETS) misc, Use to check blood sugar twice a day, Disp: 100 Each, Rfl: 11    azelastine (ASTELIN) 137 mcg (0.1 %) nasal spray, 1 [place] by Both Nostrils route two (2) times a day. Use in each nostril as directed, Disp: 1 Bottle, Rfl: 6    fluticasone (FLONASE) 50 mcg/actuation nasal spray, 2 Sprays by Both Nostrils route daily. , Disp: 1 Bottle, Rfl: 6    Blood-Glucose Meter monitoring kit, As allowed by insurance, Disp: 1 Kit, Rfl: 0    atorvastatin (LIPITOR) 40 mg tablet, Take 1 Tab by mouth daily. , Disp: 90 Tab, Rfl: 1    ibuprofen (MOTRIN) 800 mg tablet, Take 1 Tab by mouth every six (6) hours as needed for Pain., Disp: 180 Tab, Rfl: 1    Physical Exam:      /67   Pulse 78   Temp 97.2 °F (36.2 °C) (Oral)   Resp 12   Ht 5' 5\" (1.651 m)   Wt 247 lb (112 kg)   SpO2 99%   BMI 41.10 kg/m²     General: obese habitus, NAD, conversant  Eyes: sclera clear bilaterally, no discharge noted, eyelids normal in appearance  HENT: NCAT  Lungs: CTAB, normal respiratory effort and rate  CV: RRR, no MRGs  ABD: soft, non-tender, non-distended, normal bowel sounds  Skin: normal temperature, turgor, color, and texture  Psych: alert and oriented to person, place and situation, normal affect  Neuro: speech normal, moving all extremities, gait normal      Assessment/Plan     DMII, Likely Improved:  -Suspect significant improvement now that she has resumed taking her diabetic medication  -Patient encouraged to continue taking diabetic medication as prescribed and to continue working on recommended lifestyle changes  -Plan to check HGBA1c at next visit  -Follow up in 3 months      HTN, Well Controlled:  -BP at goal of <130/80  -Will continue current BP medication regimen  -Follow up in 3 months      Depressed Mood, resolved:  -No indication for further intervention  -Advised to follow up as needed if mood becomes an issue again  -Follow up in 3 months        Lolis Avelar MD  10/4/2019, 9:01 AM

## 2019-10-04 NOTE — PROGRESS NOTES
1. Have you been to the ER, urgent care clinic since your last visit? Hospitalized since your last visit? No    2. Have you seen or consulted any other health care providers outside of the [address] [place] [address] since your last visit? Include any pap smears or colon screening.  No     Patient seen Dr. Jose Malcolm on 8-31-19

## 2019-10-29 ENCOUNTER — PATIENT OUTREACH (OUTPATIENT)
Dept: FAMILY MEDICINE CLINIC | Age: 59
End: 2019-10-29

## 2019-11-01 DIAGNOSIS — E11.40 TYPE 2 DIABETES MELLITUS WITH DIABETIC NEUROPATHY, WITHOUT LONG-TERM CURRENT USE OF INSULIN (HCC): ICD-10-CM

## 2019-11-01 DIAGNOSIS — I10 ESSENTIAL HYPERTENSION WITH GOAL BLOOD PRESSURE LESS THAN 130/80: ICD-10-CM

## 2019-11-08 DIAGNOSIS — K21.9 GASTROESOPHAGEAL REFLUX DISEASE WITHOUT ESOPHAGITIS: ICD-10-CM

## 2019-11-10 RX ORDER — RANITIDINE 150 MG/1
TABLET, FILM COATED ORAL
Qty: 120 TAB | Refills: 1 | Status: SHIPPED | OUTPATIENT
Start: 2019-11-10 | End: 2020-04-09

## 2019-11-10 RX ORDER — LIRAGLUTIDE 6 MG/ML
INJECTION SUBCUTANEOUS
Qty: 18 PEN | Refills: 3 | Status: SHIPPED | OUTPATIENT
Start: 2019-11-10 | End: 2020-04-09 | Stop reason: SDUPTHER

## 2019-11-10 RX ORDER — LISINOPRIL 5 MG/1
5 TABLET ORAL DAILY
Qty: 30 TAB | Refills: 3 | Status: SHIPPED | OUTPATIENT
Start: 2019-11-10 | End: 2020-04-27

## 2019-11-19 ENCOUNTER — OFFICE VISIT (OUTPATIENT)
Dept: FAMILY MEDICINE CLINIC | Age: 59
End: 2019-11-19

## 2019-11-19 VITALS
DIASTOLIC BLOOD PRESSURE: 78 MMHG | OXYGEN SATURATION: 98 % | SYSTOLIC BLOOD PRESSURE: 122 MMHG | RESPIRATION RATE: 14 BRPM | HEIGHT: 65 IN | HEART RATE: 80 BPM | TEMPERATURE: 97.3 F | WEIGHT: 244 LBS | BODY MASS INDEX: 40.65 KG/M2

## 2019-11-19 DIAGNOSIS — J31.0 RHINOSINUSITIS: ICD-10-CM

## 2019-11-19 DIAGNOSIS — J32.9 RHINOSINUSITIS: ICD-10-CM

## 2019-11-19 DIAGNOSIS — Z00.00 MEDICARE ANNUAL WELLNESS VISIT, SUBSEQUENT: Primary | ICD-10-CM

## 2019-11-19 RX ORDER — OXYMETAZOLINE HCL 0.05 %
2 SPRAY, NON-AEROSOL (ML) NASAL 2 TIMES DAILY
Qty: 1 EACH | Refills: 0 | Status: SHIPPED | OUTPATIENT
Start: 2019-11-19 | End: 2019-11-22

## 2019-11-19 RX ORDER — PSEUDOEPHEDRINE HCL 30 MG
60 TABLET ORAL
Qty: 24 TAB | Refills: 0 | Status: SHIPPED | OUTPATIENT
Start: 2019-11-19 | End: 2020-07-31 | Stop reason: SDUPTHER

## 2019-11-19 NOTE — PATIENT INSTRUCTIONS
Medicare Wellness Visit, Female     The best way to live healthy is to have a lifestyle where you eat a well-balanced diet, exercise regularly, limit alcohol use, and quit all forms of tobacco/nicotine, if applicable. Regular preventive services are another way to keep healthy. Preventive services (vaccines, screening tests, monitoring & exams) can help personalize your care plan, which helps you manage your own care. Screening tests can find health problems at the earliest stages, when they are easiest to treat. [facility] follows the current, evidence-based guidelines published by the [facility] Anast[facility] Perez ([facility]) when recommending preventive services for our patients. Because we follow these guidelines, sometimes recommendations change over time as research supports it. (For example, mammograms used to be recommended annually. Even though Medicare will still pay for an annual mammogram, the newer guidelines recommend a mammogram every two years for women of average risk). Of course, you and your doctor may decide to screen more often for some diseases, based on your risk and your co-morbidities (chronic disease you are already diagnosed with). Preventive services for you include:  - Medicare offers their members a free annual wellness visit, which is time for you and your primary care provider to discuss and plan for your preventive service needs. Take advantage of this benefit every year!  -All adults over the age of 72 should receive the recommended pneumonia vaccines. Current USPSTF guidelines recommend a series of two vaccines for the best pneumonia protection.   -All adults should have a flu vaccine yearly and a tetanus vaccine every 10 years.   -All adults age 48 and older should receive the shingles vaccines (series of two vaccines).       -All adults age 38-68 who are overweight should have a diabetes screening test once every three years.   -All adults born between [birth year] and 1963 should be screened once for Hepatitis C.  -Other screening tests and preventive services for persons with diabetes include: an eye exam to screen for diabetic retinopathy, a kidney function test, a foot exam, and stricter control over your cholesterol.   -Cardiovascular screening for adults with routine risk involves an electrocardiogram (ECG) at intervals determined by your doctor.   -Colorectal cancer screenings should be done for adults age 54-65 with no increased risk factors for colorectal cancer. There are a number of acceptable methods of screening for this type of cancer. Each test has its own benefits and drawbacks. Discuss with your doctor what is most appropriate for you during your annual wellness visit. The different tests include: colonoscopy (considered the best screening method), a fecal occult blood test, a fecal DNA test, and sigmoidoscopy.    -A bone mass density test is recommended when a woman turns 65 to screen for osteoporosis. This test is only recommended one time, as a screening. Some providers will use this same test as a disease monitoring tool if you already have osteoporosis. -Breast cancer screenings are recommended every other year for women of normal risk, age 54-69.  -Cervical cancer screenings for women over age 72 are only recommended with certain risk factors. Here is a list of your current Health Maintenance items (your personalized list of preventive services) with a due date:  Health Maintenance Due   Topic Date Due    Colon Cancer Screening  01/03/2019    Annual Well Visit  10/18/2019            Advance Directives: Care Instructions  Your Care Instructions  An advance directive is a legal way to state your wishes at the end of your life. It tells your family and your doctor what to do if you can no longer say what you want. There are two main types of advance directives. You can change them any time that your wishes change.   · A living will tells your family and your doctor your wishes about life support and other treatment. · A durable power of  for health care lets you name a person to make treatment decisions for you when you can't speak for yourself. This person is called a health care agent. If you do not have an advance directive, decisions about your medical care may be made by a doctor or a  who doesn't know you. It may help to think of an advance directive as a gift to the people who care for you. If you have one, they won't have to make tough decisions by themselves. Follow-up care is a key part of your treatment and safety. Be sure to make and go to all appointments, and call your doctor if you are having problems. It's also a good idea to know your test results and keep a list of the medicines you take. How can you care for yourself at home? · Discuss your wishes with your loved ones and your doctor. This way, there are no surprises. · Many states have a unique form. Or you might use a universal form that has been approved by many states. This kind of form can sometimes be completed and stored online. Your electronic copy will then be available wherever you have a connection to the Internet. In most cases, doctors will respect your wishes even if you have a form from a different state. · You don't need a  to do an advance directive. But you may want to get legal advice. · Think about these questions when you prepare an advance directive:  ? Who do you want to make decisions about your medical care if you are not able to? Many people choose a family member or close friend. ? Do you know enough about life support methods that might be used? If not, talk to your doctor so you understand. ? What are you most afraid of that might happen? You might be afraid of having pain, losing your independence, or being kept alive by machines. ? Where would you prefer to die?  Choices include your home, a hospital, or a nursing home. ? Would you like to have information about hospice care to support you and your family? ? Do you want to donate organs when you die? ? Do you want certain Restoration[employer] practices performed before you die? If so, put your wishes in the advance directive. · Read your advance directive every year, and make changes as needed. When should you call for help? Be sure to contact your doctor if you have any questions. Where can you learn more? Go to http://kai-janet.info/. Enter R264 in the search box to learn more about \"Advance Directives: Care Instructions. \"  Current as of: April 1, 2019  Content Version: 12.2  © 0015-2807 [employer]. Care instructions adapted under license by [employer] (which disclaims liability or warranty for this information). If you have questions about a medical condition or this instruction, always ask your healthcare professional. [address] any warranty or liability for your use of this information.
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
## 2019-11-19 NOTE — PROGRESS NOTES
1. Have you been to the ER, urgent care clinic since your last visit? Hospitalized since your last visit? Yes When: 11-17-19 Gus Damon for sinus pressure dx URI    2. Have you seen or consulted any other health care providers outside of the [address] [place] [address] since your last visit? Include any pap smears or colon screening.  No

## 2019-11-19 NOTE — PROGRESS NOTES
This is the Subsequent Medicare Annual Wellness Exam, performed 12 months or more after the Initial AWV or the last Subsequent AWV I have reviewed the patient's medical history in detail and updated the computerized patient record. History Patient Active Problem List  
Diagnosis Code  Essential hypertension I10  
 GERD (gastroesophageal reflux disease) K21.9  DOMINIQUE (obstructive sleep apnea) G47.33  
 Arthritis M19.90  Asthma J45.909  Urinary urgency R39.15  
 Morbid obesity due to excess calories (HCC) E66.01  
 Type 2 diabetes mellitus with diabetic neuropathy ([facility]) E11.40  Hyperlipidemia E78.5  Bilateral primary osteoarthritis of knee M17.0  Vertigo R42  Environmental and seasonal allergies J30.89  
 Diabetic polyneuropathy associated with type 2 diabetes mellitus ([facility] E11.42 Past Medical History:  
Diagnosis Date  Arthritis  Chronic gastritis with bleeding  Depression  Diabetes ([facility]  Hot flashes  Hypertension  Morbid obesity ([facility]  Vertigo Past Surgical History:  
Procedure Laterality Date [place] BRAIN TUMOR  08/16/2015 [facility]  
 HX CHOLECYSTECTOMY  2016 [address]  
 HX TUBAL LIGATION Bilateral   
 NEUROLOGICAL PROCEDURE UNLISTED Current Outpatient Medications Medication Sig Dispense Refill  VICTOZA 3-JERSEY 0.6 mg/0.1 mL (18 mg/3 mL) pnij INITIALLY INJECT 0.6 MG SUBCUTANEOUSLY ONCE DAILY FOR 1 WEEK THEN INCREASE TO 1.2 MG ONCE DAILY THEREAFTER AS DIRECTED 18 Pen 3  
 lisinopril (PRINIVIL, ZESTRIL) 5 mg tablet Take 1 Tab by mouth daily. 30 Tab 3  
 aspirin 81 mg chewable tablet Take 1 Tab by mouth daily. 60 Tab 0  cyclobenzaprine (FLEXERIL) 10 mg tablet Take 1 Tab by mouth three (3) times daily (with meals). 90 Tab 0  
 meclizine (ANTIVERT) 25 mg tablet Take 1 Tab by mouth two (2) times a day. 60 Tab 0  pioglitazone (ACTOS) 30 mg tablet Take 1 Tab by mouth daily.  90 Tab 1  
  pen needle, diabetic (NOVOFINE PLUS) 32 gauge x 1/6\" ndle Use daily to inject Victoza subcutaneously 100 Pen Needle 12  
 metFORMIN (GLUCOPHAGE) 1,000 mg tablet Take 1 Tab by mouth two (2) times daily (with meals). 180 Tab 1  
 glucose blood VI test strips (ASCENSIA AUTODISC VI, ONE TOUCH ULTRA TEST VI) strip Use to check blood sugar daily 100 Strip 6  
 glucose blood VI test strips (ACCU-CHEK JUNI PLUS TEST STRP) strip Use to check blood sugar twice a day 100 Strip 1  
 atorvastatin (LIPITOR) 40 mg tablet Take 1 Tab by mouth daily. 90 Tab 1  Blood-Glucose Meter (ACCU-CHEK JUNI PLUS METER) misc Use to check blood sugar twice a day 1 Each 0  
 lancets (ACCU-CHEK SOFTCLIX LANCETS) misc Use to check blood sugar twice a day 100 Each 11  Blood-Glucose Meter monitoring kit As allowed by insurance 1 Kit 0  
 raNITIdine (ZANTAC) 150 mg tablet TAKE 1 TABLET BY MOUTH TWICE DAILY 120 Tab 1  
 gabapentin (NEURONTIN) 300 mg capsule Take 1 Cap by mouth three (3) times daily. 90 Cap 0  
 ondansetron (ZOFRAN ODT) 4 mg disintegrating tablet Take 1 Tab by mouth every eight (8) hours as needed for Nausea. 30 Tab 1  
 montelukast (SINGULAIR) 10 mg tablet Take 1 Tab by mouth daily. 90 Tab 1  
 diclofenac (VOLTAREN) 1 % gel Apply 4 g to affected area as needed.  ibuprofen (MOTRIN) 800 mg tablet Take 1 Tab by mouth every six (6) hours as needed for Pain. 180 Tab 1  
 azelastine (ASTELIN) 137 mcg (0.1 %) nasal spray 1 [place] by Both Nostrils route two (2) times a day. Use in each nostril as directed 1 Bottle 6  
 fluticasone (FLONASE) 50 mcg/actuation nasal spray 2 Sprays by Both Nostrils route daily. 1 Bottle 6 No Known Allergies Family History Problem Relation Age of Onset  Cancer Mother 58  
     colon  Hypertension Mother  Diabetes Mother  Psychiatric Disorder Father  Lupus Sister Social History Tobacco Use  Smoking status: Former Smoker Last attempt to quit: 1998 Years since quittin.8  Smokeless tobacco: Never Used Substance Use Topics  Alcohol use: No  
 
 
Depression Risk Factor Screening:  
 
3 most recent PHQ Screens 2019 Little interest or pleasure in doing things Several days Feeling down, depressed, irritable, or hopeless Several days Total Score PHQ 2 2 Alcohol Risk Factor Screening: Do you average 1 drink per night or more than 7 drinks a week:  {Yes/No:286989::\"No\"} On any one occasion in the past three months have you have had more than 3 drinks containing alcohol:  {Yes/No:686436::\"No\"} Functional Ability and Level of Safety:  
Hearing: {Desc; hearing loss:51986::\"Hearing is good. \"} Activities of Daily Living: The home contains: {AWV Home APHRGN:77749::\"QA safety equipment. \"} 
{Functional ADL's:62720::\"Patient does total self care\"} Ambulation: {Patient ambulates:13485::\"with no difficulty\"} Fall Risk: 
No flowsheet data found. Abuse Screen: 
{Abuse Screen:::\"Patient is not abused\"} Cognitive Screening Has your family/caregiver stated any concerns about your memory: {AWV no/yes:74623::\"no\"} {Cognitive Screenin} Patient Care Team  
Patient Care Team: 
Eli Paris MD as PCP - [facility] [place]) Eli Paris MD as PCP - [facility] Provider Edel Martinez MD (General Surgery) Emi Sanon RN as Care Transitions Nurse Assessment/Plan Education and counseling provided: 
{Education List, choose as appropriate:::\"Are appropriate based on today's review and evaluation\"} Diagnoses and all orders for this visit: 
 
1. Type 2 diabetes mellitus with diabetic neuropathy, without long-term current use of insulin ([address] -     AMB POC HEMOGLOBIN A1C 
 
2. Medicare annual wellness visit, subsequent Health Maintenance Due Topic Date Due  
 COLON CANCER SCRN (BARIUM / CT COLO / FLX SIG) Q5Y  2019  MEDICARE YEARLY EXAM  10/18/2019

## 2019-11-21 ENCOUNTER — PATIENT OUTREACH (OUTPATIENT)
Dept: FAMILY MEDICINE CLINIC | Age: 59
End: 2019-11-21

## 2019-11-21 NOTE — PROGRESS NOTES
.Date/Time:  11/21/2019 4:06 PM    Method of communication with patient:phone    [facility] ( [facility]) made out reach to  patient by telephone to offer Care Coordination Services ( CCS). Provided introduction to self, and explanation of the Ambulatory Care Manager's role. [facility] had to leave a voicemail message for return to  anytime Monday thru Friday 08:30-5:00.     [facility] second outreach ( 10/28/2019)

## 2019-11-29 NOTE — ACP (ADVANCE CARE PLANNING)
Advance Care Planning (ACP) Provider Note - Comprehensive     Date of ACP Conversation: 11/19/19  Persons included in Conversation:  patient  Length of ACP Conversation in minutes:  <16 minutes (Non-Billable)    Authorized Decision Maker (if patient is incapable of making informed decisions):    This person is:  Eusebio - Konstantin Reyes for ALL Patients with Decision Making Capacity:   Importance of advance care planning, including choosing a healthcare agent to communicate patient's healthcare decisions if patient lost the ability to make decisions, such as after a sudden illness or accident  Exploration of values, goals, and preferences if recovery is not expected, even with continued medical treatment in the event of: Imminent death  Severe, permanent brain injury    Review of Existing Advance Directive:  N/A    For Serious or Chronic Illness:  No known illness    Interventions Provided:  Recommended completion of Advance Directive form after review of ACP materials and conversation with prospective healthcare agent

## 2019-12-05 DIAGNOSIS — E11.9 TYPE 2 DIABETES MELLITUS WITHOUT COMPLICATION, WITHOUT LONG-TERM CURRENT USE OF INSULIN (HCC): ICD-10-CM

## 2019-12-06 RX ORDER — PIOGLITAZONEHYDROCHLORIDE 30 MG/1
TABLET ORAL
Qty: 90 TAB | Refills: 1 | Status: SHIPPED | OUTPATIENT
Start: 2019-12-06 | End: 2020-03-10 | Stop reason: SDUPTHER

## 2019-12-09 ENCOUNTER — PATIENT OUTREACH (OUTPATIENT)
Dept: FAMILY MEDICINE CLINIC | Age: 59
End: 2019-12-09

## 2019-12-09 NOTE — PROGRESS NOTES
.Complex Case Management Denial       Date/Time:  2019 10:05 AM    Method of communication with patient:phone    [facility] ( [facility]) contacted the patient by telephone to perform Ambulatory Care Coordination. Verified name and  with patient as identifiers. Provided introduction to self, and explanation of the Ambulatory Care Manager's role. Reviewed most recent clinic visit w/ patient who verbalized understanding. Patient given an opportunity to ask questions. The patient Declines Care Coordination Services at this time. [facility] 3rd attempt at [address]. Patient state's she is doing well no medical needs at this time. The patient agrees to contact the PCP office or the [facility] for questions related to their healthcare. [facility] provided contact information for future reference.

## 2020-01-06 ENCOUNTER — OFFICE VISIT (OUTPATIENT)
Dept: FAMILY MEDICINE CLINIC | Age: 60
End: 2020-01-06

## 2020-01-06 VITALS
BODY MASS INDEX: 40.32 KG/M2 | RESPIRATION RATE: 18 BRPM | HEIGHT: 65 IN | HEART RATE: 64 BPM | OXYGEN SATURATION: 100 % | TEMPERATURE: 98 F | DIASTOLIC BLOOD PRESSURE: 61 MMHG | SYSTOLIC BLOOD PRESSURE: 99 MMHG | WEIGHT: 242 LBS

## 2020-01-06 DIAGNOSIS — I10 ESSENTIAL HYPERTENSION WITH GOAL BLOOD PRESSURE LESS THAN 130/80: ICD-10-CM

## 2020-01-06 DIAGNOSIS — J06.9 UPPER RESPIRATORY TRACT INFECTION, UNSPECIFIED TYPE: ICD-10-CM

## 2020-01-06 DIAGNOSIS — Z12.11 COLON CANCER SCREENING: ICD-10-CM

## 2020-01-06 DIAGNOSIS — E11.9 TYPE 2 DIABETES MELLITUS WITHOUT COMPLICATION, WITHOUT LONG-TERM CURRENT USE OF INSULIN (HCC): Primary | ICD-10-CM

## 2020-01-06 DIAGNOSIS — E78.5 HYPERLIPIDEMIA, UNSPECIFIED HYPERLIPIDEMIA TYPE: ICD-10-CM

## 2020-01-06 DIAGNOSIS — K21.9 GASTROESOPHAGEAL REFLUX DISEASE WITHOUT ESOPHAGITIS: ICD-10-CM

## 2020-01-06 LAB — HBA1C MFR BLD HPLC: 9.1 %

## 2020-01-06 RX ORDER — GLIPIZIDE 5 MG/1
5 TABLET, FILM COATED, EXTENDED RELEASE ORAL DAILY
Qty: 30 TAB | Refills: 5 | Status: SHIPPED | OUTPATIENT
Start: 2020-01-06 | End: 2020-11-12 | Stop reason: DRUGHIGH

## 2020-01-06 RX ORDER — BENZONATATE 200 MG/1
200 CAPSULE ORAL
Qty: 30 CAP | Refills: 0 | Status: SHIPPED | OUTPATIENT
Start: 2020-01-06 | End: 2020-01-13

## 2020-01-06 NOTE — PROGRESS NOTES
Identified pt with two pt identifiers(name and ). Reviewed record in preparation for visit and have obtained necessary documentation. Chief Complaint   Patient presents with    Diabetes     Follow up   [address] Follow Up     12/10 and       Patient stated she has chest pain when coughing  Health Maintenance Due   Topic    COLON CANCER SCRN (BARIUM / CT COLO / FLX SIG) Q5Y     HEMOGLOBIN A1C Q6M        Coordination of Care Questionnaire:  :   1) Have you been to an emergency room, urgent care, or hospitalized since your last visit? If yes, where when, and reason for visit? Yes 12/10 and  [facility]      2. Have seen or consulted any other health care provider since your last visit? If yes, where when, and reason for visit? No      3) Do you have an Advanced Directive/ Living Will in place? no  If yes, do we have a copy on file No  If no, would you like information No      No flowsheet data found. 3 most recent PHQ Screens 2020   Little interest or pleasure in doing things Several days   Feeling down, depressed, irritable, or hopeless Several days   Total Score PHQ 2 2        No flowsheet data found. Fall Risk Assessment, last 12 mths 2019   Able to walk? Yes   Fall in past 12 months?  No

## 2020-01-06 NOTE — PROGRESS NOTES
[facility]    CC: F/U for Chronic Disease Management    HPI:     DMII:  -Taking diabetic medication as prescribed  -Denies any side effects or issues with diabetic medication  -Has not been regularly checking blood sugar at home  -Trying to eat healthy, but admits she has been stress eating carbs  -Not following a regular exercise regimen (Has not been using her previously purchased elliptical machine)          HTN:  -Taking BP medication as prescribed denies any side effects or issue with BP medication  -Does not check her BP at home  -Trying to eat healthy, but admits she has been stress eating carbs  -Not following a regular exercise regimen (Has not been using her previously purchased elliptical machine)      GERD:  -Taking reflux medication as prescribed  -Denies any side effects or issues with the medication  -Reports the medication works well to control her symptoms      HLD:  -Taking statin as prescribed  -Denies any side effects or issue with the statin  -Trying to eat healthy, but admits she has been stress eating carbs  -Not following a regular exercise regimen (Has not been using her previously purchased elliptical machine)      ROS: Positive items marked in RED  CON: fever, chills  Cardiovascular: palpitations, CP  Resp: SOB, cough (2/2 cold)  GI: nausea, vomiting, diarrhea  : dysuria, hematuria      Past Medical History:   Diagnosis Date    Arthritis     Chronic gastritis with bleeding     DDD (degenerative disc disease), lumbar     Depression     Diabetes mellitus, type II ([address]     Diverticulosis of colon     Fibroid uterus     Gallstones     Hot flashes     Hypertension     Meningioma ([address]     Morbid obesity ([address]     Vertigo        Past Surgical History:   Procedure Laterality Date    EXCIS INFRATENT BRAIN TUMOR  08/18/2015    [facility]    HX CRANIOTOMY Left 08/18/2015    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;  Surgeon: Margot Mota MD; Location: [facility] MAIN OR LOC;     HX LAP [facility]LECYSTECTOMY  2014    [facility]    HX TUBAL LIGATION Bilateral        Family History   Problem Relation Age of Onset    Cancer Mother 58        colon    Hypertension Mother     Diabetes Mother     Psychiatric Disorder Father     Lupus Sister        Social History     Socioeconomic History    Marital status:      Spouse name: Not on file    Number of children: Not on file    Years of education: Not on file    Highest education level: Not on file   Tobacco Use    Smoking status: Former Smoker     Last attempt to quit:      Years since quittin.0    Smokeless tobacco: Never Used   Substance and Sexual Activity    Alcohol use: No    Drug use: Yes     Types: Cocaine, Marijuana, Heroin, Opiates       No Known Allergies      Current Outpatient Medications:     pioglitazone (ACTOS) 30 mg tablet, TAKE 1 TABLET BY MOUTH ONCE DAILY, Disp: 90 Tab, Rfl: 1    pseudoephedrine (SUDAFED) 30 mg tablet, Take 2 Tabs by mouth every six (6) hours as needed for Congestion. , Disp: 24 Tab, Rfl: 0    VICTOZA 3-[place] 0.6 mg/0.1 mL (18 mg/3 mL) pnij, INITIALLY INJECT 0.6 MG SUBCUTANEOUSLY ONCE DAILY FOR 1 WEEK THEN INCREASE TO 1.2 MG ONCE DAILY THEREAFTER AS DIRECTED, Disp: 18 Pen, Rfl: 3    lisinopril (PRINIVIL, ZESTRIL) 5 mg tablet, Take 1 Tab by mouth daily. , Disp: 30 Tab, Rfl: 3    raNITIdine (ZANTAC) 150 mg tablet, TAKE 1 TABLET BY MOUTH TWICE DAILY, Disp: 120 Tab, Rfl: 1    aspirin 81 mg chewable tablet, Take 1 Tab by mouth daily. , Disp: 60 Tab, Rfl: 0    cyclobenzaprine (FLEXERIL) 10 mg tablet, Take 1 Tab by mouth three (3) times daily (with meals). , Disp: 90 Tab, Rfl: 0    meclizine (ANTIVERT) 25 mg tablet, Take 1 Tab by mouth two (2) times a day., Disp: 60 Tab, Rfl: 0    gabapentin (NEURONTIN) 300 mg capsule, Take 1 Cap by mouth three (3) times daily. , Disp: 90 Cap, Rfl: 0    ondansetron (ZOFRAN ODT) 4 mg disintegrating tablet, Take 1 Tab by mouth every eight (8) hours as needed for Nausea., Disp: 30 Tab, Rfl: 1    pen needle, diabetic (NOVOFINE PLUS) 32 gauge x 1/6\" ndle, Use daily to inject Victoza subcutaneously, Disp: 100 Pen Needle, Rfl: 12    montelukast (SINGULAIR) 10 mg tablet, Take 1 Tab by mouth daily. , Disp: 90 Tab, Rfl: 1    metFORMIN (GLUCOPHAGE) 1,000 mg tablet, Take 1 Tab by mouth two (2) times daily (with meals). , Disp: 180 Tab, Rfl: 1    glucose blood VI test strips (ASCENSIA AUTODISC VI, ONE TOUCH ULTRA TEST VI) strip, Use to check blood sugar daily, Disp: 100 Strip, Rfl: 6    glucose blood VI test strips (ACCU-CHEK JUNI PLUS TEST STRP) strip, Use to check blood sugar twice a day, Disp: 100 Strip, Rfl: 1    atorvastatin (LIPITOR) 40 mg tablet, Take 1 Tab by mouth daily. , Disp: 90 Tab, Rfl: 1    diclofenac (VOLTAREN) 1 % gel, Apply 4 g to affected area as needed. , Disp: , Rfl:     ibuprofen (MOTRIN) 800 mg tablet, Take 1 Tab by mouth every six (6) hours as needed for Pain., Disp: 180 Tab, Rfl: 1    Blood-Glucose Meter (ACCU-CHEK JUNI PLUS METER) misc, Use to check blood sugar twice a day, Disp: 1 Each, Rfl: 0    lancets (ACCU-CHEK SOFTCLIX LANCETS) misc, Use to check blood sugar twice a day, Disp: 100 Each, Rfl: 11    azelastine (ASTELIN) 137 mcg (0.1 %) nasal spray, 1 [place] by Both Nostrils route two (2) times a day. Use in each nostril as directed, Disp: 1 Bottle, Rfl: 6    fluticasone (FLONASE) 50 mcg/actuation nasal spray, 2 Sprays by Both Nostrils route daily. , Disp: 1 Bottle, Rfl: 6    Blood-Glucose Meter monitoring kit, As allowed by insurance, Disp: 1 Kit, Rfl: 0    Physical Exam:      BP 99/61 (BP 1 Location: Right arm, BP Patient Position: Sitting)   Pulse 64   Temp 98 °F (36.7 °C) (Oral)   Resp 18   Ht 5' 5\" (1.651 m)   Wt 242 lb (109.8 kg)   SpO2 100%   BMI 40.27 kg/m²     General: obese habitus, NAD, conversant  Eyes: sclera clear bilaterally, no discharge noted, eyelids normal in appearance  HENT: NCAT  Lungs: CTAB, normal respiratory effort and rate  CV: RRR, no MRGs  ABD: soft, non-tender, non-distended, normal bowel sounds  Skin: normal temperature, turgor, color, and texture  Psych: alert and oriented to person, place and situation, normal affect  Neuro: speech normal, moving all extremities, gait normal    Results for Tavon Bobo (MRN [id number]):   Ref.  Range 1/6/2020 09:23   Hemoglobin A1c (POC) Latest Units: % 9.1       Assessment/Plan     DMII, Inadequately Controlled:  -HGBA1c not at goal of <7%  -Will add on 5 mg of glipizide daily to current diabetic medication regimen  -Encouraged to follow recommended dietary changes  -F/U in 3 months      HTN, Well Controlled  -BP at goal of <130/80  -Will continue current BP medication  -May need to reduce dose of BP medication in the future given slightly low BP today  -F/U in 3 months      GERD, Well Controlled:  -Will continue current ranitidine regimen  -F/U in 3 months      HLD:  -Will continue current statin regimen  -F/U in 3 months      URI:  -Likely viral etiology  -Counseled on recommendations for symptomatic treatment  -Started on Tessalon regimen  -F/U as needed if symptoms worsen or fail to improve      Health Maintenance:  -Colon cancer screening ordered        Alessandra Adkins MD  1/6/2020, 8:06 AM

## 2020-02-05 ENCOUNTER — OFFICE VISIT (OUTPATIENT)
Dept: OBGYN CLINIC | Age: 60
End: 2020-02-05

## 2020-02-05 VITALS
HEART RATE: 83 BPM | SYSTOLIC BLOOD PRESSURE: 114 MMHG | BODY MASS INDEX: 41.15 KG/M2 | DIASTOLIC BLOOD PRESSURE: 74 MMHG | RESPIRATION RATE: 20 BRPM | OXYGEN SATURATION: 99 % | WEIGHT: 247 LBS | HEIGHT: 65 IN

## 2020-02-05 DIAGNOSIS — E11.40 TYPE 2 DIABETES MELLITUS WITH DIABETIC NEUROPATHY, WITHOUT LONG-TERM CURRENT USE OF INSULIN (HCC): ICD-10-CM

## 2020-02-05 DIAGNOSIS — N93.9 ABNORMAL UTERINE BLEEDING: Primary | ICD-10-CM

## 2020-02-05 NOTE — PROGRESS NOTES
1. Have you been to the ER, urgent care clinic since your last visit? Hospitalized since your last visit? No    2. Have you seen or consulted any other health care providers outside of the [address] [place] [address] since your last visit? Include any pap smears or colon screening.  No   Visit Vitals  /74   Pulse 83   Resp 20   Ht 5' 5\" (1.651 m)   Wt 247 lb (112 kg)   SpO2 99%   BMI 41.10 kg/m²

## 2020-02-07 NOTE — PROGRESS NOTES
Brayden Rhoades is a 64 y.o. female who presents today for the following:  Chief Complaint   Patient presents with    Fibroids       HPI  63 y/o presents with abnormal uterine bleeding that has been going on for about as long as she can remember. Patient states that she thinks that she went through menopause around [age] but cant remember exactly when it was. She does remember going about 7 months to one year without a cycle she thinks. No Known Allergies    Current Outpatient Medications   Medication Sig    glipiZIDE SR (GLUCOTROL XL) 5 mg CR tablet Take 1 Tab by mouth daily.  VICTOZA 3-[place] 0.6 mg/0.1 mL (18 mg/3 mL) pnij INITIALLY INJECT 0.6 MG SUBCUTANEOUSLY ONCE DAILY FOR 1 WEEK THEN INCREASE TO 1.2 MG ONCE DAILY THEREAFTER AS DIRECTED    lisinopril (PRINIVIL, ZESTRIL) 5 mg tablet Take 1 Tab by mouth daily.  aspirin 81 mg chewable tablet Take 1 Tab by mouth daily.  cyclobenzaprine (FLEXERIL) 10 mg tablet Take 1 Tab by mouth three (3) times daily (with meals). (Patient taking differently: Take 10 mg by mouth three (3) times daily (with meals). Indications: muscle spasm)    meclizine (ANTIVERT) 25 mg tablet Take 1 Tab by mouth two (2) times a day.  gabapentin (NEURONTIN) 300 mg capsule Take 1 Cap by mouth three (3) times daily.  ondansetron (ZOFRAN ODT) 4 mg disintegrating tablet Take 1 Tab by mouth every eight (8) hours as needed for Nausea. (Patient taking differently: Take 4 mg by mouth every eight (8) hours as needed for Nausea.)    montelukast (SINGULAIR) 10 mg tablet Take 1 Tab by mouth daily.  metFORMIN (GLUCOPHAGE) 1,000 mg tablet Take 1 Tab by mouth two (2) times daily (with meals).  glucose blood VI test strips (ASCENSIA AUTODISC VI, ONE TOUCH ULTRA TEST VI) strip Use to check blood sugar daily    glucose blood VI test strips (ACCU-CHEK JUNI PLUS TEST STRP) strip Use to check blood sugar twice a day    atorvastatin (LIPITOR) 40 mg tablet Take 1 Tab by mouth daily.     diclofenac (VOLTAREN) 1 % gel Apply 4 g to affected area as needed.  ibuprofen (MOTRIN) 800 mg tablet Take 1 Tab by mouth every six (6) hours as needed for Pain. (Patient taking differently: Take 800 mg by mouth every six (6) hours as needed for Pain.)    Blood-Glucose Meter (ACCU-CHEK JUNI PLUS METER) misc Use to check blood sugar twice a day    lancets (ACCU-CHEK SOFTCLIX LANCETS) misc Use to check blood sugar twice a day    Blood-Glucose Meter monitoring kit As allowed by insurance    pen needle, diabetic (NOVOFINE PLUS) 32 gauge x 1/6\" ndle Use daily to inject Victoza subcutaneously    pioglitazone (ACTOS) 30 mg tablet TAKE 1 TABLET BY MOUTH ONCE DAILY    pseudoephedrine (SUDAFED) 30 mg tablet Take 2 Tabs by mouth every six (6) hours as needed for Congestion. (Patient taking differently: Take 60 mg by mouth every six (6) hours as needed for Congestion.)    raNITIdine (ZANTAC) 150 mg tablet TAKE 1 TABLET BY MOUTH TWICE DAILY    azelastine (ASTELIN) 137 mcg (0.1 %) nasal spray 1 [place] by Both Nostrils route two (2) times a day. Use in each nostril as directed    fluticasone (FLONASE) 50 mcg/actuation nasal spray 2 Sprays by Both Nostrils route daily. No current facility-administered medications for this visit.         Immunization History   Administered Date(s) Administered    Pneumococcal Polysaccharide (PPSV-23) 08/13/2019    TB Skin Test (PPD) Intradermal 03/20/2019    Tdap 06/11/2015       Past Medical History:   Diagnosis Date    Arthritis     Chronic gastritis with bleeding     DDD (degenerative disc disease), lumbar     Depression     Diabetes mellitus, type II ([address]     Diverticulosis of colon     Fibroid uterus     Gallstones     Hot flashes     Hypertension     Meningioma ([address]     Morbid obesity ([address]     Vertigo        Past Surgical History:   Procedure Laterality Date    EXCIS INFRATENT BRAIN TUMOR  08/18/2015    [facility]    HX CRANIOTOMY Left 08/18/2015    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;  Surgeon: Mike Shrestha MD; Location: [facility] MAIN OR LOC;     HX LAP [facility]LECYSTECTOMY  2014    [facility]    HX TUBAL LIGATION Bilateral        Social History     Socioeconomic History    Marital status:      Spouse name: Not on file    Number of children: Not on file    Years of education: Not on file    Highest education level: Not on file   Occupational History    Not on file   Social Needs    Financial resource strain: Not on file    Food insecurity:     Worry: Not on file     Inability: Not on file    Transportation needs:     Medical: Not on file     Non-medical: Not on file   Tobacco Use    Smoking status: Former Smoker     Packs/day: 0.25     Types: Cigarettes     Start date:      Last attempt to quit:      Years since quittin.1    Smokeless tobacco: Never Used   Substance and Sexual Activity    Alcohol use: No    Drug use: Yes     Types: Cocaine, Marijuana, Heroin, Opiates    Sexual activity: Not on file   Lifestyle    Physical activity:     Days per week: Not on file     Minutes per session: Not on file    Stress: Not on file   Relationships    Social connections:     Talks on phone: Not on file     Gets together: Not on file     Attends [employer] service: Not on file     Active member of club or organization: Not on file     Attends meetings of clubs or organizations: Not on file     Relationship status: Not on file    Intimate partner violence:     Fear of current or ex partner: Not on file     Emotionally abused: Not on file     Physically abused: Not on file     Forced sexual activity: Not on file   Other Topics Concern    Not on file   Social History Narrative    Not on file       Family History   Problem Relation Age of Onset    Cancer Mother 58        colon    Hypertension Mother     Diabetes Mother     Psychiatric Disorder Father     Lupus Sister        ROS: vaginal bleeding/pelvic pain  Bloating and feels blocked up per the patient       OBGyn Exam   /74   Pulse 83   Resp 20   Ht 5' 5\" (1.651 m)   Wt 247 lb (112 kg)   SpO2 99%   BMI 41.10 kg/m²       1. Abnormal uterine bleeding    - US PELV NON OB W TV; Future        Follow Up: Will await call to schedule ultrasound.  Will notify patient of results    Neil Palmer MD

## 2020-02-11 ENCOUNTER — HOSPITAL ENCOUNTER (OUTPATIENT)
Dept: ULTRASOUND IMAGING | Age: 60
Discharge: HOME | End: 2020-02-11
Attending: OBSTETRICS & GYNECOLOGY
Payer: MEDICARE

## 2020-02-11 DIAGNOSIS — N93.9 ABNORMAL UTERINE BLEEDING: ICD-10-CM

## 2020-02-11 PROCEDURE — 76830 TRANSVAGINAL US NON-OB: CPT

## 2020-02-11 RX ORDER — BLOOD SUGAR DIAGNOSTIC
STRIP MISCELLANEOUS
Qty: 100 PEN NEEDLE | Refills: 2 | Status: SHIPPED | OUTPATIENT
Start: 2020-02-11 | End: 2021-02-09

## 2020-02-14 ENCOUNTER — TELEPHONE (OUTPATIENT)
Dept: OBGYN CLINIC | Age: 60
End: 2020-02-14

## 2020-02-14 DIAGNOSIS — R14.0 BLOATING: Primary | ICD-10-CM

## 2020-03-10 ENCOUNTER — OFFICE VISIT (OUTPATIENT)
Dept: FAMILY MEDICINE CLINIC | Age: 60
End: 2020-03-10

## 2020-03-10 VITALS
HEIGHT: 65 IN | OXYGEN SATURATION: 99 % | HEART RATE: 92 BPM | BODY MASS INDEX: 41.65 KG/M2 | DIASTOLIC BLOOD PRESSURE: 73 MMHG | SYSTOLIC BLOOD PRESSURE: 117 MMHG | RESPIRATION RATE: 16 BRPM | TEMPERATURE: 97 F | WEIGHT: 250 LBS

## 2020-03-10 DIAGNOSIS — I10 ESSENTIAL HYPERTENSION: ICD-10-CM

## 2020-03-10 DIAGNOSIS — R42 VERTIGO: ICD-10-CM

## 2020-03-10 DIAGNOSIS — E11.9 TYPE 2 DIABETES MELLITUS WITHOUT COMPLICATION, WITHOUT LONG-TERM CURRENT USE OF INSULIN (HCC): ICD-10-CM

## 2020-03-10 DIAGNOSIS — M54.50 ACUTE MIDLINE LOW BACK PAIN, UNSPECIFIED WHETHER SCIATICA PRESENT: Primary | ICD-10-CM

## 2020-03-10 DIAGNOSIS — E78.5 HYPERLIPIDEMIA, UNSPECIFIED HYPERLIPIDEMIA TYPE: ICD-10-CM

## 2020-03-10 DIAGNOSIS — J30.89 ENVIRONMENTAL AND SEASONAL ALLERGIES: ICD-10-CM

## 2020-03-10 RX ORDER — DICLOFENAC SODIUM 10 MG/G
4 GEL TOPICAL AS NEEDED
Status: CANCELLED | OUTPATIENT
Start: 2020-03-10

## 2020-03-10 RX ORDER — METHOCARBAMOL 750 MG/1
750 TABLET, FILM COATED ORAL 4 TIMES DAILY
Qty: 90 TAB | Refills: 1 | Status: SHIPPED | OUTPATIENT
Start: 2020-03-10 | End: 2021-02-09

## 2020-03-10 RX ORDER — PIOGLITAZONEHYDROCHLORIDE 30 MG/1
TABLET ORAL
Qty: 90 TAB | Refills: 1 | Status: SHIPPED | OUTPATIENT
Start: 2020-03-10 | End: 2020-07-31 | Stop reason: SDUPTHER

## 2020-03-10 RX ORDER — GUAIFENESIN 100 MG/5ML
81 LIQUID (ML) ORAL DAILY
Qty: 180 TAB | Refills: 2 | Status: SHIPPED | OUTPATIENT
Start: 2020-03-10

## 2020-03-10 RX ORDER — ATORVASTATIN CALCIUM 40 MG/1
40 TABLET, FILM COATED ORAL DAILY
Qty: 90 TAB | Refills: 1 | Status: SHIPPED | OUTPATIENT
Start: 2020-03-10 | End: 2020-10-12

## 2020-03-10 RX ORDER — DICLOFENAC SODIUM 75 MG/1
75 TABLET, DELAYED RELEASE ORAL 2 TIMES DAILY
Qty: 60 TAB | Refills: 2 | Status: SHIPPED | OUTPATIENT
Start: 2020-03-10 | End: 2020-08-14

## 2020-03-10 RX ORDER — MONTELUKAST SODIUM 10 MG/1
10 TABLET ORAL DAILY
Qty: 90 TAB | Refills: 1 | Status: SHIPPED | OUTPATIENT
Start: 2020-03-10 | End: 2020-08-14

## 2020-03-10 RX ORDER — MECLIZINE HYDROCHLORIDE 25 MG/1
25 TABLET ORAL 2 TIMES DAILY
Qty: 60 TAB | Refills: 1 | Status: SHIPPED | OUTPATIENT
Start: 2020-03-10 | End: 2020-08-25

## 2020-03-10 NOTE — PROGRESS NOTES
1. Have you been to the ER, urgent care clinic since your last visit? Hospitalized since your last visit? Yes When: 2-15-20 Adebayo Pope Back pain and leg pain    2. Have you seen or consulted any other health care providers outside of the [address] [place] [address] since your last visit? Include any pap smears or colon screening.  No

## 2020-03-10 NOTE — PROGRESS NOTES
[facility]    CC: Back Pain    HPI:     Back Pain:  -Issue started on 2020  -Occurred after she got her pelvic ultrasound  -Pain is in her lower back (Primarily midline)  -Reports the pain is constant  -Rates her pain at 10/10  -Alleviated by [employer] powder      ROS: Positive items marked in RED  CON: fever, chills  Cardiovascular: palpitations, CP  Resp: SOB, cough  GI: nausea, vomiting, diarrhea  : dysuria, hematuria      Past Medical History:   Diagnosis Date    Arthritis     Chronic gastritis with bleeding     DDD (degenerative disc disease), lumbar     Depression     Diabetes mellitus, type II ([address]     Diverticulosis of colon     Fibroid uterus     Gallstones     Hot flashes     Hypertension     Meningioma ([address]     Morbid obesity ([address]     Vertigo        Past Surgical History:   Procedure Laterality Date    EXCIS INFRATENT BRAIN TUMOR  2015    [facility]    HX CRANIOTOMY Left 2015    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;  Surgeon: Tana Mena MD; Location: [place] [address] MAIN OR LOC;     HX LAP CHOLECYSTECTOMY  2014    [place] [address]    HX TUBAL LIGATION Bilateral        Family History   Problem Relation Age of Onset    Cancer Mother 58        colon    Hypertension Mother     Diabetes Mother     Psychiatric Disorder Father     Lupus Sister        Social History     Socioeconomic History    Marital status:      Spouse name: Not on file    Number of children: Not on file    Years of education: Not on file    Highest education level: Not on file   Tobacco Use    Smoking status: Former Smoker     Packs/day: 0.25     Types: Cigarettes     Start date:      Last attempt to quit:      Years since quittin.2    Smokeless tobacco: Never Used   Substance and Sexual Activity    Alcohol use: No    Drug use: Yes     Types: Cocaine, Marijuana, Heroin, Opiates       No Known Allergies      Current Outpatient Medications:    Insulin Needles, Disposable, (BD ULTRA-FINE MICRO PEN NEEDLE) 32 gauge x 1/4\" ndle, Use daily to inject Victoza subcutaneously, Disp: 100 Pen Needle, Rfl: 2    glipiZIDE SR (GLUCOTROL XL) 5 mg CR tablet, Take 1 Tab by mouth daily. , Disp: 30 Tab, Rfl: 5    pioglitazone (ACTOS) 30 mg tablet, TAKE 1 TABLET BY MOUTH ONCE DAILY, Disp: 90 Tab, Rfl: 1    VICTOZA 3-[place] 0.6 mg/0.1 mL (18 mg/3 mL) pnij, INITIALLY INJECT 0.6 MG SUBCUTANEOUSLY ONCE DAILY FOR 1 WEEK THEN INCREASE TO 1.2 MG ONCE DAILY THEREAFTER AS DIRECTED, Disp: 18 Pen, Rfl: 3    lisinopril (PRINIVIL, ZESTRIL) 5 mg tablet, Take 1 Tab by mouth daily. , Disp: 30 Tab, Rfl: 3    montelukast (SINGULAIR) 10 mg tablet, Take 1 Tab by mouth daily. , Disp: 90 Tab, Rfl: 1    metFORMIN (GLUCOPHAGE) 1,000 mg tablet, Take 1 Tab by mouth two (2) times daily (with meals). , Disp: 180 Tab, Rfl: 1    glucose blood VI test strips (ASCENSIA AUTODISC VI, ONE TOUCH ULTRA TEST VI) strip, Use to check blood sugar daily, Disp: 100 Strip, Rfl: 6    glucose blood VI test strips (ACCU-CHEK JUNI PLUS TEST STRP) strip, Use to check blood sugar twice a day, Disp: 100 Strip, Rfl: 1    atorvastatin (LIPITOR) 40 mg tablet, Take 1 Tab by mouth daily. , Disp: 90 Tab, Rfl: 1    diclofenac (VOLTAREN) 1 % gel, Apply 4 g to affected area as needed. , Disp: , Rfl:     Blood-Glucose Meter (ACCU-CHEK JUNI PLUS METER) misc, Use to check blood sugar twice a day, Disp: 1 Each, Rfl: 0    lancets (ACCU-CHEK SOFTCLIX LANCETS) misc, Use to check blood sugar twice a day, Disp: 100 Each, Rfl: 11    Blood-Glucose Meter monitoring kit, As allowed by insurance, Disp: 1 Kit, Rfl: 0    pseudoephedrine (SUDAFED) 30 mg tablet, Take 2 Tabs by mouth every six (6) hours as needed for Congestion.  (Patient taking differently: Take 60 mg by mouth every six (6) hours as needed for Congestion.), Disp: 24 Tab, Rfl: 0    raNITIdine (ZANTAC) 150 mg tablet, TAKE 1 TABLET BY MOUTH TWICE DAILY, Disp: 120 Tab, Rfl: 1   aspirin 81 mg chewable tablet, Take 1 Tab by mouth daily. , Disp: 60 Tab, Rfl: 0    cyclobenzaprine (FLEXERIL) 10 mg tablet, Take 1 Tab by mouth three (3) times daily (with meals). (Patient taking differently: Take 10 mg by mouth three (3) times daily (with meals). Indications: muscle spasm), Disp: 90 Tab, Rfl: 0    meclizine (ANTIVERT) 25 mg tablet, Take 1 Tab by mouth two (2) times a day., Disp: 60 Tab, Rfl: 0    gabapentin (NEURONTIN) 300 mg capsule, Take 1 Cap by mouth three (3) times daily. , Disp: 90 Cap, Rfl: 0    ondansetron (ZOFRAN ODT) 4 mg disintegrating tablet, Take 1 Tab by mouth every eight (8) hours as needed for Nausea. (Patient taking differently: Take 4 mg by mouth every eight (8) hours as needed for Nausea.), Disp: 30 Tab, Rfl: 1    ibuprofen (MOTRIN) 800 mg tablet, Take 1 Tab by mouth every six (6) hours as needed for Pain. (Patient taking differently: Take 800 mg by mouth every six (6) hours as needed for Pain.), Disp: 180 Tab, Rfl: 1    azelastine (ASTELIN) 137 mcg (0.1 %) nasal spray, 1 [place] by Both Nostrils route two (2) times a day. Use in each nostril as directed, Disp: 1 Bottle, Rfl: 6    fluticasone (FLONASE) 50 mcg/actuation nasal spray, 2 Sprays by Both Nostrils route daily. , Disp: 1 Bottle, Rfl: 6    Physical Exam:      /73   Pulse 92   Temp 97 °F (36.1 °C) (Oral)   Resp 16   Ht 5' 5\" (1.651 m)   Wt 250 lb (113.4 kg)   SpO2 99%   BMI 41.60 kg/m²     General: obese habitus, NAD, conversant  Eyes: sclera clear bilaterally, no discharge noted, eyelids normal in appearance  HENT: NCAT  Back: normal appearance, no point tenderness or step-off deformity, bilateral muscle spasm noted in lower back  Lungs: CTAB, normal respiratory effort and rate  CV: RRR, no MRGs  ABD: soft, non-tender, non-distended, normal bowel sounds  Skin: normal temperature, turgor, color, and texture  Psych: alert and oriented to person, place and situation, normal affect  Neuro: speech normal, moving all extremities, gait normal      Lumbar Spine XR (2/15/2020): Impression:  1. No evidence for acute fracture or malalignment.   2. Mild degenerative findings are as described    Assessment/Plan     Lower Back Pain:  -Suspect she aggravated her degenerative spondylosis/disc disease during her pelvic ultrasound  -Patient counseled on suspected etiology and recommended care  -Started on diclofenac and Robaxin regimen  -Follow-up in 1 month (or sooner if needed)        Mary Villa MD  3/10/2020, 4:06 PM

## 2020-04-06 ENCOUNTER — TELEPHONE (OUTPATIENT)
Dept: PRIMARY CARE CLINIC | Age: 60
End: 2020-04-06

## 2020-04-06 ENCOUNTER — VIRTUAL VISIT (OUTPATIENT)
Dept: FAMILY MEDICINE CLINIC | Age: 60
End: 2020-04-06

## 2020-04-06 NOTE — TELEPHONE ENCOUNTER
Have tried 3 x times trying to reach pt for her 9AM appt with Dr. Hattie Rivas. Thank you    Left ms for pt to call us back in this regard.

## 2020-04-06 NOTE — PROGRESS NOTES
A user error has taken place: encounter opened in error, closed for administrative reasons.   Patient did not respond to phone call or text message for virtual visit

## 2020-04-09 ENCOUNTER — VIRTUAL VISIT (OUTPATIENT)
Dept: FAMILY MEDICINE CLINIC | Age: 60
End: 2020-04-09

## 2020-04-09 DIAGNOSIS — M54.50 ACUTE MIDLINE LOW BACK PAIN, UNSPECIFIED WHETHER SCIATICA PRESENT: ICD-10-CM

## 2020-04-09 DIAGNOSIS — E11.40 TYPE 2 DIABETES MELLITUS WITH DIABETIC NEUROPATHY, WITHOUT LONG-TERM CURRENT USE OF INSULIN (HCC): ICD-10-CM

## 2020-04-09 DIAGNOSIS — K21.9 GASTROESOPHAGEAL REFLUX DISEASE, ESOPHAGITIS PRESENCE NOT SPECIFIED: Primary | ICD-10-CM

## 2020-04-09 DIAGNOSIS — B37.31 VAGINAL YEAST INFECTION: ICD-10-CM

## 2020-04-09 RX ORDER — LIRAGLUTIDE 6 MG/ML
1.2 INJECTION SUBCUTANEOUS DAILY
Qty: 18 ADJUSTABLE DOSE PRE-FILLED PEN SYRINGE | Refills: 1 | Status: SHIPPED | OUTPATIENT
Start: 2020-04-09 | End: 2020-07-31 | Stop reason: SDUPTHER

## 2020-04-09 RX ORDER — FLUCONAZOLE 150 MG/1
150 TABLET ORAL DAILY
Qty: 1 TAB | Refills: 0 | Status: SHIPPED | OUTPATIENT
Start: 2020-04-09 | End: 2020-04-10

## 2020-04-09 RX ORDER — FAMOTIDINE 40 MG/1
40 TABLET, FILM COATED ORAL DAILY
Qty: 90 TAB | Refills: 2 | Status: SHIPPED | OUTPATIENT
Start: 2020-04-09 | End: 2020-07-31 | Stop reason: RX

## 2020-04-09 NOTE — PROGRESS NOTES
Shukri Russo    CC: Yeast Infection and Follow-up of Back Pain    HPI:     Consent: Rajani Fitzgerald, who was seen by synchronous (real-time) audio-video technology, and/or her healthcare decision maker, is aware that this patient-initiated, Telehealth encounter on 4/9/2020 is a billable service, with coverage as determined by her insurance carrier. She is aware that she may receive a bill and has provided verbal consent to proceed: Yes. Lower Back Pain:  -Reports that issue has improved since last visit  -Not taking previously prescribed medications for issue as she does not like to take medications  -Has been doing more stretches which has helped significantly      Yeast Infection:  -Issue started around 3 days ago  -Associated with vaginal itching and white discharge  -States that this feels like her prior yeast infections  -Has not tried any OTC medication for issue      GERD:  -Not taking previously prescribed ranitidine  -Reports issues with nausea that is alleviated with usage of Tums  -States that issue has also improved when she stopped eating cereal in the morning        ROS: Positive items marked in RED  CON: fever, chills  Cardiovascular: palpitations, CP  Resp: SOB, cough  GI: nausea, vomiting, diarrhea  : dysuria, hematuria      Past Medical History:   Diagnosis Date    Arthritis     Chronic gastritis with bleeding     DDD (degenerative disc disease), lumbar     Depression     Diabetes mellitus, type II ([address]     Diverticulosis of colon     Fibroid uterus     Gallstones     Hot flashes     Hypertension     Meningioma ([address]     Morbid obesity ([address]     Vertigo        Past Surgical History:   Procedure Laterality Date    EXCIS INFRATENT BRAIN TUMOR  08/18/2015    [facility]    HX CRANIOTOMY Left 08/18/2015    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;  Surgeon: Flynn Schmid MD; Location: [facility] LOC;     HX LAP CHOLECYSTECTOMY  11/04/2014 [facility]    HX TUBAL LIGATION Bilateral        Family History   Problem Relation Age of Onset    Cancer Mother 58        colon    Hypertension Mother     Diabetes Mother     Psychiatric Disorder Father     Lupus Sister        Social History     Socioeconomic History    Marital status:      Spouse name: Not on file    Number of children: Not on file    Years of education: Not on file    Highest education level: Not on file   Tobacco Use    Smoking status: Former Smoker     Packs/day: 0.25     Types: Cigarettes     Start date:      Last attempt to quit:      Years since quittin.2    Smokeless tobacco: Never Used   Substance and Sexual Activity    Alcohol use: No    Drug use: Yes     Types: Cocaine, Marijuana, Heroin, Opiates       No Known Allergies      Current Outpatient Medications:     aspirin 81 mg chewable tablet, Take 1 Tab by mouth daily. , Disp: 180 Tab, Rfl: 2    atorvastatin (LIPITOR) 40 mg tablet, Take 1 Tab by mouth daily. , Disp: 90 Tab, Rfl: 1    meclizine (ANTIVERT) 25 mg tablet, Take 1 Tab by mouth two (2) times a day., Disp: 60 Tab, Rfl: 1    montelukast (SINGULAIR) 10 mg tablet, Take 1 Tab by mouth daily. , Disp: 90 Tab, Rfl: 1    pioglitazone (ACTOS) 30 mg tablet, TAKE 1 TABLET BY MOUTH ONCE DAILY, Disp: 90 Tab, Rfl: 1    methocarbamoL (ROBAXIN) 750 mg tablet, Take 1 Tab by mouth four (4) times daily. , Disp: 90 Tab, Rfl: 1    diclofenac EC (VOLTAREN) 75 mg EC tablet, Take 1 Tab by mouth two (2) times a day., Disp: 60 Tab, Rfl: 2    Insulin Needles, Disposable, (BD ULTRA-FINE MICRO PEN NEEDLE) 32 gauge x 1/4\" ndle, Use daily to inject Victoza subcutaneously, Disp: 100 Pen Needle, Rfl: 2    glipiZIDE SR (GLUCOTROL XL) 5 mg CR tablet, Take 1 Tab by mouth daily. , Disp: 30 Tab, Rfl: 5    pseudoephedrine (SUDAFED) 30 mg tablet, Take 2 Tabs by mouth every six (6) hours as needed for Congestion.  (Patient taking differently: Take 60 mg by mouth every six (6) hours as needed for Congestion.), Disp: 24 Tab, Rfl: 0    VICTOZA 3-[place] 0.6 mg/0.1 mL (18 mg/3 mL) pnij, INITIALLY INJECT 0.6 MG SUBCUTANEOUSLY ONCE DAILY FOR 1 WEEK THEN INCREASE TO 1.2 MG ONCE DAILY THEREAFTER AS DIRECTED (Patient taking differently: 1.2 mg by SubCUTAneous route daily.), Disp: 18 Pen, Rfl: 3    lisinopril (PRINIVIL, ZESTRIL) 5 mg tablet, Take 1 Tab by mouth daily. , Disp: 30 Tab, Rfl: 3    ondansetron (ZOFRAN ODT) 4 mg disintegrating tablet, Take 1 Tab by mouth every eight (8) hours as needed for Nausea. (Patient taking differently: Take 4 mg by mouth every eight (8) hours as needed for Nausea.), Disp: 30 Tab, Rfl: 1    metFORMIN (GLUCOPHAGE) 1,000 mg tablet, Take 1 Tab by mouth two (2) times daily (with meals). , Disp: 180 Tab, Rfl: 1    glucose blood VI test strips (ASCENSIA AUTODISC VI, ONE TOUCH ULTRA TEST VI) strip, Use to check blood sugar daily, Disp: 100 Strip, Rfl: 6    glucose blood VI test strips (ACCU-CHEK JUNI PLUS TEST STRP) strip, Use to check blood sugar twice a day, Disp: 100 Strip, Rfl: 1    ibuprofen (MOTRIN) 800 mg tablet, Take 1 Tab by mouth every six (6) hours as needed for Pain. (Patient taking differently: Take 800 mg by mouth every six (6) hours as needed for Pain.), Disp: 180 Tab, Rfl: 1    Blood-Glucose Meter (ACCU-CHEK JUNI PLUS METER) misc, Use to check blood sugar twice a day, Disp: 1 Each, Rfl: 0    lancets (ACCU-CHEK SOFTCLIX LANCETS) misc, Use to check blood sugar twice a day, Disp: 100 Each, Rfl: 11    Blood-Glucose Meter monitoring kit, As allowed by insurance, Disp: 1 Kit, Rfl: 0    raNITIdine (ZANTAC) 150 mg tablet, TAKE 1 TABLET BY MOUTH TWICE DAILY, Disp: 120 Tab, Rfl: 1    gabapentin (NEURONTIN) 300 mg capsule, Take 1 Cap by mouth three (3) times daily. , Disp: 90 Cap, Rfl: 0    diclofenac (VOLTAREN) 1 % gel, Apply 4 g to affected area as needed. , Disp: , Rfl:     azelastine (ASTELIN) 137 mcg (0.1 %) nasal spray, 1 [place] by Both Nostrils route two (2) times a day. Use in each nostril as directed, Disp: 1 Bottle, Rfl: 6    fluticasone (FLONASE) 50 mcg/actuation nasal spray, 2 Sprays by Both Nostrils route daily. , Disp: 1 Bottle, Rfl: 6    Physical Exam:      General: obese habitus, NAD, conversant, initially standing in restaurant and then moved to her vehicle  Eyes: sclera clear bilaterally, no discharge noted, eyelids normal in appearance, EOMI  HENT: NCAT  Neck: no masses visualized, trachea appears to be midline  Lungs: normal respiratory effort and rate, No visualized signs of difficulty breathing or respiratory distress  MS: Normal AROM of neck noted  Skin: no significant exanthematous lesions or discoloration noted on neck/facial skin    Psych: alert and oriented to person, place and situation, normal affect  Neuro: speech normal, moving all upper extremities, no gaze palsy, no facial asymmetry (Cranial nerve 7 motor function) (limited exam due to video visit)       Assessment/Plan     GERD, inadequately controlled:  -Officially discontinued ranitidine  -Started on famotidine regimen  -Follow-up in 1 month      Lower Back Pain, improving:  -Encouraged to continue doing stretches for issue  -Officially discontinued diclofenac  -Follow-up in 1 month      Yeast Infection:  -Will treat empirically for issue based on reported symptoms  -Started on Diflucan regimen  -Follow-up in 1 month (or sooner if needed)      Judy Uribe is a 64 y.o. female being evaluated by a Virtual Visit (video visit) encounter to address concerns as mentioned above. A caregiver was present when appropriate. Due to this being a TeleHealth encounter (During [place]KYY[place]-07 public health emergency), evaluation of the following organ systems was limited: Vitals/Constitutional/EENT/Resp/CV/GI//MS/Neuro/Skin/Heme-Lymph-Imm.   Pursuant to the emergency declaration under the [address] waiver authority and the [employer] Supplemental Appropriations Act, this Virtual Visit was conducted with patient's (and/or legal guardian's) consent, to reduce the risk of exposure to COVID-19 and provide necessary medical care. Services were provided through a video synchronous discussion virtually to substitute for in-person encounter. --Kraig Reddy MD on 4/9/2020 at 10:11AM    An electronic signature was used to authenticate this note.

## 2020-04-09 NOTE — PROGRESS NOTES
1. Have you been to the ER, urgent care clinic since your last visit? Hospitalized since your last visit? No    2. Have you seen or consulted any other health care providers outside of the [address] [place] [address] since your last visit? Include any pap smears or colon screening.  No

## 2020-04-27 DIAGNOSIS — I10 ESSENTIAL HYPERTENSION WITH GOAL BLOOD PRESSURE LESS THAN 130/80: ICD-10-CM

## 2020-04-27 RX ORDER — LISINOPRIL 5 MG/1
TABLET ORAL
Qty: 90 TAB | Refills: 0 | Status: SHIPPED | OUTPATIENT
Start: 2020-04-27 | End: 2020-08-14

## 2020-05-11 ENCOUNTER — VIRTUAL VISIT (OUTPATIENT)
Dept: FAMILY MEDICINE CLINIC | Age: 60
End: 2020-05-11

## 2020-05-11 NOTE — PROGRESS NOTES
1st attempt- 9:11am -No answer- Left message to return call for check in.    2nd attempt- 9:51am- No answer    3rd attempt- 10:12am - No answer      A user error has taken place: encounter opened in error, closed for administrative reasons.

## 2020-07-01 ENCOUNTER — VIRTUAL VISIT (OUTPATIENT)
Dept: FAMILY MEDICINE CLINIC | Age: 60
End: 2020-07-01

## 2020-07-01 NOTE — PROGRESS NOTES
1. Have you been to the ER, urgent care clinic since your last visit? Hospitalized since your last visit? No    2. Have you seen or consulted any other health care providers outside of the [address] [place] [address] since your last visit? Include any pap smears or colon screening.  No

## 2020-07-01 NOTE — PROGRESS NOTES
1. Have you been to the ER, urgent care clinic since your last visit? Hospitalized since your last visit? No    2. Have you seen or consulted any other health care providers outside of the [address] [place] [address] since your last visit? Include any pap smears or colon screening. No    This encounter was created in error - please disregard.

## 2020-07-31 ENCOUNTER — VIRTUAL VISIT (OUTPATIENT)
Dept: FAMILY MEDICINE CLINIC | Age: 60
End: 2020-07-31

## 2020-07-31 DIAGNOSIS — B37.31 VAGINAL YEAST INFECTION: ICD-10-CM

## 2020-07-31 DIAGNOSIS — E11.69 DIABETES MELLITUS TYPE 2 IN OBESE (HCC): ICD-10-CM

## 2020-07-31 DIAGNOSIS — M54.42 ACUTE BILATERAL LOW BACK PAIN WITH BILATERAL SCIATICA: ICD-10-CM

## 2020-07-31 DIAGNOSIS — E66.9 DIABETES MELLITUS TYPE 2 IN OBESE (HCC): ICD-10-CM

## 2020-07-31 DIAGNOSIS — M54.41 ACUTE BILATERAL LOW BACK PAIN WITH BILATERAL SCIATICA: ICD-10-CM

## 2020-07-31 DIAGNOSIS — K21.9 GASTROESOPHAGEAL REFLUX DISEASE, ESOPHAGITIS PRESENCE NOT SPECIFIED: Primary | ICD-10-CM

## 2020-07-31 DIAGNOSIS — U07.1 COVID-19: ICD-10-CM

## 2020-07-31 RX ORDER — CIMETIDINE 400 MG/1
400 TABLET, FILM COATED ORAL 2 TIMES DAILY
Qty: 60 TAB | Refills: 2 | Status: SHIPPED | OUTPATIENT
Start: 2020-07-31 | End: 2020-08-14 | Stop reason: ALTCHOICE

## 2020-07-31 RX ORDER — FAMOTIDINE 40 MG/1
40 TABLET, FILM COATED ORAL DAILY
Qty: 90 TAB | Refills: 2 | Status: CANCELLED | OUTPATIENT
Start: 2020-07-31

## 2020-07-31 RX ORDER — PIOGLITAZONEHYDROCHLORIDE 30 MG/1
TABLET ORAL
Qty: 90 TAB | Refills: 1 | Status: SHIPPED | OUTPATIENT
Start: 2020-07-31 | End: 2021-02-09 | Stop reason: SDUPTHER

## 2020-07-31 RX ORDER — PSEUDOEPHEDRINE HCL 30 MG
60 TABLET ORAL
Qty: 24 TAB | Refills: 0 | Status: SHIPPED | OUTPATIENT
Start: 2020-07-31 | End: 2020-12-01

## 2020-07-31 RX ORDER — LIRAGLUTIDE 6 MG/ML
1.2 INJECTION SUBCUTANEOUS DAILY
Qty: 18 ADJUSTABLE DOSE PRE-FILLED PEN SYRINGE | Refills: 1 | Status: SHIPPED | OUTPATIENT
Start: 2020-07-31 | End: 2021-02-09

## 2020-07-31 RX ORDER — METFORMIN HYDROCHLORIDE 1000 MG/1
1000 TABLET ORAL 2 TIMES DAILY WITH MEALS
Qty: 180 TAB | Refills: 1 | Status: SHIPPED | OUTPATIENT
Start: 2020-07-31 | End: 2021-04-30 | Stop reason: SDUPTHER

## 2020-07-31 NOTE — PROGRESS NOTES
1. Have you been to the ER, urgent care clinic since your last visit? Hospitalized since your last visit? Yes 7-13-20 [facility] for fall    Patient was covid Positive on 6-22-20 and 7-5-20    2. Have you seen or consulted any other health care providers outside of the [address] [place] [address] since your last visit? Include any pap smears or colon screening.  No

## 2020-07-31 NOTE — PROGRESS NOTES
[facility]    CC: Follow-up for Chronic Disease Management    HPI:     Waldo Hester, who was evaluated through a synchronous (real-time) audio-video encounter, and/or her healthcare decision maker, is aware that it is a billable service, with coverage as determined by her insurance carrier. She provided verbal consent to proceed: Yes, and patient identification was verified. It was conducted pursuant to the emergency declaration under the [address] [place] [address] and the [employer] Act. A caregiver was present when appropriate. Ability to conduct physical exam was limited. I was in the office. The patient was at home.       GERD:  -Has not been taking prescribed famotidine  -States that the pharmacy never gave her the prescription  -Diet is unchanged as last visit      Yeast Infection:  -Report issue resolved with use of no medication  -States that the pharmacy never gave her the prescription      Back Pain:  -Secondary to fall at [address]  -Fall happened on 7/12/2020  -Reports having pain across her lower back  -Pain is associated with radiation down both her legs      COVID-19 Infection:  -Reports getting illness this past June  -States that her illness was not severe  -Has gotten completely over the infection and comments that she is asymptomatic  -Received a letter on June 14 clearing her to return to regular activities (letter was shown during the visit)      ROS: Positive items marked in RED  CON: fever, chills  Cardiovascular: palpitations, CP  Resp: SOB, cough  GI: nausea, vomiting, diarrhea  : dysuria, hematuria      Past Medical History:   Diagnosis Date    Arthritis     Chronic gastritis with bleeding     DDD (degenerative disc disease), lumbar     Depression     [facility] mellitus, type II [facility]     Diverticulosis of colon     Fibroid uterus     Gallstones     Hot flashes     Hypertension     [facility]     Morbid obesity ([facility]     Vertigo        Past Surgical History:   Procedure Laterality Date    EXCIS INFRATENT BRAIN TUMOR  2015    [facility]    HX CRANIOTOMY Left 2015    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; Surgeon: Ginny Dia MD; Location: [facility] MAIN OR LOC;     [facility]    HX TUBAL LIGATION Bilateral        Family History   Problem Relation Age of Onset    Cancer Mother 58        colon   [address] [facility] Hypertension Mother     Diabetes Mother     Psychiatric Disorder Father     Lupus Sister        Social History     Tobacco Use    Smoking status: Former Smoker     Packs/day: 0.25     Types: Cigarettes     Start date:      Last attempt to quit:      Years since quittin.5    Smokeless tobacco: Never Used   Substance Use Topics    Alcohol use: No    Drug use: Yes     Types: Cocaine, Marijuana, Heroin, Opiates       No Known Allergies      Current Outpatient Medications:     lisinopriL (PRINIVIL, ZESTRIL) 5 mg tablet, Take 1 tablet by mouth once daily, Disp: 90 Tab, Rfl: 0    famotidine (PEPCID) 40 mg tablet, Take 1 Tab by mouth daily. , Disp: 90 Tab, Rfl: 2    liraglutide (Victoza 3-José) 0.6 mg/0.1 mL (18 mg/3 mL) pnij, 1.2 mg by SubCUTAneous route daily. , Disp: 18 Adjustable Dose Pre-filled Pen Syringe, Rfl: 1    aspirin 81 mg chewable tablet, Take 1 Tab by mouth daily. , Disp: 180 Tab, Rfl: 2    atorvastatin (LIPITOR) 40 mg tablet, Take 1 Tab by mouth daily. , Disp: 90 Tab, Rfl: 1    meclizine (ANTIVERT) 25 mg tablet, Take 1 Tab by mouth two (2) times a day. (Patient taking differently: Take 25 mg by mouth two (2) times daily as needed.), Disp: 60 Tab, Rfl: 1    montelukast (SINGULAIR) 10 mg tablet, Take 1 Tab by mouth daily. , Disp: 90 Tab, Rfl: 1    pioglitazone (ACTOS) 30 mg tablet, TAKE 1 TABLET BY MOUTH ONCE DAILY, Disp: 90 Tab, Rfl: 1    diclofenac EC (VOLTAREN) 75 mg EC tablet, Take 1 Tab by mouth two (2) times a day. (Patient taking differently: Take 75 mg by mouth two (2) times daily as needed.), Disp: 60 Tab, Rfl: 2    Insulin Needles, Disposable, (BD ULTRA-FINE MICRO PEN NEEDLE) 32 gauge x 1/4\" ndle, Use daily to inject Victoza subcutaneously, Disp: 100 Pen Needle, Rfl: 2    glipiZIDE SR (GLUCOTROL XL) 5 mg CR tablet, Take 1 Tab by mouth daily. , Disp: 30 Tab, Rfl: 5    pseudoephedrine (SUDAFED) 30 mg tablet, Take 2 Tabs by mouth every six (6) hours as needed for Congestion. (Patient taking differently: Take 60 mg by mouth every six (6) hours as needed for Congestion.), Disp: 24 Tab, Rfl: 0    metFORMIN (GLUCOPHAGE) 1,000 mg tablet, Take 1 Tab by mouth two (2) times daily (with meals). , Disp: 180 Tab, Rfl: 1    glucose blood VI test strips (ASCENSIA AUTODISC VI, ONE TOUCH ULTRA TEST VI) strip, Use to check blood sugar daily, Disp: 100 Strip, Rfl: 6    glucose blood VI test strips (ACCU-CHEK JUNI PLUS TEST STRP) strip, Use to check blood sugar twice a day, Disp: 100 Strip, Rfl: 1    ibuprofen (MOTRIN) 800 mg tablet, Take 1 Tab by mouth every six (6) hours as needed for Pain. (Patient taking differently: Take 800 mg by mouth every six (6) hours as needed for Pain.), Disp: 180 Tab, Rfl: 1    Blood-Glucose Meter (ACCU-CHEK JUNI PLUS METER) misc, Use to check blood sugar twice a day, Disp: 1 Each, Rfl: 0    lancets (ACCU-CHEK SOFTCLIX LANCETS) misc, Use to check blood sugar twice a day, Disp: 100 Each, Rfl: 11    Blood-Glucose Meter monitoring kit, As allowed by insurance, Disp: 1 Kit, Rfl: 0    methocarbamoL (ROBAXIN) 750 mg tablet, Take 1 Tab by mouth four (4) times daily. , Disp: 90 Tab, Rfl: 1    ondansetron (ZOFRAN ODT) 4 mg disintegrating tablet, Take 1 Tab by mouth every eight (8) hours as needed for Nausea.  (Patient taking differently: Take 4 mg by mouth every eight (8) hours as needed for Nausea.), Disp: 30 Tab, Rfl: 1    Physical Exam:      General: obese habitus, NAD, conversant  Eyes: sclera clear bilaterally, no discharge noted, eyelids normal in appearance, EOMI  HENT: NCAT  Neck: no masses visualized, trachea appears to be midline  Lungs: normal respiratory effort and rate, No visualized signs of difficulty breathing or respiratory distress  MS: normal AROM of neck noted  Skin: no significant exanthematous lesions or discoloration noted on neck/facial skin    Psych: alert and oriented to person, place and situation, normal affect  Neuro: speech normal, moving all upper extremities, no gaze palsy, no facial asymmetry (Cranial nerve 7 motor function) (limited exam due to video visit)      SARS-CoV-2 PCR (COVID-19) (06/22/2020 6:45 PM EDT)   Component Value Ref Range Performed At Pathologist Signature   SARS-CoV-2 PCR (COVID-19) DETECTED (AA) Not Detected [facility]           SARS-CoV-2 PCR (COVID-19) (07/05/2020 7:34 PM EDT)   Component Value Ref Range Performed At Pathologist Signature   SARS-CoV-2 PCR (COVID-19) DETECTED (AA) Not Detected [facility]        Assessment/Plan     GERD, inadequately controlled:  -Discontinued previously ordered famotidine regimen due to current shortage  -Started on cimetidine regimen  -Follow-up in 1 month      Acute Lower Back Pain with Bilateral Sciatica:  -Ability to diagnose/assess issue significantly limited due to nature of visit type  -Referred to PT  -Follow-up in 1 month      Yeast infection, resolved:  -Suspected diagnosis  -Currently no indication for further intervention  -Follow-up in 1 month      COVID-19 Infection, resolved:  -Counseled on COVID-19 and methods to prevent spreading/acquiring the infection  -Follow-up in 1 month      Umair Contreras is a 64 y.o. female being evaluated by a Virtual Visit (video visit) encounter to address concerns as mentioned above. A caregiver was present when appropriate.  Due to this being a TeleHealth encounter (During [place] [address] public health emergency), evaluation of the following organ systems was limited: Vitals/Constitutional/EENT/Resp/CV/GI//MS/Neuro/Skin/Heme-Lymph-Imm. Pursuant to the emergency declaration under the [address] [place] [address] and the [employer] Act, this Virtual Visit was conducted with patient's (and/or legal guardian's) consent, to reduce the risk of exposure to COVID-19 and provide necessary medical care. Services were provided through a video synchronous discussion virtually to substitute for in-person encounter. --Tim Bustamante MD on 7/31/2020 at 10:37 AM    An electronic signature was used to authenticate this note.

## 2020-08-17 ENCOUNTER — HOSPITAL ENCOUNTER (OUTPATIENT)
Dept: PHYSICAL THERAPY | Age: 60
Discharge: HOME | End: 2020-08-17
Payer: MEDICARE

## 2020-08-17 PROCEDURE — 97162 PT EVAL MOD COMPLEX 30 MIN: CPT

## 2020-08-17 NOTE — PROGRESS NOTES
PT DAILY TREATMENT NOTE 10-18    Patient Name: Celestino Jamison  Date:2020  : 1960  [x]  Patient  Verified  Payor: [place] MEDICARE / Plan: [place] MEDICARE PART A & B / Product Type: Medicare /    In time:415  Out time:500  Total Treatment Time (min): 45  Visit #: 1 of     Medicare/BCBS Only   Total Timed Codes (min):  5 1:1 Treatment Time:  40       Treatment Area: Low back pain [M54.5]    SUBJECTIVE  Pain Level (0-10 scale): 9  Any medication changes, allergies to medications, adverse drug reactions, diagnosis change, or new procedure performed?: [x] No    [] Yes (see summary sheet for update)  Subjective functional status/changes:   [] No changes reported  Pt initial eval today see POC for details    OBJECTIVE    Modality rationale: decrease pain to improve the patients ability to walk and stand   Min Type Additional Details    [] Estim:  []Unatt       []IFC  []Premod                        []Other:  []w/ice   []w/heat  Position:  Location:    [] Estim: []Att    []TENS instruct  []NMES                    []Other:  []w/US   []w/ice   []w/heat  Position:  Location:    []  Traction: [] Cervical       []Lumbar                       [] Prone          []Supine                       []Intermittent   []Continuous Lbs:  [] before manual  [] after manual    []  Ultrasound: []Continuous   [] Pulsed                           []1MHz   []3MHz W/cm2:  Location:    []  Iontophoresis with dexamethasone         Location: [] Take home patch   [] In clinic   5 []  Ice     [x]  heat  []  Ice massage  []  Laser   []  Anodyne Position: sidelying  Location: low back    []  Laser with stim  []  Other:  Position:  Location:    []  Vasopneumatic Device Pressure:       [] lo [] med [] hi   Temperature: [] lo [] med [] hi   [] Skin assessment post-treatment:  []intact []redness- no adverse reaction    []redness - adverse reaction:     35 min [x]Eval                  []Re-Eval       5 NC min Therapeutic Exercise:  [] See flow sheet :   Rationale: increase ROM and increase strength to improve the patients ability to walk and stand              With   [] TE   [] TA   [] neuro   [] other: Patient Education: [x] Review HEP    [] Progressed/Changed HEP based on:   [] positioning   [] body mechanics   [] transfers   [] heat/ice application    [] other:      Other Objective/Functional Measures:   Justification for Eval Code Complexity:  Patient History : see POC   Examination see exam   Clinical Presentation: evolving  Clinical Decision Making : FOTO : 32/100       Pain Level (0-10 scale) post treatment: 9    ASSESSMENT/Changes in Function:  Pt was instructed in initial HEP required demo, vc, and tc for all exercises to perform correctly. Pt was given hand out detailing exercises and instructed in modification of exercises to tolerance, and in performing exercises safely. Pt verbalized understanding. Patient will continue to benefit from skilled PT services to modify and progress therapeutic interventions, address functional mobility deficits, address ROM deficits, address strength deficits, analyze and address soft tissue restrictions, analyze and cue movement patterns, analyze and modify body mechanics/ergonomics, assess and modify postural abnormalities, address imbalance/dizziness and instruct in home and community integration to attain remaining goals.      [x]  See Plan of Care  []  See progress note/recertification  []  See Discharge Summary         Progress towards goals / Updated goals:  No progress as pt goals were set today    PLAN  [x]  Upgrade activities as tolerated     [x]  Continue plan of care  []  Update interventions per flow sheet       []  Discharge due to:_  []  Other:_      Shanita Medina 8/17/2020  1:23 PM    Future Appointments   Date Time Provider Mark [place]   8/17/2020  4:15 PM Andreina Lawson [address]   9/1/2020 11:15 AM Peter Nieves MD [address] [facility] [address]

## 2020-08-17 NOTE — PROGRESS NOTES
[facility] PHYSICAL THERAPY AT [address] [place] [address]  Phone: [phone] Fax: [phone] / [address] [address] PHYSICAL THERAPY SERVICES  Patient Name: Merle Culp : 1960   Medical   Diagnosis: Low back pain [M54.5] Treatment Diagnosis: Low back pain [M54.5]   Onset Date: 20     Referral Source: Cliff Dubose MD [facility]): 2020   Prior Hospitalization: See medical history Provider #: [id number]   Prior Level of Function: Able to walk/stand 20-30 mins, able to bend   Comorbidities: Hx of brain tumor, HTN, diabetes, arthritis, depression   Medications: Verified on Patient Summary List   The Plan of Care and following information is based on the information from the initial evaluation.   ========================================================================  Assessment / key information:   Pt is a 64y.o. year old F who is referred for tx of LBP. Current deficits include: dec ROM, dec strength, pain, poor core stability, poor balance. Functional deficits include: inc fall risk, difficulty walking, standing, bending. FOTO score indicates 68% functional impairment. Home exercise program initiated on initial evaluation to address these deficits. Pt would benefit from PT to address these deficits for increased functional mobility and QOL. FELECIA:  Pt reports that she tripped and lost her balance on uneven pavement in the  on [place] on 20. She threw her arms up and twisted to catch her balance and was able to avoid falling down. She wanted to avoid falling because she didn't want to hit her head because she had a brain tumor removed in . It was non cancerous, and she has had no lasting issues other than some changes in taste and smell. Her back didn't hurt terribly immediately, but the next morning it was very painful.  She went to the ER where they did an ultrasound on her which she reports showed no fracture. She did a follow up with her MD via teledoc, who referred her to PT. She reports she has fallen twice with no specific cause since the accident a couple of weeks ago, and the end of July. She fell all the way to the ground with both, the first one falling forward onto all fours, the second one onto her L side. She reports some \"funny feeling\" in her R big toe, and pain from her back all the way down to her feet that is like a dull ache. It is worse with walking, standing, and bending down, and better with laying down on her side. She is limited to about 10 mins walking, standing 10 mins, and avoids bending down if possible. Pt denies saddle numbness/tingling, does report mild incontinence that has been longstanding. OBJECTIVE:    PAIN:  Location- Low back ZAHIDA LE R>L  Current- 9/10  Best- 6/10  Worst- 9/10  Alleviating factors: laying on side  Aggravating factors: walking, standing, bending    MMT:  Hip   -flex L=3/5 R=3/5 !  -ext L=3-/5 R=3-/5 !  -abd L=3-/5 R=3-/5! Knee  -flex L=3/5 R=3/5  -ext L=3/5 R=3/5  Ankle  - DF L=3/5 R=3/5    Posture: forward head, rounded shoulders, forward trunk lean, inc kyphosis    Balance: SLS and tandem unable due to pain- 0s    AROM:  Lumbar  Flexion- fingertips to mid thigh. ! Extension-  25% ! Rotation R- 25% ! Rotation L- 25% ! Sidebend R- fingertips to greater trochanter! Sidebend L-fingertips to greater trochanter! Hip  Flexion- SLR= ZAHIDA 20 deg!   Extension- 0 deg zahida    Functional bridge; unable due to pain    Outcome Measure: FOTO=32    Palpation for Tenderness:  TTP of zahida lumbar multifidus, upper glutes, QL R>L with min pressure    Special Tests:  Slump Test- positive ZAHDIA      ========================================================================  Eval Complexity: History: HIGH Complexity :3+ comorbidities / personal factors will impact the outcome/ POC Exam:MEDIUM Complexity : 3 Standardized tests and measures addressing body structure, function, activity limitation and / or participation in recreation  Presentation: MEDIUM Complexity : Evolving with changing characteristics  Clinical Decision Making:MEDIUM Complexity : FOTO score of 26-74Overall Complexity:MEDIUM  Problem List: pain affecting function, decrease ROM, decrease strength, edema affecting function, impaired gait/ balance, decrease ADL/ functional abilitiies, decrease activity tolerance, decrease flexibility/ joint mobility and decrease transfer abilities   Treatment Plan may include any combination of the following: Therapeutic exercise, Therapeutic activities, Neuromuscular re-education, Physical agent/modality, Gait/balance training, Manual therapy, Patient education, Self Care training, Functional mobility training, Home safety training and Stair training  Patient / Family readiness to learn indicated by: asking questions  Persons(s) to be included in education: patient (P)  Barriers to Learning/Limitations: None  Measures taken:    Patient Goal (s): \"sleep better pain slow down and stop coming so regular\"   Patient self reported health status: good  Rehabilitation Potential: good    Short Term Goals: To be accomplished in 1 weeks:  1) Goal: Patient will be independent and compliant with HEP in order to progress toward long term goals. Status at last note/certification: issued and reviewed  Long Term Goals: To be accomplished in 8-12 treatments:  1) Goal: Patient will improve FOTO assessment score to 57 pts in order to indicate improved functional abilities. Status at last note/certification: 32 pts  2) Goal: Patient will improve ZAHIDA hip extension to 10 deg for improved posture and gait mechanics  Status at last note/certification: 0 deg  3) Goal: Patient will report worst back pain as 2/10 or less in order to progress toward personal goals.   Status at last note/certification: 1/02  4) Goal: Patient will report overall at least 65% improvement in function in order to progress toward premorbid status. Status at last note/certification: n/a  5) Goal: Patient will report ability to walk/stand for 30 mins to buy groceries  Status at last note/certification: 10 mins standing/walking  Frequency / Duration:   Patient to be seen  1-2  times per week for 4-6  weeks:  Patient / Caregiver education and instruction: exercises  Therapist Signature: Maren Frankel Date: 4/99/6292   Certification Period: 8/17/20-11/15/20 Time: 1:27 PM   ========================================================================  I certify that the above Physical Therapy Services are being furnished while the patient is under my care. I agree with the treatment plan and certify that this therapy is necessary. Physician Signature:        Date:       Time:   Please sign and return to [facility] or you may fax the signed copy to [phone]. Thank you.

## 2020-08-25 ENCOUNTER — HOSPITAL ENCOUNTER (OUTPATIENT)
Dept: PHYSICAL THERAPY | Age: 60
Discharge: HOME | End: 2020-08-25
Payer: MEDICARE

## 2020-08-25 PROCEDURE — 97014 ELECTRIC STIMULATION THERAPY: CPT

## 2020-08-25 PROCEDURE — 97110 THERAPEUTIC EXERCISES: CPT

## 2020-08-25 NOTE — PROGRESS NOTES
PT DAILY TREATMENT NOTE     Patient Name: Umair Contreras  Date:2020  : 1960  [x]  Patient  Verified  Payor: Vince Syed / Plan: [place] / Product Type: Medicare /    In time: 2:15 pm          Out time: 2:45 pm  Total Treatment Time (min): 30  Total Timed Codes (min): 20  1:1 Treatment Time (min): 20   Visit #: 2 of 8-10    Treatment Area: Low back pain [M54.5]    SUBJECTIVE  Pain Level (0-10 scale): 8  Any medication changes, allergies to medications, adverse drug reactions, diagnosis change, or new procedure performed?: [x] No    [] Yes (see summary sheet for update)  Subjective functional status/changes:   [] No changes reported  Patient notes poor HEP compliance as she is unable to find a chair of the appropriate low height.     OBJECTIVE  Modality rationale: decrease pain and increase tissue extensibility to improve the patients ability to perform ADLs     Min Type Additional Details   10 [x] Estim: []Att   [x]Unatt                  [x]IFC  []Premod                                            []NMES    []Other:  []w/ice   [x]w/heat  Position: prone  Location: L/S    []  Traction: [] Cervical       [] Lumbar                       [] Supine        [] Prone                       []Intermittent   []Continuous Lbs:  [] before manual  [] after manual    []  Ultrasound: []Continuous   [] Pulsed                           []1MHz   []3MHz Location:  W/cm2:    []  Iontophoresis with dexamethasone         Location: [] Take home patch   [] In clinic    []  Ice     []  heat  []  Ice massage Position:  Location:    []  Vasopneumatic Device Pressure: [] lo [] med [] hi   Temp: [] lo [] med [] hi   [x] Skin assessment post-treatment:  [x]intact    []redness- no adverse reaction                                                                                 []redness - adverse reaction:     9 min Therapeutic Exercise:  [x] See flow sheet: initiated therex per IE   Rationale: increase ROM, increase strength and improve coordination to improve the patients ability to complete ADLs     5 min Therapeutic Activity:  [x]  See flow sheet:    Rationale: increase strength, improve coordination and improve balance  to improve the patients ability to perform transfers     6 min Neuromuscular Re-education:  [x]  See flow sheet:    Rationale: increase strength, improve coordination, improve balance and increase proprioception  to improve the patients ability to improve sitting and standing posture          with TE min Patient Education: [x] Review HEP from IE     Other Objective/Functional Measures:   STG not met. Pain Level (0-10 scale) post treatment:  6    ASSESSMENT/Changes in Function:   Good tolerance to treatment today with patient req 100% verbal/tactile cueing and demo for proper form/technique with all newly introduced therex. Non-antalgic gait. Patient will continue to benefit from skilled PT services to modify and progress therapeutic interventions, address functional mobility deficits, address ROM deficits, address strength deficits, analyze and address soft tissue restrictions, analyze and cue movement patterns, analyze and modify body mechanics/ergonomics and assess and modify postural abnormalities to attain remaining goals. [x]  See Plan of Care  []  See progress note/recertification  []  See Discharge Summary         Progress towards goals / Updated goals:  Short Term Goals: To be accomplished in 1 weeks:  1) Goal: Patient will be independent and compliant with HEP in order to progress toward long term goals. Status at last note/certification: issued and reviewed  Current: not met; pt notes poor compliance (8/25/20)  Long Term Goals: To be accomplished in 8-12 treatments:  1) Goal: Patient will improve FOTO assessment score to 57 pts in order to indicate improved functional abilities.   Status at last note/certification: 32 pts  2) Goal: Patient will improve ZAHIDA hip extension to 10 deg for improved posture and gait mechanics  Status at last note/certification: 0 deg  3) Goal: Patient will report worst back pain as 2/10 or less in order to progress toward personal goals. Status at last note/certification: 8/23  4) Goal: Patient will report overall at least 65% improvement in function in order to progress toward premorbid status.   Status at last note/certification: n/a  5) Goal: Patient will report ability to walk/stand for 30 mins to buy groceries  Status at last note/certification: 10 mins standing/walking    PLAN  [x]  Upgrade activities as tolerated     [x]  Continue plan of care  []  Update interventions per flow sheet       []  Discharge due to:_  [x]  Other: assess response to treatment    Alejandro Franklin, PTA 8/25/2020

## 2020-08-31 ENCOUNTER — HOSPITAL ENCOUNTER (OUTPATIENT)
Dept: PHYSICAL THERAPY | Age: 60
Discharge: HOME | End: 2020-08-31
Payer: MEDICARE

## 2020-08-31 PROCEDURE — 97110 THERAPEUTIC EXERCISES: CPT

## 2020-08-31 PROCEDURE — 97014 ELECTRIC STIMULATION THERAPY: CPT

## 2020-08-31 PROCEDURE — 97530 THERAPEUTIC ACTIVITIES: CPT

## 2020-08-31 NOTE — PROGRESS NOTES
PT DAILY TREATMENT NOTE     Patient Name: Pat Richardson  Date:2020  : 1960  [x]  Patient  Verified  Payor: Mulugeta Hernandez / Plan: [place] & [place] / Product Type: Medicare /    In time:4:16  Out time:4:50  Total Treatment Time (min): 34  Total Timed Codes (min): 24  1:1 Treatment Time (min): 24   Visit #: 3 of 8-10    Treatment Area: Low back pain [M54.5]    SUBJECTIVE  Pain Level (0-10 scale): 8  Any medication changes, allergies to medications, adverse drug reactions, diagnosis change, or new procedure performed?: [x] No    [] Yes (see summary sheet for update)  Subjective functional status/changes:   [] No changes reported  Pt notes con't symptoms in the R toe; notes no pattern of pain. Standing:  10-12 min   Pt notes no more random sudden falls lately   Pt notes she is moving tomorrow and her sons will be doing the work.    OBJECTIVE  Modality rationale: decrease pain and increase tissue extensibility to improve the patients ability to ambulate, sit, stand, bed mobility    Min Type Additional Details   10 [x] Estim: []Att   [x]Unatt        []TENS instruct                  [x]IFC  []Premod   []NMES                     []Other:  []w/US   []w/ice   [x]w/heat  Position:  prone   Location: T/s and lumbar    [x] Skin assessment post-treatment:  [x]intact []redness- no adverse reaction       []redness - adverse reaction:       16 min Therapeutic Exercise:  [x] See flow sheet : initiate seated QL stretch   Rationale: increase ROM and increase strength to improve the patients ability to improve standing, sitting, walking, ADLs     8 min Therapeutic Activity:  [x]  See flow sheet : initiate LTR's for bed mobility    Rationale:            x min Patient Education: [x] Review HEP    [] Progressed/Changed HEP based on:     Advised on avoidance of lifting during her move tomorrow      [] positioning   [] body mechanics   [] transfers   [] heat/ice application        Other Objective/Functional Measures:     Pain Level (0-10 scale) post treatment: 6    ASSESSMENT/Changes in Function: pt with fair tolerance to treatment, requiring VC and motivation to continue with all exercises. Notes a decrease in pain with QL stretch. Con't to be difficult to find any positions of comfort aside from L SL. Patient will continue to benefit from skilled PT services to modify and progress therapeutic interventions, address functional mobility deficits, address ROM deficits, address strength deficits, analyze and address soft tissue restrictions, analyze and cue movement patterns, analyze and modify body mechanics/ergonomics, assess and modify postural abnormalities, address imbalance/dizziness and instruct in home and community integration to attain remaining goals. []  See Plan of Care  []  See progress note/recertification  []  See Discharge Summary         Progress towards goals / Updated goals:  Short Term Goals: To be accomplished in 1 weeks:  1) Goal: Patient will be independent and compliant with HEP in order to progress toward long term goals. Status at last note/certification: issued and reviewed  Current: not met; pt notes poor compliance (8/31/20)  Long Term Goals: To be accomplished in 8-12 treatments:  1) Goal: Patient will improve FOTO assessment score to 57 pts in order to indicate improved functional abilities. Status at last note/certification: 40 LMX  2) Goal: Patient will improve ZAHIDA hip extension to 10 deg for improved posture and gait mechanics  Status at last note/certification: 0 deg  3) Goal: Patient will report worst back pain as 2/10 or less in order to progress toward personal goals. Status at last note/certification: 9/10  Current: con't at 8/10 (8/31/2020)  4) Goal: Patient will report overall at least 65% improvement in function in order to progress toward premorbid status.   Status at last note/certification: n/a  5) Goal: Patient will report ability to walk/stand for 30 mins to buy groceries  Status at last note/certification: 10 mins standing/walking  Current: pt notes 10-12 minutes (8/31/2020)    PLAN  [x]  Upgrade activities as tolerated     [x]  Continue plan of care  []  Update interventions per flow sheet       []  Discharge due to:_  []  Other:_      Nancy Bergman, PT 8/31/2020  4:19 PM    Future Appointments   Date Time Provider Mark Marguerite   9/1/2020 11:15 AM Sandrine Landon MD [address] [facility]   9/2/2020  3:30 PM Nilton Celestin, PTA [facility]   9/8/2020  2:00 PM Alberto Coffee., PT [facility]   9/10/2020  2:00 PM Alberto Coffee., PT [facility]   9/14/2020  3:30 PM [facility] [place] [address] [facility]   9/16/2020  3:30 PM [facility] [place] [address] [facility]

## 2020-09-01 ENCOUNTER — VIRTUAL VISIT (OUTPATIENT)
Dept: FAMILY MEDICINE CLINIC | Age: 60
End: 2020-09-01

## 2020-09-01 NOTE — PROGRESS NOTES
1st attempt-10:44am-  No answer. Left message to return call for check in prior to virtual appointment. Patient did not get labs done. Scheduled lab appointment for 9-3-2020. This encounter was created in error - please disregard.

## 2020-09-02 ENCOUNTER — APPOINTMENT (OUTPATIENT)
Dept: PHYSICAL THERAPY | Age: 60
End: 2020-09-02
Payer: MEDICARE

## 2020-09-08 ENCOUNTER — APPOINTMENT (OUTPATIENT)
Dept: PHYSICAL THERAPY | Age: 60
End: 2020-09-08
Payer: MEDICARE

## 2020-09-10 ENCOUNTER — APPOINTMENT (OUTPATIENT)
Dept: PHYSICAL THERAPY | Age: 60
End: 2020-09-10
Payer: MEDICARE

## 2020-09-14 ENCOUNTER — APPOINTMENT (OUTPATIENT)
Dept: PHYSICAL THERAPY | Age: 60
End: 2020-09-14
Payer: MEDICARE

## 2020-09-16 ENCOUNTER — APPOINTMENT (OUTPATIENT)
Dept: PHYSICAL THERAPY | Age: 60
End: 2020-09-16
Payer: MEDICARE

## 2020-09-17 ENCOUNTER — HOSPITAL ENCOUNTER (OUTPATIENT)
Dept: PHYSICAL THERAPY | Age: 60
Discharge: HOME | End: 2020-09-17
Payer: MEDICARE

## 2020-09-17 PROCEDURE — 97110 THERAPEUTIC EXERCISES: CPT

## 2020-09-17 PROCEDURE — 97014 ELECTRIC STIMULATION THERAPY: CPT

## 2020-09-17 NOTE — PROGRESS NOTES
PT DAILY TREATMENT NOTE     Patient Name: Felisa Lanza  Date:2020   : 1960  [x]  Patient  Verified  Payor: [facility] MEDICARE / Plan: SINTIA FRAZIER [facility] / Product Type: Managed Care Medicare /    In time: 2:00  Out time: 2:26  Total Treatment Time (min): 26  Total Timed Codes (min): 26  1:1 Treatment Time (min): 16   Visit #: 4 of 8-10    Treatment Area: Low back pain [M54.5]    SUBJECTIVE  Pain Level (0-10 scale): -8/10  Any medication changes, allergies to medications, adverse drug reactions, diagnosis change, or new procedure performed?: [x] No    [] Yes (see summary sheet for update)  Subjective functional status/changes:   [] No changes reported  Pt reports increased pain since last session but doesn't know why. She reports moving on the 1st and having 14 steps to climb which really bother her.      OBJECTIVE  Modality rationale: decrease pain and increase tissue extensibility to improve the patients ability to ambulate, sit, stand, bed mobility    Min Type Additional Details   10 [x] Estim: []Att   [x]Unatt        []TENS instruct                  [x]IFC  []Premod   []NMES                     []Other:  []w/US   []w/ice   [x]w/heat  Position:  prone   Location: T/s and lumbar    [x] Skin assessment post-treatment:  [x]intact []redness- no adverse reaction       []redness - adverse reaction:       16 min Therapeutic Exercise:  [x] See flow sheet : initiate seated QL stretch   Rationale: increase ROM and increase strength to improve the patients ability to improve standing, sitting, walking, ADLs     PD min Therapeutic Activity:  [x]  See flow sheet :    Rationale:            x min Patient Education: [x] Review HEP    [] Progressed/Changed HEP based on:     Advised on avoidance of lifting during her move tomorrow      [] positioning   [] body mechanics   [] transfers   [] heat/ice application        Other Objective/Functional Measures:  - R>L p! with seated QL S     Pain Level (0-10 scale) post treatment:  7-8/10    ASSESSMENT/Changes in Function:   Patient presents with chief c/o increase back pain since last visit, denies any recent changes except that she moved recently and has to climb 14 steps to get in/out of the home. Patient performed TE and then declined further exercises due to pain, requesting IFC with MHP. Patient will continue to benefit from skilled PT services to modify and progress therapeutic interventions, address functional mobility deficits, address ROM deficits, address strength deficits, analyze and address soft tissue restrictions, analyze and cue movement patterns, analyze and modify body mechanics/ergonomics, assess and modify postural abnormalities, address imbalance/dizziness and instruct in home and community integration to attain remaining goals. []  See Plan of Care  []  See progress note/recertification  []  See Discharge Summary         Progress towards goals / Updated goals:  Short Term Goals: To be accomplished in 1 weeks:  1) Goal: Patient will be independent and compliant with HEP in order to progress toward long term goals. Status at last note/certification: issued and reviewed  Current: not met; pt notes poor compliance (8/31/20)  Long Term Goals: To be accomplished in 8-12 treatments:  1) Goal: Patient will improve FOTO assessment score to 57 pts in order to indicate improved functional abilities. Status at last note/certification: 94 HZA  2) Goal: Patient will improve ZAHIDA hip extension to 10 deg for improved posture and gait mechanics  Status at last note/certification: 0 deg  3) Goal: Patient will report worst back pain as 2/10 or less in order to progress toward personal goals. Status at last note/certification: 9/10  Current: con't at 8/10 (8/31/2020)  4) Goal: Patient will report overall at least 65% improvement in function in order to progress toward premorbid status.   Status at last note/certification: n/a  5) Goal: Patient will report ability to walk/stand for 30 mins to buy groceries  Status at last note/certification: 10 mins standing/walking  Current: pt notes 10-12 minutes (8/31/2020)    PLAN  [x]  Upgrade activities as tolerated     [x]  Continue plan of care  []  Update interventions per flow sheet       []  Discharge due to:_  []  Other:_      ADITHYA Hadley 9/17/2020  2:26 PM    No future appointments.

## 2020-09-30 ENCOUNTER — APPOINTMENT (OUTPATIENT)
Dept: FAMILY MEDICINE CLINIC | Age: 60
End: 2020-09-30

## 2020-10-01 LAB
ALBUMIN SERPL-MCNC: 3.9 G/DL (ref 3.8–4.9)
ALBUMIN/CREAT UR: <2 MG/G CREAT (ref 0–29)
ALBUMIN/GLOB SERPL: 1.4 {RATIO} (ref 1.2–2.2)
ALP SERPL-CCNC: 105 IU/L (ref 39–117)
ALT SERPL-CCNC: 15 IU/L (ref 0–32)
AST SERPL-CCNC: 18 IU/L (ref 0–40)
BILIRUB SERPL-MCNC: 0.6 MG/DL (ref 0–1.2)
BUN SERPL-MCNC: 8 MG/DL (ref 6–24)
BUN/CREAT SERPL: 13 (ref 9–23)
CALCIUM SERPL-MCNC: 9.4 MG/DL (ref 8.7–10.2)
CHLORIDE SERPL-SCNC: 106 MMOL/L (ref 96–106)
CHOLEST SERPL-MCNC: 144 MG/DL (ref 100–199)
CO2 SERPL-SCNC: 20 MMOL/L (ref 20–29)
CREAT SERPL-MCNC: 0.63 MG/DL (ref 0.57–1)
CREAT UR-MCNC: 134.4 MG/DL
ERYTHROCYTE [DISTWIDTH] IN BLOOD BY AUTOMATED COUNT: 11.4 % (ref 11.7–15.4)
EST. AVERAGE GLUCOSE BLD GHB EST-MCNC: 217 MG/DL
GLOBULIN SER CALC-MCNC: 2.8 G/DL (ref 1.5–4.5)
GLUCOSE SERPL-MCNC: 162 MG/DL (ref 65–99)
HBA1C MFR BLD: 9.2 % (ref 4.8–5.6)
HCT VFR BLD AUTO: 37.7 % (ref 34–46.6)
HDLC SERPL-MCNC: 49 MG/DL
HGB BLD-MCNC: 12.3 G/DL (ref 11.1–15.9)
INTERPRETATION, 910389: NORMAL
LDLC SERPL CALC-MCNC: 80 MG/DL (ref 0–99)
Lab: NORMAL
MCH RBC QN AUTO: 26.6 PG (ref 26.6–33)
MCHC RBC AUTO-ENTMCNC: 32.6 G/DL (ref 31.5–35.7)
MCV RBC AUTO: 82 FL (ref 79–97)
MICROALBUMIN UR-MCNC: <3 UG/ML
PLATELET # BLD AUTO: 383 X10E3/UL (ref 150–450)
POTASSIUM SERPL-SCNC: 4.6 MMOL/L (ref 3.5–5.2)
PROT SERPL-MCNC: 6.7 G/DL (ref 6–8.5)
RBC # BLD AUTO: 4.62 X10E6/UL (ref 3.77–5.28)
SODIUM SERPL-SCNC: 141 MMOL/L (ref 134–144)
SPECIMEN STATUS REPORT, ROLRST: NORMAL
TRIGL SERPL-MCNC: 78 MG/DL (ref 0–149)
VLDLC SERPL CALC-MCNC: 15 MG/DL (ref 5–40)
WBC # BLD AUTO: 6.1 X10E3/UL (ref 3.4–10.8)

## 2020-10-12 DIAGNOSIS — E78.5 HYPERLIPIDEMIA, UNSPECIFIED HYPERLIPIDEMIA TYPE: ICD-10-CM

## 2020-10-12 RX ORDER — ATORVASTATIN CALCIUM 40 MG/1
TABLET, FILM COATED ORAL
Qty: 90 TAB | Refills: 0 | Status: SHIPPED | OUTPATIENT
Start: 2020-10-12 | End: 2021-04-06 | Stop reason: SDUPTHER

## 2020-10-26 NOTE — PROGRESS NOTES
[address] [facility] AT [address] [place], [place] [address]  Phone: [phone] Fax: [phone] SUMMARY FOR PHYSICAL THERAPY          Patient Name: Joey Hallman : 1960   Treatment/Medical Diagnosis: Low back pain [M54.5]   Onset Date: 2020    Referral Source: Elizabeth Erickson MD [facility]): 2020   Prior Hospitalization: See Medical History Provider #: [id number]   Prior Level of Function: Able to walk/stand 20-30 mins, able to bend   Comorbidities: Hx of brain tumor, HTN, diabetes, arthritis, depression   Medications: Verified on Patient Summary List   Visits from [facility]: 4 Missed Visits: 0     Goals for this period:  Short Term Goals: To be accomplished in 1 weeks:  1) Goal: Patient will be independent and compliant with HEP in order to progress toward long term goals. Status at last note/certification: issued and reviewed  Current: not met; pt notes poor compliance (20)    Long Term Goals: To be accomplished in 8-12 treatments:  1) Goal: Patient will improve FOTO assessment score to 57 pts in order to indicate improved functional abilities. Status at last note/certification: 69 NHH  Current: pt did not return  2) Goal: Patient will improve ZAHIDA hip extension to 10 deg for improved posture and gait mechanics  Status at last note/certification: 0 deg  Current:  Not returned  3) Goal: Patient will report worst back pain as 2/10 or less in order to progress toward personal goals. Status at last note/certification: 9/10  Current: con't at 810 (2020)  4) Goal: Patient will report overall at least 65% improvement in function in order to progress toward premorbid status.   Status at last note/certification: n/a  Current: min improvements  5) Goal: Patient will report ability to walk/stand for 30 mins to buy groceries  Status at last note/certification: 10 mins standing/walking  Current: pt notes 10-12 minutes (8/31/2020)    Key Functional Changes/Progress: Pt was seen for 4 session of PT and made very slow progress, as impacted by poor HEP compliance and limited attendance to 1x/week. Pt also moved and then had a dramatic increase in stair climbing in her new home which contributed to increased c/o back pain. Standing tolerance is 10-12 min. She does note a decrease in random sudden falls. She con't with sxs in the R toe, with no pattern of worse/better and no change with repeated movements. Pain is improved with QL stretch, but difficulty to find any other positions of comfort aside from L SL positioning. Assessments/Recommendations: Discontinue therapy due to lack of attendance or compliance. If you have any questions/comments please contact us directly at [phone]   Thank you for allowing us to assist in the care of your patient.   Therapist Signature: Leah Graham DPT Date: 10/26/2020    Reporting Period: 8/17/2020 to 9/17/2020  Time: 8:25 AM

## 2020-11-12 ENCOUNTER — VIRTUAL VISIT (OUTPATIENT)
Dept: FAMILY MEDICINE CLINIC | Age: 60
End: 2020-11-12
Payer: MEDICARE

## 2020-11-12 DIAGNOSIS — K21.9 GASTROESOPHAGEAL REFLUX DISEASE WITHOUT ESOPHAGITIS: Primary | ICD-10-CM

## 2020-11-12 DIAGNOSIS — E78.5 HYPERLIPIDEMIA, UNSPECIFIED HYPERLIPIDEMIA TYPE: ICD-10-CM

## 2020-11-12 DIAGNOSIS — I10 ESSENTIAL HYPERTENSION WITH GOAL BLOOD PRESSURE LESS THAN 130/80: ICD-10-CM

## 2020-11-12 DIAGNOSIS — E11.69 DIABETES MELLITUS TYPE 2 IN OBESE (HCC): ICD-10-CM

## 2020-11-12 DIAGNOSIS — E66.9 DIABETES MELLITUS TYPE 2 IN OBESE (HCC): ICD-10-CM

## 2020-11-12 DIAGNOSIS — M54.50 ACUTE MIDLINE LOW BACK PAIN, UNSPECIFIED WHETHER SCIATICA PRESENT: ICD-10-CM

## 2020-11-12 DIAGNOSIS — E66.01 MORBID OBESITY (HCC): ICD-10-CM

## 2020-11-12 PROCEDURE — 99214 OFFICE O/P EST MOD 30 MIN: CPT | Performed by: FAMILY MEDICINE

## 2020-11-12 RX ORDER — BUPROPION HYDROCHLORIDE 150 MG/1
150 TABLET, EXTENDED RELEASE ORAL 2 TIMES DAILY
Qty: 60 TAB | Refills: 2 | Status: SHIPPED | OUTPATIENT
Start: 2020-11-12 | End: 2021-09-07 | Stop reason: ALTCHOICE

## 2020-11-12 RX ORDER — MAGNESIUM CARB/ALUMINUM HYDROX 105-160MG
2 TABLET,CHEWABLE ORAL 2 TIMES DAILY
Qty: 60 TAB | Refills: 2 | Status: SHIPPED | OUTPATIENT
Start: 2020-11-12 | End: 2021-02-09

## 2020-11-12 RX ORDER — GLIPIZIDE 10 MG/1
10 TABLET, FILM COATED, EXTENDED RELEASE ORAL DAILY
Qty: 90 TAB | Refills: 1 | Status: SHIPPED | OUTPATIENT
Start: 2020-11-12 | End: 2021-05-26 | Stop reason: SDUPTHER

## 2020-11-12 NOTE — PROGRESS NOTES
1. Have you been to the ER, urgent care clinic since your last visit? Hospitalized since your last visit? yes 11-5-20 Nimo Mims for pelvic pain    2. Have you seen or consulted any other health care providers outside of the [address] [place] [address] since your last visit? Include any pap smears or colon screening.  No

## 2020-11-12 NOTE — PROGRESS NOTES
[facility]    CC: F/U for Chronic Disease Management    HPI:     Cortney Landa, who was evaluated through a synchronous (real-time) audio-video encounter, and/or her healthcare decision maker, is aware that it is a billable service, with coverage as determined by her insurance carrier. She provided verbal consent to proceed: Yes, and patient identification was verified. It was conducted pursuant to the emergency declaration under the [address] [place] [address] and the [employer] Act. A caregiver was present when appropriate. Ability to conduct physical exam was limited. I was in the office. The patient was at home.       Acute Lower Back Pain with Bilateral Sciatica:  -Taking diclofenac for issue  -Denies any side effects or issue with the medication regimen  -Reports improvement in issue with use of the medication  -Did PT for the issue, which she feels had helped  -Would like to resume PT      GERD:  -Has been taking famotidine for issue  -Denies any side effects or issue with the medication  -Report issue has been inadequately controlled  -Went to ED for issue on 11/5/2020      Obesity:  -On no weight loss medication  -Reports she has been gaining weight  -Not following any regular exercise regimen  -Diet is mostly unchanged      ROS: Positive items marked in RED  CON: fever, chills  Cardiovascular: palpitations, CP  Resp: SOB, cough  GI: nausea, vomiting, diarrhea  : dysuria, hematuria      Past Medical History:   Diagnosis Date    Arthritis     Chronic gastritis with bleeding     DDD (degenerative disc disease), lumbar     Depression     Diabetes mellitus, type II ([address]     Diverticulosis of colon     Fibroid uterus     Gallstones     Hot flashes     Hypertension     Meningioma ([address]     Morbid obesity ([address]     Vertigo        Past Surgical History:   Procedure Laterality Date    EXCIS INFRATENT BRAIN TUMOR  2015    [facility]    HX CRANIOTOMY Left 2015    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; Surgeon: Aylin Sanderson MD; Location: [facility] MAIN OR LOC;     HX LAP CHOLECYSTECTOMY  2014    [facility]    HX TUBAL LIGATION Bilateral        Family History   Problem Relation Age of Onset    Cancer Mother 58        colon    Hypertension Mother     Diabetes Mother     Psychiatric Disorder Father     Lupus Sister        Social History     Tobacco Use    Smoking status: Former Smoker     Packs/day: 0.25     Types: Cigarettes     Start date:      Last attempt to quit:      Years since quittin.8    Smokeless tobacco: Never Used   Substance Use Topics    Alcohol use: No    Drug use: Yes     Types: Cocaine, Marijuana, Heroin, Opiates       No Known Allergies      Current Outpatient Medications:     diclofenac EC (VOLTAREN) 75 mg EC tablet, TAKE 1 TABLET BY MOUTH TWICE DAILY, Disp: 90 Tab, Rfl: 1    meclizine (ANTIVERT) 25 mg tablet, TAKE 1 TABLET BY MOUTH TWICE DAILY AS NEEDED FOR DIZZINESS, Disp: 60 Tab, Rfl: 1    atorvastatin (LIPITOR) 40 mg tablet, Take 1 tablet by mouth once daily, Disp: 90 Tab, Rfl: 0    famotidine (PEPCID) 40 mg tablet, TAKE 1 TABLET BY MOUTH ONCE DAILY, Disp: 90 Tab, Rfl: 1    lisinopriL (PRINIVIL, ZESTRIL) 5 mg tablet, TAKE 1 TABLET BY MOUTH ONCE DAILY, Disp: 90 Tab, Rfl: 1    montelukast (SINGULAIR) 10 mg tablet, TAKE 1 TABLET BY MOUTH ONCE DAILY, Disp: 90 Tab, Rfl: 1    metFORMIN (GLUCOPHAGE) 1,000 mg tablet, Take 1 Tab by mouth two (2) times daily (with meals). , Disp: 180 Tab, Rfl: 1    pioglitazone (ACTOS) 30 mg tablet, TAKE 1 TABLET BY MOUTH ONCE DAILY, Disp: 90 Tab, Rfl: 1    liraglutide (Victoza 3-José) 0.6 mg/0.1 mL (18 mg/3 mL) pnij, 1.2 mg by SubCUTAneous route daily. , Disp: 18 Adjustable Dose Pre-filled Pen Syringe, Rfl: 1    aspirin 81 mg chewable tablet, Take 1 Tab by mouth daily. , Disp: 180 Tab, Rfl: 2    methocarbamoL (ROBAXIN) 750 mg tablet, Take 1 Tab by mouth four (4) times daily. , Disp: 90 Tab, Rfl: 1    Insulin Needles, Disposable, (BD ULTRA-FINE MICRO PEN NEEDLE) 32 gauge x 1/4\" ndle, Use daily to inject Victoza subcutaneously, Disp: 100 Pen Needle, Rfl: 2    glipiZIDE SR (GLUCOTROL XL) 5 mg CR tablet, Take 1 Tab by mouth daily. , Disp: 30 Tab, Rfl: 5    glucose blood VI test strips (ASCENSIA AUTODISC VI, ONE TOUCH ULTRA TEST VI) strip, Use to check blood sugar daily, Disp: 100 Strip, Rfl: 6    glucose blood VI test strips (ACCU-CHEK JUNI PLUS TEST STRP) strip, Use to check blood sugar twice a day, Disp: 100 Strip, Rfl: 1    Blood-Glucose Meter (ACCU-CHEK JUNI PLUS METER) misc, Use to check blood sugar twice a day, Disp: 1 Each, Rfl: 0    lancets (ACCU-CHEK SOFTCLIX LANCETS) misc, Use to check blood sugar twice a day, Disp: 100 Each, Rfl: 11    Blood-Glucose Meter monitoring kit, As allowed by insurance, Disp: 1 Kit, Rfl: 0    pseudoephedrine (SUDAFED) 30 mg tablet, Take 2 Tabs by mouth every six (6) hours as needed for Congestion. , Disp: 24 Tab, Rfl: 0    ondansetron (ZOFRAN ODT) 4 mg disintegrating tablet, Take 1 Tab by mouth every eight (8) hours as needed for Nausea.  (Patient taking differently: Take 4 mg by mouth every eight (8) hours as needed for Nausea.), Disp: 30 Tab, Rfl: 1    Physical Exam:      General: obese habitus, NAD, conversant  Eyes: sclera clear bilaterally, no discharge noted, eyelids normal in appearance, EOMI  HENT: NCAT  Neck: no masses visualized, trachea appears to be midline  Lungs: normal respiratory effort and rate, No visualized signs of difficulty breathing or respiratory distress  MS: normal AROM of neck noted  Skin: no significant exanthematous lesions or discoloration noted on neck/facial skin    Psych: alert and oriented to person, place and situation, normal affect  Neuro: speech normal, moving all upper extremities, no gaze palsy, no facial asymmetry (Cranial nerve 7 motor function) (limited exam due to video visit)      Assessment/Plan     GERD, inadequately controlled:  -Started on Gaviscon regimen  -Follow-up in 1 month for Medicare wellness visit      Acute Lower Back Pain with Bilateral Sciatica, improving:  -Will continue current diclofenac regimen  -PT ordered  -Follow-up in 1 month for Medicare wellness visit      Morbid Obesity, worsening:  -Has gained 3 pounds since her last visit  -Started on bupropion regimen to aid in weight loss  -Referred to nutritionist  -Follow-up in 1 month for Medicare wellness visit        Kassie Ferro is a 64 y.o. female being evaluated by a Virtual Visit (video visit) encounter to address concerns as mentioned above. A caregiver was present when appropriate. Due to this being a TeleHealth encounter ([facility]), evaluation of the following organ systems was limited: Vitals/Constitutional/EENT/Resp/CV/GI//MS/Neuro/Skin/Heme-Lymph-Imm. Pursuant to the emergency declaration under the [address] [place] [address] authority and the [employer] Act, this Virtual Visit was conducted with patient's (and/or legal guardian's) consent, to reduce the risk of exposure to COVID-19 and provide necessary medical care. Services were provided through a video synchronous discussion virtually to substitute for in-person encounter. --Sanjuanita Dimas MD on 11/12/2020 at 4:04 PM    An electronic signature was used to authenticate this note.

## 2020-12-08 ENCOUNTER — HOSPITAL ENCOUNTER (OUTPATIENT)
Dept: NUTRITION | Age: 60
Discharge: HOME | End: 2020-12-08
Payer: MEDICARE

## 2020-12-08 PROCEDURE — 97802 MEDICAL NUTRITION INDIV IN: CPT

## 2020-12-08 NOTE — PROGRESS NOTES
..  [employer] - Outpatient Nutrition Services  [address] [facility] [place] [address]   Nutrition Assessment - Medical Nutrition Therapy   Outpatient Initial Evaluation         Patient Name: James Antonio : 1960   Treatment Diagnosis: Type 2 DM, obesity   Referral Source: Josefa Lackey MD [facility]): 2020     Gender: female Age: 64 y.o. Ht: 65 in Wt: 247  lb  kg   BMI: 41.1 BMR   Male  BMR Female 1720     Past Medical History:  GERD, depression, diverticulitis, brain tumor (benign), former smoker     Pertinent Medications:     Lisinopril, Pepcid, Victoza 1.2 mg daily, Actos 30 mg, Glipizide 5 mg, Metformin 2000 mg   Biochemical Data:   Lab Results   Component Value Date/Time    Hemoglobin A1c 9.2 (H) 2020 12:55 PM    Hemoglobin A1c (POC) 9.1 2020 09:23 AM     Lab Results   Component Value Date/Time    Sodium 141 2020 12:55 PM    Potassium 4.6 2020 12:55 PM    Chloride 106 2020 12:55 PM    CO2 20 2020 12:55 PM    Anion gap 5 2019 10:00 AM    Glucose 162 (H) 2020 12:55 PM    BUN 8 2020 12:55 PM    Creatinine 0.63 2020 12:55 PM    BUN/Creatinine ratio 13 2020 12:55 PM    GFR est  2020 12:55 PM    GFR est non- 2020 12:55 PM    Calcium 9.4 2020 12:55 PM    Bilirubin, total 0.6 2020 12:55 PM    Alk.  phosphatase 105 2020 12:55 PM    Protein, total 6.7 2020 12:55 PM    Albumin 3.9 2020 12:55 PM    Globulin 2.8 2019 10:00 AM    A-G Ratio 1.4 2020 12:55 PM    ALT (SGPT) 15 2020 12:55 PM    AST (SGOT) 18 2020 12:55 PM     Lab Results   Component Value Date/Time    Cholesterol, total 144 2020 12:55 PM    HDL Cholesterol 49 2020 12:55 PM    LDL, calculated 83.8 2019 10:00 AM    LDL Chol Calc ([facility]) 80 2020 12:55 PM    VLDL, calculated 18.2 2019 10:00 AM    VLDL Cholesterol Isma 15 2020 12:55 PM Triglyceride 78 09/30/2020 12:55 PM    CHOL/HDL Ratio 3.0 07/26/2019 10:00 AM     Lab Results   Component Value Date/Time    ALT (SGPT) 15 09/30/2020 12:55 PM    AST (SGOT) 18 09/30/2020 12:55 PM    Alk. phosphatase 105 09/30/2020 12:55 PM    Bilirubin, total 0.6 09/30/2020 12:55 PM     Lab Results   Component Value Date/Time    Creatinine 0.63 09/30/2020 12:55 PM     Lab Results   Component Value Date/Time    BUN 8 09/30/2020 12:55 PM     Lab Results   Component Value Date/Time    Microalbumin/Creat ratio (mg/g creat) 5 07/26/2019 10:00 AM    Microalb/Creat ratio (ug/mg creat.) <2 09/30/2020 12:55 PM    Microalbumin,urine random 0.90 07/26/2019 10:00 AM        Assessment:    The patient is here today for diabetes management and weight loss counseling. The patient reported that she has had diabetes \"forever,\" but she has never received any education. \"or maybe I did but I don't remember. \". She desires to not be on medication. She admits that sometimes she does not take her medications as prescribed. She c/o headache again which keeps her from sleeping at night. Food & Nutrition: Breakfast: skips OR 3-4 slices turkey junior, egg, cheese, 2 pieces of honey wheat toast, coffee with light creamer, sometimes juice  Lunch: salad with lettuce, tomato, cucumbers, turkey meat [address] [facility] or [facility] dressing OR PB and J and fruit OR fried fish and Jiffy cornbread   Dinner: fried chicken OR pork and beans and hot dogs  Snacks: corn chips, sweet tarts, chocolate, candy any kind of sweets   Problem areas:   1. The pt admits to eating junk food all day- \"How do I not eat that?\"  2. Diet is high fat, high carb (lots of fried foods)  3. Drinks fruit juice (did not tell me how much)  4. Does not have awareness of foods that affect blood sugars and portion sizes  5. Limited non-starchy vegetables in the diet  6. Skipping meals and then overeating  7.  Not eating on a schedule          Estimate Needs   Calories: 1008-3671 Protein: 108 Carbs: 194 Fat: 57   Kcal/day  g/day  g/day  g/day        percent: 25  45  30               Nutrition Diagnosis   Nutrition knowledge deficit related to poor control of type 2 DM evidenced by overconsumption of CHO containing foods, lack of exercise, inappropriate diet to prevent weight gain. Altered nutrition related lab values related to type 2 DM as evidenced by A1C of 9.2. Obesity related to excessive energy intake as evidenced by BMI of 41.1. Nutrition Intervention &  Education: Educated patient on diabetes, weight loss diet with plate method guidelines. Encouraged the patient to eat at least 3 times per day, spacing meals no more than 4-5 hours apart (2-3 hours in-between snacks). Discussed that 1/2 the plate should include non-starchy vegetables, 1/4 plate should be lean protein, and 1/4 of plate should be carbohydrate. Suggested the patient include protein source with all meals and snacks. Include more non-starchy vegetables and 2 fruit servings per day (eat a variety). Don't drink calories: avoid fruit juice, regular sodas, sweet tea, Gatorade. Eliminate/decrease fast food consumption and junk food. We discussed the importance of timing of meals. Discussed importance of 150 minutes per week physical activity.      Handouts Provided: [x]  Carbohydrates  [x]  Protein  [x]  Non-starchy Vegetables  []  Food Label  [x]  Meal and Snack Ideas  []  Food Journals [x]  Diabetes  []  Cholesterol  []  Sodium  [x]  Gen Nutr Guidelines  []  SBGM Guidelines  [x]  Others: My Healthy Plate   Information Reviewed with: Patient   Readiness to Change Stage: [x]  Pre-contemplative    []  Contemplative  []  Preparation               []  Action                  []  Maintenance   Potential Barriers to Learning: []  Decline in memory    []  Language barrier   []  Other:  []  Emotional                  []  Limited mobility  []  Lack of motivation     [] Vision, hearing or cognitive impairment   Expected Compliance: Fair to poor     Nutritional Goal - To promote lifestyle changes to result in:    [x]  Weight loss  [x]  Improved diabetic control  []  Decreased cholesterol levels  []  Decreased blood pressure  []  Weight maintenance []  Preventing any interactions associated with food allergies  []  Adequate weight gain toward goal weight  []  Other:        Patient Goals:   1. Eliminate fried foods- bake, broil or grill instead. 2. Do not buy junk food at the grocery store. Out of sight, out of mind. 3. Try to eat something within 1 hour of waking, and then every 4-5 hours thereafter. Do not skip meals. 4. Follow plate method for lunch and dinner meals- include non-starchy vegetables and lean protein at meals  5.  Try walking after meals- 15 minutes twice a day with goal of 150 minutes per week    Krista Solo RD, [facility]  Follow-up: January 28, 2021 @   2 PM Time: 5:29 PM

## 2021-01-28 ENCOUNTER — HOSPITAL ENCOUNTER (OUTPATIENT)
Dept: NUTRITION | Age: 61
Discharge: HOME | End: 2021-01-28
Payer: MEDICARE

## 2021-01-28 PROCEDURE — 97803 MED NUTRITION INDIV SUBSEQ: CPT

## 2021-01-28 NOTE — PROGRESS NOTES
.. NUTRITION - FOLLOW-UP TREATMENT NOTE  Patient Name: Luis Redmond         Date: 2021  : 1960    YES/NO Patient  Verified  Diagnosis: Type 2 DM, obesity   In time:   2:00          Out time:  2:30   Total Treatment Time (min):  30 minutes     SUBJECTIVE/ASSESSMENT    Changes in medication or medical history? Any new allergies, surgeries or procedures? YES   If yes, update Summary List   The patient is here today for a follow up visit. She said she ended up in the hospital for a day over the holidays; she had stomach pain. While inpatient, she was told she had high cholesterol. She also said that her aortic artery was \"flat. \" The stomach pain was related to cardiac issues. She says she is feeling ok now but she has to take a statin. She requested diet information on controlling cholesterol. She says she is trying to follow RD guidelines. She is trying not to eat junk food but she admits that sometimes she is tempted by kettle chips. She is eating less fried food- she bought an air fryer. She said she would like to eat more vegetables but she doesn't know how to cook them. The patient says she has exercise equipment in her home (eliptical, treadmill) but it is hard to motivate herself to do it. She is entering a weight loss challenge at [employer]. She says she could win $300; she is hoping this challenge will motivate her to follow guidelines. She was shocked that she has lost 7# since her last visit. \"My clothes are loose! \"        Current Wt: 240 Previous Wt: 247 Wt Change: -7#     Achievement of Goals: 1. Eliminate fried foods: bake, broil, grill. In progress; the patient bought an air fryer, she is turning down fried food from family and friends. Continue. 2. Do not buy junk food at grocery store- Pt is doing better but not avoiding 100% of the time, In progress. Continue. 3. Try to eat something within an hour of waking and then every 4-5 hours.  Do not skip meals.- Did not discuss timing of meals with patient today, continue  4. Follow plate method for lunch and dinner meals- Doing better with this, continue  5. Try walking after meals- 15 minutes twice a day. Not met, continue     Patient Education:  [x]  Review current plan with patient   [x]  Other: Reviewed carbs and portion sizes with patient. Encouraged the patient to each no more than 1 cup of carbs (pasta, rice, corn, potatoes, sweet potatoes etc) at each meal. Discussed foods high in cholesterol (avoid fried food and processed foods.) Eat more vegetables and whole grains. Exercise. Handouts/  Information Provided: []  Carbohydrates  []  Protein  []  Fiber  []  Serving Sizes  [x]  Fluids  [x]  General guidelines []  Diabetes  [x]  Cholesterol  []  Sodium  []  SBGM  []  Food Journals  [x]  Others: Non-starchy vegetables     New Patient Goals: 1. Exercise consistently. Try 15 minute \"chunks\" of time after you eat. Goal is 30 minutes, 5 days per week. 2. Eat more non-starchy vegetables. Strive to include non-starchy vegetables at lunch and dinner meals everyday. 3. Drink 64 oz of sugar free beverages daily (mostly water) One Coke Zero once in awhile is fine. 4. Continue to keep junk food out of the house so you aren't tempted. 5. Include more seafood in the diet. Eat salmon once a week.  Choose fish filets (not shrimp) and do not deep fat reich       PLAN    [x]  Continue on current plan []  Follow-up PRN   []  Discharge due to :    [x]  Next appt: March 25, 2021 @ 1:30 PM     Dietitian: Tejinder Wong RD, [facility]    Date: 1/28/2021 Time: 2:59 PM

## 2021-02-09 ENCOUNTER — VIRTUAL VISIT (OUTPATIENT)
Dept: FAMILY MEDICINE CLINIC | Age: 61
End: 2021-02-09
Payer: MEDICARE

## 2021-02-09 DIAGNOSIS — Z12.31 ENCOUNTER FOR SCREENING MAMMOGRAM FOR MALIGNANT NEOPLASM OF BREAST: ICD-10-CM

## 2021-02-09 DIAGNOSIS — Z76.89 ENCOUNTER TO ESTABLISH CARE: ICD-10-CM

## 2021-02-09 DIAGNOSIS — I10 ESSENTIAL HYPERTENSION WITH GOAL BLOOD PRESSURE LESS THAN 130/80: ICD-10-CM

## 2021-02-09 DIAGNOSIS — Z09 HOSPITAL DISCHARGE FOLLOW-UP: ICD-10-CM

## 2021-02-09 DIAGNOSIS — E66.9 DIABETES MELLITUS TYPE 2 IN OBESE (HCC): ICD-10-CM

## 2021-02-09 DIAGNOSIS — E78.2 MIXED HYPERLIPIDEMIA: ICD-10-CM

## 2021-02-09 DIAGNOSIS — E11.69 DIABETES MELLITUS TYPE 2 IN OBESE (HCC): ICD-10-CM

## 2021-02-09 DIAGNOSIS — Z00.00 MEDICARE ANNUAL WELLNESS VISIT, SUBSEQUENT: Primary | ICD-10-CM

## 2021-02-09 PROCEDURE — G8432 DEP SCR NOT DOC, RNG: HCPCS | Performed by: NURSE PRACTITIONER

## 2021-02-09 PROCEDURE — 2022F DILAT RTA XM EVC RTNOPTHY: CPT | Performed by: NURSE PRACTITIONER

## 2021-02-09 PROCEDURE — 3017F COLORECTAL CA SCREEN DOC REV: CPT | Performed by: NURSE PRACTITIONER

## 2021-02-09 PROCEDURE — 3046F HEMOGLOBIN A1C LEVEL >9.0%: CPT | Performed by: NURSE PRACTITIONER

## 2021-02-09 PROCEDURE — G8427 DOCREV CUR MEDS BY ELIG CLIN: HCPCS | Performed by: NURSE PRACTITIONER

## 2021-02-09 PROCEDURE — 99214 OFFICE O/P EST MOD 30 MIN: CPT | Performed by: NURSE PRACTITIONER

## 2021-02-09 PROCEDURE — G8756 NO BP MEASURE DOC: HCPCS | Performed by: NURSE PRACTITIONER

## 2021-02-09 PROCEDURE — G9899 SCRN MAM PERF RSLTS DOC: HCPCS | Performed by: NURSE PRACTITIONER

## 2021-02-09 PROCEDURE — G0439 PPPS, SUBSEQ VISIT: HCPCS | Performed by: NURSE PRACTITIONER

## 2021-02-09 RX ORDER — LISINOPRIL 5 MG/1
5 TABLET ORAL DAILY
Qty: 90 TAB | Refills: 0 | Status: SHIPPED | OUTPATIENT
Start: 2021-02-09 | End: 2021-03-12 | Stop reason: SDUPTHER

## 2021-02-09 RX ORDER — PIOGLITAZONEHYDROCHLORIDE 30 MG/1
TABLET ORAL
Qty: 90 TAB | Refills: 0 | Status: SHIPPED | OUTPATIENT
Start: 2021-02-09 | End: 2021-05-03

## 2021-02-09 NOTE — PROGRESS NOTES
Chief Complaint   Patient presents with   [address] Follow Up     Adebayo DUMONT- 1/18/21     Annual Wellness Visit     is due     Medication Evaluation     Stopped Victoza- nausea x 1 mo        1. Have you been to the ER, urgent care clinic since your last visit? Hospitalized since your last visit? Adebayo DUMONT 1/18/21    2. Have you seen or consulted any other health care providers outside of the [address] [place] [address] since your last visit? Include any pap smears or colon screening. Nutrition         Chief Complaint   Patient presents with   [address] Follow Up     Adebayo DUMONT- 1/18/21     Annual Wellness Visit     is due     Medication Evaluation     Stopped Victoza- nausea x 1 mo        3 most recent PHQ Screens 2/9/2021   Little interest or pleasure in doing things Not at all   Feeling down, depressed, irritable, or hopeless Not at all   Total Score PHQ 2 0     Fall Risk Assessment, last 12 mths 2/9/2021   Able to walk? Yes   Fall in past 12 months? 0   Do you feel unsteady? 0   Are you worried about falling 0               Learning Assessment 2/9/2021   PRIMARY LEARNER Patient   HIGHEST LEVEL OF EDUCATION - PRIMARY LEARNER  DID NOT GRADUATE HIGH SCHOOL   BARRIERS PRIMARY LEARNER NONE   CO-LEARNER CAREGIVER No   PRIMARY LANGUAGE ENGLISH   LEARNER PREFERENCE PRIMARY LISTENING   ANSWERED BY Martínez FAJARDO   RELATIONSHIP SELF     There were no vitals taken for this visit.

## 2021-02-09 NOTE — PROGRESS NOTES
[place]is               [address]               [phone]      Manjula Arriola is a 64 y.o. female who was seen by synchronous (real-time) audio-video technology on 2/9/2021. Consent: Manjula Arriola, who was seen by synchronous (real-time) audio-video technology, and/or her healthcare decision maker, is aware that this patient-initiated, Telehealth encounter on 2/9/2021 is a billable service, with coverage as determined by her insurance carrier. She is aware that she may receive a bill and has provided verbal consent to proceed: Yes. Assessment & Plan:   Diagnoses and all orders for this visit:    1. Medicare annual wellness visit, subsequent  Completed today to include EtOH and depression screening    2. Hospital discharge follow-up  Had overnight stay in the emergency department for chest pain  She had a CT angiography of her heart completed negative for any significant blockages, no follow-up needed    3. Essential hypertension with goal blood pressure less than 130/80  -     lisinopriL (PRINIVIL, ZESTRIL) 5 mg tablet; Take 1 Tab by mouth daily.  -     METABOLIC PANEL, COMPREHENSIVE; Future  Endorses medication compliance  Blood pressure stable, continue same medications  Refill provided  We will have her come in for follow-up labs    4. Diabetes mellitus type 2 in obese (HCC)  -     pioglitazone (ACTOS) 30 mg tablet; TAKE 1 TABLET BY MOUTH ONCE DAILY  -     HEMOGLOBIN A1C WITH EAG; Future  -     MICROALBUMIN, UR, RAND W/ MICROALB/CREAT RATIO; Future  States she has stopped the Victoza but continues taking the pioglitazone, glipizide and Metformin  Refill provided  We will have her come in for follow-up A1c, make decisions on her medication regimen based on those results    5. Mixed hyperlipidemia  -     LIPID PANEL; Future  Have her come in for follow-up lipid levels    6.  Encounter for screening mammogram for malignant neoplasm of breast  -     FLAQUITA MAMMO BI SCREENING INCL CAD; Future  Health maintenance need addressed    7. Encounter to establish care  Visit today to establish care, prior patient of Dr. Jeny Tello    Follow-up and Dispositions    · Return for ASAP, Labs, AND, 3 months, DM, HLD, HTN, 15 min, VV.             712  Subjective:   Luis Redmond is a 64 y.o. female who was seen for   [address] (Diet pills ), Hospital Follow Up DR VINAY OTTO [facility]- 1/18/21 ), Annual Wellness Visit (is due ), and Medication Evaluation (Stopped Victoza- nausea x 1 mo )    1/18/21  ED for chest pain,   EKG: Impression SR-Sinus rhythm-normal P axis, V-rate 50-99   Impression   O6OH-Lsmtjtijtmr T abnormalities, lateral leads-T <-0.10mV, I aVL V5 V6   Impression NSC-No significant change since prior tracing-       -Had CTA heart with angiography:   -------------------  No evidence of epicardial coronary disease  Recommendation: No further evaluation of acute coronary syndrome required   if normal troponins and ECGs. Consider other non-coronary etiologies and   primary prevention. Would stop aspirin if started. Per cardiologist CTA negative aside from very mild aortic atherosclerosis    Discharged with instructions to f/u with PCP, no medication changes      Victoza  Prior to ED visit took herself off medications due to feeling sick  Was taking the prescribed 1.2mg daily  She has been on Victoza since 2018, states she has mentioned the problem before  She started to add her medications back  Since she has been off victoza the nausea has stopped   I could not clarify exactly which medications she stopped, states she stopped them for about a week    Obesity  Went to nutritionist last week, first visit was 12/8/2020  Lost 7 pounds between visits  Started wellbutrin in November, 2020  States wellbutrin is not helping with weight loss  Asked for another med,       Prior to Admission medications    Medication Sig Start Date End Date Taking?  Authorizing Provider   lisinopriL (PRINIVIL, ZESTRIL) 5 mg tablet Take 1 Tab by mouth daily. 2/9/21  Yes Ciara Cordero NP   pioglitazone (ACTOS) 30 mg tablet TAKE 1 TABLET BY MOUTH ONCE DAILY 2/9/21  Yes Ciara Cordero NP   buPROPion SR [facility] SR) 150 mg SR tablet Take 1 Tab by mouth two (2) times a day. 11/12/20  Yes Matt Nguyen MD   glipiZIDE SR (GLUCOTROL XL) 10 mg CR tablet Take 1 Tab by mouth daily. 11/12/20  Yes Matt Nguyen MD   diclofenac EC (VOLTAREN) 75 mg EC tablet TAKE 1 TABLET BY MOUTH TWICE DAILY 10/16/20  Yes Matt Nguyen MD   meclizine (ANTIVERT) 25 mg tablet TAKE 1 TABLET BY MOUTH TWICE DAILY AS NEEDED FOR DIZZINESS 10/16/20  Yes Matt Nguyen MD   atorvastatin (LIPITOR) 40 mg tablet Take 1 tablet by mouth once daily 10/12/20  Yes Matt Nguyen MD   montelukast (SINGULAIR) 10 mg tablet TAKE 1 TABLET BY MOUTH ONCE DAILY 8/14/20  Yes Matt Nguyen MD   metFORMIN (GLUCOPHAGE) 1,000 mg tablet Take 1 Tab by mouth two (2) times daily (with meals). 7/31/20  Yes Matt Nguyen MD   aspirin 81 mg chewable tablet Take 1 Tab by mouth daily. 3/10/20  Yes Matt Nguyen MD   methocarbamoL (ROBAXIN) 750 mg tablet Take 1 Tab by mouth four (4) times daily. 3/10/20  Yes Matt Nguyen MD   Blood-Glucose Meter monitoring kit As allowed by insurance 8/15/18  Yes Ciara Cordero NP   aluminum hydrox-magnesium carb (Gaviscon Extra Strength) 160-105 mg chew Take 2 Tabs by mouth two (2) times a day. 11/12/20   Matt Nguyen MD   famotidine (PEPCID) 40 mg tablet TAKE 1 TABLET BY MOUTH ONCE DAILY 8/14/20   Matt Nguyen MD   lisinopriL (PRINIVIL, ZESTRIL) 5 mg tablet TAKE 1 TABLET BY MOUTH ONCE DAILY 8/14/20 2/9/21  Matt Nguyen MD   liraglutide (Victoza 3-José) 0.6 mg/0.1 mL (18 mg/3 mL) pnij 1.2 mg by SubCUTAneous route daily.  7/31/20   Matt Nguyen MD   pioglitazone (ACTOS) 30 mg tablet TAKE 1 TABLET BY MOUTH ONCE DAILY 7/31/20 2/9/21  Matt Nguyen MD Insulin Needles, Disposable, (BD ULTRA-FINE MICRO PEN NEEDLE) 32 gauge x 1/4\" ndle Use daily to inject Victoza subcutaneously 2/11/20   Alvin Prabhakar MD   glucose blood VI test strips (ASCENSIA AUTODISC VI, ONE TOUCH ULTRA TEST VI) strip Use to check blood sugar daily 2/7/19   Alvin Prabhakar MD   glucose blood VI test strips (ACCU-CHEK JUNI PLUS TEST STRP) strip Use to check blood sugar twice a day 2/7/19   Alvin Prabhakar MD   Blood-Glucose Meter (ACCU-CHEK JUNI PLUS METER) misc Use to check blood sugar twice a day 12/14/18   Alvin Prabhakar MD   lancets (ACCU-CHEK SOFTCLIX LANCETS) misc Use to check blood sugar twice a day 12/14/18   Alvin Prabhakar MD     No Known Allergies    Patient Active Problem List   Diagnosis Code    Essential hypertension with goal blood pressure less than 130/80 I10    GERD (gastroesophageal reflux disease) K21.9    DOMINIQUE (obstructive sleep apnea) G47.33    Arthritis M19.90    Asthma J45.909    Urinary urgency R39.15    Morbid obesity ([facility] E66.01    Type 2 diabetes mellitus with diabetic neuropathy (HCC) E11.40    Hyperlipidemia E78.5    Bilateral primary osteoarthritis of knee M17.0    Vertigo R42    Environmental and seasonal allergies J30.89    Diabetic polyneuropathy associated with type 2 diabetes mellitus ([address] E11.42    Diabetes mellitus type 2 in obese ([facility] E11.69, E66.9     Past Surgical History:   Procedure Laterality Date    HX CRANIOTOMY Left 08/18/2015    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;  Surgeon: Gilda Kaur MD; Location: [facility] MAIN OR LOC;     [facility]OMY  11/04/2014    [facility]    HX TUBAL LIGATION Bilateral     [place] EXCIS INFRATENT BRAIN TUMOR  08/18/2015    [facility]     Family History   Problem Relation Age of Onset   Dossie Meenakshi Cancer Mother 58        colon    Hypertension Mother     Diabetes Mother     Psychiatric Disorder Father     Lupus Sister      Social History     Tobacco Use    Smoking status: Former Smoker     Packs/day: 0.25     Years: 1.00     Pack years: 0.25     Types: Cigarettes     Start date:      Quit date:      Years since quittin.1    Smokeless tobacco: Never Used   Substance Use Topics    Alcohol use: No       ROS  As stated in HPI, otherwise all others negative. Objective: There were no vitals taken for this visit. General: alert, cooperative, no distress   Mental  status: normal mood, behavior, speech, dress, motor activity, and thought processes, able to follow commands   HENT: NCAT   Neck: no visualized mass   Resp: no respiratory distress   Neuro: no gross deficits   Skin: no discoloration or lesions of concern on visible areas   Psychiatric: normal affect, consistent with stated mood, no evidence of hallucinations     Additional exam findings: We discussed the expected course, resolution and complications of the diagnosis(es) in detail. Medication risks, benefits, costs, interactions, and alternatives were discussed as indicated. I advised her to contact the office if her condition worsens, changes or fails to improve as anticipated. She expressed understanding with the diagnosis(es) and plan. Tierney Jim is a 64 y.o. female who was evaluated by a video visit encounter for concerns as above. Patient identification was verified prior to start of the visit. A caregiver was present when appropriate. Due to this being a TeleHealth encounter (During  public health emergency), evaluation of the following organ systems was limited: Vitals/Constitutional/EENT/Resp/CV/GI//MS/Neuro/Skin/Heme-Lymph-Imm.   Pursuant to the emergency declaration under the [facility] waiver authority and the [employer] Act, this Virtual  Visit was conducted, with patient's (and/or legal guardian's) consent, to reduce the patient's risk of exposure to COVID-19 and provide necessary medical care. Services were provided through a video synchronous discussion virtually to substitute for in-person clinic visit. Patient and provider were located at their individual homes. An After Visit Summary was printed and given to the patient. All diagnosis have been discussed with the patient and all of the patient's questions have been answered. Follow-up and Dispositions    · Return for ASAP, Labs, AND, 3 months, DM, HLD, HTN, 15 min, VV. Brea Hendrickson [facility]      This is the Subsequent Medicare Annual Wellness Exam, performed 12 months or more after the Initial AWV or the last Subsequent AWV    I have reviewed the patient's medical history in detail and updated the computerized patient record. Depression Risk Factor Screening:     3 most recent PHQ Screens 2/9/2021   Little interest or pleasure in doing things Not at all   Feeling down, depressed, irritable, or hopeless Not at all   Total Score PHQ 2 0       Alcohol Risk Screen    Do you average more than 1 drink per night or more than 7 drinks a week:  No    On any one occasion in the past three months have you have had more than 3 drinks containing alcohol:  No        Functional Ability and Level of Safety:    Hearing: Hearing is good. Activities of Daily Living: The home contains: no safety equipment. Patient does total self care      Ambulation: with no difficulty     Fall Risk:  Fall Risk Assessment, last 12 mths 2/9/2021   Able to walk? Yes   Fall in past 12 months? 0   Do you feel unsteady?  0   Are you worried about falling 0      Abuse Screen:  Patient is not abused       Cognitive Screening    Has your family/caregiver stated any concerns about your memory: no    Cognitive Screening: Normal - 3 word recall    Assessment/Plan   Education and counseling provided:  Are appropriate based on today's review and evaluation    Diagnoses and all orders for this visit:    1. Medicare annual wellness visit, subsequent    2. Hospital discharge follow-up    3. Essential hypertension with goal blood pressure less than 130/80  -     lisinopriL (PRINIVIL, ZESTRIL) 5 mg tablet; Take 1 Tab by mouth daily.  -     METABOLIC PANEL, COMPREHENSIVE; Future    4. Diabetes mellitus type 2 in obese (HCC)  -     pioglitazone (ACTOS) 30 mg tablet; TAKE 1 TABLET BY MOUTH ONCE DAILY  -     HEMOGLOBIN A1C WITH EAG; Future  -     MICROALBUMIN, UR, RAND W/ MICROALB/CREAT RATIO; Future    5. Mixed hyperlipidemia  -     LIPID PANEL; Future    6. Encounter for screening mammogram for malignant neoplasm of breast  -     FLAQUITA MAMMO BI SCREENING INCL CAD; Future    7.  Encounter to establish care        Health Maintenance Due     Health Maintenance Due   Topic Date Due    COVID-19 Vaccine (1 of 2) 12/09/1978    Breast Cancer Screen Mammogram  08/02/2020    Foot Exam Q1  08/13/2020    A1C test (Diabetic or Prediabetic)  12/30/2020       Patient Care Team   Patient Care Team:  Noreen Regalado NP as PCP - General (Nurse Practitioner)  Noreen Regalado NP as PCP - [facility] Empaneled Provider  Jordan Aguilar MD (General Surgery)    History     Patient Active Problem List   Diagnosis Code    Essential hypertension with goal blood pressure less than 130/80 I10    GERD (gastroesophageal reflux disease) K21.9    DOMINIQUE (obstructive sleep apnea) G47.33    Arthritis M19.90    Asthma J45.909    Urinary urgency R39.15    Morbid obesity ([address] E66.01    Type 2 diabetes mellitus with diabetic neuropathy ([address] E11.40    Hyperlipidemia E78.5    Bilateral primary osteoarthritis of knee M17.0    Vertigo R42    Environmental and seasonal allergies J30.89    Diabetic polyneuropathy associated with type 2 diabetes mellitus ([address] E11.42    Diabetes mellitus type 2 in obese ([address] E11.69, E66.9     Past Medical History:   Diagnosis Date    Arthritis     Chronic gastritis with bleeding     DDD (degenerative disc disease), lumbar     Depression     Diabetes mellitus, type II ([address]     Diverticulosis of colon     Fibroid uterus     Gallstones     Hot flashes     Hypertension     Meningioma ([address]     Morbid obesity ([address]     Vertigo       Past Surgical History:   Procedure Laterality Date    HX CRANIOTOMY Left 08/18/2015    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 90 Tab 0    pioglitazone (ACTOS) 30 mg tablet TAKE 1 TABLET BY MOUTH ONCE DAILY 90 Tab 0    buPROPion SR (WELLBUTRIN SR) 150 mg SR tablet Take 1 Tab by mouth two (2) times a day. 60 Tab 2    glipiZIDE SR (GLUCOTROL XL) 10 mg CR tablet Take 1 Tab by mouth daily. 90 Tab 1    diclofenac EC (VOLTAREN) 75 mg EC tablet TAKE 1 TABLET BY MOUTH TWICE DAILY 90 Tab 1    meclizine (ANTIVERT) 25 mg tablet TAKE 1 TABLET BY MOUTH TWICE DAILY AS NEEDED FOR DIZZINESS 60 Tab 1    atorvastatin (LIPITOR) 40 mg tablet Take 1 tablet by mouth once daily 90 Tab 0    montelukast (SINGULAIR) 10 mg tablet TAKE 1 TABLET BY MOUTH ONCE DAILY 90 Tab 1    metFORMIN (GLUCOPHAGE) 1,000 mg tablet Take 1 Tab by mouth two (2) times daily (with meals). 180 Tab 1    aspirin 81 mg chewable tablet Take 1 Tab by mouth daily. 180 Tab 2    methocarbamoL (ROBAXIN) 750 mg tablet Take 1 Tab by mouth four (4) times daily. 90 Tab 1    Blood-Glucose Meter monitoring kit As allowed by insurance 1 Kit 0    aluminum hydrox-magnesium carb (Gaviscon Extra Strength) 160-105 mg chew Take 2 Tabs by mouth two (2) times a day.  60 Tab 2    famotidine (PEPCID) 40 mg tablet TAKE 1 TABLET BY MOUTH ONCE DAILY 90 Tab 1    liraglutide (Victoza 3-José) 0.6 mg/0.1 mL (18 mg/3 mL) pnij 1.2 mg by SubCUTAneous route daily. 18 Adjustable Dose Pre-filled Pen Syringe 1    Insulin Needles, Disposable, (BD ULTRA-FINE MICRO PEN NEEDLE) 32 gauge x 1/4\" ndle Use daily to inject Victoza subcutaneously 100 Pen Needle 2    glucose blood VI test strips (ASCENSIA AUTODISC VI, ONE TOUCH ULTRA TEST VI) strip Use to check blood sugar daily 100 Strip 6    glucose blood VI test strips (ACCU-CHEK JUNI PLUS TEST STRP) strip Use to check blood sugar twice a day 100 Strip 1    Blood-Glucose Meter (ACCU-CHEK JUNI PLUS METER) misc Use to check blood sugar twice a day 1 Each 0    lancets (ACCU-CHEK SOFTCLIX LANCETS) misc Use to check blood sugar twice a day 100 Each 11     No Known Allergies    Family History   Problem Relation Age of Onset    Cancer Mother 58        colon    Hypertension Mother     Diabetes Mother     Psychiatric Disorder Father     Lupus Sister      Social History     Tobacco Use    Smoking status: Former Smoker     Packs/day: 0.25     Years: 1.00     Pack years: 0.25     Types: Cigarettes     Start date:      Quit date:      Years since quittin.1    Smokeless tobacco: Never Used   Substance Use Topics    Alcohol use: No       Bridget So, who was evaluated through a synchronous (real-time) audio-video encounter, and/or her healthcare decision maker, is aware that it is a billable service, with coverage as determined by her insurance carrier. She provided verbal consent to proceed: Yes, and patient identification was verified. It was conducted pursuant to the emergency declaration under the [facility], [address] [place] [address] authority and the [employer] Act. A caregiver was present when appropriate. Ability to conduct physical exam was limited. I was in the office. The patient was at home.     Kiran Washington Wale Shook, NP

## 2021-02-09 NOTE — PATIENT INSTRUCTIONS
Medicare Wellness Visit, Female The best way to live healthy is to have a lifestyle where you eat a well-balanced diet, exercise regularly, limit alcohol use, and quit all forms of tobacco/nicotine, if applicable. Regular preventive services are another way to keep healthy. Preventive services (vaccines, screening tests, monitoring & exams) can help personalize your care plan, which helps you manage your own care. Screening tests can find health problems at the earliest stages, when they are easiest to treat. [facility] follows the current, evidence-based guidelines published by the [employer] Anastacio Perez ([facility]) when recommending preventive services for our patients. Because we follow these guidelines, sometimes recommendations change over time as research supports it. (For example, mammograms used to be recommended annually. Even though Medicare will still pay for an annual mammogram, the newer guidelines recommend a mammogram every two years for women of average risk). Of course, you and your doctor may decide to screen more often for some diseases, based on your risk and your co-morbidities (chronic disease you are already diagnosed with). Preventive services for you include: - Medicare offers their members a free annual wellness visit, which is time for you and your primary care provider to discuss and plan for your preventive service needs. Take advantage of this benefit every year! 
-All adults over the age of 72 should receive the recommended pneumonia vaccines. Current USPSTF guidelines recommend a series of two vaccines for the best pneumonia protection.  
-All adults should have a flu vaccine yearly and a tetanus vaccine every 10 years.  
-All adults age 48 and older should receive the shingles vaccines (series of two vaccines). -All adults age 38-68 who are overweight should have a diabetes screening test once every three years. -All adults born between [birth year] and 1963 should be screened once for Hepatitis C. 
-Other screening tests and preventive services for persons with diabetes include: an eye exam to screen for diabetic retinopathy, a kidney function test, a foot exam, and stricter control over your cholesterol.  
-Cardiovascular screening for adults with routine risk involves an electrocardiogram (ECG) at intervals determined by your doctor.  
-Colorectal cancer screenings should be done for adults age 54-65 with no increased risk factors for colorectal cancer. There are a number of acceptable methods of screening for this type of cancer. Each test has its own benefits and drawbacks. Discuss with your doctor what is most appropriate for you during your annual wellness visit. The different tests include: colonoscopy (considered the best screening method), a fecal occult blood test, a fecal DNA test, and sigmoidoscopy. 
 
-A bone mass density test is recommended when a woman turns 65 to screen for osteoporosis. This test is only recommended one time, as a screening. Some providers will use this same test as a disease monitoring tool if you already have osteoporosis. -Breast cancer screenings are recommended every other year for women of normal risk, age 54-69. 
-Cervical cancer screenings for women over age 72 are only recommended with certain risk factors. Here is a list of your current Health Maintenance items (your personalized list of preventive services) with a due date: 
Health Maintenance Due Topic Date Due  
 COVID-19 Vaccine (1 of 2) 12/09/1978  Mammogram  08/02/2020 [facility]  08/13/2020  Hemoglobin A1C    12/30/2020

## 2021-02-15 ENCOUNTER — APPOINTMENT (OUTPATIENT)
Dept: FAMILY MEDICINE CLINIC | Age: 61
End: 2021-02-15

## 2021-02-15 DIAGNOSIS — E11.69 DIABETES MELLITUS TYPE 2 IN OBESE (HCC): ICD-10-CM

## 2021-02-15 DIAGNOSIS — E66.9 DIABETES MELLITUS TYPE 2 IN OBESE (HCC): ICD-10-CM

## 2021-02-15 DIAGNOSIS — I10 ESSENTIAL HYPERTENSION WITH GOAL BLOOD PRESSURE LESS THAN 130/80: ICD-10-CM

## 2021-02-15 DIAGNOSIS — E78.2 MIXED HYPERLIPIDEMIA: ICD-10-CM

## 2021-02-16 LAB
ALBUMIN SERPL-MCNC: 3.7 G/DL (ref 3.8–4.9)
ALBUMIN/CREAT UR: <2 MG/G CREAT (ref 0–29)
ALBUMIN/GLOB SERPL: 1.5 {RATIO} (ref 1.2–2.2)
ALP SERPL-CCNC: 100 IU/L (ref 39–117)
ALT SERPL-CCNC: 9 IU/L (ref 0–32)
AST SERPL-CCNC: 13 IU/L (ref 0–40)
BILIRUB SERPL-MCNC: 0.5 MG/DL (ref 0–1.2)
BUN SERPL-MCNC: 7 MG/DL (ref 6–24)
BUN/CREAT SERPL: 12 (ref 9–23)
CALCIUM SERPL-MCNC: 9.4 MG/DL (ref 8.7–10.2)
CHLORIDE SERPL-SCNC: 103 MMOL/L (ref 96–106)
CHOLEST SERPL-MCNC: 106 MG/DL (ref 100–199)
CO2 SERPL-SCNC: 23 MMOL/L (ref 20–29)
CREAT SERPL-MCNC: 0.6 MG/DL (ref 0.57–1)
CREAT UR-MCNC: 122.5 MG/DL
EST. AVERAGE GLUCOSE BLD GHB EST-MCNC: 206 MG/DL
GLOBULIN SER CALC-MCNC: 2.5 G/DL (ref 1.5–4.5)
GLUCOSE SERPL-MCNC: 281 MG/DL (ref 65–99)
HBA1C MFR BLD: 8.8 % (ref 4.8–5.6)
HDLC SERPL-MCNC: 47 MG/DL
INTERPRETATION, 910389: NORMAL
LDLC SERPL CALC-MCNC: 42 MG/DL (ref 0–99)
Lab: NORMAL
MAMMOGRAPHY, EXTERNAL: NORMAL
MICROALBUMIN UR-MCNC: <3 UG/ML
POTASSIUM SERPL-SCNC: 4.4 MMOL/L (ref 3.5–5.2)
PROT SERPL-MCNC: 6.2 G/DL (ref 6–8.5)
SODIUM SERPL-SCNC: 138 MMOL/L (ref 134–144)
TRIGL SERPL-MCNC: 87 MG/DL (ref 0–149)
VLDLC SERPL CALC-MCNC: 17 MG/DL (ref 5–40)

## 2021-03-12 DIAGNOSIS — I10 ESSENTIAL HYPERTENSION WITH GOAL BLOOD PRESSURE LESS THAN 130/80: ICD-10-CM

## 2021-03-12 DIAGNOSIS — J30.89 ENVIRONMENTAL AND SEASONAL ALLERGIES: ICD-10-CM

## 2021-03-12 RX ORDER — LISINOPRIL 5 MG/1
5 TABLET ORAL DAILY
Qty: 90 TAB | Refills: 0 | Status: SHIPPED | OUTPATIENT
Start: 2021-03-12 | End: 2021-04-26

## 2021-03-12 RX ORDER — MONTELUKAST SODIUM 10 MG/1
10 TABLET ORAL DAILY
Qty: 90 TAB | Refills: 0 | Status: SHIPPED | OUTPATIENT
Start: 2021-03-12 | End: 2021-05-26

## 2021-04-06 DIAGNOSIS — E78.5 HYPERLIPIDEMIA, UNSPECIFIED HYPERLIPIDEMIA TYPE: ICD-10-CM

## 2021-04-07 RX ORDER — ATORVASTATIN CALCIUM 40 MG/1
40 TABLET, FILM COATED ORAL DAILY
Qty: 90 TAB | Refills: 0 | Status: SHIPPED | OUTPATIENT
Start: 2021-04-07 | End: 2021-07-25

## 2021-04-22 DIAGNOSIS — I10 ESSENTIAL HYPERTENSION WITH GOAL BLOOD PRESSURE LESS THAN 130/80: ICD-10-CM

## 2021-04-26 RX ORDER — LISINOPRIL 5 MG/1
TABLET ORAL
Qty: 90 TAB | Refills: 0 | Status: SHIPPED | OUTPATIENT
Start: 2021-04-26 | End: 2021-09-06

## 2021-04-29 DIAGNOSIS — E11.69 DIABETES MELLITUS TYPE 2 IN OBESE (HCC): ICD-10-CM

## 2021-04-29 DIAGNOSIS — E66.9 DIABETES MELLITUS TYPE 2 IN OBESE (HCC): ICD-10-CM

## 2021-05-03 RX ORDER — PIOGLITAZONEHYDROCHLORIDE 30 MG/1
TABLET ORAL
Qty: 90 TAB | Refills: 0 | Status: SHIPPED | OUTPATIENT
Start: 2021-05-03 | End: 2021-08-10

## 2021-05-03 RX ORDER — METFORMIN HYDROCHLORIDE 1000 MG/1
1000 TABLET ORAL 2 TIMES DAILY WITH MEALS
Qty: 180 TAB | Refills: 1 | Status: SHIPPED | OUTPATIENT
Start: 2021-05-03 | End: 2021-09-07 | Stop reason: ALTCHOICE

## 2021-05-25 ENCOUNTER — TELEPHONE (OUTPATIENT)
Dept: FAMILY MEDICINE CLINIC | Age: 61
End: 2021-05-25

## 2021-05-25 NOTE — TELEPHONE ENCOUNTER
I see nothing on her profile over the past year she will need an appointment and she needs a f/u appointment : DM, HLD, HTN, 15 min, VV.

## 2021-05-25 NOTE — TELEPHONE ENCOUNTER
Patient called to requesting a refill on some yeast infection cream that was prescribed to her one time before.  Patient did not know the name of the medication but requesting a return call back at [phone]

## 2021-05-26 ENCOUNTER — VIRTUAL VISIT (OUTPATIENT)
Dept: FAMILY MEDICINE CLINIC | Age: 61
End: 2021-05-26
Payer: MEDICARE

## 2021-05-26 DIAGNOSIS — E11.40 TYPE 2 DIABETES MELLITUS WITH DIABETIC NEUROPATHY, WITHOUT LONG-TERM CURRENT USE OF INSULIN (HCC): ICD-10-CM

## 2021-05-26 DIAGNOSIS — E66.01 MORBID OBESITY (HCC): ICD-10-CM

## 2021-05-26 DIAGNOSIS — M54.50 ACUTE MIDLINE LOW BACK PAIN, UNSPECIFIED WHETHER SCIATICA PRESENT: ICD-10-CM

## 2021-05-26 DIAGNOSIS — R42 VERTIGO: ICD-10-CM

## 2021-05-26 DIAGNOSIS — B37.31 VAGINAL YEAST INFECTION: ICD-10-CM

## 2021-05-26 DIAGNOSIS — E66.9 DIABETES MELLITUS TYPE 2 IN OBESE (HCC): ICD-10-CM

## 2021-05-26 DIAGNOSIS — E78.2 MIXED HYPERLIPIDEMIA: ICD-10-CM

## 2021-05-26 DIAGNOSIS — E11.69 DIABETES MELLITUS TYPE 2 IN OBESE (HCC): ICD-10-CM

## 2021-05-26 DIAGNOSIS — I10 ESSENTIAL HYPERTENSION WITH GOAL BLOOD PRESSURE LESS THAN 130/80: Primary | ICD-10-CM

## 2021-05-26 PROCEDURE — 3017F COLORECTAL CA SCREEN DOC REV: CPT | Performed by: NURSE PRACTITIONER

## 2021-05-26 PROCEDURE — G8756 NO BP MEASURE DOC: HCPCS | Performed by: NURSE PRACTITIONER

## 2021-05-26 PROCEDURE — 2022F DILAT RTA XM EVC RTNOPTHY: CPT | Performed by: NURSE PRACTITIONER

## 2021-05-26 PROCEDURE — 99214 OFFICE O/P EST MOD 30 MIN: CPT | Performed by: NURSE PRACTITIONER

## 2021-05-26 PROCEDURE — G9899 SCRN MAM PERF RSLTS DOC: HCPCS | Performed by: NURSE PRACTITIONER

## 2021-05-26 PROCEDURE — G8432 DEP SCR NOT DOC, RNG: HCPCS | Performed by: NURSE PRACTITIONER

## 2021-05-26 PROCEDURE — G8427 DOCREV CUR MEDS BY ELIG CLIN: HCPCS | Performed by: NURSE PRACTITIONER

## 2021-05-26 PROCEDURE — 3052F HG A1C>EQUAL 8.0%<EQUAL 9.0%: CPT | Performed by: NURSE PRACTITIONER

## 2021-05-26 RX ORDER — GLIPIZIDE 10 MG/1
10 TABLET, FILM COATED, EXTENDED RELEASE ORAL DAILY
Qty: 90 TABLET | Refills: 1 | Status: SHIPPED | OUTPATIENT
Start: 2021-05-26 | End: 2021-11-23 | Stop reason: SDUPTHER

## 2021-05-26 RX ORDER — BLOOD SUGAR DIAGNOSTIC
STRIP MISCELLANEOUS
Qty: 100 STRIP | Refills: 5 | Status: SHIPPED | OUTPATIENT
Start: 2021-05-26 | End: 2021-10-13 | Stop reason: SDUPTHER

## 2021-05-26 RX ORDER — MECLIZINE HYDROCHLORIDE 25 MG/1
TABLET ORAL
Qty: 60 TABLET | Refills: 1 | Status: SHIPPED | OUTPATIENT
Start: 2021-05-26 | End: 2021-11-23 | Stop reason: SDUPTHER

## 2021-05-26 RX ORDER — FLUCONAZOLE 150 MG/1
150 TABLET ORAL DAILY
Qty: 1 TABLET | Refills: 0 | Status: SHIPPED | OUTPATIENT
Start: 2021-05-26 | End: 2021-05-27

## 2021-05-26 RX ORDER — DICLOFENAC SODIUM 75 MG/1
TABLET, DELAYED RELEASE ORAL
Qty: 90 TABLET | Refills: 1 | Status: SHIPPED | OUTPATIENT
Start: 2021-05-26 | End: 2021-09-07 | Stop reason: ALTCHOICE

## 2021-05-26 NOTE — PROGRESS NOTES
Boris               Shey Ellis [address]               [phone]      Zaida Hsu is a 64 y.o. female who was seen by synchronous (real-time) audio-video technology on 5/26/2021. Consent: Zaida Hsu, who was seen by synchronous (real-time) audio-video technology, and/or her healthcare decision maker, is aware that this patient-initiated, Telehealth encounter on 5/26/2021 is a billable service, with coverage as determined by her insurance carrier. She is aware that she may receive a bill and has provided verbal consent to proceed: Yes. Assessment & Plan:   Diagnoses and all orders for this visit:    1. Essential hypertension with goal blood pressure less than 130/80  Endorses medication compliance and Blood pressure stable, continue same medications   2. Type 2 diabetes mellitus with diabetic neuropathy, without long-term current use of insulin ([facility])  -     glucose blood VI test strips (Advocate Redi-Code Plus) strip; Use to check blood glucose once a day  Endorses medication compliance, Refill provided and Denies signs and symptoms of hyper or hypoglycemia  3. Diabetes mellitus type 2 in obese (HCC)  -     glipiZIDE SR (GLUCOTROL XL) 10 mg CR tablet; Take 1 Tablet by mouth daily. Endorses medication compliance, Refill provided and Denies signs and symptoms of hyper or hypoglycemia  4. Mixed hyperlipidemia  Endorses medication compliance and Denies abdominal pain or symptoms of myalgia  5. Acute midline low back pain, unspecified whether sciatica present  -     diclofenac EC (VOLTAREN) 75 mg EC tablet; TAKE 1 TABLET BY MOUTH TWICE DAILY  Refill provided  6. Vertigo  -     meclizine (ANTIVERT) 25 mg tablet; TAKE 1 TABLET BY MOUTH TWICE DAILY AS NEEDED FOR DIZZINESS  Refill provided  7. Vaginal yeast infection  -     fluconazole (DIFLUCAN) 150 mg tablet; Take 1 Tablet by mouth daily for 1 day. FDA advises cautious prescribing of oral fluconazole in pregnancy.   Endorses signs and symptoms consistent with a yeast infection, will treat empirically  8. Morbid obesity ([address]    wants weight loss pills, next visit when in office and we can get an accurate blood pressure and weight on her encouraged her to work on managing her blood glucose control at this time  Follow-up and Dispositions    · Return in about 3 months (around 8/26/2021) for DM, HLD, HTN, weight loss, office only, 30 min, AND, labs prior. 712  Subjective:     Health Maintenance Due   Topic Date Due    Foot Exam Q1  08/13/2020             Marc Lara is a 64 y.o. female who was seen for   Follow Up Chronic Condition and Leg Pain (patient c/o leg pain for several weeks rt greater than left ; NKT)    Leg pain  bilat  Onset about a week ago  Pain is in both knees, right more than left, both thighs anterior  Last time she worked out was about two weeks ago  The pain was bothering her a little bit while she was working out  Has tried tylenol: helped some      DMII-   Patient reports medication compliance Daily  Diabetic diet compliance most of the time  Patient monitors blood sugars regularly occasionally   Reports am fasting sugars range 212,130,300   Denies hypoglycemic episodes yes  Denies polyuria, polydipsia, paraesthesia, vision changes?  yes     Diabetic Foot and Eye Exam HM Status   Topic Date Due    Diabetic Foot Care  08/13/2020    Eye Exam  08/30/2021     Hemoglobin A1c   Date Value Ref Range Status   02/15/2021 8.8 (H) 4.8 - 5.6 % Final     Comment:              Prediabetes: 5.7 - 6.4           Diabetes: >6.4           Glycemic control for adults with diabetes: <7.0     ]  Creatinine, urine   Date Value Ref Range Status   02/15/2021 122.5 Not Estab. mg/dL Final     Microalbumin/Creat ratio (mg/g creat)   Date Value Ref Range Status   07/26/2019 5 0 - 30 mg/g Final     Microalb/Creat ratio (ug/mg creat.)   Date Value Ref Range Status   02/15/2021 <2 0 - 29 mg/g creat Final     Comment: Normal:                0 -  29                         Moderately increased: 30 - 300                         Severely increased:       >300     09/30/2020 <2 0 - 29 mg/g creat Final     Comment:                            Normal:                0 -  29                         Moderately increased: 30 - 300                         Severely increased:       >300                **Please note reference interval change**     06/21/2018  0.0 - 30.0 mcg/mg of Creatinine Final     Comment:     ** Unable to calculate Microablumin/Creatinine Ratio due to low Microalbumin     Key Antihyperglycemic Medications             glipiZIDE SR (GLUCOTROL XL) 10 mg CR tablet (Taking) Take 1 Tablet by mouth daily. pioglitazone (ACTOS) 30 mg tablet (Taking) Take 1 tablet by mouth once daily    metFORMIN (GLUCOPHAGE) 1,000 mg tablet (Taking) Take 1 Tab by mouth two (2) times daily (with meals). Hypertension:   Patient reports taking medications as instructed. yes   Medication side effects noted. no  Headache upon wakening. no   Home BP monitoring in range of does not know    Do you experience chest pain/pressure or SOB with exertion? no  Maintain a low salt diet? yes  Key CAD CHF Meds             lisinopriL (PRINIVIL, ZESTRIL) 5 mg tablet (Taking) Take 1 tablet by mouth once daily    atorvastatin (LIPITOR) 40 mg tablet (Taking) Take 1 Tab by mouth daily. aspirin 81 mg chewable tablet (Taking) Take 1 Tab by mouth daily. HLD:  Has been compliant with meds  all of the time  Compliant with low-fat diet. most of the time    Denies myalgias or other side effects.  no   Cholesterol, total   Date Value Ref Range Status   02/15/2021 106 100 - 199 mg/dL Final     Triglyceride   Date Value Ref Range Status   02/15/2021 87 0 - 149 mg/dL Final     HDL Cholesterol   Date Value Ref Range Status   02/15/2021 47 >39 mg/dL Final     LDL, calculated   Date Value Ref Range Status   02/15/2021 42 0 - 99 mg/dL Final   ]  Key Antihyperlipidemia Meds             atorvastatin (LIPITOR) 40 mg tablet (Taking) Take 1 Tab by mouth daily. Prior to Admission medications    Medication Sig Start Date End Date Taking? Authorizing Provider   diclofenac EC (VOLTAREN) 75 mg EC tablet TAKE 1 TABLET BY MOUTH TWICE DAILY 5/26/21  Yes Dave Lerma NP   glipiZIDE SR (GLUCOTROL XL) 10 mg CR tablet Take 1 Tablet by mouth daily. 5/26/21  Yes Dave Lerma NP   meclizine (ANTIVERT) 25 mg tablet TAKE 1 TABLET BY MOUTH TWICE DAILY AS NEEDED FOR DIZZINESS 5/26/21  Yes Dave Lerma NP   glucose blood VI test strips ([employer] Redi-Code Plus) strip Use to check blood glucose once a day 5/26/21  Yes Dave Lerma NP   pioglitazone (ACTOS) 30 mg tablet Take 1 tablet by mouth once daily 5/3/21  Yes Dave Lerma NP   metFORMIN (GLUCOPHAGE) 1,000 mg tablet Take 1 Tab by mouth two (2) times daily (with meals). 5/3/21  Yes Dave Lerma NP   lisinopriL (PRINIVIL, ZESTRIL) 5 mg tablet Take 1 tablet by mouth once daily 4/26/21  Yes Dave Lerma NP   atorvastatin (LIPITOR) 40 mg tablet Take 1 Tab by mouth daily. 4/7/21  Yes Dave Lerma NP   buPROPion SR [facility] SR) 150 mg SR tablet Take 1 Tab by mouth two (2) times a day. 11/12/20  Yes Camila Morfin MD   aspirin 81 mg chewable tablet Take 1 Tab by mouth daily.  3/10/20  Yes Camila Morfin MD   glucose blood VI test strips (ASCENSIA AUTODISC VI, ONE TOUCH ULTRA TEST VI) strip Use to check blood sugar daily 2/7/19  Yes Camila Morfin MD   glucose blood VI test strips (ACCU-CHEK JUNI PLUS TEST STRP) strip Use to check blood sugar twice a day 2/7/19  Yes Camila Morfin MD   Blood-Glucose Meter (ACCU-CHEK JUNI PLUS METER) misc Use to check blood sugar twice a day 12/14/18  Yes Camila Morfin MD   lancets (ACCU-CHEK SOFTCLIX LANCETS) misc Use to check blood sugar twice a day 12/14/18  Yes Camila Morfin MD     No Known Allergies    Patient Active Problem List   Diagnosis Code    Essential hypertension with goal blood pressure less than 130/80 I10    GERD (gastroesophageal reflux disease) K21.9    DOMINIQUE (obstructive sleep apnea) G47.33    Arthritis M19.90    Asthma J45.909    Urinary urgency R39.15    Morbid obesity ([facility] E66.01    Type 2 diabetes mellitus with diabetic neuropathy (HCC) E11.40    Hyperlipidemia E78.5    Bilateral primary osteoarthritis of knee M17.0    Vertigo R42    Environmental and seasonal allergies J30.89    Diabetic polyneuropathy associated with type 2 diabetes mellitus ([facility] E11.42    Diabetes mellitus type 2 in obese (HCC) E11.69, E66.9     Past Surgical History:   Procedure Laterality Date    HX CRANIOTOMY Left 2015    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; Surgeon: Sudarshan Steel MD; Location: [facility] MAIN OR [facility];     HX LAP CHOLECYSTECTOMY  2014    [facility]    HX TUBAL LIGATION Bilateral     TN EXCIS INFRATENT BRAIN TUMOR  2015    [facility]     Family History   Problem Relation Age of Onset    Cancer Mother 58        colon   Carla Abler Hypertension Mother     Diabetes Mother     Psychiatric Disorder Father     Lupus Sister      Social History     Tobacco Use    Smoking status: Former Smoker     Packs/day: 0.25     Years: 1.00     Pack years: 0.25     Types: Cigarettes     Start date:      Quit date:      Years since quittin.4    Smokeless tobacco: Never Used   Substance Use Topics    Alcohol use: No       ROS  As stated in HPI, otherwise all others negative. Objective: There were no vitals taken for this visit.    General: alert, cooperative, no distress   Mental  status: normal mood, behavior, speech, dress, motor activity, and thought processes, able to follow commands   HENT: NCAT   Neck: no visualized mass   Resp: no respiratory distress   Neuro: no gross deficits   Skin: no discoloration or lesions of concern on visible areas Psychiatric: normal affect, consistent with stated mood, no evidence of hallucinations     Additional exam findings: We discussed the expected course, resolution and complications of the diagnosis(es) in detail. Medication risks, benefits, costs, interactions, and alternatives were discussed as indicated. I advised her to contact the office if her condition worsens, changes or fails to improve as anticipated. She expressed understanding with the diagnosis(es) and plan. Umair Contreras is a 64 y.o. female who was evaluated by a video visit encounter for concerns as above. Patient identification was verified prior to start of the visit. A caregiver was present when appropriate. Due to this being a TeleHealth encounter (During YYV-11 public health emergency), evaluation of the following organ systems was limited: Vitals/Constitutional/EENT/Resp/CV/GI//MS/Neuro/Skin/Heme-Lymph-Imm. Pursuant to the emergency declaration under the [address] [place] [address] waiver authority and the [employer] and [employer] Act, this Virtual  Visit was conducted, with patient's (and/or legal guardian's) consent, to reduce the patient's risk of exposure to COVID-19 and provide necessary medical care. Services were provided through a video synchronous discussion virtually to substitute for in-person clinic visit. Patient and provider were located at their individual homes. An After Visit Summary was printed and given to the patient. All diagnosis have been discussed with the patient and all of the patient's questions have been answered. Follow-up and Dispositions    · Return in about 3 months (around 8/26/2021) for DM, HLD, HTN, weight loss, office only, 30 min, AND, labs prior. Marin Stoddard, [facility] [address] [address]   Shey Ellis

## 2021-06-02 DIAGNOSIS — E11.40 TYPE 2 DIABETES MELLITUS WITH DIABETIC NEUROPATHY, WITHOUT LONG-TERM CURRENT USE OF INSULIN (HCC): ICD-10-CM

## 2021-06-02 RX ORDER — LANCETS
EACH MISCELLANEOUS
Qty: 100 EACH | Refills: 11 | Status: SHIPPED | OUTPATIENT
Start: 2021-06-02 | End: 2022-04-05 | Stop reason: SDUPTHER

## 2021-06-02 NOTE — TELEPHONE ENCOUNTER
Patient called stating she did not receive the test strips  ordered on 05/26 . ... [facility] was called and per Chanel Dale the test strips could not be ordered through [facility].   Chanel Dale can be contacted at [phone]

## 2021-06-17 ENCOUNTER — VIRTUAL VISIT (OUTPATIENT)
Dept: FAMILY MEDICINE CLINIC | Age: 61
End: 2021-06-17
Payer: MEDICARE

## 2021-06-17 DIAGNOSIS — R10.9 ABDOMINAL CRAMPS: Primary | ICD-10-CM

## 2021-06-17 PROCEDURE — G9899 SCRN MAM PERF RSLTS DOC: HCPCS | Performed by: NURSE PRACTITIONER

## 2021-06-17 PROCEDURE — 99213 OFFICE O/P EST LOW 20 MIN: CPT | Performed by: NURSE PRACTITIONER

## 2021-06-17 PROCEDURE — G8432 DEP SCR NOT DOC, RNG: HCPCS | Performed by: NURSE PRACTITIONER

## 2021-06-17 PROCEDURE — 3017F COLORECTAL CA SCREEN DOC REV: CPT | Performed by: NURSE PRACTITIONER

## 2021-06-17 PROCEDURE — G8756 NO BP MEASURE DOC: HCPCS | Performed by: NURSE PRACTITIONER

## 2021-06-17 PROCEDURE — G8427 DOCREV CUR MEDS BY ELIG CLIN: HCPCS | Performed by: NURSE PRACTITIONER

## 2021-06-17 RX ORDER — DICYCLOMINE HYDROCHLORIDE 10 MG/1
10 CAPSULE ORAL
Qty: 90 CAPSULE | Refills: 0 | Status: SHIPPED | OUTPATIENT
Start: 2021-06-17

## 2021-06-17 NOTE — PROGRESS NOTES
Patient complain of stomach pain and having diarrhea. Patient state's \" The diarrhea started today\"       1. Have you been to the ER, urgent care clinic since your last visit? Hospitalized since your last visit? Yes, [facility]     2. Have you seen or consulted any other health care providers outside of the [address] [place] [address] since your last visit? Include any pap smears or colon screening.  No       Health Maintenance Due   Topic Date Due    Foot Exam Q1  08/13/2020

## 2021-06-17 NOTE — PROGRESS NOTES
Shey [address]               [phone]      Marty Moreau is a 64 y.o. female who was seen by synchronous (real-time) audio-video technology on 6/17/2021. Consent: Marty Moreau, who was seen by synchronous (real-time) audio-video technology, and/or her healthcare decision maker, is aware that this patient-initiated, Telehealth encounter on 6/17/2021 is a billable service, with coverage as determined by her insurance carrier. She is aware that she may receive a bill and has provided verbal consent to proceed: Yes. Assessment & Plan:   Diagnoses and all orders for this visit:    1. Abdominal cramps  -     REFERRAL TO GASTROENTEROLOGY  -     dicyclomine (BENTYL) 10 mg capsule; Take 1 Capsule by mouth four (4) times daily as needed for Abdominal Cramps.     referral to GI for further evaluation and mananment  Rx for dicyclomine provided  Patient verbalized understanding and is in agreement with this plan of care  Follow-up and Dispositions    · Return for keep in office f/u scheduled for august.             712  Subjective:     Health Maintenance Due   Topic Date Due    Foot Exam Q1  08/13/2020             Marty Moreau is a 64 y.o. female who was seen for   Hospital Follow Up    ED two times  1st time she was told could be a virus  2nd time had CT scan done  Impressions Performed At       Colonic diverticulosis without evidence of acute diverticulitis.        Prior cholecystectomy.        Fibroid uterus.        Additional incidentals as above.        Signed By: Dai Ron MD on 6/16/2021 11:59 PM        Visit today  C/o abdominal pain,   The pain is located in the middle of her stomach by the belly button  The pain is constant and worsens in waves  New diarrhea started today, she was having constipation  deines taking any medications for constipation  Has had nausea with no emesis  Has prescription for Zofran, pepcid    states her diabetes has been controlled 120, except for one occasion when it was up to 500,  then went back down to 124 in 3-4 hours          Prior to Admission medications    Medication Sig Start Date End Date Taking? Authorizing Provider   dicyclomine (BENTYL) 10 mg capsule Take 1 Capsule by mouth four (4) times daily as needed for Abdominal Cramps. 6/17/21  Yes Emilie Amos NP   lancets (Accu-Chek Softclix Lancets) misc Use to check blood sugar twice a day 6/2/21  Yes Emilie Amos NP   glipiZIDE SR (GLUCOTROL XL) 10 mg CR tablet Take 1 Tablet by mouth daily. 5/26/21  Yes Emilie Amos NP   meclizine (ANTIVERT) 25 mg tablet TAKE 1 TABLET BY MOUTH TWICE DAILY AS NEEDED FOR DIZZINESS 5/26/21  Yes Emilie Amos NP   glucose blood VI test strips ([employer] Redi-Code Plus) strip Use to check blood glucose once a day 5/26/21  Yes Emilie Amos NP   pioglitazone (ACTOS) 30 mg tablet Take 1 tablet by mouth once daily 5/3/21  Yes Emilie Amos NP   metFORMIN (GLUCOPHAGE) 1,000 mg tablet Take 1 Tab by mouth two (2) times daily (with meals). 5/3/21  Yes Emilie Amos NP   lisinopriL (PRINIVIL, ZESTRIL) 5 mg tablet Take 1 tablet by mouth once daily 4/26/21  Yes Emilie Amos NP   atorvastatin (LIPITOR) 40 mg tablet Take 1 Tab by mouth daily. 4/7/21  Yes Emilie Amos NP   buPROPion SR [facility] SR) 150 mg SR tablet Take 1 Tab by mouth two (2) times a day.  11/12/20  Yes Jony Bennett MD   glucose blood VI test strips (ASCENSIA AUTODISC VI, ONE TOUCH ULTRA TEST VI) strip Use to check blood sugar daily 2/7/19  Yes Jony Bennett MD   glucose blood VI test strips (ACCU-CHEK JUNI PLUS TEST STRP) strip Use to check blood sugar twice a day 2/7/19  Yes Jony Bennett MD   Blood-Glucose Meter (ACCU-CHEK JUNI PLUS METER) misc Use to check blood sugar twice a day 12/14/18  Yes Jony Bennett MD   diclofenac EC (VOLTAREN) 75 mg EC tablet TAKE 1 TABLET BY MOUTH TWICE DAILY  Patient not taking: Reported on 2021   Flori Hopper NP   aspirin 81 mg chewable tablet Take 1 Tab by mouth daily. Patient not taking: Reported on 2021 3/10/20   Tiffany Goldstein MD     No Known Allergies    Patient Active Problem List   Diagnosis Code    Essential hypertension with goal blood pressure less than 130/80 I10    GERD (gastroesophageal reflux disease) K21.9    DOMINIQUE (obstructive sleep apnea) G47.33    Arthritis M19.90    Asthma J45.909    Urinary urgency R39.15    Morbid obesity ([address] E66.01    Type 2 diabetes mellitus with diabetic neuropathy (HCC) E11.40    Hyperlipidemia E78.5    Bilateral primary osteoarthritis of knee M17.0    Vertigo R42    Environmental and seasonal allergies J30.89    Diabetic polyneuropathy associated with type 2 diabetes mellitus ([facility] E11.42    Diabetes mellitus type 2 in obese ([facility] E11.69, E66.9     Past Surgical History:   Procedure Laterality Date    HX CRANIOTOMY Left 2015    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; Surgeon: Rosalia Carlos MD; Location: [facility] MAIN OR LOC;     [facility]LE[facility]OMY  2014    [facility]    HX TUBAL LIGATION Bilateral     TN EXCIS INFRATENT BRAIN TUMOR  2015    [facility]     Family History   Problem Relation Age of Onset    Cancer Mother 58        colon   Heddie Konig Hypertension Mother     Diabetes Mother     Psychiatric Disorder Father     Lupus Sister      Social History     Tobacco Use    Smoking status: Former Smoker     Packs/day: 0.25     Years: 1.00     Pack years: 0.25     Types: Cigarettes     Start date:      Quit date:      Years since quittin.5    Smokeless tobacco: Never Used   Substance Use Topics    Alcohol use: No       ROS  As stated in HPI, otherwise all others negative. Objective: There were no vitals taken for this visit.    General: alert, cooperative, no distress   Mental  status: normal mood, behavior, speech, dress, motor activity, and thought processes, able to follow commands   HENT: NCAT   Neck: no visualized mass   Resp: no respiratory distress   Neuro: no gross deficits   Skin: no discoloration or lesions of concern on visible areas   Psychiatric: normal affect, consistent with stated mood, no evidence of hallucinations     Additional exam findings: We discussed the expected course, resolution and complications of the diagnosis(es) in detail. Medication risks, benefits, costs, interactions, and alternatives were discussed as indicated. I advised her to contact the office if her condition worsens, changes or fails to improve as anticipated. She expressed understanding with the diagnosis(es) and plan. Prabhjot Hung is a 64 y.o. female who was evaluated by a video visit encounter for concerns as above. Patient identification was verified prior to start of the visit. A caregiver was present when appropriate. Due to this being a TeleHealth encounter (During [place] [address] public [facility] emergency), evaluation of the following organ systems was limited: Vitals/Constitutional/EENT/Resp/CV/GI//MS/Neuro/Skin/Heme-Lymph-Imm. Pursuant to the emergency declaration under the [facility], [facility] waiver authority and the [employer] Act, this Virtual  Visit was conducted, with patient's (and/or legal guardian's) consent, to reduce the patient's risk of exposure to COVID-19 and provide necessary medical care. Services were provided through a video synchronous discussion virtually to substitute for in-person clinic visit. Patient and provider were located at their individual homes. An After Visit Summary was printed and given to the patient. All diagnosis have been discussed with the patient and all of the patient's questions have been answered.      Follow-up and Dispositions    · Return for keep in office f/u scheduled for Nikole Luna, [facility] [id number] [address] [place].   Shey Ellis

## 2021-07-25 DIAGNOSIS — E78.5 HYPERLIPIDEMIA, UNSPECIFIED HYPERLIPIDEMIA TYPE: ICD-10-CM

## 2021-07-25 RX ORDER — ATORVASTATIN CALCIUM 40 MG/1
TABLET, FILM COATED ORAL
Qty: 90 TABLET | Refills: 0 | Status: SHIPPED | OUTPATIENT
Start: 2021-07-25 | End: 2021-09-07 | Stop reason: SINTOL

## 2021-08-08 DIAGNOSIS — E66.9 DIABETES MELLITUS TYPE 2 IN OBESE (HCC): ICD-10-CM

## 2021-08-08 DIAGNOSIS — E11.69 DIABETES MELLITUS TYPE 2 IN OBESE (HCC): ICD-10-CM

## 2021-08-10 RX ORDER — PIOGLITAZONEHYDROCHLORIDE 30 MG/1
TABLET ORAL
Qty: 90 TABLET | Refills: 0 | Status: SHIPPED | OUTPATIENT
Start: 2021-08-10 | End: 2021-11-23 | Stop reason: SDUPTHER

## 2021-08-26 ENCOUNTER — TELEPHONE (OUTPATIENT)
Dept: FAMILY MEDICINE CLINIC | Age: 61
End: 2021-08-26

## 2021-08-26 NOTE — TELEPHONE ENCOUNTER
Patient states \" I was coughing up blood on last Saturday and I may have been geting rid of a cold I am not sure. \" Patient was informed to schedule an appointment.  Patient appointment is September 7, 2021 at 11:00 am.

## 2021-09-06 DIAGNOSIS — I10 ESSENTIAL HYPERTENSION WITH GOAL BLOOD PRESSURE LESS THAN 130/80: ICD-10-CM

## 2021-09-06 RX ORDER — LISINOPRIL 5 MG/1
TABLET ORAL
Qty: 90 TABLET | Refills: 0 | Status: SHIPPED | OUTPATIENT
Start: 2021-09-06 | End: 2021-09-07 | Stop reason: SDUPTHER

## 2021-09-07 ENCOUNTER — OFFICE VISIT (OUTPATIENT)
Dept: FAMILY MEDICINE CLINIC | Age: 61
End: 2021-09-07
Payer: MEDICARE

## 2021-09-07 VITALS
TEMPERATURE: 98.3 F | WEIGHT: 249 LBS | HEART RATE: 65 BPM | DIASTOLIC BLOOD PRESSURE: 76 MMHG | OXYGEN SATURATION: 97 % | HEIGHT: 65 IN | SYSTOLIC BLOOD PRESSURE: 124 MMHG | BODY MASS INDEX: 41.48 KG/M2

## 2021-09-07 DIAGNOSIS — R25.2 LEG CRAMPS: ICD-10-CM

## 2021-09-07 DIAGNOSIS — I10 ESSENTIAL HYPERTENSION WITH GOAL BLOOD PRESSURE LESS THAN 130/80: ICD-10-CM

## 2021-09-07 DIAGNOSIS — E11.40 TYPE 2 DIABETES MELLITUS WITH DIABETIC NEUROPATHY, WITHOUT LONG-TERM CURRENT USE OF INSULIN (HCC): Primary | ICD-10-CM

## 2021-09-07 DIAGNOSIS — J45.909 MODERATE ASTHMA WITHOUT COMPLICATION, UNSPECIFIED WHETHER PERSISTENT: ICD-10-CM

## 2021-09-07 PROBLEM — R07.9 CHEST PAIN: Status: ACTIVE | Noted: 2021-01-18

## 2021-09-07 PROCEDURE — G8752 SYS BP LESS 140: HCPCS | Performed by: NURSE PRACTITIONER

## 2021-09-07 PROCEDURE — G8427 DOCREV CUR MEDS BY ELIG CLIN: HCPCS | Performed by: NURSE PRACTITIONER

## 2021-09-07 PROCEDURE — G8754 DIAS BP LESS 90: HCPCS | Performed by: NURSE PRACTITIONER

## 2021-09-07 PROCEDURE — 3017F COLORECTAL CA SCREEN DOC REV: CPT | Performed by: NURSE PRACTITIONER

## 2021-09-07 PROCEDURE — 2022F DILAT RTA XM EVC RTNOPTHY: CPT | Performed by: NURSE PRACTITIONER

## 2021-09-07 PROCEDURE — G9899 SCRN MAM PERF RSLTS DOC: HCPCS | Performed by: NURSE PRACTITIONER

## 2021-09-07 PROCEDURE — 3051F HG A1C>EQUAL 7.0%<8.0%: CPT | Performed by: NURSE PRACTITIONER

## 2021-09-07 PROCEDURE — 99214 OFFICE O/P EST MOD 30 MIN: CPT | Performed by: NURSE PRACTITIONER

## 2021-09-07 PROCEDURE — G8417 CALC BMI ABV UP PARAM F/U: HCPCS | Performed by: NURSE PRACTITIONER

## 2021-09-07 PROCEDURE — G8432 DEP SCR NOT DOC, RNG: HCPCS | Performed by: NURSE PRACTITIONER

## 2021-09-07 RX ORDER — FAMOTIDINE 20 MG/1
20 TABLET, FILM COATED ORAL EVERY 12 HOURS
COMMUNITY
Start: 2021-06-16 | End: 2021-10-13

## 2021-09-07 RX ORDER — LISINOPRIL 5 MG/1
5 TABLET ORAL DAILY
Qty: 90 TABLET | Refills: 0 | Status: SHIPPED | OUTPATIENT
Start: 2021-09-07 | End: 2022-04-05 | Stop reason: SDUPTHER

## 2021-09-07 RX ORDER — SEMAGLUTIDE 1.34 MG/ML
1 INJECTION, SOLUTION SUBCUTANEOUS
Qty: 1 BOX | Refills: 0 | Status: SHIPPED | OUTPATIENT
Start: 2021-09-07 | End: 2021-09-09 | Stop reason: SINTOL

## 2021-09-07 NOTE — PROGRESS NOTES
The Eber Seller 64 y.o. female presents today for:    Chief Complaint   Patient presents with    Cough     w/ expectoration, reported as dots of blood (red)    Hypertension     146/73 checked at home, requested change medication lisinopril     Diabetes     bs fasting 103, requested change medication metfomin     Leg Pain     both 10/10 pain has not improved, fell twice last month      Pt states has been coughing for 4 days. Denies shortness of breath and chest pain. -Covid  Patient states needs lisinopril refill. Pt had ER visit on 8/20/2021 and EGD last week; awaiting results. Pt states needs new HTN medication and wants to change from Metformin due to abdominal cramps. Review of Systems   Constitutional: Negative. Negative for chills, fever, malaise/fatigue and weight loss. HENT: Negative. Eyes: Negative. Negative for blurred vision. Respiratory: Positive for cough and hemoptysis. Negative for shortness of breath and wheezing. Cardiovascular: Negative. Negative for chest pain, palpitations and leg swelling. Gastrointestinal: Positive for diarrhea. Negative for abdominal pain, blood in stool and constipation. Genitourinary: Negative. Musculoskeletal: Positive for falls. Leg cramps     Skin: Negative. Negative for itching and rash. Neurological: Negative. Negative for dizziness and headaches. Endo/Heme/Allergies: Negative. Psychiatric/Behavioral: Negative. Negative for depression and suicidal ideas.        Health Maintenance Due   Topic Date Due    Foot Exam Q1  08/13/2020    Flu Vaccine (1) Never done        Past Medical History:   Diagnosis Date    Arthritis     Chronic gastritis with bleeding     DDD (degenerative disc disease), lumbar     Depression     Diabetes mellitus, type II ([address]     Diverticulosis of colon     Fibroid uterus     Gallstones     Hot flashes     Hypertension     Meningioma ([address]     Morbid obesity (HCC)     Vertigo Physical Exam  Constitutional:       Appearance: She is obese. Neck:      Vascular: No carotid bruit. Cardiovascular:      Rate and Rhythm: Normal rate and regular rhythm. Pulses: Normal pulses. Heart sounds: Normal heart sounds. No murmur heard. Pulmonary:      Effort: Pulmonary effort is normal. No respiratory distress. Breath sounds: No wheezing. Abdominal:      General: There is no distension. Palpations: Abdomen is soft. Tenderness: There is no abdominal tenderness. Musculoskeletal:         General: No swelling or tenderness. Normal range of motion. Cervical back: Normal range of motion and neck supple. No rigidity. Right lower leg: No edema. Left lower leg: No edema. Lymphadenopathy:      Cervical: No cervical adenopathy. Skin:     General: Skin is warm. Capillary Refill: Capillary refill takes less than 2 seconds. Findings: No erythema or rash. Neurological:      General: No focal deficit present. Mental Status: She is alert and oriented to person, place, and time. Psychiatric:         Mood and Affect: Mood normal.          ASSESSMENT and PLAN    ICD-10-CM ICD-9-CM    1. Type 2 diabetes mellitus with diabetic neuropathy, without long-term current use of insulin ([facility])  E11.40 250.60 HEMOGLOBIN A1C WITH EAG     357.2 HEMOGLOBIN A1C WITH EAG      DISCONTINUED: semaglutide (Ozempic) 1 mg/dose (2 mg/1.5 mL) sub-q pen   2. Essential hypertension with goal blood pressure less than 130/80  I10 401.9 lisinopriL (PRINIVIL, ZESTRIL) 5 mg tablet   3. Moderate asthma without complication, unspecified whether persistent  J45.909 493.90 beclomethasone (QVAR) 40 mcg/actuation aero   4. Leg cramps  R25.2 729.82 MAGNESIUM      HEPATIC FUNCTION PANEL      DUPLEX LOWER EXT ARTERY BILAT      HEPATIC FUNCTION PANEL      MAGNESIUM     the following changes in treatment are made: check LFTs, magnesium. stop cholesterol medication for 2 weeks.  Follow up with Olivia regarding leg cramps.  Stop metformin because of abdominal cramps and start ozemic.   lab results and schedule of future lab studies reviewed with patient  reviewed diet, exercise and weight control    Fabio Urias NP

## 2021-09-07 NOTE — PROGRESS NOTES
Chief Complaint   Patient presents with    Cough     w/ expectoration, reported as dots of blood (red)    Hypertension     146/73 checked at home, requested change medication lisinopril     Diabetes     bs fasting 103, requested change medication metfomin     Leg Pain     both 10/10 pain has not improved, fell twice last month      1. Have you been to the ER, urgent care clinic since your last visit? Hospitalized since your last visit? Yes [facility] two weeks ago for nausea and vomiting Coughing blood    2. Have you seen or consulted any other health care providers outside of the [address] [place] [address] since your last visit? Include any pap smears or colon screening.  Yes Gastroenterology last week      Health Maintenance Due   Topic Date Due    Shingrix Vaccine Age 49> (1 of 2) Never done    Foot Exam Q1  08/13/2020    Flu Vaccine (1) Never done    PAP AKA CERVICAL CYTOLOGY  12/22/2021

## 2021-09-08 LAB
ALBUMIN SERPL-MCNC: 3.7 G/DL (ref 3.8–4.9)
ALP SERPL-CCNC: 102 IU/L (ref 48–121)
ALT SERPL-CCNC: 22 IU/L (ref 0–32)
AST SERPL-CCNC: 20 IU/L (ref 0–40)
BILIRUB DIRECT SERPL-MCNC: 0.14 MG/DL (ref 0–0.4)
BILIRUB SERPL-MCNC: 0.4 MG/DL (ref 0–1.2)
EST. AVERAGE GLUCOSE BLD GHB EST-MCNC: 166 MG/DL
HBA1C MFR BLD: 7.4 % (ref 4.8–5.6)
MAGNESIUM SERPL-MCNC: 1.8 MG/DL (ref 1.6–2.3)
PROT SERPL-MCNC: 6.4 G/DL (ref 6–8.5)

## 2021-09-09 NOTE — PROGRESS NOTES
Patient called stating Main Reyes is giving her headaches and refusing to take after patient asked for the medication. Advised to stop medication and follow up Evins Solum.

## 2021-09-16 NOTE — PROGRESS NOTES
Can you please call patient to let the patient know that her hemoglobin A1c has improved from 8.8 to 7. 4.

## 2021-09-20 ENCOUNTER — HOSPITAL ENCOUNTER (OUTPATIENT)
Dept: VASCULAR SURGERY | Age: 61
Discharge: HOME | End: 2021-09-20
Attending: NURSE PRACTITIONER
Payer: MEDICARE

## 2021-09-20 DIAGNOSIS — R25.2 LEG CRAMPS: ICD-10-CM

## 2021-09-20 PROCEDURE — 93922 UPR/L XTREMITY ART 2 LEVELS: CPT

## 2021-09-24 DIAGNOSIS — E11.40 TYPE 2 DIABETES MELLITUS WITH DIABETIC NEUROPATHY, WITHOUT LONG-TERM CURRENT USE OF INSULIN (HCC): Primary | ICD-10-CM

## 2021-09-24 LAB
LEFT ABI: 1.12
LEFT ANTERIOR TIBIAL: 144 MMHG
LEFT ATA BP LEVEL: NORMAL
LEFT POSTERIOR TIBIAL: 140 MMHG
RIGHT ABI: 1.1
RIGHT ANTERIOR TIBIAL: 142 MMHG
RIGHT ARM BP: 129 MMHG
RIGHT ATA BP LEVEL: NORMAL
RIGHT POSTERIOR TIBIAL: 141 MMHG

## 2021-09-24 RX ORDER — BLOOD SUGAR DIAGNOSTIC
100 STRIP MISCELLANEOUS SEE ADMIN INSTRUCTIONS
COMMUNITY
End: 2021-09-24 | Stop reason: SDUPTHER

## 2021-09-24 RX ORDER — BLOOD SUGAR DIAGNOSTIC
100 STRIP MISCELLANEOUS SEE ADMIN INSTRUCTIONS
Qty: 100 STRIP | Refills: 1 | Status: SHIPPED | OUTPATIENT
Start: 2021-09-24 | End: 2021-10-13 | Stop reason: SDUPTHER

## 2021-10-13 ENCOUNTER — OFFICE VISIT (OUTPATIENT)
Dept: FAMILY MEDICINE CLINIC | Age: 61
End: 2021-10-13
Payer: MEDICARE

## 2021-10-13 VITALS
TEMPERATURE: 98.5 F | RESPIRATION RATE: 18 BRPM | DIASTOLIC BLOOD PRESSURE: 82 MMHG | HEIGHT: 65 IN | SYSTOLIC BLOOD PRESSURE: 132 MMHG | BODY MASS INDEX: 41.15 KG/M2 | HEART RATE: 72 BPM | OXYGEN SATURATION: 99 % | WEIGHT: 247 LBS

## 2021-10-13 DIAGNOSIS — D21.9 FIBROIDS: ICD-10-CM

## 2021-10-13 DIAGNOSIS — E11.40 TYPE 2 DIABETES MELLITUS WITH DIABETIC NEUROPATHY, WITHOUT LONG-TERM CURRENT USE OF INSULIN (HCC): Primary | ICD-10-CM

## 2021-10-13 DIAGNOSIS — R10.9 ABDOMINAL CRAMPS: ICD-10-CM

## 2021-10-13 PROBLEM — E11.42 DIABETIC POLYNEUROPATHY ASSOCIATED WITH TYPE 2 DIABETES MELLITUS (HCC): Status: RESOLVED | Noted: 2019-02-14 | Resolved: 2021-10-13

## 2021-10-13 PROCEDURE — G8427 DOCREV CUR MEDS BY ELIG CLIN: HCPCS | Performed by: NURSE PRACTITIONER

## 2021-10-13 PROCEDURE — G8754 DIAS BP LESS 90: HCPCS | Performed by: NURSE PRACTITIONER

## 2021-10-13 PROCEDURE — G8417 CALC BMI ABV UP PARAM F/U: HCPCS | Performed by: NURSE PRACTITIONER

## 2021-10-13 PROCEDURE — G8432 DEP SCR NOT DOC, RNG: HCPCS | Performed by: NURSE PRACTITIONER

## 2021-10-13 PROCEDURE — G8752 SYS BP LESS 140: HCPCS | Performed by: NURSE PRACTITIONER

## 2021-10-13 PROCEDURE — 2022F DILAT RTA XM EVC RTNOPTHY: CPT | Performed by: NURSE PRACTITIONER

## 2021-10-13 PROCEDURE — G9899 SCRN MAM PERF RSLTS DOC: HCPCS | Performed by: NURSE PRACTITIONER

## 2021-10-13 PROCEDURE — 3017F COLORECTAL CA SCREEN DOC REV: CPT | Performed by: NURSE PRACTITIONER

## 2021-10-13 PROCEDURE — 99214 OFFICE O/P EST MOD 30 MIN: CPT | Performed by: NURSE PRACTITIONER

## 2021-10-13 PROCEDURE — 3051F HG A1C>EQUAL 7.0%<8.0%: CPT | Performed by: NURSE PRACTITIONER

## 2021-10-13 RX ORDER — BLOOD-GLUCOSE METER
EACH MISCELLANEOUS
Qty: 1 EACH | Refills: 0 | Status: SHIPPED | OUTPATIENT
Start: 2021-10-13 | End: 2022-04-04

## 2021-10-13 RX ORDER — SEMAGLUTIDE 1.34 MG/ML
1 INJECTION, SOLUTION SUBCUTANEOUS
Qty: 4 EACH | Refills: 3 | Status: SHIPPED | OUTPATIENT
Start: 2021-10-13 | End: 2022-02-27

## 2021-10-13 RX ORDER — BLOOD SUGAR DIAGNOSTIC
STRIP MISCELLANEOUS
Qty: 100 STRIP | Refills: 1 | Status: SHIPPED | OUTPATIENT
Start: 2021-10-13 | End: 2022-01-31 | Stop reason: SDUPTHER

## 2021-10-13 RX ORDER — SEMAGLUTIDE 1.34 MG/ML
1 INJECTION, SOLUTION SUBCUTANEOUS
COMMUNITY
Start: 2021-09-07 | End: 2021-10-13 | Stop reason: SDUPTHER

## 2021-10-13 NOTE — PROGRESS NOTES
[address] [place] [address]               [phone]      Manjula Arriola is a 64 y.o. female and presents with Follow-up       Assessment/Plan:    Diagnoses and all orders for this visit:    1. Type 2 diabetes mellitus with diabetic neuropathy, without long-term current use of insulin (HCC)  -     Blood-Glucose Meter (Accu-Chek Flor Plus Meter) misc; Use to check blood sugar twice a day  -     Ozempic 1 mg/dose (4 mg/3 mL) pnij; 1 mg by SubCUTAneous route every seven (7) days.  -     glucose blood VI test strips (Accu-Chek Flor Plus test strp) strip; Use to check blood sugar twice a day  States she stopped the ozempic for about three days because she thought it was giving her headaches, however, after restarting she did not suffer any more headaches and is continuing the medication  Refill provided  2. Fibroids  -     REFERRAL TO GYNECOLOGY  [facility] uterine fibroids, she has had a GI workup for her abd cramps with no relief, will refer to gyn   3. Abdominal cramps  First visit was 6/2021 [facility], this is after ED visit where she had CT scan that was negative for any abnormal findings except for uterine fibroids  She was referred to GI where she apparently underwent and upper GI showing gastritis and positive for H. Pylori, I do not have these records but according to the patient description and ordered medications I can see I presume these were the findings  Problem continues today, she did state she notices the problem after taking all her medications at one time in the morning and states she does eat prior      Follow-up and Dispositions    · Return in about 3 months (around 1/13/2022) for DM, HLD, HTN, 15 min, office only, with, labs prior.            Health Maintenance:   Health Maintenance   Topic Date Due    Foot Exam Q1  08/13/2020    Flu Vaccine (1) Never done    Shingrix Vaccine Age 50> (1 of 2) 12/13/2021 (Originally 12/9/2012)    Cervical cancer screen  12/22/2021    Medicare Yearly Exam  02/10/2022    MICROALBUMIN Q1  02/15/2022    Lipid Screen  02/15/2022    A1C test (Diabetic or Prediabetic)  09/07/2022    Breast Cancer Screen Mammogram  02/16/2023    Eye Exam Retinal or Dilated  05/28/2023    Colorectal Cancer Screening Combo  01/09/2025    DTaP/Tdap/Td series (2 - Td or Tdap) 06/11/2025    Hepatitis C Screening  Completed    COVID-19 Vaccine  Completed    Pneumococcal 0-64 years  Aged Out        Subjective:    Diabetes  She stopped the metformin  She started ozempic, she called and said it was giving her headaches and advised to stop the medications  She stopped the ozempic, the headaches stopped  She did not think the ozemipic was the problem so she re-started the medication three days later and had no headache, has continued till now  Current dose is 1mg a day, she needs refill  Blood glucose has been   Had one episode of 45 when first starting  She is also taking glipizide and actos    abd cramps  Rx for bentyl ordered 6/2021  States she is using taking one tablet once a day  Taking for stomach aches  States she takes all her medications at one time in the morning with food and then she gets stomach upset  She has been going to gastroenterology, had upper endo, showed h pylori and she was treated, also had gastritis and given carafate and prilosec  Next GI appointment. PMH: uterine fibroids-abd pain goes from lower abd and wrapping to the right flank    Leg cramps  Stopped atorvastatin  Feels like the leg cramps got better some  Continue to hold atorvastatin      ROS:     ROS  As stated in HPI, otherwise all others negative. The problem list was updated as a part of today's visit.   Patient Active Problem List   Diagnosis Code    Essential hypertension with goal blood pressure less than 130/80 I10    GERD (gastroesophageal reflux disease) K21.9    DOMINIQUE (obstructive sleep apnea) G47.33    Arthritis M19.90    Asthma J45.909    Urinary urgency R39.15    Morbid obesity (HCC) E66.01    Type 2 diabetes mellitus with diabetic neuropathy ([facility]) E11.40    Hyperlipidemia E78.5    Bilateral primary osteoarthritis of knee M17.0    Vertigo R42    Environmental and seasonal allergies J30.89    Diabetic polyneuropathy associated with type 2 diabetes mellitus ([facility] E11.42    Diabetes mellitus type 2 in obese ([facility] E11.69, E66.9    Chest pain R07.9    Former cigarette smoker Z87.891    Meningioma ([facility] D32.9    Mild intermittent asthma J45.20    Primary osteoarthritis involving multiple joints M89.49       The PSH, FH were reviewed. SH:  Social History     Tobacco Use    Smoking status: Former Smoker     Packs/day: 0.25     Years: 1.00     Pack years: 0.25     Types: Cigarettes     Start date:      Quit date:      Years since quittin.8    Smokeless tobacco: Never Used   Vaping Use    Vaping Use: Never used   Substance Use Topics    Alcohol use: No    Drug use: Yes     Types: Cocaine, Marijuana, Heroin, Opiates       Medications/Allergies:  Current Outpatient Medications on File Prior to Visit   Medication Sig Dispense Refill    lisinopriL (PRINIVIL, ZESTRIL) 5 mg tablet Take 1 Tablet by mouth daily. 90 Tablet 0    dicyclomine (BENTYL) 10 mg capsule Take 1 Capsule by mouth four (4) times daily as needed for Abdominal Cramps. 90 Capsule 0    lancets (Accu-Chek Softclix Lancets) misc Use to check blood sugar twice a day 100 Each 11    glipiZIDE SR (GLUCOTROL XL) 10 mg CR tablet Take 1 Tablet by mouth daily. 90 Tablet 1    meclizine (ANTIVERT) 25 mg tablet TAKE 1 TABLET BY MOUTH TWICE DAILY AS NEEDED FOR DIZZINESS 60 Tablet 1    aspirin 81 mg chewable tablet Take 1 Tab by mouth daily. 180 Tab 2    [DISCONTINUED] Ozempic 1 mg/dose (4 mg/3 mL) pnij 1 mg by SubCUTAneous route every seven (7) days.  [DISCONTINUED] glucose blood VI test strips (Ascensia CONTOUR) strip 100 Each by Does Not Apply route See Admin Instructions.  Use to check blood glucose once a day. 100 Strip 1    beclomethasone (QVAR) 40 mcg/actuation aero Take 1 Puff by inhalation two (2) times a day. 8.7 g 1    [DISCONTINUED] famotidine (PEPCID) 20 mg tablet Take 20 mg by mouth every twelve (12) hours.  pioglitazone (ACTOS) 30 mg tablet Take 1 tablet by mouth once daily 90 Tablet 0    [DISCONTINUED] glucose blood VI test strips (Advocate Redi-Code Plus) strip Use to check blood glucose once a day 100 Strip 5    [DISCONTINUED] glucose blood VI test strips (ASCENSIA AUTODISC VI, ONE TOUCH ULTRA TEST VI) strip Use to check blood sugar daily 100 Strip 6    [DISCONTINUED] glucose blood VI test strips (ACCU-CHEK JUNI PLUS TEST STRP) strip Use to check blood sugar twice a day 100 Strip 1    [DISCONTINUED] Blood-Glucose Meter (ACCU-CHEK JUNI PLUS METER) misc Use to check blood sugar twice a day 1 Each 0     No current facility-administered medications on file prior to visit. Allergies   Allergen Reactions    Atorvastatin Myalgia     Leg cramps       Objective:  Visit Vitals  /82   Pulse 72   Temp 98.5 °F (36.9 °C) (Temporal)   Resp 18   Ht 5' 5\" (1.651 m)   Wt 247 lb (112 kg)   SpO2 99%   BMI 41.10 kg/m²    Body mass index is 41.1 kg/m². Physical assessment  Physical Exam  Vitals and nursing note reviewed. Eyes:      Conjunctiva/sclera: Conjunctivae normal.      Pupils: Pupils are equal, round, and reactive to light. Neck:      Vascular: No JVD. Cardiovascular:      Rate and Rhythm: Normal rate and regular rhythm. Heart sounds: Normal heart sounds. No murmur heard. No friction rub. No gallop. Pulmonary:      Effort: Pulmonary effort is normal.      Breath sounds: Normal breath sounds. Musculoskeletal:         General: Normal range of motion. Cervical back: Normal range of motion. Skin:     General: Skin is warm and dry. Neurological:      Mental Status: She is alert and oriented to person, place, and time.    Psychiatric: Mood and Affect: Affect normal.         Cognition and Memory: Memory normal.         Judgment: Judgment normal.           Labwork and Ancillary Studies:    CBC w/Diff  Lab Results   Component Value Date/Time    WBC 6.1 09/30/2020 12:55 PM    HGB 12.3 09/30/2020 12:55 PM    PLATELET 117 07/69/7654 12:55 PM         Basic Metabolic Profile  Lab Results   Component Value Date/Time    Sodium 138 02/15/2021 10:26 AM    Potassium 4.4 02/15/2021 10:26 AM    Chloride 103 02/15/2021 10:26 AM    CO2 23 02/15/2021 10:26 AM    Anion gap 5 07/26/2019 10:00 AM    Glucose 281 (H) 02/15/2021 10:26 AM    BUN 7 02/15/2021 10:26 AM    Creatinine 0.60 02/15/2021 10:26 AM    BUN/Creatinine ratio 12 02/15/2021 10:26 AM    GFR est  02/15/2021 10:26 AM    GFR est non- 02/15/2021 10:26 AM    Calcium 9.4 02/15/2021 10:26 AM        Cholesterol  Lab Results   Component Value Date/Time    Cholesterol, total 106 02/15/2021 10:26 AM    HDL Cholesterol 47 02/15/2021 10:26 AM    LDL, calculated 42 02/15/2021 10:26 AM    LDL, calculated 83.8 07/26/2019 10:00 AM    Triglyceride 87 02/15/2021 10:26 AM    CHOL/HDL Ratio 3.0 07/26/2019 10:00 AM           I have discussed the diagnosis with the patient and the intended plan as seen in the above orders. The patient has received an After-Visit Summary and questions were answered concerning future plans. An After Visit Summary was printed and given to the patient. All diagnosis have been discussed with the patient and all of the patient's questions have been answered. Follow-up and Dispositions    · Return in about 3 months (around 1/13/2022) for DM, HLD, HTN, 15 min, office only, with, labs prior. Ramirez Moise, TIKIP-BC  [address] [place] [address]  [id number]

## 2021-10-13 NOTE — PATIENT INSTRUCTIONS
Learning About the [address] [facility] Diet  What is the Mediterranean diet? The Mediterranean diet is a style of eating rather than a diet plan. It features foods eaten in [place], [place], [place] and [place], and other countries along the [facility]. It emphasizes eating foods like fish, fruits, vegetables, beans, high-fiber breads and whole grains, nuts, and olive oil. This style of eating includes limited red meat, cheese, and sweets. Why choose the Mediterranean diet? A Mediterranean-style diet may improve heart health. It contains more fat than other heart-healthy diets. But the fats are mainly from nuts, unsaturated oils (such as fish oils and olive oil), and certain nut or seed oils (such as canola, soybean, or flaxseed oil). These fats may help protect the heart and blood vessels. How can you get started on the Mediterranean diet? Here are some things you can do to switch to a more Mediterranean way of eating. What to eat  · Eat a variety of fruits and vegetables each day, such as grapes, blueberries, tomatoes, broccoli, peppers, figs, olives, spinach, eggplant, beans, lentils, and chickpeas. · Eat a variety of whole-grain foods each day, such as oats, brown rice, and whole wheat bread, pasta, and couscous. · Eat fish at least 2 times a week. Try tuna, salmon, mackerel, lake trout, herring, or sardines. · Eat moderate amounts of low-fat dairy products, such as milk, cheese, or yogurt. · Eat moderate amounts of poultry and eggs. · Choose healthy (unsaturated) fats, such as nuts, olive oil, and certain nut or seed oils like canola, soybean, and flaxseed. · Limit unhealthy (saturated) fats, such as butter, palm oil, and coconut oil. And limit fats found in animal products, such as meat and dairy products made with whole milk. Try to eat red meat only a few times a month in very small amounts. · Limit sweets and desserts to only a few times a week.  This includes sugar-sweetened drinks like soda.  The Mediterranean diet may also include red wine with your meal--1 glass each day for women and up to 2 glasses a day for men. Tips for eating at home  · Use herbs, spices, garlic, lemon zest, and citrus juice instead of salt to add flavor to foods. · Add avocado slices to your sandwich instead of junior. · Have fish for lunch or dinner instead of red meat. Brush the fish with olive oil, and broil or grill it. · Sprinkle your salad with seeds or nuts instead of cheese. · Cook with olive or canola oil instead of butter or oils that are high in saturated fat. · Switch from 2% milk or whole milk to 1% or fat-free milk. · Dip raw vegetables in a vinaigrette dressing or hummus instead of dips made from mayonnaise or sour cream.  · Have a piece of fruit for dessert instead of a piece of cake. Try baked apples, or have some dried fruit. Tips for eating out  · Try broiled, grilled, baked, or poached fish instead of having it fried or breaded. · Ask your  to have your meals prepared with olive oil instead of butter. · Order dishes made with marinara sauce or sauces made from olive oil. Avoid sauces made from cream or mayonnaise. · Choose whole-grain breads, whole wheat pasta and pizza crust, brown rice, beans, and lentils. · Cut back on butter or margarine on bread. Instead, you can dip your bread in a small amount of olive oil. · Ask for a side salad or grilled vegetables instead of french fries or chips. Where can you learn more? Go to http://www.turner.com/  Enter O407 in the search box to learn more about \"Learning About the Mediterranean Diet. \"  Current as of: August 22, 2019               Content Version: 12.6  © 2343-0404 [employer]. Care instructions adapted under license by [employer] (which disclaims liability or warranty for this information).  If you have questions about a medical condition or this instruction, always ask your healthcare professional. [address] any warranty or liability for your use of this information.

## 2021-10-13 NOTE — PROGRESS NOTES
Room 9    Did patient bring someone? No    Did the patient have DME equipment? Did you take your medication today? 1. Have you been to the ER, urgent care clinic since your last visit? Hospitalized since your last visit? Yes [facility]     2. Have you seen or consulted any other health care providers outside of the [address] [place] [address] since your last visit? Include any pap smears or colon screening.  No      Health Maintenance Due   Topic Date Due    Foot Exam Q1  08/13/2020    Flu Vaccine (1) Never done

## 2021-11-23 DIAGNOSIS — E11.69 DIABETES MELLITUS TYPE 2 IN OBESE (HCC): ICD-10-CM

## 2021-11-23 DIAGNOSIS — E66.9 DIABETES MELLITUS TYPE 2 IN OBESE (HCC): ICD-10-CM

## 2021-11-23 DIAGNOSIS — R42 VERTIGO: ICD-10-CM

## 2021-11-23 RX ORDER — MECLIZINE HYDROCHLORIDE 25 MG/1
TABLET ORAL
Qty: 60 TABLET | Refills: 1 | Status: SHIPPED | OUTPATIENT
Start: 2021-11-23

## 2021-11-23 RX ORDER — PIOGLITAZONEHYDROCHLORIDE 30 MG/1
30 TABLET ORAL DAILY
Qty: 90 TABLET | Refills: 0 | Status: SHIPPED | OUTPATIENT
Start: 2021-11-23 | End: 2022-02-14 | Stop reason: SDUPTHER

## 2021-11-23 RX ORDER — GLIPIZIDE 10 MG/1
10 TABLET, FILM COATED, EXTENDED RELEASE ORAL DAILY
Qty: 90 TABLET | Refills: 1 | Status: SHIPPED | OUTPATIENT
Start: 2021-11-23 | End: 2022-02-14

## 2022-01-17 ENCOUNTER — VIRTUAL VISIT (OUTPATIENT)
Dept: FAMILY MEDICINE CLINIC | Age: 62
End: 2022-01-17

## 2022-01-17 DIAGNOSIS — E11.40 TYPE 2 DIABETES MELLITUS WITH DIABETIC NEUROPATHY, WITHOUT LONG-TERM CURRENT USE OF INSULIN (HCC): Primary | ICD-10-CM

## 2022-01-17 DIAGNOSIS — E78.2 MIXED HYPERLIPIDEMIA: ICD-10-CM

## 2022-01-17 DIAGNOSIS — I10 ESSENTIAL HYPERTENSION WITH GOAL BLOOD PRESSURE LESS THAN 130/80: ICD-10-CM

## 2022-01-17 NOTE — PROGRESS NOTES
This encounter was created in error - please disregard.   Unable to connect on video visit, rescheduled

## 2022-01-17 NOTE — PROGRESS NOTES
Please use this number [phone]    Did you take your medication today? Not Yet       1. Have you been to the ER, urgent care clinic since your last visit? Hospitalized since your last visit? No    2. Have you seen or consulted any other health care providers outside of the [address] [place] [address] since your last visit? Include any pap smears or colon screening.  No    3 most recent PHQ Screens 10/13/2021   Little interest or pleasure in doing things Several days   Feeling down, depressed, irritable, or hopeless Several days   Total Score PHQ 2 2         Health Maintenance Due   Topic Date Due    Shingrix Vaccine Age 49> (1 of 2) Never done    Foot Exam Q1  08/13/2020    Flu Vaccine (1) Never done    COVID-19 Vaccine (3 - Booster for Pfizer series) 11/04/2021    Cervical cancer screen  12/22/2021    Medicare Yearly Exam  02/10/2022       Learning Assessment 2/9/2021   PRIMARY LEARNER Patient   HIGHEST LEVEL OF EDUCATION - PRIMARY LEARNER  DID NOT GRADUATE HIGH SCHOOL   BARRIERS PRIMARY LEARNER NONE   CO-LEARNER CAREGIVER No   PRIMARY LANGUAGE ENGLISH   LEARNER PREFERENCE PRIMARY LISTENING   ANSWERED BY Елена FAJARDO   RELATIONSHIP SELF

## 2022-01-31 DIAGNOSIS — E11.40 TYPE 2 DIABETES MELLITUS WITH DIABETIC NEUROPATHY, WITHOUT LONG-TERM CURRENT USE OF INSULIN (HCC): ICD-10-CM

## 2022-02-01 RX ORDER — BLOOD SUGAR DIAGNOSTIC
STRIP MISCELLANEOUS
Qty: 100 STRIP | Refills: 1 | Status: SHIPPED | OUTPATIENT
Start: 2022-02-01 | End: 2022-04-05

## 2022-02-14 ENCOUNTER — OFFICE VISIT (OUTPATIENT)
Dept: FAMILY MEDICINE CLINIC | Age: 62
End: 2022-02-14
Payer: MEDICARE

## 2022-02-14 VITALS
TEMPERATURE: 98.2 F | OXYGEN SATURATION: 99 % | DIASTOLIC BLOOD PRESSURE: 77 MMHG | BODY MASS INDEX: 41.32 KG/M2 | SYSTOLIC BLOOD PRESSURE: 114 MMHG | WEIGHT: 248 LBS | RESPIRATION RATE: 18 BRPM | HEIGHT: 65 IN | HEART RATE: 82 BPM

## 2022-02-14 DIAGNOSIS — E11.40 TYPE 2 DIABETES MELLITUS WITH DIABETIC NEUROPATHY, WITHOUT LONG-TERM CURRENT USE OF INSULIN (HCC): Primary | ICD-10-CM

## 2022-02-14 DIAGNOSIS — E78.2 MIXED HYPERLIPIDEMIA: ICD-10-CM

## 2022-02-14 DIAGNOSIS — I10 ESSENTIAL HYPERTENSION WITH GOAL BLOOD PRESSURE LESS THAN 130/80: ICD-10-CM

## 2022-02-14 DIAGNOSIS — E66.01 MORBID OBESITY (HCC): ICD-10-CM

## 2022-02-14 PROCEDURE — G8754 DIAS BP LESS 90: HCPCS | Performed by: NURSE PRACTITIONER

## 2022-02-14 PROCEDURE — 2022F DILAT RTA XM EVC RTNOPTHY: CPT | Performed by: NURSE PRACTITIONER

## 2022-02-14 PROCEDURE — G8752 SYS BP LESS 140: HCPCS | Performed by: NURSE PRACTITIONER

## 2022-02-14 PROCEDURE — G8417 CALC BMI ABV UP PARAM F/U: HCPCS | Performed by: NURSE PRACTITIONER

## 2022-02-14 PROCEDURE — 99214 OFFICE O/P EST MOD 30 MIN: CPT | Performed by: NURSE PRACTITIONER

## 2022-02-14 PROCEDURE — G8427 DOCREV CUR MEDS BY ELIG CLIN: HCPCS | Performed by: NURSE PRACTITIONER

## 2022-02-14 PROCEDURE — 3017F COLORECTAL CA SCREEN DOC REV: CPT | Performed by: NURSE PRACTITIONER

## 2022-02-14 PROCEDURE — 3044F HG A1C LEVEL LT 7.0%: CPT | Performed by: NURSE PRACTITIONER

## 2022-02-14 PROCEDURE — G9899 SCRN MAM PERF RSLTS DOC: HCPCS | Performed by: NURSE PRACTITIONER

## 2022-02-14 PROCEDURE — G8432 DEP SCR NOT DOC, RNG: HCPCS | Performed by: NURSE PRACTITIONER

## 2022-02-14 RX ORDER — PIOGLITAZONEHYDROCHLORIDE 30 MG/1
30 TABLET ORAL DAILY
Qty: 90 TABLET | Refills: 1 | Status: SHIPPED | OUTPATIENT
Start: 2022-02-14 | End: 2022-04-15 | Stop reason: SDUPTHER

## 2022-02-14 RX ORDER — PHENTERMINE HYDROCHLORIDE 37.5 MG/1
37.5 TABLET ORAL
Qty: 30 TABLET | Refills: 2 | Status: SHIPPED | OUTPATIENT
Start: 2022-02-14 | End: 2022-09-26
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
## 2022-02-14 NOTE — PROGRESS NOTES
[address] [place] [address]      Neeraj Burnham is a 63 y.o. female and presents with Hospital Follow Up       Assessment/Plan:    Diagnoses and all orders for this visit:    1. Type 2 diabetes mellitus with diabetic neuropathy, without long-term current use of insulin (HCC)  -     pioglitazone (ACTOS) 30 mg tablet; Take 1 Tablet by mouth daily.  -     HEMOGLOBIN A1C WITH EAG; Future  -     MICROALBUMIN, UR, RAND W/ MICROALB/CREAT RATIO; Future  Endorses medication compliance  A1C is 5.3, discontinue glucotrol, refill provided, follow up labs prior to next visit  2. Essential hypertension with goal blood pressure less than 633/88  -     METABOLIC PANEL, COMPREHENSIVE; Future  Endorses medication compliance, follow up labs prior to next visit, blood pressure controlled continue lisinopril  3. Mixed hyperlipidemia  -     LIPID PANEL; Future  Endorses medication compliance, follow up labs prior to next visit  4. Morbid obesity (HCC)  -     phentermine (ADIPEX-P) 37.5 mg tablet; Take 1 Tablet by mouth every morning. Max Daily Amount: 37.5 mg. Has been working on weight loss, she is asking for help, Rx for phentermine provided  Educated on the best way for weight loss is diet and exercise, advised to keep a chart of her calories to aid in her efforts  Patient verbalized understanding and is in agreement with this plan of care      Follow-up and Dispositions    · Return in about 3 months (around 5/14/2022) for DM, HLD, HTN, weight loss, 15 min, office only, with, labs prior.            Health Maintenance:   Health Maintenance   Topic Date Due    Shingrix Vaccine Age 49> (1 of 2) Never done    Foot Exam Q1  08/13/2020    Flu Vaccine (1) Never done    COVID-19 Vaccine (3 - Booster for Pfizer series) 10/04/2021    Cervical cancer screen  12/22/2021    Medicare Yearly Exam  02/10/2022    A1C test (Diabetic or Prediabetic)  01/07/2023    MICROALBUMIN Q1 01/07/2023    Lipid Screen  01/07/2023    Depression Screen  01/17/2023    Breast Cancer Screen Mammogram  02/16/2023    Eye Exam Retinal or Dilated  05/28/2023    Colorectal Cancer Screening Combo  01/09/2025    DTaP/Tdap/Td series (2 - Td or Tdap) 06/11/2025    Pneumococcal 0-64 years (2 of 2 - PPSV23) 12/09/2027    Hepatitis C Screening  Completed        Subjective:    Labs obtained prior to visit? YES  Reviewed with patient? Yes    DMII- DC glucotrol   Patient reports medication compliance Daily  Diabetic diet compliance most of the time  Patient monitors blood sugars regularly Daily   Reports am fasting sugars range AM: 70's   Denies hypoglycemic episodes no, some mornings glucose is low  Denies polyuria, polydipsia, paraesthesia, vision changes? yes  Engaging in daily exercise?  No     Diabetic Foot and Eye Exam HM Status   Topic Date Due    Diabetic Foot Care  08/13/2020    Eye Exam  05/28/2023     Hemoglobin A1c   Date Value Ref Range Status   01/07/2022 5.3 4.8 - 5.6 % Final     Comment:              Prediabetes: 5.7 - 6.4           Diabetes: >6.4           Glycemic control for adults with diabetes: <7.0     ]  Creatinine, urine   Date Value Ref Range Status   01/07/2022 71.5 Not Estab. mg/dL Final     Microalbumin/Creat ratio (mg/g creat)   Date Value Ref Range Status   07/26/2019 5 0 - 30 mg/g Final     Microalb/Creat ratio (ug/mg creat.)   Date Value Ref Range Status   01/07/2022 <4 0 - 29 mg/g creat Final     Comment:                            Normal:                0 -  29                         Moderately increased: 30 - 300                         Severely increased:       >300     02/15/2021 <2 0 - 29 mg/g creat Final     Comment:                            Normal:                0 -  29                         Moderately increased: 30 - 300                         Severely increased:       >300     09/30/2020 <2 0 - 29 mg/g creat Final     Comment:                            Normal: 0 -  29                         Moderately increased: 30 - 300                         Severely increased:       >300                **Please note reference interval change**     2018  0.0 - 30.0 mcg/mg of Creatinine Final     Comment:     ** Unable to calculate Microablumin/Creatinine Ratio due to low Microalbumin     Key Antihyperglycemic Medications             pioglitazone (ACTOS) 30 mg tablet (Taking) Take 1 Tablet by mouth daily. Ozempic 1 mg/dose (4 mg/3 mL) pnij (Taking) 1 mg by SubCUTAneous route every seven (7) days. Hypertension:  Visit Vitals  /77 (BP 1 Location: Right arm, BP Patient Position: Sitting, BP Cuff Size: Adult) Comment (BP Patient Position): Feet flat on the floor   Pulse 82   Temp 98.2 °F (36.8 °C) (Temporal)   Resp 18   Ht 5' 5\" (1.651 m)   Wt 248 lb (112.5 kg)   SpO2 99%   BMI 41.27 kg/m²      Patient reports taking medications as instructed. yes   Medication side effects noted. no  Headache upon wakening. no   Home BP monitorin/76  Do you experience chest pain/pressure or SOB with exertion? no  Maintain a low Sodium diet? yes  Key CAD CHF Meds             lisinopriL (PRINIVIL, ZESTRIL) 5 mg tablet (Taking) Take 1 Tablet by mouth daily. aspirin 81 mg chewable tablet (Taking) Take 1 Tab by mouth daily. BUN   Date Value Ref Range Status   2022 12 6 - 24 mg/dL Final     Creatinine   Date Value Ref Range Status   2022 0.69 0.57 - 1.00 mg/dL Final     GFR est AA   Date Value Ref Range Status   2022 110 >59 mL/min/1.73 Final     Comment:     **In accordance with recommendations from the NKF-ASN Task force,**    [facility] is in the process of updating its eGFR calculation to the     CKD-EPI creatinine equation that estimates kidney function    without a race variable. Potassium   Date Value Ref Range Status   2022 4.5 3.5 - 5.2 mmol/L Final       HLD:  Has been compliant with meds  Yes  Compliant with low-fat diet. most of the time    Denies myalgias or other side effects. yes  The 10-year ASCVD risk score (Naga Jc et al., 2013) is: 10.4%    Cholesterol, total   Date Value Ref Range Status   2022 165 100 - 199 mg/dL Final     Triglyceride   Date Value Ref Range Status   2022 71 0 - 149 mg/dL Final     HDL Cholesterol   Date Value Ref Range Status   2022 43 >39 mg/dL Final     LDL, calculated   Date Value Ref Range Status   2022 108 (H) 0 - 99 mg/dL Final   ]  Key Antihyperlipidemia Meds     The patient is on no antihyperlipidemia meds. ROS:     ROS  As stated in HPI, otherwise all others negative. The problem list was updated as a part of today's visit. Patient Active Problem List   Diagnosis Code    Essential hypertension with goal blood pressure less than 130/80 I10    GERD (gastroesophageal reflux disease) K21.9    DOMINIQUE (obstructive sleep apnea) G47.33    Arthritis M19.90    Asthma J45.909    Urinary urgency R39.15    Morbid obesity (HCC) E66.01    Type 2 diabetes mellitus with diabetic neuropathy (HCC) E11.40    Mixed hyperlipidemia E78.2    Bilateral primary osteoarthritis of knee M17.0    Vertigo R42    Environmental and seasonal allergies J30.89    Diabetes mellitus type 2 in obese ([address] E11.69, E66.9    Chest pain R07.9    Former cigarette smoker Z87.891    Meningioma ([facility] [address] D32.9    Mild intermittent asthma J45.20    Primary osteoarthritis involving multiple joints M89.49       The PSH, FH were reviewed.       SH:  Social History     Tobacco Use    Smoking status: Former Smoker     Packs/day: 0.25     Years: 1.00     Pack years: 0.25     Types: Cigarettes     Start date:      Quit date:      Years since quittin.1    Smokeless tobacco: Never Used   Vaping Use    Vaping Use: Never used   Substance Use Topics    Alcohol use: No    Drug use: Yes     Types: Cocaine, Marijuana, Heroin, Opiates       Medications/Allergies:  Current Outpatient Medications on File Prior to Visit   Medication Sig Dispense Refill    glucose blood VI test strips (Accu-Chek Flor Plus test strp) strip Use to check blood sugar twice a day 100 Strip 1    meclizine (ANTIVERT) 25 mg tablet TAKE 1 TABLET BY MOUTH TWICE DAILY AS NEEDED FOR DIZZINESS 60 Tablet 1    Blood-Glucose Meter (Accu-Chek Flor Plus Meter) misc Use to check blood sugar twice a day 1 Each 0    Ozempic 1 mg/dose (4 mg/3 mL) pnij 1 mg by SubCUTAneous route every seven (7) days. 4 Each 3    lisinopriL (PRINIVIL, ZESTRIL) 5 mg tablet Take 1 Tablet by mouth daily. 90 Tablet 0    dicyclomine (BENTYL) 10 mg capsule Take 1 Capsule by mouth four (4) times daily as needed for Abdominal Cramps. 90 Capsule 0    lancets (Accu-Chek Softclix Lancets) misc Use to check blood sugar twice a day 100 Each 11    aspirin 81 mg chewable tablet Take 1 Tab by mouth daily. 180 Tab 2    [DISCONTINUED] pioglitazone (ACTOS) 30 mg tablet Take 1 Tablet by mouth daily. 90 Tablet 0    [DISCONTINUED] glipiZIDE SR (GLUCOTROL XL) 10 mg CR tablet Take 1 Tablet by mouth daily. 90 Tablet 1    beclomethasone (QVAR) 40 mcg/actuation aero Take 1 Puff by inhalation two (2) times a day. 8.7 g 1     No current facility-administered medications on file prior to visit. Allergies   Allergen Reactions    Atorvastatin Myalgia     Leg cramps       Objective:  Visit Vitals  /77 (BP 1 Location: Right arm, BP Patient Position: Sitting, BP Cuff Size: Adult) Comment (BP Patient Position): Feet flat on the floor   Pulse 82   Temp 98.2 °F (36.8 °C) (Temporal)   Resp 18   Ht 5' 5\" (1.651 m)   Wt 248 lb (112.5 kg)   SpO2 99%   BMI 41.27 kg/m²    Body mass index is 41.27 kg/m².     Physical assessment  Physical Exam      Labwork and Ancillary Studies:    CBC w/Diff  Lab Results   Component Value Date/Time    WBC 6.1 09/30/2020 12:55 PM    HGB 12.3 09/30/2020 12:55 PM    PLATELET 430 35/89/1125 12:55 PM         Basic Metabolic Profile  Lab Results Component Value Date/Time    Sodium 141 01/07/2022 08:40 AM    Potassium 4.5 01/07/2022 08:40 AM    Chloride 106 01/07/2022 08:40 AM    CO2 24 01/07/2022 08:40 AM    Anion gap 5 07/26/2019 10:00 AM    Glucose 84 01/07/2022 08:40 AM    BUN 12 01/07/2022 08:40 AM    Creatinine 0.69 01/07/2022 08:40 AM    BUN/Creatinine ratio 17 01/07/2022 08:40 AM    GFR est  01/07/2022 08:40 AM    GFR est non-AA 96 01/07/2022 08:40 AM    Calcium 9.3 01/07/2022 08:40 AM        Cholesterol  Lab Results   Component Value Date/Time    Cholesterol, total 165 01/07/2022 08:40 AM    HDL Cholesterol 43 01/07/2022 08:40 AM    LDL, calculated 108 (H) 01/07/2022 08:40 AM    LDL, calculated 83.8 07/26/2019 10:00 AM    Triglyceride 71 01/07/2022 08:40 AM    CHOL/HDL Ratio 3.0 07/26/2019 10:00 AM           I have discussed the diagnosis with the patient and the intended plan as seen in the above orders. The patient has received an After-Visit Summary and questions were answered concerning future plans. An After Visit Summary was printed and given to the patient. All diagnosis have been discussed with the patient and all of the patient's questions have been answered. Follow-up and Dispositions    · Return in about 3 months (around 5/14/2022) for DM, HLD, HTN, weight loss, 15 min, office only, with, labs prior. Myriam Goodman, TIKIP-BC  [facility]   [address] [facility] [address]  [id number]

## 2022-02-24 DIAGNOSIS — E11.40 TYPE 2 DIABETES MELLITUS WITH DIABETIC NEUROPATHY, WITHOUT LONG-TERM CURRENT USE OF INSULIN (HCC): ICD-10-CM

## 2022-02-27 RX ORDER — SEMAGLUTIDE 1.34 MG/ML
INJECTION, SOLUTION SUBCUTANEOUS
Qty: 3 EACH | Refills: 1 | Status: ON HOLD | OUTPATIENT
Start: 2022-02-27 | End: 2022-08-10

## 2022-02-28 ENCOUNTER — TELEPHONE (OUTPATIENT)
Dept: FAMILY MEDICINE CLINIC | Age: 62
End: 2022-02-28

## 2022-02-28 DIAGNOSIS — M54.50 ACUTE MIDLINE LOW BACK PAIN, UNSPECIFIED WHETHER SCIATICA PRESENT: Primary | ICD-10-CM
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
## 2022-03-07 ENCOUNTER — NURSE TRIAGE (OUTPATIENT)
Dept: OTHER | Facility: CLINIC | Age: 62
End: 2022-03-07

## 2022-03-07 ENCOUNTER — APPOINTMENT (OUTPATIENT)
Dept: PHYSICAL THERAPY | Age: 62
End: 2022-03-07
Payer: MEDICARE
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
## 2022-03-08 ENCOUNTER — TELEPHONE (OUTPATIENT)
Dept: FAMILY MEDICINE CLINIC | Age: 62
End: 2022-03-08

## 2022-03-08 NOTE — TELEPHONE ENCOUNTER
ALISSA    Message  Received: Today  Blanca Maddox Pool  Subject: Referral Request     QUESTIONS   Reason for referral request? Patient is requesting to see an Orthopedic   for her left shoulder and arm. The Dr at [facility] is   requesting it. Has the physician seen you for this condition before? No   Preferred Specialist (if applicable)? Do you already have an appointment scheduled? Additional Information for Provider?   ---------------------------------------------------------------------------   --------------   CALL BACK INFO   What is the best way for the office to contact you? OK to leave message on   voicemail   Preferred Call Back Phone Number?  [phone]

## 2022-03-08 NOTE — TELEPHONE ENCOUNTER
Returned call to patient and advised her she could go to the [facility]. Provided all locations to patient. She stated she was going to go to the [facility] location.  Advised patient this locations hours are from 1pm to 8PM.

## 2022-03-09 ENCOUNTER — HOSPITAL ENCOUNTER (OUTPATIENT)
Dept: PHYSICAL THERAPY | Age: 62
Discharge: HOME | End: 2022-03-09
Payer: MEDICARE

## 2022-03-09 PROCEDURE — 97162 PT EVAL MOD COMPLEX 30 MIN: CPT
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
## 2022-03-18 ENCOUNTER — HOSPITAL ENCOUNTER (OUTPATIENT)
Dept: PHYSICAL THERAPY | Age: 62
Discharge: HOME | End: 2022-03-18
Payer: MEDICARE

## 2022-03-18 PROBLEM — M17.0 BILATERAL PRIMARY OSTEOARTHRITIS OF KNEE: Status: ACTIVE | Noted: 2019-02-14

## 2022-03-18 PROBLEM — J45.909 ASTHMA: Status: ACTIVE | Noted: 2017-03-09

## 2022-03-18 PROBLEM — E11.69 DIABETES MELLITUS TYPE 2 IN OBESE (HCC): Status: ACTIVE | Noted: 2020-07-31

## 2022-03-18 PROBLEM — E66.9 DIABETES MELLITUS TYPE 2 IN OBESE (HCC): Status: ACTIVE | Noted: 2020-07-31

## 2022-03-18 PROBLEM — R39.15 URINARY URGENCY: Status: ACTIVE | Noted: 2017-03-09

## 2022-03-18 PROBLEM — I10 ESSENTIAL HYPERTENSION WITH GOAL BLOOD PRESSURE LESS THAN 130/80: Status: ACTIVE | Noted: 2017-03-09

## 2022-03-18 PROBLEM — E66.01 MORBID OBESITY (HCC): Status: ACTIVE | Noted: 2017-03-09

## 2022-03-18 PROBLEM — J30.89 ENVIRONMENTAL AND SEASONAL ALLERGIES: Status: ACTIVE | Noted: 2019-02-14

## 2022-03-18 PROBLEM — M19.90 ARTHRITIS: Status: ACTIVE | Noted: 2017-03-09

## 2022-03-18 PROCEDURE — 97110 THERAPEUTIC EXERCISES: CPT

## 2022-03-18 PROCEDURE — 97530 THERAPEUTIC ACTIVITIES: CPT

## 2022-03-18 NOTE — PROGRESS NOTES
PT DAILY TREATMENT NOTE     Patient Name: Lucille Brunson  Date:3/18/2022  : 1960  [x]  Patient  Verified  Payor: [facility] MEDICARE / [facility] / Product Type: Managed Care Medicare /    In time:2:17  Out time:3:13  Total Treatment Time (min): 56  Visit #: 2 of 10    Medicare/BCBS Only   Total Timed Codes (min):  45 1:1 Treatment Time:  39       Treatment Area: Other low back pain [M54.59]    SUBJECTIVE  Pain Level (0-10 scale): 7  Any medication changes, allergies to medications, adverse drug reactions, diagnosis change, or new procedure performed?: [x] No    [] Yes (see summary sheet for update)  Subjective functional status/changes:   [] No changes reported  \"I lost my exercises for home, can you print those out again? \"    OBJECTIVE    Modality rationale: decrease pain and increase tissue extensibility to improve the patients ability to perform ADLs and rest comfortably following therapy.     Min Type Additional Details    [] Estim:  []Unatt       []IFC  []Premod                        []Other:  []w/ice   []w/heat  Position:   Location:     [] Estim: []Att    []TENS instruct  []NMES                    []Other:  []w/US   []w/ice   []w/heat  Position:   Location:     []  Traction: [] Cervical       []Lumbar                       [] Prone          []Supine                       []Intermittent   []Continuous Lbs:  [] before manual  [] after manual    []  Ultrasound: []Continuous   [] Pulsed                           []1MHz   []3MHz W/cm2:  Location:    []  Iontophoresis with dexamethasone         Location: [] Take home patch   [] In clinic   10 []  Ice     [x]  heat  []  Ice massage  []  Laser   []  Anodyne Position: supine with wedge  Location: low back, left shoulder    []  Laser with stim  []  Other:  Position:  Location:    []  Vasopneumatic Device    []  Right     []  Left  Pre-treatment girth:  Post-treatment girth:  Measured at (location):  Pressure:       [] lo [] med [] hi Temperature: [] lo [] med [] hi   [x] Skin assessment post-treatment:  [x]intact []redness- no adverse reaction    []redness - adverse reaction:     35 min Therapeutic Exercise:  [x] See flow sheet :   Rationale: increase ROM and increase strength to improve UE/LE strength/mobility and cervical/lumbar mobility to improve ease of ADLs, household chores, and gait. 10 min Therapeutic Activity:  [x]  See flow sheet :   Rationale: increase strength, improve coordination, improve balance, and increase proprioception to improve the patients ability to perform transfers, stair negotiation, functional overhead reach/lifts, and functional carries. Pt education: updated HEP, breathing patterns to reduce pain with exercise, permission to exercise to point of slight pain but no further     With   [x] TE   [x] TA   [] neuro   [] other: Patient Education: [x] Review HEP    [] Progressed/Changed HEP based on:   [] positioning   [] body mechanics   [] transfers   [] heat/ice application    [] other:      Other Objective/Functional Measures:   Provided pt 2 new copies of original HEP to encourage HEP performance     Pain Level (0-10 scale) post treatment: 5-6    ASSESSMENT/Changes in Function: Patient requires moderate verbal and visual cuing for correct technique and to encourage participation in today's interventions. Provided HEP at home to encourage carryover of therapy gains between sessions. Pt requires consistent encouragement for participation due to fatigue and shifting aches/pains throughout session.     Patient will continue to benefit from skilled PT services to modify and progress therapeutic interventions, address functional mobility deficits, address ROM deficits, address strength deficits, analyze and address soft tissue restrictions, analyze and cue movement patterns, analyze and modify body mechanics/ergonomics, assess and modify postural abnormalities and address imbalance/dizziness to attain remaining goals.     []  See Plan of Care  []  See progress note/recertification  []  See Discharge Summary         Progress towards goals / Updated goals: · Short Term Goals: To be accomplished in  3  weeks:  1. Pt will be compliant with HEP for symptom management at home. Current: not met, pt lost original copy of HEP; provided new copy today (3/18/22)  2. Pt will be independent with all bed mobility to improve independence. · Long Term Goals: To be accomplished in  5  weeks:  1. Pt will be independent with HEP at D/C for self management. 2. Pt will demonstrate left shoulder flexion AROM to at least 100 deg to improve overhead reaching. 3. Pt will reports 50% improvement in condition to improve quality of life. 4. Pt will increase FOTO Functional Status score to 54 to decrease functional limitations. 5. Pt will demonstrate left hip flexion strength of at least 4/5 to engage in age appropriate activities.      PLAN  [x]  Upgrade activities as tolerated     [x]  Continue plan of care  []  Update interventions per flow sheet       []  Discharge due to:_  []  Other:_      Rodrick Gallardo 3/18/2022  12:19 PM    Future Appointments   Date Time Provider Mark Everett   3/18/2022  2:15 PM Roger Singleton [facility]   3/30/2022 12:30 PM [facility] [place] [facility]   4/1/2022 12:45 PM [facility]   4/4/2022 12:15 PM [facility] PT [place] [facility]   4/6/2022 12:15 PM [facility] PT [place] [facility]   4/11/2022  2:00 PM Roger Singleton [facility]   4/13/2022  2:00 PM Roger Singleton [facility]   4/18/2022 12:15 PM [facility] PT [place] [facility]   4/20/2022 12:15 PM [facility] PT [place] [facility]   4/25/2022 11:30 AM [facility] PT [place] [facility]   4/27/2022 12:15 PM [facility] PT [place] [facility]   5/6/2022  1:00 PM [facility] AMA BS AMB   5/13/2022  2:00 PM Cara Moreland NP [facility] AMB

## 2022-03-19 PROBLEM — G47.33 OSA (OBSTRUCTIVE SLEEP APNEA): Status: ACTIVE | Noted: 2017-03-09

## 2022-03-19 PROBLEM — K21.9 GERD (GASTROESOPHAGEAL REFLUX DISEASE): Status: ACTIVE | Noted: 2017-03-09

## 2022-03-19 PROBLEM — E78.2 MIXED HYPERLIPIDEMIA: Status: ACTIVE | Noted: 2019-02-14

## 2022-03-19 PROBLEM — R07.9 CHEST PAIN: Status: ACTIVE | Noted: 2021-01-18

## 2022-03-19 PROBLEM — E11.40 TYPE 2 DIABETES MELLITUS WITH DIABETIC NEUROPATHY (HCC): Status: ACTIVE | Noted: 2018-10-17

## 2022-03-19 PROBLEM — R42 VERTIGO: Status: ACTIVE | Noted: 2019-02-14

## 2022-03-24 ENCOUNTER — HOSPITAL ENCOUNTER (OUTPATIENT)
Dept: PHYSICAL THERAPY | Age: 62
Discharge: HOME | End: 2022-03-24
Payer: MEDICARE

## 2022-03-24 PROCEDURE — 97140 MANUAL THERAPY 1/> REGIONS: CPT | Performed by: GENERAL ACUTE CARE HOSPITAL

## 2022-03-24 PROCEDURE — 97110 THERAPEUTIC EXERCISES: CPT | Performed by: GENERAL ACUTE CARE HOSPITAL

## 2022-03-24 PROCEDURE — 97530 THERAPEUTIC ACTIVITIES: CPT | Performed by: GENERAL ACUTE CARE HOSPITAL

## 2022-03-24 NOTE — PROGRESS NOTES
PT DAILY TREATMENT NOTE     Patient Name: Anaid Powers  Date:3/24/2022  : 1960  [x]  Patient  Verified  Payor: [facility] MEDICARE / Plan: SINTIA FRAZIER [facility] / Product Type: Managed Care Medicare /    In time: 1:16  Out time: 2 :10   Total Treatment Time (min):  54  Visit #: 3 of 10    Medicare/BCBS Only   Total Timed Codes (min):  44 1:1 Treatment Time:  44       Treatment Area: Other low back pain [M54.59]    SUBJECTIVE  Pain Level (0-10 scale): 7/10   Any medication changes, allergies to medications, adverse drug reactions, diagnosis change, or new procedure performed?: [x] No    [] Yes (see summary sheet for update)  Subjective functional status/changes:   [] No changes reported  \"About the same\"  Pt reports poor compliance with HEP \"I'm gonna do better though, I said I would. \"    OBJECTIVE    Modality rationale: decrease pain and increase tissue extensibility to improve the patients ability to perform ADLs and rest comfortably following therapy.     Min Type Additional Details    [] Estim:  []Unatt       []IFC  []Premod                        []Other:  []w/ice   []w/heat  Position:   Location:     [] Estim: []Att    []TENS instruct  []NMES                    []Other:  []w/US   []w/ice   []w/heat  Position:   Location:     []  Traction: [] Cervical       []Lumbar                       [] Prone          []Supine                       []Intermittent   []Continuous Lbs:  [] before manual  [] after manual    []  Ultrasound: []Continuous   [] Pulsed                           []1MHz   []3MHz W/cm2:  Location:    []  Iontophoresis with dexamethasone         Location: [] Take home patch   [] In clinic   10 []  Ice     [x]  heat  []  Ice massage  []  Laser   []  Anodyne Position: supine with wedge  Location: low back, left shoulder    []  Laser with stim  []  Other:  Position:  Location:    []  Vasopneumatic Device    []  Right     []  Left  Pre-treatment girth:  Post-treatment girth:  Measured at (location):  Pressure:       [] lo [] med [] hi   Temperature: [] lo [] med [] hi   [x] Skin assessment post-treatment:  [x]intact []redness- no adverse reaction    []redness - adverse reaction:     26 min Therapeutic Exercise:  [x] See flow sheet :   Rationale: increase ROM and increase strength to improve UE/LE strength/mobility and cervical/lumbar mobility to improve ease of ADLs, household chores, and gait. 10 min Therapeutic Activity:  [x]  See flow sheet :   Rationale: increase strength, improve coordination, improve balance, and increase proprioception to improve the patients ability to perform transfers, stair negotiation, functional overhead reach/lifts, and functional carries. Pt education: updated HEP, breathing patterns to reduce pain with exercise, permission to exercise to point of slight pain but no further    8 min Manual Therapy:  PROM L shoulder in all directions. STM to anterior deltoid, biceps, pect major, upper trap. L shoulder distraction. The manual therapy interventions were performed at a separate and distinct time from the therapeutic activities interventions. Rationale: decrease pain, increase ROM and increase tissue extensibility to reaching [place], performing household and dressing ADL's       With   [x] TE   [x] TA   [] neuro   [] other: Patient Education: [x] Review HEP    [] Progressed/Changed HEP based on:   [] positioning   [] body mechanics   [] transfers   [] heat/ice application    [] other:      Other Objective/Functional Measures: Added pulleys for flexion, finger ladder for flexion (up to level 19), supine cane flexion/scaption   L shoulder AROM: flexion 95 d   L shoulder PROM: flex 105 d, scaption 130 d     Pain Level (0-10 scale) post treatment: 5-6      ASSESSMENT/Changes in Function: Patient pebbles's generally poor exercise tolerance, but able to complete new exercises without exacerbation of pain. Pt intermittently reporting increased R knee pain with LAQ.  Pt requires VC's for encouragement and for correct performance of exercises. Assessed L shoulder AROM flexion at beginning of session to be 95 degrees, but noted progressively less ROM throughout session, even with AAROM secondary to fatigue. Good response to manual therapy, reduced guarding after STM to allow for greater progression in PROM. Pt is very fear avoidant. Patient will continue to benefit from skilled PT services to modify and progress therapeutic interventions, address functional mobility deficits, address ROM deficits, address strength deficits, analyze and address soft tissue restrictions, analyze and cue movement patterns, analyze and modify body mechanics/ergonomics, assess and modify postural abnormalities and address imbalance/dizziness to attain remaining goals. [x]  See Plan of Care  []  See progress note/recertification  []  See Discharge Summary         Progress towards goals / Updated goals: · Short Term Goals: To be accomplished in  3  weeks:  1. Pt will be compliant with HEP for symptom management at home. Current: not met, pt lost original copy of HEP; provided new copy today (3/18/22)  2. Pt will be independent with all bed mobility to improve independence. · Long Term Goals: To be accomplished in  5  weeks:  1. Pt will be independent with HEP at D/C for self management. 2. Pt will demonstrate left shoulder flexion AROM to at least 100 deg to improve overhead reaching. Current: progressing: L shoulder flexion AROM 95 d (3/24/22)   3. Pt will reports 50% improvement in condition to improve quality of life. 4. Pt will increase FOTO Functional Status score to 54 to decrease functional limitations. 5. Pt will demonstrate left hip flexion strength of at least 4/5 to engage in age appropriate activities.      PLAN  [x]  Upgrade activities as tolerated     [x]  Continue plan of care  []  Update interventions per flow sheet       []  Discharge due to:_  []  Other:_      Dorita Carrillo PT 3/24/2022  12:19 PM    Future Appointments   Date Time Provider Mark Marguerite   3/24/2022  1:15 PM [facility] [place] [facility]   3/30/2022 12[facility]30 PM [facility] [place] [facility]   4/1/2022 12:45 PM [facility] [place] [facility]   4/4/2022 12:15 PM [facility] PT [place] [facility]   4/6/2022 12:15 PM [facility] PT [place] [facility]   4/11/2022  2:00 PM Yessica Greene [facility]   4/13/2022  2:00 PM Yessica Greene [facility]   4/18/2022 12:15 PM [facility] PT [place] [facility]   4/20/2022 12:15 PM [facility] PT [place] [facility]   4/25/2022 11:30 AM [facility] [place] [facility]   4/27/2022 12:15 PM [facility] PT [place] [facility]   5/6/2022  1:00 PM [facility] AMB   5/13/2022  2:00 PM Bertrand Worley NP [facility] BS AMB

## 2022-03-30 ENCOUNTER — HOSPITAL ENCOUNTER (OUTPATIENT)
Dept: PHYSICAL THERAPY | Age: 62
Discharge: HOME | End: 2022-03-30
Payer: MEDICARE

## 2022-03-30 PROCEDURE — 97110 THERAPEUTIC EXERCISES: CPT | Performed by: PHYSICAL THERAPIST

## 2022-03-30 PROCEDURE — 97530 THERAPEUTIC ACTIVITIES: CPT | Performed by: PHYSICAL THERAPIST

## 2022-03-30 PROCEDURE — 97140 MANUAL THERAPY 1/> REGIONS: CPT | Performed by: PHYSICAL THERAPIST

## 2022-03-30 NOTE — PROGRESS NOTES
PT DAILY TREATMENT NOTE     Patient Name: Sona Murry  Date:3/30/2022  : 1960  [x]  Patient  Verified  Payor: [facility] MEDICARE / [facility] Product Type: Managed Care Medicare /    In time: 1230pm   Out time: 140pm   Total Treatment Time (min): 70  Visit #: 4 of 10    Medicare/BCBS Only   Total Timed Codes (min):  60 1:1 Treatment Time:  55       Treatment Area: Other low back pain [M54.59]    SUBJECTIVE  Pain Level (0-10 scale):8/10   Any medication changes, allergies to medications, adverse drug reactions, diagnosis change, or new procedure performed?: [x] No    [] Yes (see summary sheet for update)  Subjective functional status/changes:   [] No changes reported  Pt reports Claudio thigh sx L>R, she reports she is unable to lye on the L  Shoulder. OBJECTIVE    Modality rationale: decrease pain and increase tissue extensibility to improve the patients ability to perform ADLs and rest comfortably following therapy.     Min Type Additional Details    [] Estim:  []Unatt       []IFC  []Premod                        []Other:  []w/ice   []w/heat  Position:   Location:     [] Estim: []Att    []TENS instruct  []NMES                    []Other:  []w/US   []w/ice   []w/heat  Position:   Location:     []  Traction: [] Cervical       []Lumbar                       [] Prone          []Supine                       []Intermittent   []Continuous Lbs:  [] before manual  [] after manual    []  Ultrasound: []Continuous   [] Pulsed                           []1MHz   []3MHz W/cm2:  Location:    []  Iontophoresis with dexamethasone         Location: [] Take home patch   [] In clinic   10 [x]  Ice     [x]  heat  []  Ice massage  []  Laser   []  Anodyne Position: MHP in prone to l/s  Location: CP to  left shoulder    []  Laser with stim  []  Other:  Position:  Location:    []  Vasopneumatic Device    []  Right     []  Left  Pre-treatment girth:  Post-treatment girth:  Measured at (location):  Pressure: [] lo [] med [] hi   Temperature: [] lo [] med [] hi   [x] Skin assessment post-treatment:  [x]intact []redness- no adverse reaction    []redness - adverse reaction:     40/35 min Therapeutic Exercise:  [x] See flow sheet :   Rationale: increase ROM and increase strength to improve UE/LE strength/mobility and cervical/lumbar mobility to improve ease of ADLs, household chores, and gait. 10 min Therapeutic Activity:  [x]  See flow sheet :   Rationale: increase strength, improve coordination, improve balance, and increase proprioception to improve the patients ability to perform transfers, stair negotiation, functional overhead reach/lifts, and functional carries. Pt education: updated HEP, breathing patterns to reduce pain with exercise, permission to exercise to point of slight pain but no further    10 min Manual Therapy:  PROM L shoulder in all directions. STM to anterior deltoid, biceps, pect major, upper trap. L shoulder distraction. The manual therapy interventions were performed at a separate and distinct time from the therapeutic activities interventions.   Rationale: decrease pain, increase ROM and increase tissue extensibility to reaching OH, performing household and dressing ADL's       With   [x] TE   [x] TA   [] neuro   [] other: Patient Education: [x] Review HEP    [] Progressed/Changed HEP based on:   [] positioning   [] body mechanics   [] transfers   [] heat/ice application    [] other:      Other Objective/Functional Measures:    performed TA draw with hip ABD/ADD seated today (GTB and ball)  Added YTB to scap retraction with improved tolerance today  Frequent VC for UT relaxation, noted L UE nerve tension  Pt c/o L glut and post thigh pain when lying supine during treatment  Trial of prone, YVONNE, and press ups: pt reports mild decrease in LE sx, also applied MHP to l/s in prone and L shld CP    Pain Level (0-10 scale) post treatment: 5      ASSESSMENT/Changes in Function:    Patient continues with mod guarding in UT mm, and neural tension on L with R c/s SB. Performed neural glides on L with neutral c/s as pt unable to tolerate R SB of c/s during manual. Less fear avoidance with education and diaphragmatic breathing. Pt had improved tolerance to therex today with less motivation needed by therapist. Pt given new handouts with progressed therex for HEP. Assess effectiveness of extension based therapy NV. Patient will continue to benefit from skilled PT services to modify and progress therapeutic interventions, address functional mobility deficits, address ROM deficits, address strength deficits, analyze and address soft tissue restrictions, analyze and cue movement patterns, analyze and modify body mechanics/ergonomics, assess and modify postural abnormalities and address imbalance/dizziness to attain remaining goals. [x]  See Plan of Care  []  See progress note/recertification  []  See Discharge Summary         Progress towards goals / Updated goals: · Short Term Goals: To be accomplished in  3  weeks:  1. Pt will be compliant with HEP for symptom management at home. Current: not met, pt lost original copy of HEP; provided new copy today (3/18/22)  2. Pt will be independent with all bed mobility to improve independence. · Long Term Goals: To be accomplished in  5  weeks:  1. Pt will be independent with HEP at D/C for self management. 2. Pt will demonstrate left shoulder flexion AROM to at least 100 deg to improve overhead reaching. AAROM ~120deg, progressing, PROM near full. 3/30/22  Current: progressing: L shoulder flexion AROM 95 d (3/24/22)   3. Pt will reports 50% improvement in condition to improve quality of life. 4. Pt will increase FOTO Functional Status score to 54 to decrease functional limitations. 5. Pt will demonstrate left hip flexion strength of at least 4/5 to engage in age appropriate activities.      PLAN  [x]  Upgrade activities as tolerated     [x]  Continue plan of care  []  Update interventions per flow sheet       []  Discharge due to:_  [x]  Other:_assess ext based treatment from [facility], PT 3/30/2022  12:19 PM    Future Appointments   Date Time Provider Mark Everett   3/30/2022 12:30 PM [address] [id number] [facility]   4/1/2022 12:45 PM [address] [address] [facility]   4/4/2022 12:15 PM [facility] PT [place] [facility]   4/6/2022 12:15 PM [facility] PT [place] [facility]   4/11/2022  2:00 PM Hercalisia Laurent [facility]   4/13/2022  2:00 PM Jesús Lauretn [facility]   4/18/2022 12:15 PM [facility] PT [place] [facility]   4/20/2022 12:15 PM [facility] PT [place] [facility]25/2022 11:30 AM [facility] PT [place] [facility]   4/27/2022 12:15 PM [facility] PT [place] [facility]   5/6/2022  1:00 PM [facility] AMB   5/13/2022  2:00 PM Claudia Dial NP [facility] AM[facility]

## 2022-04-01 ENCOUNTER — HOSPITAL ENCOUNTER (OUTPATIENT)
Dept: PHYSICAL THERAPY | Age: 62
Discharge: HOME | End: 2022-04-01
Payer: MEDICARE

## 2022-04-01 PROCEDURE — 97110 THERAPEUTIC EXERCISES: CPT

## 2022-04-01 PROCEDURE — 97140 MANUAL THERAPY 1/> REGIONS: CPT

## 2022-04-01 NOTE — PROGRESS NOTES
PT DAILY TREATMENT NOTE     Patient Name: Marcelino Fernando  Date:2022  : 1960  [x]  Patient  Verified  Payor: [facility] MEDICARE / Plan: SINTIA FRAZIER [facility]P / Product Type: Managed Care Medicare /    In time: 9886 pm   Out time: 1342 pm   Total Treatment Time (min): 57  Visit #: 5 of 10    Medicare/BCBS Only   Total Timed Codes (min):  45 1:1 Treatment Time: 42       Treatment Area: Other low back pain [M54.59]    SUBJECTIVE  Pain Level (0-10 scale): 5/10   Any medication changes, allergies to medications, adverse drug reactions, diagnosis change, or new procedure performed?: [x] No    [] Yes (see summary sheet for update)  Subjective functional status/changes:   [] No changes reported  \"The back just won't get right\"   \"the hot water has been helping at home\"  Pt report she has guests staying at her house and does not have the space to perform therex at home like she wants to     OBJECTIVE    Modality rationale: decrease pain and increase tissue extensibility to improve the patients ability to perform ADLs and rest comfortably following therapy.     Min Type Additional Details    [] Estim:  []Unatt       []IFC  []Premod                        []Other:  []w/ice   []w/heat  Position:   Location:     [] Estim: []Att    []TENS instruct  []NMES                    []Other:  []w/US   []w/ice   []w/heat  Position:   Location:     []  Traction: [] Cervical       []Lumbar                       [] Prone          []Supine                       []Intermittent   []Continuous Lbs:  [] before manual  [] after manual    []  Ultrasound: []Continuous   [] Pulsed                           []1MHz   []3MHz W/cm2:  Location:    []  Iontophoresis with dexamethasone         Location: [] Take home patch   [] In clinic   10' + 2' set up []  Ice     [x]  heat  []  Ice massage  []  Laser   []  Anodyne Position/location: MHP in prone to l/s and left shoulder       []  Laser with stim  []  Other:  Position:  Location:    [] Vasopneumatic Device    []  Right     []  Left  Pre-treatment girth:  Post-treatment girth:  Measured at (location):  Pressure:       [] lo [] med [] hi   Temperature: [] lo [] med [] hi   [x] Skin assessment post-treatment:  [x]intact []redness- no adverse reaction    []redness - adverse reaction:     35/32 min Therapeutic Exercise:  [x] See flow sheet : Assess during warm up   Rationale: increase ROM and increase strength to improve UE/LE strength/mobility and cervical/lumbar mobility to improve ease of ADLs, household chores, and gait. 10 min Manual Therapy: In prone: STM to bilat lumbar paraspinals. The manual therapy interventions were performed at a separate and distinct time from the therapeutic activities interventions. Rationale: decrease pain, increase ROM and increase tissue extensibility to reaching OH, performing household and dressing ADL's       With   [x] TE   [] TA   [] neuro   [] other: Patient Education: [] Review HEP    [] Progressed/Changed HEP based on:   [] positioning   [x] body mechanics   [] transfers   [] heat/ice application    [] other:      Other Objective/Functional Measures: Added: supine/reclined chest press with wand   Progressed: repetitions for finger ladder    Pain Level (0-10 scale) post treatment: 5     ASSESSMENT/Changes in Function:    Patient reports improvement in back pain post prone on elbows trial last session. Noted soft tissue restrictions along lumbar paraspinals Right greater than left with some improvements post manual therapy techniques. Incorporated prone on elbows today as patient reports decreased pain levels following last PT session. Will plan to continue to progress patient as tolerated for improved left shoulder and lumbar mobility, core stability to decrease pain/discomfort and return to PLOF, as able.      Patient will continue to benefit from skilled PT services to modify and progress therapeutic interventions, address functional mobility deficits, address ROM deficits, address strength deficits, analyze and address soft tissue restrictions, analyze and cue movement patterns, analyze and modify body mechanics/ergonomics, assess and modify postural abnormalities and address imbalance/dizziness to attain remaining goals. [x]  See Plan of Care  []  See progress note/recertification  []  See Discharge Summary         Progress towards goals / Updated goals: · Short Term Goals: To be accomplished in  3  weeks:  1. Pt will be compliant with HEP for symptom management at home. Current: not met, pt lost original copy of HEP; provided new copy today (3/18/22)  2. Pt will be independent with all bed mobility to improve independence. · Long Term Goals: To be accomplished in  5  weeks:  1. Pt will be independent with HEP at D/C for self management. 2. Pt will demonstrate left shoulder flexion AROM to at least 100 deg to improve overhead reaching. AAROM ~120deg, progressing, PROM near full. 3/30/22  Current: progressing: L shoulder flexion AROM 95 d (3/24/22)   3. Pt will reports 50% improvement in condition to improve quality of life. 4. Pt will increase FOTO Functional Status score to 54 to decrease functional limitations. 5. Pt will demonstrate left hip flexion strength of at least 4/5 to engage in age appropriate activities.      PLAN  [x]  Upgrade activities as tolerated     [x]  Continue plan of care  []  Update interventions per flow sheet       []  Discharge due to:_  []  Other:    Christina Babcock, PT 4/1/2022  2:38 PM     Future Appointments   Date Time Provider Mark Everett   4/4/2022 12:15 PM [facility] PT [place] [facility]   4/6/2022 12:15 PM [facility] PT [place] [facility]   4/11/2022  2:00 PM Brando Graff [facility]   4/13/2022  2:00 PM Brando Graff [facility]   4/18/2022 12:15 PM [facility] PT [place] [facility]   4/20/2022 12:15 PM [facility] PT [place] [facility]   4/25/2022 11:30 AM [facility] PT [place] [facility]   4/27/2022 12:15 PM [facility] PT [place] [facility]   5/6/2022  1:00 PM [facility] ANNE CANNON AMB   5/13/2022  2:00 PM Gibson Frankel, NP [facility] AMB

## 2022-04-03 DIAGNOSIS — E11.40 TYPE 2 DIABETES MELLITUS WITH DIABETIC NEUROPATHY, WITHOUT LONG-TERM CURRENT USE OF INSULIN (HCC): ICD-10-CM

## 2022-04-04 ENCOUNTER — HOSPITAL ENCOUNTER (OUTPATIENT)
Dept: PHYSICAL THERAPY | Age: 62
Discharge: HOME | End: 2022-04-04
Payer: MEDICARE

## 2022-04-04 PROCEDURE — 97140 MANUAL THERAPY 1/> REGIONS: CPT

## 2022-04-04 PROCEDURE — 97110 THERAPEUTIC EXERCISES: CPT

## 2022-04-04 RX ORDER — BLOOD-GLUCOSE METER
EACH MISCELLANEOUS
Qty: 1 EACH | Refills: 0 | Status: SHIPPED | OUTPATIENT
Start: 2022-04-04 | End: 2022-04-05 | Stop reason: SDUPTHER

## 2022-04-04 NOTE — PROGRESS NOTES
PT DAILY TREATMENT NOTE     Patient Name: Barrett Shone  Date:2022  : 1960  [x]  Patient  Verified  Payor: [facility] MEDICARE / Plan: SINTIA FRAZIER [facility] CCCP / Product Type: Managed Care Medicare /    In time:   Out time: 131   Total Treatment Time (min):52   Visit #: 6 of 10    Medicare/BCBS Only   Total Timed Codes (min): 52 1:1 Treatment Time: 42        Treatment Area: Other low back pain [M54.59]    SUBJECTIVE  Pain Level (0-10 scale): 6 /10   Any medication changes, allergies to medications, adverse drug reactions, diagnosis change, or new procedure performed?: [x] No    [] Yes (see summary sheet for update)  Subjective functional status/changes:   [] No changes reported  Pt reports that she still has guests and has not been able to do her HEP. Denies any changes to her back pain at this time. OBJECTIVE    Modality rationale: decrease pain and increase tissue extensibility to improve the patients ability to perform ADLs and rest comfortably following therapy.     Min Type Additional Details    [] Estim:  []Unatt       []IFC  []Premod                        []Other:  []w/ice   []w/heat  Position:   Location:     [] Estim: []Att    []TENS instruct  []NMES                    []Other:  []w/US   []w/ice   []w/heat  Position:   Location:     []  Traction: [] Cervical       []Lumbar                       [] Prone          []Supine                       []Intermittent   []Continuous Lbs:  [] before manual  [] after manual    []  Ultrasound: []Continuous   [] Pulsed                           []1MHz   []3MHz W/cm2:  Location:    []  Iontophoresis with dexamethasone         Location: [] Take home patch   [] In clinic   10 []  Ice     [x]  heat  []  Ice massage  []  Laser   []  Anodyne Position/location: MHP in prone to l/s and B  shoulder       []  Laser with stim  []  Other:  Position:  Location:    []  Vasopneumatic Device    []  Right     []  Left  Pre-treatment girth:  Post-treatment girth: Measured at (location):  Pressure:       [] lo [] med [] hi   Temperature: [] lo [] med [] hi   [x] Skin assessment post-treatment:  [x]intact []redness- no adverse reaction    []redness - adverse reaction:     30 min Therapeutic Exercise:  [x] See flow sheet : Assess during warm up   Rationale: increase ROM and increase strength to improve UE/LE strength/mobility and cervical/lumbar mobility to improve ease of ADLs, household chores, and gait. 12 min Manual Therapy: In prone: STM to bilat lumbar paraspinals, in supine: STM to the left upper trap and upper arm, PROM into L shoulder flex, ER, IR and abd w/ gentle GHJ distraction for flex ROM and relaxation   The manual therapy interventions were performed at a separate and distinct time from the therapeutic activities interventions. Rationale: decrease pain, increase ROM and increase tissue extensibility to reaching OH, performing household and dressing ADL's       With   [x] TE   [] TA   [] neuro   [] other: Patient Education: [] Review HEP, intent of therapy tx and POC    [] Progressed/Changed HEP based on:   [] positioning   [] body mechanics   [] transfers   [] heat/ice application    [] other:      Other Objective/Functional Measures:   + modified activity to accommodate pain  + progressed to prone on hands press ups    Pain Level (0-10 scale) post treatment: 0, pt reported \" I am a little sore but no pain\"    ASSESSMENT/Changes in Function:    Pt noted to have guarded movement w/ all transitional activity 2/2 back pain, however reported that her L shoulder was more bothersome than her L/s. Able to progress to prone press ups today without any increased S&S. Noted cont soft tissue restrictions in bilateral l/s paraspinals as well as the L upper quadrant that reduced w/ manual techniques, w/ pt noting reduced pain overall. Pt required time w/ all tasks for slow and controlled movement 2/2 pain at the start of session.  PT will cont to progress pt as tolerated w/ l/s and L shoulder AROM in order to reduce pain and improve tolerance to functional activity. Patient will continue to benefit from skilled PT services to modify and progress therapeutic interventions, address functional mobility deficits, address ROM deficits, address strength deficits, analyze and address soft tissue restrictions, analyze and cue movement patterns, analyze and modify body mechanics/ergonomics, assess and modify postural abnormalities and address imbalance/dizziness to attain remaining goals. []  See Plan of Care  []  See progress note/recertification  []  See Discharge Summary    PN due 4/9/22  Progress towards goals / Updated goals: · Short Term Goals: To be accomplished in  3  weeks:  1. Pt will be compliant with HEP for symptom management at home. Current: 4/4/22: Pt reports that she has not been compliant 2/2 having family/friends visiting, noting that they leave this week. 2. Pt will be independent with all bed mobility to improve independence. · Long Term Goals: To be accomplished in  5  weeks:  1. Pt will be independent with HEP at D/C for self management. 2. Pt will demonstrate left shoulder flexion AROM to at least 100 deg to improve overhead reaching. AAROM ~120deg, progressing, PROM near full. 3/30/22  Current: progressing: L shoulder flexion AROM 95 d (3/24/22)   3. Pt will reports 50% improvement in condition to improve quality of life. 4. Pt will increase FOTO Functional Status score to 54 to decrease functional limitations. 5. Pt will demonstrate left hip flexion strength of at least 4/5 to engage in age appropriate activities.      PLAN  [x]  Upgrade activities as tolerated     [x]  Continue plan of care  []  Update interventions per flow sheet       []  Discharge due to:_  [x]  Other: PN due at [facility], PT 4/4/2022  1310    Future Appointments   Date Time Provider Mark Everett   4/4/2022 12:15 PM [facility] PT [place] [facility]   4/6/2022 12:15 PM [facility] PT [place] [facility]   4/11/2022  2:00 PM Mia Pierre [facility]   4/13/2022  2:00 PM Mia Pierre [facility]   4/18/2022 12:15 PM [facility] PT [place] [facility]   4/20/2022 12:15 PM [facility] PT [place] [facility]   4/25/2022 11:30 AM [facility] PT [place] [facility]   4/27/2022 12:15 PM [facility] PT [place] [facility]   5/6/2022  1:00 PM [facility] LAB [facility] AMB   5/13/2022  2:00 PM Dagoberto Arellano NP AMA BS AMB

## 2022-04-05 DIAGNOSIS — I10 ESSENTIAL HYPERTENSION WITH GOAL BLOOD PRESSURE LESS THAN 130/80: ICD-10-CM

## 2022-04-05 DIAGNOSIS — E11.40 TYPE 2 DIABETES MELLITUS WITH DIABETIC NEUROPATHY, WITHOUT LONG-TERM CURRENT USE OF INSULIN (HCC): ICD-10-CM

## 2022-04-05 RX ORDER — BLOOD SUGAR DIAGNOSTIC
STRIP MISCELLANEOUS
Qty: 100 STRIP | Refills: 3 | Status: SHIPPED | OUTPATIENT
Start: 2022-04-05

## 2022-04-05 RX ORDER — LISINOPRIL 5 MG/1
5 TABLET ORAL DAILY
Qty: 90 TABLET | Refills: 0 | Status: SHIPPED | OUTPATIENT
Start: 2022-04-05 | End: 2022-04-05 | Stop reason: SDUPTHER

## 2022-04-05 RX ORDER — LANCETS
EACH MISCELLANEOUS
Qty: 100 EACH | Refills: 11 | Status: SHIPPED | OUTPATIENT
Start: 2022-04-05

## 2022-04-05 RX ORDER — LISINOPRIL 5 MG/1
5 TABLET ORAL DAILY
Qty: 90 TABLET | Refills: 0 | Status: SHIPPED | OUTPATIENT
Start: 2022-04-05 | End: 2022-09-16

## 2022-04-05 RX ORDER — BLOOD SUGAR DIAGNOSTIC
STRIP MISCELLANEOUS
Qty: 100 STRIP | Refills: 1 | Status: SHIPPED | OUTPATIENT
Start: 2022-04-05

## 2022-04-05 RX ORDER — BLOOD-GLUCOSE METER
EACH MISCELLANEOUS
Qty: 1 EACH | Refills: 0 | Status: SHIPPED | OUTPATIENT
Start: 2022-04-05

## 2022-04-06 ENCOUNTER — HOSPITAL ENCOUNTER (OUTPATIENT)
Dept: PHYSICAL THERAPY | Age: 62
Discharge: HOME | End: 2022-04-06
Payer: MEDICARE

## 2022-04-06 PROCEDURE — 97530 THERAPEUTIC ACTIVITIES: CPT
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
## 2022-04-11 ENCOUNTER — HOSPITAL ENCOUNTER (OUTPATIENT)
Dept: PHYSICAL THERAPY | Age: 62
Discharge: HOME | End: 2022-04-11
Payer: MEDICARE

## 2022-04-11 PROCEDURE — 97110 THERAPEUTIC EXERCISES: CPT

## 2022-04-11 NOTE — PROGRESS NOTES
PT DAILY TREATMENT NOTE     Patient Name: Carla Bishop  QFBO:  : 1960  [x]  Patient  Verified  Payor: [facility] MEDICARE / Plan: SINTIA FRAZIER [facility]P / Product Type: Managed Care Medicare /    In time:2:14  Out time:2:52  Total Treatment Time (min): 36  Visit #: 1 of 12    Medicare/BCBS Only   Total Timed Codes (min):  26 1:1 Treatment Time:  26       Treatment Area: Other low back pain [M54.59]    SUBJECTIVE  Pain Level (0-10 scale): 9  Any medication changes, allergies to medications, adverse drug reactions, diagnosis change, or new procedure performed?: [x] No    [] Yes (see summary sheet for update)  Subjective functional status/changes:   [] No changes reported  \"My knees and shoulder are really bothering me today, I don't think I can do very much today. \"    OBJECTIVE    Modality rationale: decrease pain and increase tissue extensibility to improve the patients ability to perform ADLs and rest comfortably following therapy.     Min Type Additional Details    [] Estim:  []Unatt       []IFC  []Premod                        []Other:  []w/ice   []w/heat  Position:   Location:     [] Estim: []Att    []TENS instruct  []NMES                    []Other:  []w/US   []w/ice   []w/heat  Position:   Location:     []  Traction: [] Cervical       []Lumbar                       [] Prone          []Supine                       []Intermittent   []Continuous Lbs:  [] before manual  [] after manual    []  Ultrasound: []Continuous   [] Pulsed                           []1MHz   []3MHz W/cm2:  Location:    []  Iontophoresis with dexamethasone         Location: [] Take home patch   [] In clinic   10 []  Ice     [x]  heat  []  Ice massage  []  Laser   []  Anodyne Position: prone  Location: posterior left shoulder, posterior B knees    []  Laser with stim  []  Other:  Position:  Location:    []  Vasopneumatic Device    []  Right     []  Left  Pre-treatment girth:  Post-treatment girth:  Measured at (location): Pressure:       [] lo [] med [] hi   Temperature: [] lo [] med [] hi   [x] Skin assessment post-treatment:  [x]intact []redness- no adverse reaction    []redness - adverse reaction:     26 min Therapeutic Exercise:  [x] See flow sheet :   Rationale: increase ROM and increase strength to improve UE/LE strength/mobility and cervical/lumbar mobility to improve ease of ADLs, household chores, and gait. With   [x] TE   [] TA   [] neuro   [] other: Patient Education: [x] Review HEP    [] Progressed/Changed HEP based on:   [] positioning   [] body mechanics   [] transfers   [] heat/ice application    [] other:      Other Objective/Functional Measures: -  Maximal verbal cues required for participation in active therapy interventions with poor performance noted throughout today's session     Pain Level (0-10 scale) post treatment: 9 overall, 12 in right knee    ASSESSMENT/Changes in Function: Patient very reluctant and unmotivated to complete any therapy interventions today, stating that she is having more pain and does not want to do any exercises, just receive a hot pack and go home. She is intolerant to attempts to improve knee mobility with standing hamstring stretch or stair knee flexion so regressed to seated LAQs. Pt unwilling to address left shoulder deficits despite multiple attempts from therapist.     Patient will continue to benefit from skilled PT services to modify and progress therapeutic interventions, address functional mobility deficits, address ROM deficits, address strength deficits, analyze and address soft tissue restrictions, analyze and cue movement patterns, analyze and modify body mechanics/ergonomics, assess and modify postural abnormalities and address imbalance/dizziness to attain remaining goals. []  See Plan of Care  []  See progress note/recertification  []  See Discharge Summary         Progress towards goals / Updated goals:  1.  Pt will be independent with HEP at D/C for self management. PN: Pt reports that she has not been compliant 2/2 having family/friends visiting, noting that they leave this week. Current:   2. Pt will demonstrate left shoulder flexion AROM to at least 100 deg to improve overhead reaching. PN:  L shoulder flexion AROM 85 d    Current:   3. Pt will reports 50% improvement in condition to improve quality of life. PN: 30%  Current:   4. Pt will increase FOTO Functional Status score to 54 to decrease functional limitations. PN: 50/100  Current:   5. Pt will demonstrate left hip flexion strength of at least 4/5 to engage in age appropriate activities. PN: 3+/5 w/ pain  Current:   6. Pt will amb 400 ' within 2 min without any rest breaks to indicate improved endurance for community ambulation. PN: 288 ', one standing rest break  Current:   7. Pt will perform 8 reps of STS in 30 sec, from standard chair  in order to indicate improved endurance and reduced fall risk.    PN: 4 reps, 21.5 inch mat table  Current:     PLAN  [x]  Upgrade activities as tolerated     [x]  Continue plan of care  []  Update interventions per flow sheet       []  Discharge due to:_  []  Other:_      Bedelia Spikes 4/11/2022  9:46 AM    Future Appointments   Date Time Provider Mark Everett   4/11/2022  2:00 PM Aniyah Levo [facility]   4/13/2022  2:00 PM Aniyah Levo [facility]   4/18/2022 12:15 PM [facility] PT [facility] 2 [facility]   4/20/2022 12:15 PM [facility] PT [facility] 2 [facility]   4/25/2022 11:30 AM [facility] PT [facility] 2 [facility]   4/27/2022 12:15 PM [facility] PT [facility] 2 [facility]   5/6/2022  1:00 PM [facility] AMB   5/13/2022  2:00 PM Violeta Sutton NP [facility] BS AMB

## 2022-04-13 ENCOUNTER — HOSPITAL ENCOUNTER (OUTPATIENT)
Dept: PHYSICAL THERAPY | Age: 62
Discharge: HOME | End: 2022-04-13
Payer: MEDICARE

## 2022-04-13 PROCEDURE — 97110 THERAPEUTIC EXERCISES: CPT

## 2022-04-13 PROCEDURE — 97140 MANUAL THERAPY 1/> REGIONS: CPT

## 2022-04-13 NOTE — PROGRESS NOTES
PT DAILY TREATMENT NOTE     Patient Name: Barrett Shone  UPNU:  : 1960  [x]  Patient  Verified  Payor: [facility] MEDICARE / [facility] Product Type: Managed Care Medicare /    In time: 67 PM  Out time: 9877  Total Treatment Time (min): 53  Visit #: 2 of 12    Medicare/BCBS Only   Total Timed Codes (min):  40 1:1 Treatment Time:  40       Treatment Area: Other low back pain [M54.59]    SUBJECTIVE  Pain Level (0-10 scale):5-6  \"pressure\"  Any medication changes, allergies to medications, adverse drug reactions, diagnosis change, or new procedure performed?: [x] No    [] Yes (see summary sheet for update)  Subjective functional status/changes:   [] No changes reported  \"I don't feel good today\"     OBJECTIVE    Modality rationale: decrease pain and increase tissue extensibility to improve the patients ability to perform ADLs and rest comfortably following therapy.     Min Type Additional Details    [] Estim:  []Unatt       []IFC  []Premod                        []Other:  []w/ice   []w/heat  Position:   Location:     [] Estim: []Att    []TENS instruct  []NMES                    []Other:  []w/US   []w/ice   []w/heat  Position:   Location:     []  Traction: [] Cervical       []Lumbar                       [] Prone          []Supine                       []Intermittent   []Continuous Lbs:  [] before manual  [] after manual    []  Ultrasound: []Continuous   [] Pulsed                           []1MHz   []3MHz W/cm2:  Location:    []  Iontophoresis with dexamethasone         Location: [] Take home patch   [] In clinic   10' + 3' set up/removal []  Ice     [x]  heat  []  Ice massage  []  Laser   []  Anodyne Position: supine  Location: left shoulder    []  Laser with stim  []  Other:  Position:  Location:    []  Vasopneumatic Device    []  Right     []  Left  Pre-treatment girth:  Post-treatment girth:  Measured at (location):  Pressure:       [] lo [] med [] hi   Temperature: [] lo [] med [] hi   [x] Skin assessment post-treatment:  [x]intact []redness- no adverse reaction    []redness - adverse reaction:     32 min Therapeutic Exercise:  [x] See flow sheet :   Rationale: increase ROM and increase strength to improve UE/LE strength/mobility and cervical/lumbar mobility to improve ease of ADLs, household chores, and gait. 8 min Manual Therapy:  STM to left shoulder at deltoid, upper trap and proximal biceps    The manual therapy interventions were performed at a separate and distinct time from the therapeutic activities interventions. Rationale: decrease pain and increase tissue extensibility to be able to perform ADL's and functional activities with decreased pain      With   [x] TE   [] TA   [] neuro   [] other: Patient Education: [] Review HEP    [] Progressed/Changed HEP based on:   [] positioning   [x] body mechanics   [] transfers   [] heat/ice application    [] other:      Other Objective/Functional Measures: Added: Swiss ball x 3 for low back; shoulder extension with therapy band for scapular strengthening   Progressed: repetitions for wand flexion AAROM and TA ball/band   Pain Level (0-10 scale) post treatment: 0    ASSESSMENT/Changes in Function: Patient with positive response to today's treatment session as patient reports decreased pain levels post PT treatment. Focus of today's treatment on shoulder ROM and core strength and stability. The patient requires skilled cuing and demonstration to perform added therex with proper form. Patient will continue to benefit from skilled PT services to modify and progress therapeutic interventions, address functional mobility deficits, address ROM deficits, address strength deficits, analyze and address soft tissue restrictions, analyze and cue movement patterns, analyze and modify body mechanics/ergonomics, assess and modify postural abnormalities and address imbalance/dizziness to attain remaining goals.      []  See Plan of Care  [] See progress note/recertification  []  See Discharge Summary         Progress towards goals / Updated goals:  1. Pt will be independent with HEP at D/C for self management. PN: Pt reports that she has not been compliant 2/2 having family/friends visiting, noting that they leave this week. Current:   2. Pt will demonstrate left shoulder flexion AROM to at least 100 deg to improve overhead reaching. PN:  L shoulder flexion AROM 85 d    Current: goal in progress; patient progressed repetitions for wand flexion today (4/13/22)  3. Pt will reports 50% improvement in condition to improve quality of life. PN: 30%  Current:   4. Pt will increase FOTO Functional Status score to 54 to decrease functional limitations. PN: 50/100  Current: will plan to assess near MD note (4/13/22)   5. Pt will demonstrate left hip flexion strength of at least 4/5 to engage in age appropriate activities. PN: 3+/5 w/ pain  Current:   6. Pt will amb 400 ' within 2 min without any rest breaks to indicate improved endurance for community ambulation. PN: 288 ', one standing rest break  Current:   7. Pt will perform 8 reps of STS in 30 sec, from standard chair  in order to indicate improved endurance and reduced fall risk.    PN: 4 reps, 21.5 inch mat table  Current:      PLAN  [x]  Upgrade activities as tolerated     [x]  Continue plan of care  []  Update interventions per flow sheet       []  Discharge due to:_  []  Other:_      oJse Solis, PT 4/13/2022      Future Appointments   Date Time Provider Mark Everett   4/18/2022 12:15 PM [facility] [place] 2 [facility]20/2022 12:15 PM [facility] PT [place] 2 [facility]25/2022 11:30 AM [facility] PT [place] 2 [facility]   4/27/2022 12:15 PM [facility] PT [place] 2 [facility]   5/6/2022  1:00 PM A[facility] LAB ANNE BS AMB   5/13/2022  2:00 PM Hair Cuellar NP AMMARIELENA BS AMB

## 2022-04-15 DIAGNOSIS — E11.40 TYPE 2 DIABETES MELLITUS WITH DIABETIC NEUROPATHY, WITHOUT LONG-TERM CURRENT USE OF INSULIN (HCC): ICD-10-CM

## 2022-04-15 RX ORDER — PIOGLITAZONEHYDROCHLORIDE 30 MG/1
30 TABLET ORAL DAILY
Qty: 90 TABLET | Refills: 1 | Status: SHIPPED | OUTPATIENT
Start: 2022-04-15

## 2022-04-18 ENCOUNTER — HOSPITAL ENCOUNTER (OUTPATIENT)
Dept: PHYSICAL THERAPY | Age: 62
Discharge: HOME | End: 2022-04-18
Payer: MEDICARE

## 2022-04-18 PROCEDURE — 97110 THERAPEUTIC EXERCISES: CPT

## 2022-04-18 NOTE — PROGRESS NOTES
PT DAILY TREATMENT NOTE     Patient Name: Carla Bishop  Date:2022  : 1960  [x]  Patient  Verified  Payor: [facility] MEDICARE / [facility] / Product Type: Managed Care Medicare /    In time: 8508   Out time: 1320   Total Treatment Time (min): 49   Visit # 3 of 12    Medicare/BCBS Only   Total Timed Codes (min): 39 1:1 Treatment Time: 39       Treatment Area: Other low back pain [M54.59]    SUBJECTIVE  Pain Level (0-10 scale):6/10  Any medication changes, allergies to medications, adverse drug reactions, diagnosis change, or new procedure performed?: [x] No    [] Yes (see summary sheet for update)  Subjective functional status/changes:   [] No changes reported  Pt reported that she was sore for 3-4 days after the last session. Notes pain 6/10. OBJECTIVE    Modality rationale: decrease pain and increase tissue extensibility to improve the patients ability to perform ADLs and rest comfortably following therapy.     Min Type Additional Details    [] Estim:  []Unatt       []IFC  []Premod                        []Other:  []w/ice   []w/heat  Position:   Location:     [] Estim: []Att    []TENS instruct  []NMES                    []Other:  []w/US   []w/ice   []w/heat  Position:   Location:     []  Traction: [] Cervical       []Lumbar                       [] Prone          []Supine                       []Intermittent   []Continuous Lbs:  [] before manual  [] after manual    []  Ultrasound: []Continuous   [] Pulsed                           []1MHz   []3MHz W/cm2:  Location:    []  Iontophoresis with dexamethasone         Location: [] Take home patch   [] In clinic   10 []  Ice     [x]  heat  []  Ice massage  []  Laser   []  Anodyne Position: pt requested prone, table flat  Location: left shoulder and l/s, 2 layers between skin and heat    []  Laser with stim  []  Other:  Position:  Location:    []  Vasopneumatic Device    []  Right     []  Left  Pre-treatment girth:  Post-treatment girth: Measured at (location):  Pressure:       [] lo [] med [] hi   Temperature: [] lo [] med [] hi   [x] Skin assessment post-treatment:  [x]intact []redness- no adverse reaction    []redness - adverse reaction:     39 min Therapeutic Exercise:  [x] See flow sheet :   Rationale: increase ROM and increase strength to improve UE/LE strength/mobility and cervical/lumbar mobility to improve ease of ADLs, household chores, and gait. x min Manual Therapy:  STM to left shoulder at deltoid, upper trap and proximal biceps    The manual therapy interventions were performed at a separate and distinct time from the therapeutic activities interventions. Rationale: decrease pain and increase tissue extensibility to be able to perform ADL's and functional activities with decreased pain      With   [x] TE   [] TA   [] neuro   [] other: Patient Education: [x] Review HEP and importance of compliance w/ HEP, importance of arrival to PT on time   [] Progressed/Changed HEP based on:   [] positioning   [] body mechanics   [] transfers   [] heat/ice application    [] other:      Other Objective/Functional Measures:   + pt late to appt, modified activity as appropriate as result. Pt on phone throughout session (hold vs talking to social ), attempting to do exercises while on phone   + progressed seated marching to addition 2# on each leg     Pain Level (0-10 scale) post treatment: 6/10 \" left hip only\"    ASSESSMENT/Changes in Function:   Pt arrived to session 15 min late. Modified activity as appropriate. Pt phone on w/ the  office entire session on hold, pt answering the hold call once available. Intermittent v.c. required for correct activity technique as pt attempted to cont w/ exercises while on the phone. PT monitored pt throughout session without any apparent increased S&S noted. At end of session, noted 6/10 pain in the L hip only. Will cont w/ est POC as pt tolerates at the next session.      Patient will continue to benefit from skilled PT services to modify and progress therapeutic interventions, address functional mobility deficits, address ROM deficits, address strength deficits, analyze and address soft tissue restrictions, analyze and cue movement patterns, analyze and modify body mechanics/ergonomics, assess and modify postural abnormalities and address imbalance/dizziness to attain remaining goals. []  See Plan of Care  []  See progress note/recertification  []  See Discharge Summary         Progress towards goals / Updated goals:  1. Pt will be independent with HEP at D/C for self management. PN: Pt reports that she has not been compliant 2/2 having family/friends visiting, noting that they leave this week. Current: 4/18/22: has not been compliant w/ HEP. Notes that she will \"start today\" 2/2 company gone. 2. Pt will demonstrate left shoulder flexion AROM to at least 100 deg to improve overhead reaching. PN:  L shoulder flexion AROM 85 d    Current: goal in progress; patient progressed repetitions for wand flexion today (4/13/22)  3. Pt will reports 50% improvement in condition to improve quality of life. PN: 30%  Current:   4. Pt will increase FOTO Functional Status score to 54 to decrease functional limitations. PN: 50/100  Current: will plan to assess near MD note (4/13/22)   5. Pt will demonstrate left hip flexion strength of at least 4/5 to engage in age appropriate activities. PN: 3+/5 w/ pain  Current:   6. Pt will amb 400 ' within 2 min without any rest breaks to indicate improved endurance for community ambulation. PN: 288 ', one standing rest break  Current:   7. Pt will perform 8 reps of STS in 30 sec, from standard chair  in order to indicate improved endurance and reduced fall risk.    PN: 4 reps, 21.5 inch mat table  Current:      PLAN  [x]  Upgrade activities as tolerated     [x]  Continue plan of care  []  Update interventions per flow sheet       []  Discharge due to:_  [x] Other:progress POC at the [facility], PT 4/18/2022      Future Appointments   Date Time Provider Mark Krausei   4/18/2022 12:15 PM [facility] PT [place] 2 [facility]   4/20/2022 12:15 PM [facility] PT [place] 2 [facility]   4/25/2022 11:30 AM [facility] PT [place] 2 [facility]   4/27/2022 12:15 PM [facility] PT [place] 2 [facility]   5/6/2022  1:00 PM AMA LAB AMMARIELENA BS AMB   5/13/2022  2:00 PM Ashlyn Wallace NP AMA BS AMB

## 2022-04-20 ENCOUNTER — HOSPITAL ENCOUNTER (OUTPATIENT)
Dept: PHYSICAL THERAPY | Age: 62
Discharge: HOME | End: 2022-04-20
Payer: MEDICARE

## 2022-04-20 PROCEDURE — 97140 MANUAL THERAPY 1/> REGIONS: CPT

## 2022-04-20 PROCEDURE — 97110 THERAPEUTIC EXERCISES: CPT

## 2022-04-20 NOTE — PROGRESS NOTES
PT DAILY TREATMENT NOTE     Patient Name: Alina Rubin  Date:2022  : 1960  [x]  Patient  Verified  Payor: [facility] MEDICARE / Plan: SINTIA FRAZIER [facility] / Product Type: Managed Care Medicare /    In time: 8565    Out time: 1320   Total Treatment Time (min): 56  Visit # 4 of 12    Medicare/BCBS Only   Total Timed Codes (min):46  1:1 Treatment Time: 46       Treatment Area: Other low back pain [M54.59]    SUBJECTIVE  Pain Level (0-10 scale): 5 /10  Any medication changes, allergies to medications, adverse drug reactions, diagnosis change, or new procedure performed?: [x] No    [] Yes (see summary sheet for update)  Subjective functional status/changes:   [] No changes reported  Pt reports that she has L leg soreness but denied any other concerns. OBJECTIVE    Modality rationale: decrease pain and increase tissue extensibility to improve the patients ability to perform ADLs and rest comfortably following therapy.     Min Type Additional Details    [] Estim:  []Unatt       []IFC  []Premod                        []Other:  []w/ice   []w/heat  Position:   Location:     [] Estim: []Att    []TENS instruct  []NMES                    []Other:  []w/US   []w/ice   []w/heat  Position:   Location:     []  Traction: [] Cervical       []Lumbar                       [] Prone          []Supine                       []Intermittent   []Continuous Lbs:  [] before manual  [] after manual    []  Ultrasound: []Continuous   [] Pulsed                           []1MHz   []3MHz W/cm2:  Location:    []  Iontophoresis with dexamethasone         Location: [] Take home patch   [] In clinic   10 []  Ice     [x]  heat  []  Ice massage  []  Laser   []  Anodyne Position: pt requested prone, table flat  Location: left shoulder and B hips, 2 layers between skin and heat    []  Laser with stim  []  Other:  Position:  Location:    []  Vasopneumatic Device    []  Right     []  Left  Pre-treatment girth:  Post-treatment girth: Measured at (location):  Pressure:       [] lo [] med [] hi   Temperature: [] lo [] med [] hi   [x] Skin assessment post-treatment:  [x]intact []redness- no adverse reaction    []redness - adverse reaction:     36 min Therapeutic Exercise:  [x] See flow sheet :   Rationale: increase ROM and increase strength to improve UE/LE strength/mobility and cervical/lumbar mobility to improve ease of ADLs, household chores, and gait. 10 min Manual Therapy:  STM to left shoulder at deltoid, upper trap and proximal biceps    The manual therapy interventions were performed at a separate and distinct time from the therapeutic activities interventions. Rationale: decrease pain and increase tissue extensibility to be able to perform ADL's and functional activities with decreased pain      With   [x] TE   [] TA   [] neuro   [] other: Patient Education: [x] Review HEP and importance of compliance w/ HEP, importance of arrival to PT on time. [] Progressed/Changed HEP based on:   [] positioning   [] body mechanics   [] transfers   [] heat/ice application    [] other:      Other Objective/Functional Measures:   + pt late for session 9 min  + added: palloff press w/ red TB      yellow TB 90/90 IR/ER      Stair knee flex stretch BLE 2nd step      Sit<>stands from mat table  + progressed: HSS to standing position, foot on the 2nd step    Pain Level (0-10 scale) post treatment: 5/10 \" left hip only\"    ASSESSMENT/Changes in Function:   Pt noted to have good tolerance to activity progression today w/ 100% v.c. on correct activity technique. Pt did defer performance of LTR in the supine 2/2 \"cramping in the left leg\" but was able to participate today in more standing based exercises. Pt without any reports of increased pain post tx. PT will cont to progress pt as she tolerates.      Patient will continue to benefit from skilled PT services to modify and progress therapeutic interventions, address functional mobility deficits, address ROM deficits, address strength deficits, analyze and address soft tissue restrictions, analyze and cue movement patterns, analyze and modify body mechanics/ergonomics, assess and modify postural abnormalities and address imbalance/dizziness to attain remaining goals. []  See Plan of Care  []  See progress note/recertification  []  See Discharge Summary         Progress towards goals / Updated goals:  1. Pt will be independent with HEP at D/C for self management. PN: Pt reports that she has not been compliant 2/2 having family/friends visiting, noting that they leave this week. Current: 4/20/22: pt reports that she has not started her HEP  2. Pt will demonstrate left shoulder flexion AROM to at least 100 deg to improve overhead reaching. PN:  L shoulder flexion AROM 85 d    Current: goal in progress; patient progressed repetitions for wand flexion today (4/13/22)  3. Pt will reports 50% improvement in condition to improve quality of life. PN: 30%  Current: 4/20/22:  4. Pt will increase FOTO Functional Status score to 54 to decrease functional limitations. PN: 50/100  Current: will plan to assess near MD note (4/13/22)   5. Pt will demonstrate left hip flexion strength of at least 4/5 to engage in age appropriate activities. PN: 3+/5 w/ pain  Current:   6. Pt will amb 400 ' within 2 min without any rest breaks to indicate improved endurance for community ambulation. PN: 288 ', one standing rest break  Current:   7. Pt will perform 8 reps of STS in 30 sec, from standard chair  in order to indicate improved endurance and reduced fall risk.    PN: 4 reps, 21.5 inch mat table  Current:  Cont to progress w/ mat table sit<>stand activity    PLAN  [x]  Upgrade activities as tolerated     [x]  Continue plan of care  []  Update interventions per flow sheet       []  Discharge due to:_  []  Other:    Marco Low, PT 4/20/2022      Future Appointments   Date Time Provider Mark Everett   4/20/2022 12:15 PM [facility] PT [place] 2 [facility]   4/25/2022 11:30 AM [facility] PT [place] 2 [facility]   4/27/2022 12:15 PM [facility] PT [place] 2 [facility]   5/6/2022  1:00 PM AMA LAB AMA BS AMB   5/13/2022  2:00 PM Whit Rojas, LUCILLE AMA BS AMB

## 2022-04-25 ENCOUNTER — HOSPITAL ENCOUNTER (OUTPATIENT)
Dept: PHYSICAL THERAPY | Age: 62
Discharge: HOME | End: 2022-04-25
Payer: MEDICARE

## 2022-04-25 PROCEDURE — 97110 THERAPEUTIC EXERCISES: CPT

## 2022-04-25 NOTE — PROGRESS NOTES
PT DAILY TREATMENT NOTE     Patient Name: Jose Ogden  Date:2022  : 1960  [x]  Patient  Verified  Payor: [facility] MEDICARE / Plan: SINTIA FRAZIER [facility]P / Product Type: Managed Care Medicare /    In time: 6  Out time: 836  Total Treatment Time (min):43  Visit # 5 of 12    Medicare/BCBS Only   Total Timed Codes (min): 33 1:1 Treatment Time: 33        Treatment Area: Other low back pain [M54.59]    SUBJECTIVE  Pain Level (0-10 scale): 7/10  Any medication changes, allergies to medications, adverse drug reactions, diagnosis change, or new procedure performed?: [x] No    [] Yes (see summary sheet for update)  Subjective functional status/changes:   [] No changes reported  Pt reports muscle soreness following last session. Arrived 15 min late to session today. OBJECTIVE    Modality rationale: decrease pain and increase tissue extensibility to improve the patients ability to perform ADLs and rest comfortably following therapy.     Min Type Additional Details    [] Estim:  []Unatt       []IFC  []Premod                        []Other:  []w/ice   []w/heat  Position:   Location:     [] Estim: []Att    []TENS instruct  []NMES                    []Other:  []w/US   []w/ice   []w/heat  Position:   Location:     []  Traction: [] Cervical       []Lumbar                       [] Prone          []Supine                       []Intermittent   []Continuous Lbs:  [] before manual  [] after manual    []  Ultrasound: []Continuous   [] Pulsed                           []1MHz   []3MHz W/cm2:  Location:    []  Iontophoresis with dexamethasone         Location: [] Take home patch   [] In clinic   10 []  Ice     [x]  heat  []  Ice massage  []  Laser   []  Anodyne Position: pt requested prone, table flat  Location: left shoulder and B hips, 2 layers between skin and heat    []  Laser with stim  []  Other:  Position:  Location:    []  Vasopneumatic Device    []  Right     []  Left  Pre-treatment girth:  Post-treatment girth:  Measured at (location):  Pressure:       [] lo [] med [] hi   Temperature: [] lo [] med [] hi   [x] Skin assessment post-treatment:  [x]intact []redness- no adverse reaction    []redness - adverse reaction:     33 min Therapeutic Exercise:  [x] See flow sheet :   Rationale: increase ROM and increase strength to improve UE/LE strength/mobility and cervical/lumbar mobility to improve ease of ADLs, household chores, and gait. X min Manual Therapy:  STM to left shoulder at deltoid, upper trap and proximal biceps    The manual therapy interventions were performed at a separate and distinct time from the therapeutic activities interventions. Rationale: decrease pain and increase tissue extensibility to be able to perform ADL's and functional activities with decreased pain      With   [x] TE   [] TA   [] neuro   [] other: Patient Education: [x] Review HEP and importance of compliance w/ HEP, importance of arrival to PT on time. [] Progressed/Changed HEP based on:   [] positioning   [] body mechanics   [] transfers   [] heat/ice application    [] other:      Other Objective/Functional Measures:   + pt late for session 15 min  + re-integrated finger ladder, LTR  + added 2# cane chest press, bracing    Pain Level (0-10 scale) post treatment: 7/10 \"its the same\"    ASSESSMENT/Changes in Function:   Pt arrived 15 min late to session today, therefore activity modified. Pt noted to have pain in the RLE w/ 2nd set of the HSS today, but was able to complete task on the LLE. Changed positions to supine to accommodate pain. PT alternating between UE and LE exercises. Pt noted inconsistent effort when performing shoulder ROM activity today. Able to complete the finger ladder vs gravity and perform AROM in supine of the L shoulder to 130 d, however when given a cane for supine flex, pt unable to perform on the L past 90 d.  Pt denied any changes to pain level today post tx, did request to use heat at end of session. Able to perform sup<>prone without guarded motion. PT did discuss importance of arrival to therapy on time to allow for maximal benefit of therapy and importance of participating in her HEP as she has not been compliant w/ HEP. Patient will continue to benefit from skilled PT services to modify and progress therapeutic interventions, address functional mobility deficits, address ROM deficits, address strength deficits, analyze and address soft tissue restrictions, analyze and cue movement patterns, analyze and modify body mechanics/ergonomics, assess and modify postural abnormalities and address imbalance/dizziness to attain remaining goals. []  See Plan of Care  []  See progress note/recertification  []  See Discharge Summary         Progress towards goals / Updated goals: PN due 5/6/22  1. Pt will be independent with HEP at D/C for self management. PN: Pt reports that she has not been compliant 2/2 having family/friends visiting, noting that they leave this week. Current: 4/25/22: pt reports that she has not started her HEP  2. Pt will demonstrate left shoulder flexion AROM to at least 100 deg to improve overhead reaching. PN:  L shoulder flexion AROM 85 d    Current: goal in progress; 4/25/22: in supine: AROM 130 d L shoulder  3. Pt will reports 50% improvement in condition to improve quality of life. PN: 30%  Current: :  4. Pt will increase FOTO Functional Status score to 54 to decrease functional limitations. PN: 50/100  Current: will plan to assess near MD note (4/13/22)   5. Pt will demonstrate left hip flexion strength of at least 4/5 to engage in age appropriate activities. PN: 3+/5 w/ pain  Current:    6. Pt will amb 400 ' within 2 min without any rest breaks to indicate improved endurance for community ambulation. PN: 288 ', one standing rest break  Current:    7.  Pt will perform 8 reps of STS in 30 sec, from standard chair  in order to indicate improved endurance and reduced fall risk.    PN: 4 reps, 21.5 inch mat table  Current:  Cont to progress w/ mat table sit<>stand activity    PLAN  [x]  Upgrade activities as tolerated     [x]  Continue plan of care  []  Update interventions per flow sheet       []  Discharge due to:_  []  Other:     [facility], PT 4/25/2022      Future Appointments   Date Time Provider Mark Everett   4/27/2022 12:15 PM [facility]   5/6/2022  1:00 PM [facility] LAB AMA BS AMB   5/13/2022  2:00 PM Berta Nicole NP AMA BS AMB

## 2022-04-27 ENCOUNTER — HOSPITAL ENCOUNTER (OUTPATIENT)
Dept: PHYSICAL THERAPY | Age: 62
Discharge: HOME | End: 2022-04-27
Payer: MEDICARE

## 2022-04-27 PROCEDURE — 97110 THERAPEUTIC EXERCISES: CPT

## 2022-04-27 NOTE — PROGRESS NOTES
PT DAILY TREATMENT NOTE     Patient Name: Elder Reynolds  Date:2022  : 1960  [x]  Patient  Verified  Payor: [facility] MEDICARE / [facility] Type: Managed Care Medicare /    In time: 402  Out time:    Total Treatment Time (min):60  Visit # 6 of 12    Medicare/BCBS Only   Total Timed Codes (min): 50 1:1 Treatment Time: 45 (2153-7030)        Treatment Area: Other low back pain [M54.59]    SUBJECTIVE  Pain Level (0-10 scale): 0/10  Any medication changes, allergies to medications, adverse drug reactions, diagnosis change, or new procedure performed?: [x] No    [] Yes (see summary sheet for update)  Subjective functional status/changes:   [] No changes reported  Pt arrived on time today. Reports that she took 500 mg Tylenol x 2 prior to therapy and had no pain. OBJECTIVE    Modality rationale: decrease pain and increase tissue extensibility to improve the patients ability to perform ADLs and rest comfortably following therapy.     Min Type Additional Details    [] Estim:  []Unatt       []IFC  []Premod                        []Other:  []w/ice   []w/heat  Position:   Location:     [] Estim: []Att    []TENS instruct  []NMES                    []Other:  []w/US   []w/ice   []w/heat  Position:   Location:     []  Traction: [] Cervical       []Lumbar                       [] Prone          []Supine                       []Intermittent   []Continuous Lbs:  [] before manual  [] after manual    []  Ultrasound: []Continuous   [] Pulsed                           []1MHz   []3MHz W/cm2:  Location:    []  Iontophoresis with dexamethasone         Location: [] Take home patch   [] In clinic   10 []  Ice     [x]  heat  []  Ice massage  []  Laser   []  Anodyne Position: pt requested prone, table flat  Location: left shoulder and B hips, 2 layers between skin and heat    []  Laser with stim  []  Other:  Position:  Location:    []  Vasopneumatic Device    []  Right     [] Left  Pre-treatment girth:  Post-treatment girth:  Measured at (location):  Pressure:       [] lo [] med [] hi   Temperature: [] lo [] med [] hi   [x] Skin assessment post-treatment:  [x]intact []redness- no adverse reaction    []redness - adverse reaction:     50 (45) min Therapeutic Exercise:  [x] See flow sheet :   Rationale: increase ROM and increase strength to improve UE/LE strength/mobility and cervical/lumbar mobility to improve ease of ADLs, household chores, and gait. X min Manual Therapy:  STM to left shoulder at deltoid, upper trap and proximal biceps    The manual therapy interventions were performed at a separate and distinct time from the therapeutic activities interventions. Rationale: decrease pain and increase tissue extensibility to be able to perform ADL's and functional activities with decreased pain      With   [x] TE   [] TA   [] neuro   [] other: Patient Education: [x] Review HEP and importance of compliance w/ HEP  [] Progressed/Changed HEP based on:   [] positioning   [] body mechanics   [] transfers   [] heat/ice application    [] other:      Other Objective/Functional Measures:   + progressed tx as outlined in the flow sheet.   + added supine waiters w/ emphasis on scap retraction, supine shoulder flex and chest press w/ inc reps without weight, progressed from YTB to RTB for TB 90/90 IR/ER walk-outs, mini-squats to elevated mat table    Pain Level (0-10 scale) post treatment: \"7/10 R leg just my knee that has arthritis and 5/10 L shoulder\"    ASSESSMENT/Changes in Function:   Pt arrived to PT today on time. Able to complete an entire session. However, cont to be limited w/ further activity progression 2/2 elevated pain levels. Pt noted to be motivated today to participate in activity progression however, noted discomfort post tx. Pt did have crepitus (palpable) R knee w/ mini-squats to an elevated table.  Noted intermittent discomfort w/ this but otherwise able to complete w/ the surface more elevated. Pt demo AROM of the L shoulder today to 150 d in standing. Pt strongly advised on importance of compliance w/ HEP outside of therapy to improve benefits of therapy. Patient will continue to benefit from skilled PT services to modify and progress therapeutic interventions, address functional mobility deficits, address ROM deficits, address strength deficits, analyze and address soft tissue restrictions, analyze and cue movement patterns, analyze and modify body mechanics/ergonomics, assess and modify postural abnormalities and address imbalance/dizziness to attain remaining goals. []  See Plan of Care  []  See progress note/recertification  []  See Discharge Summary         Progress towards goals / Updated goals: PN due 5/6/22  1. Pt will be independent with HEP at D/C for self management. PN: Pt reports that she has not been compliant 2/2 having family/friends visiting, noting that they leave this week. Current: 4/27/22: pt reports that she has not started her HEP  2. Pt will demonstrate left shoulder flexion AROM to at least 100 deg to improve overhead reaching. PN:  L shoulder flexion AROM 85 d    Current: 4/27/22: AROM today L shoulder 150 d  3. Pt will reports 50% improvement in condition to improve quality of life. PN: 30%  Current: :  4. Pt will increase FOTO Functional Status score to 54 to decrease functional limitations. PN: 50/100  Current: will plan to assess near MD note (4/13/22)   5. Pt will demonstrate left hip flexion strength of at least 4/5 to engage in age appropriate activities. PN: 3+/5 w/ pain  Current:    6. Pt will amb 400 ' within 2 min without any rest breaks to indicate improved endurance for community ambulation. PN: 288 ', one standing rest break  Current:    7. Pt will perform 8 reps of STS in 30 sec, from standard chair  in order to indicate improved endurance and reduced fall risk.    PN: 4 reps, 21.5 inch mat table  Current: 4/27/22: elevated surface today w/ R knee crepitus and pain    PLAN  [x]  Upgrade activities as tolerated     [x]  Continue plan of care  []  Update interventions per flow sheet       []  Discharge due to:_  []  Other:     [facility], PT 4/27/2022      Future Appointments   Date Time Provider Mark Everett   4/27/2022 12:15 PM [facility] [place] [facility]   5/6/2022  1:00 PM AMA LAB AMA BS AMB   5/13/2022  2:00 PM Claudia Dial NP A[facility] BS AMB

## 2022-05-05 ENCOUNTER — TELEPHONE (OUTPATIENT)
Dept: PHYSICAL THERAPY | Age: 62
End: 2022-05-05

## 2022-05-05 ENCOUNTER — HOSPITAL ENCOUNTER (OUTPATIENT)
Dept: PHYSICAL THERAPY | Age: 62
Discharge: HOME | End: 2022-05-05
Payer: MEDICARE

## 2022-05-05 PROCEDURE — 97110 THERAPEUTIC EXERCISES: CPT

## 2022-05-05 NOTE — PROGRESS NOTES
PT DAILY TREATMENT NOTE     Patient Name: Verna Lara  Date:2022  : 1960  [x]  Patient  Verified  Payor: [facility] MEDICARE / [facility] Type: Managed [facility] Medicare /    In time: 208 Out time: 256  Total Treatment Time (min):48  Visit # 7 of 12    Medicare/BCBS Only   Total Timed Codes (min): 38 1:1 Treatment Time: 38       Treatment Area: Other low back pain [M54.59]    SUBJECTIVE  Pain Level (0-10 scale): 6/10  L LE / hamstring   Any medication changes, allergies to medications, adverse drug reactions, diagnosis change, or new procedure performed?: [x] No    [] Yes (see summary sheet for update)  Subjective functional status/changes:   [] No changes reported  Pt reports \"I just started my exercises the other night. They do ok\"    OBJECTIVE    Modality rationale: decrease pain and increase tissue extensibility to improve the patients ability to perform ADLs and rest comfortably following therapy.     Min Type Additional Details    [] Estim:  []Unatt       []IFC  []Premod                        []Other:  []w/ice   []w/heat  Position:   Location:     [] Estim: []Att    []TENS instruct  []NMES                    []Other:  []w/US   []w/ice   []w/heat  Position:   Location:     []  Traction: [] Cervical       []Lumbar                       [] Prone          []Supine                       []Intermittent   []Continuous Lbs:  [] before manual  [] after manual    []  Ultrasound: []Continuous   [] Pulsed                           []1MHz   []3MHz W/cm2:  Location:    []  Iontophoresis with dexamethasone         Location: [] Take home patch   [] In clinic   10 []  Ice     [x]  heat  []  Ice massage  []  Laser   []  Anodyne Position: semi reclined   Location: left shoulder and LS     []  Laser with stim  []  Other:  Position:  Location:    []  Vasopneumatic Device    []  Right     []  Left  Pre-treatment girth:  Post-treatment girth:  Measured at (location):  Pressure:       [] lo [] med [] hi   Temperature: [] lo [] med [] hi   [x] Skin assessment post-treatment:  [x]intact []redness- no adverse reaction    []redness - adverse reaction:     38 min Therapeutic Exercise:  [x] See flow sheet :REASSESSMENT/ FOTO    Rationale: increase ROM and increase strength to improve UE/LE strength/mobility and cervical/lumbar mobility to improve ease of ADLs, household chores, and gait. NT  min Manual Therapy: The manual therapy interventions were performed at a separate and distinct time from the therapeutic activities interventions. Rationale: decrease pain and increase tissue extensibility to be able to perform ADL's and functional activities with decreased pain      With rx  Patient Education: [x] Review HEP, nutrition / dieting      Other Objective/Functional Measures:   Goals assessed for PN   Pain at best 0/10, at worst 10/10 located posterior knee   Subjective % improvement 50%  Objective:   L shoulder AROM - flexion B 160 , MCKENZIE behind head, FIR T12 (l t8)  L shoulder strength  - grossly 4/5  - mild cogwheel   B knee strength: 4+/5 flexion and extension  Hip strength: flexion 4-/5   B knee flexion - 120 deg   Bridge - 50%  30 STS :  7x   Improvements: subjective improvements in reaching,  Stretch longer/ improved flexibility,  Walking tolerance 1 block   Deficits reaching/ lifting, sweeping, prolonged walking , cleaning the house/ [employer]     Pain Level (0-10 scale) post treatment: 4 Grossly. ASSESSMENT/Changes in Function: Patient reports she feels she needs another couple weeks. Patient demo discrepancies in assessment with pain switching from one leg to the other.   See pn     Patient will continue to benefit from skilled PT services to modify and progress therapeutic interventions, address functional mobility deficits, address ROM deficits, address strength deficits, analyze and address soft tissue restrictions, analyze and cue movement patterns, analyze and modify body mechanics/ergonomics, assess and modify postural abnormalities and address imbalance/dizziness to attain remaining goals. []  See Plan of Care  [x]  See progress note/recertification  5/5  []  See Discharge Summary         Progress towards goals / Updated goals: PN due 5/6/22  1. Pt will be independent with HEP at D/C for self management. Current : patient reports \"I just started\"  PN: Pt reports that she has not been compliant 2/2 having family/friends visiting, noting that they leave this week. 2. Pt will demonstrate left shoulder flexion AROM to at least 100 deg to improve overhead reaching. PN:  L shoulder flexion AROM 85 d    Current: met at 160 deg B     3. Pt will reports 50% improvement in condition to improve quality of life. PN: 30%  Current: :50%     4. Pt will increase FOTO Functional Status score to 54 to decrease functional limitations. PN: 50/100  Current: 44/100     5. Pt will demonstrate left hip flexion strength of at least 4/5 to engage in age appropriate activities. PN: 3+/5 w/ pain  Current:  Progressing 4-/5     6. Pt will amb 400 ' within 2 min without any rest breaks to indicate improved endurance for community ambulation. PN: 288 ', one standing rest break  Current:  300 ft - progressing     7. Pt will perform 8 reps of STS in 30 sec, from standard chair  in order to indicate improved endurance and reduced fall risk.    PN: 4 reps, 21.5 inch mat table  Current: 7 x     PLAN  [x]  Upgrade activities as tolerated     [x]  Continue plan of care  []  Update interventions per flow sheet       []  Discharge due to:_  [x]  Other: cont 1x 4    Focus on functional movements - FOTO activities     Salazar Bales PT 5/5/2022      Future Appointments   Date Time Provider Mark Everett   5/5/2022  2:00 PM Cinthia Delgado, PT [facility]   5/6/2022  1:00 PM AMA LAB AMA BS AMB   5/13/2022  2:00 PM Violeta Sutton NP AMA BS AMB
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
## 2022-05-06 ENCOUNTER — APPOINTMENT (OUTPATIENT)
Dept: PHYSICAL THERAPY | Age: 62
End: 2022-05-06
Payer: MEDICARE

## 2022-05-10 ENCOUNTER — HOSPITAL ENCOUNTER (OUTPATIENT)
Dept: PHYSICAL THERAPY | Age: 62
Discharge: HOME | End: 2022-05-10
Payer: MEDICARE

## 2022-05-10 PROCEDURE — 97110 THERAPEUTIC EXERCISES: CPT

## 2022-05-10 NOTE — PROGRESS NOTES
PT DAILY TREATMENT NOTE     Patient Name: Alina Rubin  Date:5/10/2022  : 1960  [x]  Patient  Verified  Payor: [facility] MEDICARE / Plan: [facility] / Product Type: Managed Care Medicare /    In time: 10:05 am             Out time:  11:00 am  Total Treatment Time (min): 55  Visit # 1 of 4    Medicare/BCBS Only   Total Timed Codes (min): 45 1:1 Treatment Time: 40       Treatment Area: Other low back pain [M54.59]    SUBJECTIVE  Pain Level (0-10 scale): 5 in (L) knee  Any medication changes, allergies to medications, adverse drug reactions, diagnosis change, or new procedure performed?: [x] No    [] Yes (see summary sheet for update)  Subjective functional status/changes:   [] No changes reported  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 She reports increased left knee pain recently of unknown cause. OBJECTIVE    Modality rationale: decrease pain and increase tissue extensibility to improve the patients ability to perform ADLs and rest comfortably following therapy.     Min Type Additional Details    [] Estim:  []Unatt       []IFC  []Premod                        []Other:  []w/ice   []w/heat  Position:   Location:     [] Estim: []Att    []TENS instruct  []NMES                    []Other:  []w/US   []w/ice   []w/heat  Position:   Location:     []  Traction: [] Cervical       []Lumbar                       [] Prone          []Supine                       []Intermittent   []Continuous Lbs:  [] before manual  [] after manual    []  Ultrasound: []Continuous   [] Pulsed                           []1MHz   []3MHz W/cm2:  Location:    []  Iontophoresis with dexamethasone         Location: [] Take home patch   [] In clinic   10 []  Ice     [x]  Heat   Position: semi-reclined   Location: left knee and LS     []  Laser with stim  []  Other:  Position:  Location:    []  Vasopneumatic Device    []  Right     []  Left  Pre-treatment girth:  Post-treatment girth: Measured at (location):  Pressure:       [] lo [] med [] hi   Temperature: [] lo [] med [] hi   [x] Skin assessment post-treatment:  [x]intact []redness- no adverse reaction    []redness - adverse reaction:     45 min Therapeutic Exercise:  [x] See flow sheet: added wall wipes, lifting from table to chest height (yellow medicine ball in pillow sheet to mimic taking out trash at [facility]); wall wipes, stairs, squatting   Rationale: increase ROM and increase strength to improve UE/LE strength/mobility and cervical/lumbar mobility to improve ease of ADLs, household chores, and gait. NT  min Manual Therapy: The manual therapy interventions were performed at a separate and distinct time from the therapeutic activities interventions. Rationale: decrease pain and increase tissue extensibility to be able to perform ADL's and functional activities with decreased pain      With rx  Patient Education: [x] Review HEP     Other Objective/Functional Measures:       Pain Level (0-10 scale) post treatment: 3    ASSESSMENT/Changes in Function:   Patient challenged with introduction of wall wipes, req cueing to decrease arc of [place] to better challenge shoulder strength without overuse causing aggravation. Able to manage lift of yellow medicine ball placed in a pillowsheet to simulate lighter bags of trash which patient has to replace at [facility] monthly. Patient will continue to benefit from skilled PT services to modify and progress therapeutic interventions, address functional mobility deficits, address ROM deficits, address strength deficits, analyze and address soft tissue restrictions, analyze and cue movement patterns, analyze and modify body mechanics/ergonomics, assess and modify postural abnormalities and address imbalance/dizziness to attain remaining goals.      []  See Plan of Care  [x]  See progress note/recertification  5/5  []  See Discharge Summary         Progress towards goals / Updated goals: PN due 5/6/22  1. Pt will be independent with HEP at D/C for self management. Current : patient reports \"I just started\"  PN: Pt reports that she has not been compliant 2/2 having family/friends visiting, noting that they leave this week. 2. Pt will demonstrate left shoulder flexion AROM to at least 100 deg to improve overhead reaching. PN:  L shoulder flexion AROM 85 d    Current: met at 160 deg B     3. Pt will reports 50% improvement in condition to improve quality of life. PN: 30%  Current: :50%     4. Pt will increase FOTO Functional Status score to 54 to decrease functional limitations. PN: 44/100     5. Pt will demonstrate left hip flexion strength of at least 4/5 to engage in age appropriate activities. PN: 4-/5     6. Pt will amb 400 ' within 2 min without any rest breaks to indicate improved endurance for community ambulation. PN: 300 ft - progressing     7. Pt will perform 8 reps of STS in 30 sec, from standard chair  in order to indicate improved endurance and reduced fall risk.    PN: 7 x   Current: goal progressing; patient demos 2 sets of 5 reps (5/10/22)    PLAN  [x]  Upgrade activities as tolerated     [x]  Continue plan of care  []  Update interventions per flow sheet       []  Discharge due to:_  [x]  Other: cont 1x3; focus on functional movements    Andres Horowitz PTA 5/10/2022      Future Appointments   Date Time Provider Mark [place]   5/13/2022  2:00 PM LUCILLE Schultz BS AMB

## 2022-05-11 ENCOUNTER — APPOINTMENT (OUTPATIENT)
Dept: PHYSICAL THERAPY | Age: 62
End: 2022-05-11
Payer: MEDICARE

## 2022-05-13 ENCOUNTER — APPOINTMENT (OUTPATIENT)
Dept: PHYSICAL THERAPY | Age: 62
End: 2022-05-13
Payer: MEDICARE

## 2022-05-13 ENCOUNTER — OFFICE VISIT (OUTPATIENT)
Dept: FAMILY MEDICINE CLINIC | Age: 62
End: 2022-05-13
Payer: MEDICARE

## 2022-05-13 ENCOUNTER — HOSPITAL ENCOUNTER (OUTPATIENT)
Dept: PHYSICAL THERAPY | Age: 62
Discharge: HOME | End: 2022-05-13
Payer: MEDICARE

## 2022-05-13 VITALS
DIASTOLIC BLOOD PRESSURE: 68 MMHG | TEMPERATURE: 97.6 F | HEART RATE: 67 BPM | RESPIRATION RATE: 20 BRPM | WEIGHT: 233 LBS | HEIGHT: 65 IN | OXYGEN SATURATION: 99 % | BODY MASS INDEX: 38.82 KG/M2 | SYSTOLIC BLOOD PRESSURE: 109 MMHG

## 2022-05-13 DIAGNOSIS — E78.2 MIXED HYPERLIPIDEMIA: ICD-10-CM

## 2022-05-13 DIAGNOSIS — E11.40 TYPE 2 DIABETES MELLITUS WITH DIABETIC NEUROPATHY, WITHOUT LONG-TERM CURRENT USE OF INSULIN (HCC): ICD-10-CM

## 2022-05-13 DIAGNOSIS — I10 ESSENTIAL HYPERTENSION WITH GOAL BLOOD PRESSURE LESS THAN 130/80: ICD-10-CM

## 2022-05-13 DIAGNOSIS — Z00.00 MEDICARE ANNUAL WELLNESS VISIT, SUBSEQUENT: Primary | ICD-10-CM

## 2022-05-13 DIAGNOSIS — D32.9 MENINGIOMA (HCC): ICD-10-CM

## 2022-05-13 PROCEDURE — G8432 DEP SCR NOT DOC, RNG: HCPCS | Performed by: NURSE PRACTITIONER

## 2022-05-13 PROCEDURE — G8754 DIAS BP LESS 90: HCPCS | Performed by: NURSE PRACTITIONER

## 2022-05-13 PROCEDURE — G8417 CALC BMI ABV UP PARAM F/U: HCPCS | Performed by: NURSE PRACTITIONER

## 2022-05-13 PROCEDURE — G8427 DOCREV CUR MEDS BY ELIG CLIN: HCPCS | Performed by: NURSE PRACTITIONER

## 2022-05-13 PROCEDURE — G9899 SCRN MAM PERF RSLTS DOC: HCPCS | Performed by: NURSE PRACTITIONER

## 2022-05-13 PROCEDURE — 97110 THERAPEUTIC EXERCISES: CPT

## 2022-05-13 PROCEDURE — G8752 SYS BP LESS 140: HCPCS | Performed by: NURSE PRACTITIONER

## 2022-05-13 PROCEDURE — 3017F COLORECTAL CA SCREEN DOC REV: CPT | Performed by: NURSE PRACTITIONER

## 2022-05-13 PROCEDURE — 99214 OFFICE O/P EST MOD 30 MIN: CPT | Performed by: NURSE PRACTITIONER

## 2022-05-13 PROCEDURE — 2022F DILAT RTA XM EVC RTNOPTHY: CPT | Performed by: NURSE PRACTITIONER

## 2022-05-13 PROCEDURE — G0439 PPPS, SUBSEQ VISIT: HCPCS | Performed by: NURSE PRACTITIONER

## 2022-05-13 PROCEDURE — 3044F HG A1C LEVEL LT 7.0%: CPT | Performed by: NURSE PRACTITIONER

## 2022-05-13 NOTE — PROGRESS NOTES
Room 8    Did patient bring someone? No    Did the patient have DME equipment? No     Did you take your medication today? Patiet states I have not     1. \"Have you been to the ER, urgent care clinic since your last visit? Hospitalized since your last visit? \" No    2. \"Have you seen or consulted any other health care providers outside of the [address] [place] [address] since your last visit? \" No     3. For patients aged 39-70: Has the patient had a colonoscopy / FIT/ Cologuard? Yes - no Care Gap present      If the patient is female:    4. For patients aged 41-77: Has the patient had a mammogram within the past 2 years? Yes - no Care Gap present      5. For patients aged 21-65: Has the patient had a pap smear?  Yes - no Care Gap present        3 most recent PHQ Screens 5/13/2022   Little interest or pleasure in doing things Not at all   Feeling down, depressed, irritable, or hopeless Several days   Total Score PHQ 2 1         Health Maintenance Due   Topic Date Due    Shingrix Vaccine Age 49> (1 of 2) Never done    Foot Exam Q1  08/13/2020    Pneumococcal 0-64 years (2 - PCV) 08/13/2020    COVID-19 Vaccine (3 - Booster for [employer] series) 10/04/2021    Medicare Yearly Exam  02/10/2022       Learning Assessment 2/9/2021   PRIMARY LEARNER Patient   HIGHEST LEVEL OF EDUCATION - PRIMARY LEARNER  DID NOT GRADUATE HIGH SCHOOL   BARRIERS PRIMARY LEARNER NONE   CO-LEARNER CAREGIVER No   PRIMARY LANGUAGE ENGLISH   LEARNER PREFERENCE PRIMARY LISTENING   ANSWERED BY Shakira FAJARDO   RELATIONSHIP SELF

## 2022-05-13 NOTE — PROGRESS NOTES
Boris               Shey Ellis               [phone]      Sona Murry is a 63 y.o. female and presents with Weight Loss, Diabetes, and Cholesterol Problem       Assessment/Plan:    Diagnoses and all orders for this visit:    1. Medicare annual wellness visit, subsequent  Completed today to include ETOH and depression screening and Healthcare decision maker verified   2. Type 2 diabetes mellitus with diabetic neuropathy, without long-term current use of insulin (HCC)  -     MICROALBUMIN, UR, RAND W/ MICROALB/CREAT RATIO  -     HEMOGLOBIN A1C WITH EAG  Endorses medication compliance, Follow up labs prior to next visit, Denies signs and symptoms of hyper or hypoglycemia and Diabetes controlled, A1C <7   3. Essential hypertension with goal blood pressure less than 032/47  -     METABOLIC PANEL, COMPREHENSIVE  Endorses medication compliance, Follow up labs prior to next visit and Blood pressure stable, continue lisinopril at same dose   4. Mixed hyperlipidemia  -     LIPID PANEL  Managed with diet, follow up labs prior to next visit  5. Meningioma ([facility] [address]  -     REFERRAL TO NEUROSURGERY   for follow up and management if needed  Other orders  -     CVD REPORT        Follow-up and Dispositions    · Return in about 4 months (around 9/13/2022) for DM, DM foot, HLD, HTN, 30 min, office only, with, labs prior.            Health Maintenance:   Health Maintenance   Topic Date Due    Shingrix Vaccine Age 49> (1 of 2) Never done    Foot Exam Q1  08/13/2020    Pneumococcal 0-64 years (2 - PCV) 08/13/2020    COVID-19 Vaccine (3 - Booster for Pfizer series) 10/04/2021    Flu Vaccine (Season Ended) 09/01/2022    Breast Cancer Screen Mammogram  02/16/2023    Depression Screen  05/13/2023    A1C test (Diabetic or Prediabetic)  05/13/2023    MICROALBUMIN Q1  05/13/2023    Lipid Screen  05/13/2023    Medicare Yearly Exam  05/14/2023    Eye Exam Retinal or Dilated  05/28/2023    Colorectal Cancer Screening Combo  01/09/2025    Cervical cancer screen  03/16/2025    DTaP/Tdap/Td series (2 - Td or Tdap) 06/11/2025    Hepatitis C Screening  Completed        Subjective:    Labs obtained prior to visit? NO  Reviewed with patient? Not applicable    DMII-   Patient reports medication compliance Daily  Diabetic diet compliance most of the time  Patient monitors blood sugars regularly Daily   Reports am fasting sugars range 109, 99   Denies hypoglycemic episodes yes  Denies polyuria, polydipsia, paraesthesia, vision changes? yes  Engaging in daily exercise?  Yes: Comment: PT and floor exercises     Diabetic Foot and Eye Exam HM Status   Topic Date Due    Diabetic Foot Care  08/13/2020    Eye Exam  05/28/2023     Hemoglobin A1c   Date Value Ref Range Status   05/13/2022 5.2 4.8 - 5.6 % Final     Comment:              Prediabetes: 5.7 - 6.4           Diabetes: >6.4           Glycemic control for adults with diabetes: <7.0     ]  Creatinine, urine   Date Value Ref Range Status   05/13/2022 66.7 Not Estab. mg/dL Final     Microalbumin/Creat ratio (mg/g creat)   Date Value Ref Range Status   07/26/2019 5 0 - 30 mg/g Final     Microalb/Creat ratio (ug/mg creat.)   Date Value Ref Range Status   05/13/2022 6 0 - 29 mg/g creat Final     Comment:                            Normal:                0 -  29                         Moderately increased: 30 - 300                         Severely increased:       >300     01/07/2022 <4 0 - 29 mg/g creat Final     Comment:                            Normal:                0 -  29                         Moderately increased: 30 - 300                         Severely increased:       >300     02/15/2021 <2 0 - 29 mg/g creat Final     Comment:                            Normal:                0 -  29                         Moderately increased: 30 - 300                         Severely increased:       >300     09/30/2020 <2 0 - 29 mg/g creat Final     Comment: Normal:                0 -  29                         Moderately increased: 30 - 300                         Severely increased:       >300                **Please note reference interval change**       Key Antihyperglycemic Medications             pioglitazone (ACTOS) 30 mg tablet (Taking) Take 1 Tablet by mouth daily. Ozempic 1 mg/dose (4 mg/3 mL) pnij (Taking) INJECT 1 MG SUBCUTANEOUSLY EVERY 7 DAYS        Hypertension:  Visit Vitals  /68 (BP 1 Location: Left arm, BP Patient Position: Sitting, BP Cuff Size: Adult) Comment (BP Patient Position): feet on floor   Pulse 67   Temp 97.6 °F (36.4 °C) (Temporal)   Resp 20   Ht 5' 5\" (1.651 m)   Wt 233 lb (105.7 kg)   SpO2 99%   BMI 38.77 kg/m²      Patient reports taking medications as instructed. yes   Medication side effects noted. no  Headache upon wakening. no   Home BP monitoring: Does not check  Do you experience chest pain/pressure or SOB with exertion? no  Maintain a low Sodium diet? yes  Key CAD CHF Meds             lisinopriL (PRINIVIL, ZESTRIL) 5 mg tablet (Taking) Take 1 Tablet by mouth daily. aspirin 81 mg chewable tablet (Taking) Take 1 Tab by mouth daily. BUN   Date Value Ref Range Status   05/13/2022 9 6 - 24 mg/dL Final     Creatinine   Date Value Ref Range Status   05/13/2022 0.71 0.57 - 1.00 mg/dL Final     GFR est AA   Date Value Ref Range Status   01/07/2022 110 >59 mL/min/1.73 Final     Comment:     **In accordance with recommendations from the NKF-ASN Task force,**    [facility] is in the process of updating its eGFR calculation to the    2021 CKD-EPI creatinine equation that estimates kidney function    without a race variable. Potassium   Date Value Ref Range Status   05/13/2022 4.2 3.5 - 5.2 mmol/L Final       HLD:  Has been compliant with meds  No: managed with diet and exercise  Compliant with low-fat diet. most of the time    Denies myalgias or other side effects.  N/A  The 10-year ASCVD risk score (Yari Roy., et al., 2013) is: 9%    Cholesterol, total   Date Value Ref Range Status   2022 179 100 - 199 mg/dL Final     Triglyceride   Date Value Ref Range Status   2022 83 0 - 149 mg/dL Final     HDL Cholesterol   Date Value Ref Range Status   2022 49 >39 mg/dL Final     LDL, calculated   Date Value Ref Range Status   2022 115 (H) 0 - 99 mg/dL Final   ]  Key Antihyperlipidemia Meds     The patient is on no antihyperlipidemia meds. ROS:     ROS  As stated in HPI, otherwise all others negative. The problem list was updated as a part of today's visit. Patient Active Problem List   Diagnosis Code    Essential hypertension with goal blood pressure less than 130/80 I10    GERD (gastroesophageal reflux disease) K21.9    DOMINIQUE (obstructive sleep apnea) G47.33    Morbid obesity (HCC) E66.01    Type 2 diabetes mellitus with diabetic neuropathy (HCC) E11.40    Mixed hyperlipidemia E78.2    Bilateral primary osteoarthritis of knee M17.0    Environmental and seasonal allergies J30.89    Diabetes mellitus type 2 in obese ([facility] [address] E11.69, E66.9    Former cigarette smoker Z87.891    [facility] D32.9    Mild intermittent asthma J45.20    Primary osteoarthritis involving multiple joints M15.9       The PSH, FH were reviewed. SH:  Social History     Tobacco Use    Smoking status: Former Smoker     Packs/day: 0.25     Years: 1.00     Pack years: 0.25     Types: Cigarettes     Start date:      Quit date:      Years since quittin.4    Smokeless tobacco: Never Used   Vaping Use    Vaping Use: Never used   Substance Use Topics    Alcohol use: No    Drug use: Yes     Types: Cocaine, Marijuana, Heroin, Opiates       Medications/Allergies:  Current Outpatient Medications on File Prior to Visit   Medication Sig Dispense Refill    pioglitazone (ACTOS) 30 mg tablet Take 1 Tablet by mouth daily.  90 Tablet 1    Accu-Chek Guide test strips strip USE 1 STRIP TO CHECK GLUCOSE TWICE DAILY 100 Strip 3    lancets (Accu-Chek Softclix Lancets) misc Use to check blood sugar twice a day 100 Each 11    glucose blood VI test strips (Accu-Chek Flor Plus test strp) strip Use to check blood sugar twice a day 100 Strip 1    Blood-Glucose Meter (Accu-Chek Guide Glucose Meter) misc Use to check glucose once a day 1 Each 0    lisinopriL (PRINIVIL, ZESTRIL) 5 mg tablet Take 1 Tablet by mouth daily. 90 Tablet 0    Ozempic 1 mg/dose (4 mg/3 mL) pnij INJECT 1 MG SUBCUTANEOUSLY EVERY 7 DAYS 3 Each 1    phentermine (ADIPEX-P) 37.5 mg tablet Take 1 Tablet by mouth every morning. Max Daily Amount: 37.5 mg. 30 Tablet 2    meclizine (ANTIVERT) 25 mg tablet TAKE 1 TABLET BY MOUTH TWICE DAILY AS NEEDED FOR DIZZINESS 60 Tablet 1    beclomethasone (QVAR) 40 mcg/actuation aero Take 1 Puff by inhalation two (2) times a day. 8.7 g 1    dicyclomine (BENTYL) 10 mg capsule Take 1 Capsule by mouth four (4) times daily as needed for Abdominal Cramps. 90 Capsule 0    aspirin 81 mg chewable tablet Take 1 Tab by mouth daily. 180 Tab 2     No current facility-administered medications on file prior to visit. Allergies   Allergen Reactions    Atorvastatin Myalgia     Leg cramps       Objective:  Visit Vitals  /68 (BP 1 Location: Left arm, BP Patient Position: Sitting, BP Cuff Size: Adult) Comment (BP Patient Position): feet on floor   Pulse 67   Temp 97.6 °F (36.4 °C) (Temporal)   Resp 20   Ht 5' 5\" (1.651 m)   Wt 233 lb (105.7 kg)   SpO2 99%   BMI 38.77 kg/m²    Body mass index is 38.77 kg/m². Physical assessment  Physical Exam  Vitals and nursing note reviewed. Eyes:      Conjunctiva/sclera: Conjunctivae normal.      Pupils: Pupils are equal, round, and reactive to light. Cardiovascular:      Rate and Rhythm: Normal rate and regular rhythm. Heart sounds: Normal heart sounds. No murmur heard. No friction rub. No gallop.     Pulmonary:      Effort: Pulmonary effort is normal. Breath sounds: Normal breath sounds. Musculoskeletal:         General: Normal range of motion. Cervical back: Normal range of motion. Skin:     General: Skin is warm and dry. Neurological:      Mental Status: She is alert. Labwork and Ancillary Studies:    CBC w/Diff  Lab Results   Component Value Date/Time    WBC 6.1 09/30/2020 12:55 PM    HGB 12.3 09/30/2020 12:55 PM    PLATELET 403 19/50/3993 12:55 PM         Basic Metabolic Profile  Lab Results   Component Value Date/Time    Sodium 139 05/13/2022 03:00 PM    Potassium 4.2 05/13/2022 03:00 PM    Chloride 104 05/13/2022 03:00 PM    CO2 21 05/13/2022 03:00 PM    Anion gap 5 07/26/2019 10:00 AM    Glucose 85 05/13/2022 03:00 PM    BUN 9 05/13/2022 03:00 PM    Creatinine 0.71 05/13/2022 03:00 PM    BUN/Creatinine ratio 13 05/13/2022 03:00 PM    GFR est  01/07/2022 08:40 AM    GFR est non-AA 96 01/07/2022 08:40 AM    Calcium 9.8 05/13/2022 03:00 PM        Cholesterol  Lab Results   Component Value Date/Time    Cholesterol, total 179 05/13/2022 03:00 PM    HDL Cholesterol 49 05/13/2022 03:00 PM    LDL, calculated 115 (H) 05/13/2022 03:00 PM    LDL, calculated 83.8 07/26/2019 10:00 AM    Triglyceride 83 05/13/2022 03:00 PM    CHOL/HDL Ratio 3.0 07/26/2019 10:00 AM           I have discussed the diagnosis with the patient and the intended plan as seen in the above orders. The patient has received an After-Visit Summary and questions were answered concerning future plans. An After Visit Summary was printed and given to the patient. All diagnosis have been discussed with the patient and all of the patient's questions have been answered. Follow-up and Dispositions    · Return in about 4 months (around 9/13/2022) for DM, DM foot, HLD, HTN, 30 min, office only, with, labs prior. Abiola Montelongo, AGNP-BC  [address] [facility]   [address] [facility] [address] [address]      This is the Subsequent Medicare Annual Wellness Exam, performed 12 months or more after the Initial AWV or the last Subsequent AWV    I have reviewed the patient's medical history in detail and updated the computerized patient record. Assessment/Plan   Education and counseling provided:  Are appropriate based on today's review and evaluation    1. Medicare annual wellness visit, subsequent  2. Type 2 diabetes mellitus with diabetic neuropathy, without long-term current use of insulin (HCC)  -     MICROALBUMIN, UR, RAND W/ MICROALB/CREAT RATIO  -     HEMOGLOBIN A1C WITH EAG  3. Essential hypertension with goal blood pressure less than 543/90  -     METABOLIC PANEL, COMPREHENSIVE  4. Mixed hyperlipidemia  -     LIPID PANEL  5. Meningioma ([address]  -     REFERRAL TO NEUROSURGERY       Depression Risk Factor Screening     3 most recent [facility] Screens 5/13/2022   Little interest or pleasure in doing things Not at all   Feeling down, depressed, irritable, or hopeless Several days   Total Score PHQ 2 1       Alcohol & Drug Abuse Risk Screen    Do you average more than 1 drink per night or more than 7 drinks a week:  No    On any one occasion in the past three months have you have had more than 3 drinks containing alcohol:  No          Functional Ability and Level of Safety    Hearing: Hearing is good. Activities of Daily Living: The home contains: handrails and grab bars  Patient does total self care      Ambulation: with mild difficulty     Fall Risk:  Fall Risk Assessment, last 12 mths 2/9/2021   Able to walk? Yes   Fall in past 12 months? 0   Do you feel unsteady?  0   Are you worried about falling 0      Abuse Screen:  Patient is not abused       Cognitive Screening    Has your family/caregiver stated any concerns about your memory: no     Cognitive Screening: Normal - 3 word recall: 3/3    Health Maintenance Due     Health Maintenance Due   Topic Date Due    Shingrix Vaccine Age 50> (1 of 2) Never done    Foot Exam Q1  08/13/2020    Pneumococcal 0-64 years (2 - PCV) 08/13/2020    COVID-19 Vaccine (3 - Booster for Pfizer series) 10/04/2021       Patient Care Team   Patient Care Team:  Claudia Dial NP as PCP - General (Nurse Practitioner)  Claudia Dial NP as PCP [facility]  Humphrey Siemens, MD (General Surgery)    History     Patient Active Problem List   Diagnosis Code    Essential hypertension with goal blood pressure less than 130/80 I10    GERD (gastroesophageal reflux disease) K21.9    DOMINIQUE (obstructive sleep apnea) G47.33    Morbid obesity ([address] E66.01    Type 2 diabetes mellitus with diabetic neuropathy ([address] E11.40    Mixed hyperlipidemia E78.2    Bilateral primary osteoarthritis of knee M17.0    Environmental and seasonal allergies J30.89    Diabetes mellitus type 2 in obese ([address] E11.69, E66.9    Former cigarette smoker Z87.891    Meningioma ([address] D32.9    Mild intermittent asthma J45.20    Primary osteoarthritis involving multiple joints M15.9     Past Medical History:   Diagnosis Date    Arthritis     Chronic gastritis with bleeding     DDD (degenerative disc disease), lumbar     Depression     Diabetes mellitus, type II ([address]     Diverticulosis of colon     Fibroid uterus     Fibroids     Gallstones     Hot flashes     Hypertension     Meningioma ([address]     Morbid obesity ([address]     Vertigo       Past Surgical History:   Procedure Laterality Date    HX CRANIOTOMY Left 08/18/2015    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  90 Tablet 1    Accu-Chek Guide test strips strip USE 1 STRIP TO CHECK GLUCOSE TWICE DAILY 100 Strip 3    lancets (Accu-Chek Softclix Lancets) misc Use to check blood sugar twice a day 100 Each 11    glucose blood VI test strips (Accu-Chek Flor Plus test strp) strip Use to check blood sugar twice a day 100 Strip 1    Blood-Glucose Meter (Accu-Chek Guide Glucose Meter) misc Use to check glucose once a day 1 Each 0    lisinopriL (PRINIVIL, ZESTRIL) 5 mg tablet Take 1 Tablet by mouth daily. 90 Tablet 0    Ozempic 1 mg/dose (4 mg/3 mL) pnij INJECT 1 MG SUBCUTANEOUSLY EVERY 7 DAYS 3 Each 1    phentermine (ADIPEX-P) 37.5 mg tablet Take 1 Tablet by mouth every morning. Max Daily Amount: 37.5 mg. 30 Tablet 2    meclizine (ANTIVERT) 25 mg tablet TAKE 1 TABLET BY MOUTH TWICE DAILY AS NEEDED FOR DIZZINESS 60 Tablet 1    beclomethasone (QVAR) 40 mcg/actuation aero Take 1 Puff by inhalation two (2) times a day. 8.7 g 1    dicyclomine (BENTYL) 10 mg capsule Take 1 Capsule by mouth four (4) times daily as needed for Abdominal Cramps. 90 Capsule 0    aspirin 81 mg chewable tablet Take 1 Tab by mouth daily.  180 Tab 2     Allergies   Allergen Reactions    Atorvastatin Myalgia     Leg cramps       Family History   Problem Relation Age of Onset    Cancer Mother 58        colon   [facility] Hypertension Mother     Diabetes Mother     Psychiatric Disorder Father     Lupus Sister      Social History     Tobacco Use    Smoking status: Former Smoker     Packs/day: 0.25     Years: 1.00     Pack years: 0.25     Types: Cigarettes     Start date: 1     Quit date:      Years since quittin.4    Smokeless tobacco: Never Used   Substance Use Topics    Alcohol use: No         Ragini Diane NP

## 2022-05-13 NOTE — PATIENT INSTRUCTIONS
Medicare Wellness Visit, Female     The best way to live healthy is to have a lifestyle where you eat a well-balanced diet, exercise regularly, limit alcohol use, and quit all forms of tobacco/nicotine, if applicable. Regular preventive services are another way to keep healthy. Preventive services (vaccines, screening tests, monitoring & exams) can help personalize your care plan, which helps you manage your own care. Screening tests can find health problems at the earliest stages, when they are easiest to treat. [facility] follows the current, evidence-based guidelines published by the [facility] Anastacio Perez ([facility]) when recommending preventive services for our patients. Because we follow these guidelines, sometimes recommendations change over time as research supports it. (For example, mammograms used to be recommended annually. Even though Medicare will still pay for an annual mammogram, the newer guidelines recommend a mammogram every two years for women of average risk). Of course, you and your doctor may decide to screen more often for some diseases, based on your risk and your co-morbidities (chronic disease you are already diagnosed with). Preventive services for you include:  - Medicare offers their members a free annual wellness visit, which is time for you and your primary care provider to discuss and plan for your preventive service needs. Take advantage of this benefit every year!  -All adults over the age of 72 should receive the recommended pneumonia vaccines. Current USPSTF guidelines recommend a series of two vaccines for the best pneumonia protection.   -All adults should have a flu vaccine yearly and a tetanus vaccine every 10 years.   -All adults age 48 and older should receive the shingles vaccines (series of two vaccines).       -All adults age 38-68 who are overweight should have a diabetes screening test once every three years.   -All adults born between [birth year] and 1963 should be screened once for Hepatitis C.  -Other screening tests and preventive services for persons with diabetes include: an eye exam to screen for diabetic retinopathy, a kidney function test, a foot exam, and stricter control over your cholesterol.   -Cardiovascular screening for adults with routine risk involves an electrocardiogram (ECG) at intervals determined by your doctor.   -Colorectal cancer screenings should be done for adults age 54-65 with no increased risk factors for colorectal cancer. There are a number of acceptable methods of screening for this type of cancer. Each test has its own benefits and drawbacks. Discuss with your doctor what is most appropriate for you during your annual wellness visit. The different tests include: colonoscopy (considered the best screening method), a fecal occult blood test, a fecal DNA test, and sigmoidoscopy.    -A bone mass density test is recommended when a woman turns 65 to screen for osteoporosis. This test is only recommended one time, as a screening. Some providers will use this same test as a disease monitoring tool if you already have osteoporosis. -Breast cancer screenings are recommended every other year for women of normal risk, age 54-69.  -Cervical cancer screenings for women over age 72 are only recommended with certain risk factors.      Here is a list of your current Health Maintenance items (your personalized list of preventive services) with a due date:  Health Maintenance Due   Topic Date Due    Shingles Vaccine (1 of 2) Never done    Diabetic Foot Care  08/13/2020    Pneumococcal Vaccine (2 - PCV) 08/13/2020    COVID-19 Vaccine (3 - Booster for Pfizer series) 10/04/2021

## 2022-05-13 NOTE — PROGRESS NOTES
PT DAILY TREATMENT NOTE     Patient Name: Misael Burnham  Date:2022  : 1960  [x]  Patient  Verified  Payor: [facility] MEDICARE / Plan: SINTIA FRAZIER [facility] / Product Type: Managed Care Medicare /    In time:  12:30             Out time:   1:02  Total Treatment Time (min): 32  Visit # 2 of 4    Medicare/BCBS Only   Total Timed Codes (min): 32 1:1 Treatment Time: 32       Treatment Area: Other low back pain [M54.59]    SUBJECTIVE  Pain Level (0-10 scale): 6 back, 5 L shoulder   Any medication changes, allergies to medications, adverse drug reactions, diagnosis change, or new procedure performed?: [x] No    [] Yes (see summary sheet for update)  Subjective functional status/changes:   [] No changes reported  \"I woke up with pain today. I can't do much today. 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 OBJECTIVE    Modality rationale: decrease pain and increase tissue extensibility to improve the patients ability to perform ADLs and rest comfortably following therapy. Min Type Additional Details   PD []  Ice     [x]  Heat   Position: semi-reclined   Location: left knee and LS    [x] Skin assessment post-treatment:  [x]intact []redness- no adverse reaction    []redness - adverse reaction:     32 min Therapeutic Exercise:  [x] See flow sheet:   Wall wipes with pillow case for germ reduction per pt request  Pt deferred most treatment and all functional movements today due to increased LBP     Rationale: increase ROM and increase strength to improve UE/LE strength/mobility and cervical/lumbar mobility to improve ease of ADLs, household chores, and gait.       With rx  Patient Education: [x] Review HEP    Movement for avoidance of an increase in back pain      Other Objective/Functional Measures:   Repeated flexion in sitting (lumbar): No effect   Repeated extension in standing: no effect  L SKC: no effect. Sit to stand: pt notes a decrease in pain, \"that's easier\"  D/C Seated HS stretch due to no stretch felt     Pt self-initiated ballistic rotational movements for the spine and notes an increase pain pain that then caused guarded, antalgic gait. At 1:00pm pt notes, \" I have to leave for an MD appt that's at 2pm and I need to leave ASAP cause I don't like driving in the rain\"    Pain Level (0-10 scale) post treatment: 6     ASSESSMENT/Changes in Function:   Slower progress today due to an increase in LBP last night/this morning and pt with low motivation to participate in PT interventions. No directional preference with back pain. Upon leaving, pt notes that her pain is in the T/S. Pt was a poor historian today. Pt denied any activities yesterday that could have caused a mm strain. Patient will continue to benefit from skilled PT services to modify and progress therapeutic interventions, address functional mobility deficits, address ROM deficits, address strength deficits, analyze and address soft tissue restrictions, analyze and cue movement patterns, analyze and modify body mechanics/ergonomics, assess and modify postural abnormalities and address imbalance/dizziness to attain remaining goals. []  See Plan of Care  [x]  See progress note/recertification  5/5  []  See Discharge Summary         Progress towards goals / Updated goals: PN due  6/5/2022  1. Pt will be independent with HEP at D/C for self management. previous :  Pt reports that she has not been compliant 2/2 having family/friends visiting, noting that they leave this week. Current:  Notes poor compliance due to LBP last night (5/13/22)  2. Pt will demonstrate left shoulder flexion AROM to at least 100 deg to improve overhead reaching. Previous:  met at 160 deg B    3. Pt will reports 50% improvement in condition to improve quality of life.   PN: :50%   Current:   4. Pt will increase FOTO Functional Status score to 54 to decrease functional limitations. PN: 44/100   Current:   5. Pt will demonstrate left hip flexion strength of at least 4/5 to engage in age appropriate activities. PN: 4-/5   Current:  Limited today in progressing due to lumbar pain (5/13/22)    6. Pt will amb 400 ' within 2 min without any rest breaks to indicate improved endurance for community ambulation. PN: 300 ft - progressing   Pt unable to tolerate progression of gait today due to antalgic, guarded patterning and LBP (5/13/22)    7. Pt will perform 8 reps of STS in 30 sec, from standard chair  in order to indicate improved endurance and reduced fall risk.    PN: 7 x   Current: goal progressing; patient demos 2 sets of 5 reps (5/10/22)    PLAN  [x]  Upgrade activities as tolerated     [x]  Continue plan of care  []  Update interventions per flow sheet       []  Discharge due to:_  [x]  Other: cont 1x2; focus on functional movements    Raul Jara PT 5/13/2022      Future Appointments   Date Time Provider Mark Everett   5/13/2022 12:30 PM Vince Johnston PT [facility]   5/13/2022  2:00 PM Juan Manuel Harris NP [facility] AM[facility]

## 2022-05-14 LAB
ALBUMIN SERPL-MCNC: 4 G/DL (ref 3.8–4.9)
ALBUMIN/CREAT UR: 6 MG/G CREAT (ref 0–29)
ALBUMIN/GLOB SERPL: 1.4 {RATIO} (ref 1.2–2.2)
ALP SERPL-CCNC: 93 IU/L (ref 44–121)
ALT SERPL-CCNC: 14 IU/L (ref 0–32)
AST SERPL-CCNC: 14 IU/L (ref 0–40)
BILIRUB SERPL-MCNC: 0.5 MG/DL (ref 0–1.2)
BUN SERPL-MCNC: 9 MG/DL (ref 6–24)
BUN/CREAT SERPL: 13 (ref 9–23)
CALCIUM SERPL-MCNC: 9.8 MG/DL (ref 8.7–10.2)
CHLORIDE SERPL-SCNC: 104 MMOL/L (ref 96–106)
CHOLEST SERPL-MCNC: 179 MG/DL (ref 100–199)
CO2 SERPL-SCNC: 21 MMOL/L (ref 20–29)
CREAT SERPL-MCNC: 0.71 MG/DL (ref 0.57–1)
CREAT UR-MCNC: 66.7 MG/DL
EGFR: 98 ML/MIN/1.73
EST. AVERAGE GLUCOSE BLD GHB EST-MCNC: 103 MG/DL
GLOBULIN SER CALC-MCNC: 2.8 G/DL (ref 1.5–4.5)
GLUCOSE SERPL-MCNC: 85 MG/DL (ref 65–99)
HBA1C MFR BLD: 5.2 % (ref 4.8–5.6)
HDLC SERPL-MCNC: 49 MG/DL
IMP & REVIEW OF LAB RESULTS: NORMAL
LDLC SERPL CALC-MCNC: 115 MG/DL (ref 0–99)
MICROALBUMIN UR-MCNC: 4.1 UG/ML
POTASSIUM SERPL-SCNC: 4.2 MMOL/L (ref 3.5–5.2)
PROT SERPL-MCNC: 6.8 G/DL (ref 6–8.5)
SODIUM SERPL-SCNC: 139 MMOL/L (ref 134–144)
TRIGL SERPL-MCNC: 83 MG/DL (ref 0–149)
VLDLC SERPL CALC-MCNC: 15 MG/DL (ref 5–40)

## 2022-05-16 ENCOUNTER — APPOINTMENT (OUTPATIENT)
Dept: PHYSICAL THERAPY | Age: 62
End: 2022-05-16
Payer: MEDICARE

## 2022-05-17 ENCOUNTER — APPOINTMENT (OUTPATIENT)
Dept: PHYSICAL THERAPY | Age: 62
End: 2022-05-17
Payer: MEDICARE

## 2022-05-20 ENCOUNTER — APPOINTMENT (OUTPATIENT)
Dept: PHYSICAL THERAPY | Age: 62
End: 2022-05-20
Payer: MEDICARE

## 2022-05-24 ENCOUNTER — HOSPITAL ENCOUNTER (OUTPATIENT)
Dept: PHYSICAL THERAPY | Age: 62
Discharge: HOME | End: 2022-05-24
Payer: MEDICARE

## 2022-05-24 PROBLEM — R39.15 URINARY URGENCY: Status: RESOLVED | Noted: 2017-03-09 | Resolved: 2022-05-24

## 2022-05-24 PROBLEM — M19.90 ARTHRITIS: Status: RESOLVED | Noted: 2017-03-09 | Resolved: 2022-05-24

## 2022-05-24 PROBLEM — J45.909 ASTHMA: Status: RESOLVED | Noted: 2017-03-09 | Resolved: 2022-05-24

## 2022-05-24 PROBLEM — R07.9 CHEST PAIN: Status: RESOLVED | Noted: 2021-01-18 | Resolved: 2022-05-24

## 2022-05-24 PROBLEM — R42 VERTIGO: Status: RESOLVED | Noted: 2019-02-14 | Resolved: 2022-05-24

## 2022-05-24 PROCEDURE — 97110 THERAPEUTIC EXERCISES: CPT
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
## 2022-05-25 ENCOUNTER — HOSPITAL ENCOUNTER (OUTPATIENT)
Dept: PHYSICAL THERAPY | Age: 62
Discharge: HOME | End: 2022-05-25
Payer: MEDICARE

## 2022-05-25 PROCEDURE — 97110 THERAPEUTIC EXERCISES: CPT

## 2022-05-25 NOTE — PROGRESS NOTES
PT DAILY TREATMENT NOTE     Patient Name: Rach Veloz  Date:2022  : 1960  [x]  Patient  Verified  Payor: [facility] MEDICARE / [facility] Product Type: Managed Care Medicare /    In time: 10:06   Out time:    10:47  Total Treatment Time (min):  41   Visit # 4 of 4    Medicare/BCBS Only   Total Timed Codes (min):  41 1:1 Treatment Time: 41       Treatment Area: Other low back pain [M54.59]    SUBJECTIVE  Pain Level (0-10 scale):0  Any medication changes, allergies to medications, adverse drug reactions, diagnosis change, or new procedure performed?: [x] No    [] Yes (see summary sheet for update)  Subjective functional status/changes:   [] No changes reported  Doing better    OBJECTIVE    Modality rationale: decrease pain and increase tissue extensibility to improve the patients ability to perform ADLs and rest comfortably following therapy. Min Type Additional Details   PD []  Ice     [x]  Heat   Position: Supine with wedge  Location: L Knee and L/S    [x] Skin assessment post-treatment:  [x]intact []redness- no adverse reaction    []redness - adverse reaction:     41  min Therapeutic Exercise:  [x] See flow sheet:   Wall wipes with pillow case for germ reduction per pt request   Rationale: increase ROM and increase strength to improve UE/LE strength/mobility and cervical/lumbar mobility to improve ease of ADLs, household chores, and gait. With Tx  Patient Education: [x] Review HEP     Other Objective/Functional Measures:      Pain at best 0/10, at worst 4/10 located L shoulder and the lumbar spine; L knee notes intermittent pain after working out   Subjective % improvement 85%,  Improvements: subjective improvements in reaching, Stretch longer/ improved flexibility, walking better/longer   Deficits: lumbar spine and L shoulder limits sustained OH reaching for cleaning, requires increased rest breaks.    HEP: notes very inconsistent HEP compliance ; 2x/week last week, 0x/week this week   FOTO 52/100    Objective:   L shoulder AROM:  flexion B 140 , MCKENZIE behind head, FIR L T8, R T 10  L shoulder strength:  grossly 4+/5  B knee strength: 4+/5 flexion and extension  Hip strength: flexion 4-/5 (B)  B knee flexion - 120 deg at max (today pt to 90 deg AROM flexion noting \"I can't do more\"; R knee AROM 100 deg); L PROM flexion 120 deg with pain;   Lumbar AROM: flexion to toes; extension 50%; (B) SB 1\" from knee, (B) rotation 75%  Bridge - 75%  30 STS :  11.5times with no UE assist  Repeated movement testing for the lumbar spine: no effect in all planes  Time walking test (2 minutes): 330 feet with c/o resulting LBP     Pain Level (0-10 scale) post treatment:  0 /10    ASSESSMENT/Changes in Function:  See DC note    Patient will continue to benefit from skilled PT services to modify and progress therapeutic interventions, address functional mobility deficits, address ROM deficits, address strength deficits, analyze and address soft tissue restrictions, analyze and cue movement patterns, analyze and modify body mechanics/ergonomics, assess and modify postural abnormalities and address imbalance/dizziness to attain remaining goals. []  See Plan of Care  []  See progress note/recertification   [x]  See Discharge Summary         Progress towards goals / Updated goals: PN due  6/5/2022  1. Pt will be independent with HEP at D/C for self management. previous :  Pt reports that she has not been compliant 2/2 having family/friends visiting, noting that they leave this week. Current:  Notes poor compliance, some weeks 0/week, some 2x/week max (5/25/22)  2. Pt will demonstrate left shoulder flexion AROM to at least 100 deg to improve overhead reaching. Previous:  160   Current: met at 160 deg B max (5/25/22)  3. Pt will reports 50% improvement in condition to improve quality of life. PN: :50%   Current: goal met at 85% (5/25/22)  4.  Pt will increase FOTO Functional Status score to 54 to decrease functional limitations. PN: 44/100   Current:  Goal nearly met 52/100 (5/25/22)  5. Pt will demonstrate left hip flexion strength of at least 4/5 to engage in age appropriate activities. PN: 4-/5   Current:  Goal not met, con't at 4-/5 (5/25/22)  6. Pt will amb 400 ' within 2 min without any rest breaks to indicate improved endurance for community ambulation. PN: 300 ft - progressing   Current: Goal somewhat progressing to 330 feet with c/o LBP (5/25/22)  7. Pt will perform 8 reps of STS in 30 sec, from standard chair  in order to indicate improved endurance and reduced fall risk.    PN: 7 x   Current: 11.5 times with NO UE assist  (5/25/22)    PLAN  []  Upgrade activities as tolerated     []  Continue plan of care  []  Update interventions per flow sheet       [x]  Discharge due to:max gains with PT interventions, poor HEP compliance   []  Other:     Robe Sal PT 5/25/2022      Future Appointments   Date Time Provider Mark Everett   5/25/2022 10:00 AM Malissa Bello PT [facility]   9/8/2022  1:00 PM [facility] AMB   9/12/2022 12:30 PM Violeta Sutton NP [facility] AMB
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
## 2022-07-08 ENCOUNTER — TELEPHONE (OUTPATIENT)
Dept: FAMILY MEDICINE CLINIC | Age: 62
End: 2022-07-08

## 2022-07-08 DIAGNOSIS — M25.561 CHRONIC PAIN OF RIGHT KNEE: ICD-10-CM

## 2022-07-08 DIAGNOSIS — L72.9 SUBCUTANEOUS CYST: Primary | ICD-10-CM

## 2022-07-08 DIAGNOSIS — G89.29 CHRONIC PAIN OF RIGHT KNEE: ICD-10-CM

## 2022-07-08 NOTE — TELEPHONE ENCOUNTER
----- Message from [facility] sent at 7/8/2022  9:45 AM EDT -----  Subject: Referral Request    Reason for referral request? Pt would like to have a referral for a   dermatologist. She has what she says is a cyst on her shoulder. Its the   size of a golf ball. Provider patient wants to be referred to(if known):     Provider Phone Number(if known):[phone]    Additional Information for Provider? Pt would like to have a referral for   a dermatologist. She has what she says is a cyst on her shoulder. Its the   size of a golf ball.  She advised that there is a dermatologist in the   building His name is Bronson Titus MD PhD [phone]  ---------------------------------------------------------------------------  --------------  Latesha MORROW    [phone]; OK to leave message on voicemail  ---------------------------------------------------------------------------  --------------

## 2022-07-18 NOTE — TELEPHONE ENCOUNTER
Please find out if she has contacted a dermatologist yet or does she still need a referral. If she needs a referral I recommend surgery instead of dermatology if it is the size of a golf ball.

## 2022-07-19 NOTE — TELEPHONE ENCOUNTER
1st attempt calling patient . Patient did not answer. Left message stating to call the office back .

## 2022-07-19 NOTE — TELEPHONE ENCOUNTER
Spoke to pastient in regards of message . Patient states I spoke with the Dermatologist and I was told I have to do surgery. Patient is requesting rejuvenator gel for knee due to knee is still popping.  Patient states I saw it on TV

## 2022-07-20 ENCOUNTER — TELEPHONE (OUTPATIENT)
Dept: FAMILY MEDICINE CLINIC | Age: 62
End: 2022-07-20

## 2022-07-20 NOTE — TELEPHONE ENCOUNTER
Mayra Maddox Pool  Subject: Message to Provider     QUESTIONS   Information for Provider? Patient was calling to make sure when her last   TB skin test was and to see if she needs to come in and have one done.   ---------------------------------------------------------------------------   --------------   Justin España INFO   [phone]; OK to leave message on voicemail   ---------------------------------------------------------------------------   --------------   SCRIPT ANSWERS   Relationship to Patient?  Self

## 2022-07-21 NOTE — TELEPHONE ENCOUNTER
Spoke to patient in regards of message. Patient states I went some where to get the TB dkin test done. Patient verbalized understanding.
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
## 2022-08-02 ENCOUNTER — OFFICE VISIT (OUTPATIENT)
Dept: SURGERY | Age: 62
End: 2022-08-02
Payer: MEDICARE

## 2022-08-02 VITALS
HEART RATE: 83 BPM | TEMPERATURE: 97.6 F | SYSTOLIC BLOOD PRESSURE: 117 MMHG | OXYGEN SATURATION: 98 % | WEIGHT: 239.6 LBS | DIASTOLIC BLOOD PRESSURE: 79 MMHG | HEIGHT: 65 IN | BODY MASS INDEX: 39.92 KG/M2

## 2022-08-02 DIAGNOSIS — E66.01 MORBID OBESITY (HCC): Primary | ICD-10-CM

## 2022-08-02 DIAGNOSIS — M79.89 SOFT TISSUE MASS: ICD-10-CM

## 2022-08-02 PROCEDURE — 3017F COLORECTAL CA SCREEN DOC REV: CPT | Performed by: SURGERY

## 2022-08-02 PROCEDURE — 99204 OFFICE O/P NEW MOD 45 MIN: CPT | Performed by: SURGERY

## 2022-08-02 PROCEDURE — G8417 CALC BMI ABV UP PARAM F/U: HCPCS | Performed by: SURGERY

## 2022-08-02 PROCEDURE — G8752 SYS BP LESS 140: HCPCS | Performed by: SURGERY

## 2022-08-02 PROCEDURE — G9899 SCRN MAM PERF RSLTS DOC: HCPCS | Performed by: SURGERY

## 2022-08-02 PROCEDURE — G8754 DIAS BP LESS 90: HCPCS | Performed by: SURGERY

## 2022-08-02 PROCEDURE — G8432 DEP SCR NOT DOC, RNG: HCPCS | Performed by: SURGERY

## 2022-08-02 PROCEDURE — G8427 DOCREV CUR MEDS BY ELIG CLIN: HCPCS | Performed by: SURGERY

## 2022-08-02 NOTE — PROGRESS NOTES
Theodore Méndez is a 63 y.o. female (: 1960) presenting to address:    Chief Complaint   Patient presents with    New Patient     Cyst on left shoulder/ referred by Joette Blizzard, NP       Medication list and allergies have been reviewed with Theodore Méndez and updated as of today's date. I have gone over all Medical, Surgical and Social History with Theodore Méndez and updated/added the information accordingly.

## 2022-08-02 NOTE — PROGRESS NOTES
General Surgery Consult    Alondra Maya  Admit date: (Not on file)    MRN: [id number]     : 1960     Age: 63 y.o. Attending Physician: Dale Collins MD, [facility]      History of Present Illness:      Alondra Maya is a 63 y.o. female who was referred to me by Misa Leyva for evaluation of a recurrent soft tissue mass on the left shoulder area. The patient stated that she had this mass for years and they excised it few years ago but it did recur and now it is getting bigger and it is clearly visible. The patient also has been complaining of bilateral shoulder pain that she thinks she may have arthritis or from the way she sleeps according to her. Patient Active Problem List    Diagnosis Date Noted    Diabetes mellitus type 2 in obese ([address] 2020    Mixed hyperlipidemia 2019    Bilateral primary osteoarthritis of knee 2019    Environmental and seasonal allergies 2019    Type 2 diabetes mellitus with diabetic neuropathy ([address] 10/17/2018    Essential hypertension with goal blood pressure less than 130/80 2017    GERD (gastroesophageal reflux disease) 2017    DOMINIQUE (obstructive sleep apnea) 2017    Morbid obesity ([address] 2017    Former cigarette smoker 2016    Mild intermittent asthma 2016    Primary osteoarthritis involving multiple joints 2016    Meningioma ([address] 2015     Past Medical History:   Diagnosis Date    Arthritis     Chronic gastritis with bleeding     DDD (degenerative disc disease), lumbar     Depression     Diabetes mellitus, type II ([address]     Diverticulosis of colon     Fibroid uterus     Fibroids     Gallstones     Hot flashes     Hypertension     Meningioma ([address]     Morbid obesity ([address]     Vertigo       Past Surgical History:   Procedure Laterality Date    HX CRANIOTOMY Left 2015    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;  Surgeon: Stanley Khanna MD; Location: [facility] OR LOC;     HX LAP CHOLECYSTECTOMY  2014    [address]    HX TUBAL LIGATION Bilateral     MA EXCIS INFRATENT BRAIN TUMOR  2015    [facility]      Social History     Tobacco Use    Smoking status: Former     Packs/day: 0.25     Years: 1.00     Pack years: 0.25     Types: Cigarettes     Start date:      Quit date:      Years since quittin.6    Smokeless tobacco: Never   Substance Use Topics    Alcohol use: No      Social History     Tobacco Use   Smoking Status Former    Packs/day: 0.25    Years: 1.00    Pack years: 0.25    Types: Cigarettes    Start date:     Quit date:     Years since quittin.6   Smokeless Tobacco Never     Family History   Problem Relation Age of Onset    Cancer Mother 58        colon    Hypertension Mother     Diabetes Mother     Psychiatric Disorder Father     Lupus Sister       Current Outpatient Medications   Medication Sig    pioglitazone (ACTOS) 30 mg tablet Take 1 Tablet by mouth daily. Accu-Chek Guide test strips strip USE 1 STRIP TO CHECK GLUCOSE TWICE DAILY    lancets (Accu-Chek Softclix Lancets) misc Use to check blood sugar twice a day    glucose blood VI test strips (Accu-Chek Flor Plus test strp) strip Use to check blood sugar twice a day    Blood-Glucose Meter (Accu-Chek Guide Glucose Meter) misc Use to check glucose once a day    lisinopriL (PRINIVIL, ZESTRIL) 5 mg tablet Take 1 Tablet by mouth daily. Ozempic 1 mg/dose (4 mg/3 mL) pnij INJECT 1 MG SUBCUTANEOUSLY EVERY 7 DAYS    phentermine (ADIPEX-P) 37.5 mg tablet Take 1 Tablet by mouth every morning. Max Daily Amount: 37.5 mg.    meclizine (ANTIVERT) 25 mg tablet TAKE 1 TABLET BY MOUTH TWICE DAILY AS NEEDED FOR DIZZINESS    beclomethasone (QVAR) 40 mcg/actuation aero Take 1 Puff by inhalation two (2) times a day. dicyclomine (BENTYL) 10 mg capsule Take 1 Capsule by mouth four (4) times daily as needed for Abdominal Cramps. aspirin 81 mg chewable tablet Take 1 Tab by mouth daily.      No current facility-administered medications for this visit. Allergies   Allergen Reactions    Atorvastatin Myalgia     Leg cramps          Review of Systems:  Pertinent items are noted in the History of Present Illness. Objective:     Visit Vitals  /79 (BP 1 Location: Left upper arm, BP Patient Position: Sitting, BP Cuff Size: Large adult)   Pulse 83   Temp 97.6 °F (36.4 °C) (Temporal)   Ht 5' 5\" (1.651 m)   Wt 108.7 kg (239 lb 9.6 oz)   SpO2 98%   BMI 39.87 kg/m²       Physical Exam:      General:  in no apparent distress, alert, and oriented times 3   Eyes:  conjunctivae and sclerae normal, pupils equal, round, reactive to light   Throat & Neck: no erythema or exudates noted and neck supple and symmetrical; no palpable masses   Lungs:   clear to auscultation bilaterally   Heart:  Regular rate and rhythm   Abdomen:   rounded, soft, nontender, nondistended, no masses or organomegaly   Extremities: extremities normal, atraumatic, no cyanosis or edema   Left shoulder: On inspection there is a bulge on the left shoulder closer to the neck. There is also what seems to be a scar from the previous excision.   On palpation there is a soft tissue mass about 3 to 4 cm in size that is soft and nontender and partially movable consistent with either a lipoma or a sebaceous cyst.        Imaging and Lab Review:     CBC:   Lab Results   Component Value Date/Time    WBC 6.1 09/30/2020 12:55 PM    RBC 4.62 09/30/2020 12:55 PM    HGB 12.3 09/30/2020 12:55 PM    HCT 37.7 09/30/2020 12:55 PM    PLATELET 258 42/11/0288 12:55 PM     BMP:   Lab Results   Component Value Date/Time    Glucose 85 05/13/2022 03:00 PM    Sodium 139 05/13/2022 03:00 PM    Potassium 4.2 05/13/2022 03:00 PM    Chloride 104 05/13/2022 03:00 PM    CO2 21 05/13/2022 03:00 PM    BUN 9 05/13/2022 03:00 PM    Creatinine 0.71 05/13/2022 03:00 PM    Calcium 9.8 05/13/2022 03:00 PM     CMP:  Lab Results   Component Value Date/Time    Glucose 85 05/13/2022 03:00 PM Sodium 139 05/13/2022 03:00 PM    Potassium 4.2 05/13/2022 03:00 PM    Chloride 104 05/13/2022 03:00 PM    CO2 21 05/13/2022 03:00 PM    BUN 9 05/13/2022 03:00 PM    Creatinine 0.71 05/13/2022 03:00 PM    Calcium 9.8 05/13/2022 03:00 PM    Anion gap 5 07/26/2019 10:00 AM    BUN/Creatinine ratio 13 05/13/2022 03:00 PM    Alk. phosphatase 93 05/13/2022 03:00 PM    Protein, total 6.8 05/13/2022 03:00 PM    Albumin 4.0 05/13/2022 03:00 PM    Globulin 2.8 07/26/2019 10:00 AM    A-G Ratio 1.4 05/13/2022 03:00 PM       No results found for this or any previous visit (from the past 24 hour(s)). images and reports reviewed    Assessment:   Claire Mckeon is a 63 y.o. female who has multiple medical conditions and is presenting with a soft tissue mass located on the left shoulder. The mass has been excised before and now it is about 4 cm in size and seems to be deep and I told the patient that most likely this represent a lipoma or a sebaceous cyst and there is no evidence of malignancy. I gave her the option of observation however because it has been increasing in size and causing some mild discomfort she would like it to be removed which I do understand. But I explained to the patient that it would be difficult to do it under local anesthesia because it has been attempted to be excised before and it is relatively large and deep such better do it under anesthesia in the operating room. I also was very clear with the patient that her bilateral shoulder pain has nothing to do with the lipoma. Plan:      We will schedule the patient for excision of a left shoulder soft tissue mass    Please call me if you have any questions (cell phone: [phone])     Signed By: Lokesh Bell MD     August 2, 2022

## 2022-08-02 NOTE — PATIENT INSTRUCTIONS
If you have any questions or concerns about today's appointment, the verbal and/or written instructions you were given for follow up care, please call our office at [phone]. [facility] - [address], [address] [place] [address]    [phone] office  [phone] fax       PATIENT PRE AND POST OPERATIVE INSTRUCTIONS       [facility]   [address] [place], [address]  [phone]    Before Surgery Instructions:   1) You must have someone available to drive you to and from your procedure and stay with you for the first 24 hours. 2) It is very important that you have nothing to eat or drink after midnight the night before your surgery. This includes chewing gum or sucking on hard candy. Take only heart, blood pressure and cholesterol medications the morning of surgery with only a sip of water. 3) Please stop taking Plavix 5-7 days prior to your surgery with prescribing physician approval.  Stop taking Coumadin 5 days prior to your surgery with prescribing physician approval.  Stop taking all Aspirin or Aspirin containing products 7 days prior to your surgery. Stop taking Advil, Motrin, Aleve, and etc. 3 days prior to your surgery. 4) If you take any diabetic medications please consult with your primary care physician on how to take them on the day of your surgery  5) Please stop all Herbal products 2 weeks prior to your surgery. 6) Please arrive at the hospital 2 hours prior to your surgery, unless you have been otherwise instructed. 7) Patients having an operation on their colon will be given a separate instruction sheet on their Bowel Prep. 8) For any pre-operative work up check in at the main entrance to [facility], and then go to Patient Registration. These studies are done on a walk in basis they are open from 7:00am to 5:00pm Monday through Friday.   9) A urine drug screen will be performed on the day of surgery. Please be advised if your drug screen test result is positive for any illegal substances your surgery is subject to cancellation. 10) Please wash your surgical site the morning of your surgery with soap and water. 11) If you are of child bearing age you will have pregnancy test done the morning of your surgery as soon as you arrive. 12) You're surgery time is subject to change. At times this is necessary due to equipment or staffing needs. Please be advised it's your responsibility to notify our office of any changes to your healthcare coverage. Failure to notify our office of any changes to your health care coverage may result in denial of payment by your health insurance for all incurred services and you would be responsible for payment for all incurred services. After Surgery Instructions: You will need to be seen in the office for a follow-up visit 7-14 days after your surgery. Please call after you have had the procedure to make this appointment. Unless otherwise instructed, you may remove your outer bandage and shower 48 hours after your surgery. If you develop a fever greater than 101, have any significant drainage, bleeding, swelling and/or pus of the wound. Please call our office immediately. Surgery Date and Time: Wednesday, August 10, 2022 at 12:30pm    Please enter [facility] main entrance on the first floor and go to Patient Registration. Once registered, a member of our team will escort you to the second floor. Please check in by 10:30am  the day of your surgery. You may contact Misael Landa with any questions at [phone].

## 2022-08-09 ENCOUNTER — ANESTHESIA EVENT (OUTPATIENT)
Dept: SURGERY | Age: 62
End: 2022-08-09
Payer: MEDICARE

## 2022-08-09 DIAGNOSIS — E11.40 TYPE 2 DIABETES MELLITUS WITH DIABETIC NEUROPATHY, WITHOUT LONG-TERM CURRENT USE OF INSULIN (HCC): ICD-10-CM

## 2022-08-09 NOTE — PERIOP NOTES
PRE-SURGICAL INSTRUCTIONS        Patient's Name:  Armand Suarez      FBIDP'T Date:  8/9/2022            Covid Testing Date and Time:    Surgery Date:  8/10/2022                Do NOT eat or drink anything, including candy, gum, or ice chips after midnight on 8/9/2022, unless you have specific instructions from your surgeon or anesthesia provider to do so. You may brush your teeth before coming to the hospital.  No smoking 24 hours prior to the day of surgery. No alcohol 24 hours prior to the day of surgery. No recreational drugs for one week prior to the day of surgery. Leave all valuables, including money/purse, at home. Remove all jewelry, nail polish, acrylic nails, and makeup (including mascara); no lotions powders, deodorant, or perfume/cologne/after shave on the skin. Follow instruction for Hibiclens washes and CHG wipes from surgeon's office. Glasses/contact lenses and dentures may be worn to the hospital.  They will be removed prior to surgery. Call your doctor if symptoms of a cold or illness develop within 24-48 hours prior to your surgery. 11.  If you are having an outpatient procedure, please make arrangements for a responsible ADULT TO [address] [place] [address] and stay with you for 24 hours after your surgery. 12. ONE VISITOR in the hospital at this time for outpatient procedures. Exceptions may be made for surgical admissions, per nursing unit guidelines      Special Instructions:      Bring list of CURRENT medications. Bring inhaler. Bring CPAP machine. Bring any pertinent legal medical records. Take these medications the morning of surgery with a sip of water:  per MD  Follow physician instructions about insulin. Follow physician instructions about stopping anticoagulants. Complete bowel prep per MD instructions. On the day of surgery, come in the main entrance of [facility]. Let the  at the desk know you are there for surgery.   A staff member will come escort you to the surgical area on the second floor. If you have any questions or concerns, please do not hesitate to call:     (Prior to the day of surgery) PAT department:  [phone]   (Day of surgery) Pre-Op department:  [phone]    These surgical instructions were reviewed with patient during the PAT phone call.

## 2022-08-10 ENCOUNTER — ANESTHESIA (OUTPATIENT)
Dept: SURGERY | Age: 62
End: 2022-08-10
Payer: MEDICARE

## 2022-08-10 ENCOUNTER — HOSPITAL ENCOUNTER (OUTPATIENT)
Age: 62
Setting detail: OUTPATIENT SURGERY
Discharge: HOME | End: 2022-08-10
Attending: SURGERY | Admitting: SURGERY
Payer: MEDICARE

## 2022-08-10 VITALS
DIASTOLIC BLOOD PRESSURE: 89 MMHG | OXYGEN SATURATION: 95 % | TEMPERATURE: 98.6 F | RESPIRATION RATE: 16 BRPM | SYSTOLIC BLOOD PRESSURE: 139 MMHG | HEART RATE: 69 BPM | HEIGHT: 65 IN | WEIGHT: 241.3 LBS | BODY MASS INDEX: 40.2 KG/M2

## 2022-08-10 DIAGNOSIS — M79.89 SOFT TISSUE MASS: Primary | ICD-10-CM

## 2022-08-10 LAB
GLUCOSE BLD STRIP.AUTO-MCNC: 88 MG/DL (ref 70–110)
GLUCOSE BLD STRIP.AUTO-MCNC: 92 MG/DL (ref 70–110)

## 2022-08-10 PROCEDURE — 77030010507 HC ADH SKN DERMBND J&J -B: Performed by: SURGERY

## 2022-08-10 PROCEDURE — 74011000250 HC RX REV CODE- 250: Performed by: ANESTHESIOLOGY

## 2022-08-10 PROCEDURE — 74011250637 HC RX REV CODE- 250/637: Performed by: SURGERY

## 2022-08-10 PROCEDURE — 76210000021 HC REC RM PH II 0.5 TO 1 HR: Performed by: SURGERY

## 2022-08-10 PROCEDURE — 2709999900 HC NON-CHARGEABLE SUPPLY: Performed by: SURGERY

## 2022-08-10 PROCEDURE — 77030031139 HC SUT VCRL2 J&J -A: Performed by: SURGERY

## 2022-08-10 PROCEDURE — 23076 EXC SHOULDER TUM DEEP < 5 CM: CPT | Performed by: SURGERY

## 2022-08-10 PROCEDURE — 74011250636 HC RX REV CODE- 250/636: Performed by: ANESTHESIOLOGY

## 2022-08-10 PROCEDURE — 76210000016 HC OR PH I REC 1 TO 1.5 HR: Performed by: SURGERY

## 2022-08-10 PROCEDURE — 01610 ANES ALL PX NRV MUSC SHO&AX: CPT | Performed by: ANESTHESIOLOGY

## 2022-08-10 PROCEDURE — 76010000138 HC OR TIME 0.5 TO 1 HR: Performed by: SURGERY

## 2022-08-10 PROCEDURE — 74011000272 HC RX REV CODE- 272: Performed by: SURGERY

## 2022-08-10 PROCEDURE — 88305 TISSUE EXAM BY PATHOLOGIST: CPT

## 2022-08-10 PROCEDURE — 88304 TISSUE EXAM BY PATHOLOGIST: CPT

## 2022-08-10 PROCEDURE — 82962 GLUCOSE BLOOD TEST: CPT

## 2022-08-10 PROCEDURE — 74011250637 HC RX REV CODE- 250/637: Performed by: NURSE ANESTHETIST, CERTIFIED REGISTERED

## 2022-08-10 PROCEDURE — 77030002933 HC SUT MCRYL J&J -A: Performed by: SURGERY

## 2022-08-10 PROCEDURE — 76060000032 HC ANESTHESIA 0.5 TO 1 HR: Performed by: SURGERY

## 2022-08-10 RX ORDER — SODIUM CHLORIDE, SODIUM LACTATE, POTASSIUM CHLORIDE, CALCIUM CHLORIDE 600; 310; 30; 20 MG/100ML; MG/100ML; MG/100ML; MG/100ML
75 INJECTION, SOLUTION INTRAVENOUS CONTINUOUS
Status: DISCONTINUED | OUTPATIENT
Start: 2022-08-10 | End: 2022-08-10 | Stop reason: HOSPADM

## 2022-08-10 RX ORDER — SODIUM CHLORIDE 0.9 % (FLUSH) 0.9 %
5-40 SYRINGE (ML) INJECTION EVERY 8 HOURS
Status: DISCONTINUED | OUTPATIENT
Start: 2022-08-10 | End: 2022-08-10 | Stop reason: HOSPADM

## 2022-08-10 RX ORDER — MIDAZOLAM HYDROCHLORIDE 1 MG/ML
INJECTION, SOLUTION INTRAMUSCULAR; INTRAVENOUS AS NEEDED
Status: DISCONTINUED | OUTPATIENT
Start: 2022-08-10 | End: 2022-08-10 | Stop reason: HOSPADM

## 2022-08-10 RX ORDER — LIDOCAINE HYDROCHLORIDE 20 MG/ML
INJECTION, SOLUTION EPIDURAL; INFILTRATION; INTRACAUDAL; PERINEURAL AS NEEDED
Status: DISCONTINUED | OUTPATIENT
Start: 2022-08-10 | End: 2022-08-10 | Stop reason: HOSPADM

## 2022-08-10 RX ORDER — FENTANYL CITRATE 50 UG/ML
25 INJECTION, SOLUTION INTRAMUSCULAR; INTRAVENOUS AS NEEDED
Status: DISCONTINUED | OUTPATIENT
Start: 2022-08-10 | End: 2022-08-10 | Stop reason: HOSPADM

## 2022-08-10 RX ORDER — SODIUM CHLORIDE 0.9 % (FLUSH) 0.9 %
5-40 SYRINGE (ML) INJECTION AS NEEDED
Status: DISCONTINUED | OUTPATIENT
Start: 2022-08-10 | End: 2022-08-10 | Stop reason: HOSPADM

## 2022-08-10 RX ORDER — OXYCODONE AND ACETAMINOPHEN 5; 325 MG/1; MG/1
1 TABLET ORAL
Qty: 12 TABLET | Refills: 0 | Status: SHIPPED | OUTPATIENT
Start: 2022-08-10 | End: 2022-08-13

## 2022-08-10 RX ORDER — INSULIN LISPRO 100 [IU]/ML
INJECTION, SOLUTION INTRAVENOUS; SUBCUTANEOUS ONCE
Status: DISCONTINUED | OUTPATIENT
Start: 2022-08-10 | End: 2022-08-10 | Stop reason: HOSPADM

## 2022-08-10 RX ORDER — OXYCODONE AND ACETAMINOPHEN 5; 325 MG/1; MG/1
1 TABLET ORAL ONCE
Status: COMPLETED | OUTPATIENT
Start: 2022-08-10 | End: 2022-08-10

## 2022-08-10 RX ORDER — SEMAGLUTIDE 1.34 MG/ML
INJECTION, SOLUTION SUBCUTANEOUS
Qty: 3 EACH | Refills: 3 | Status: SHIPPED | OUTPATIENT
Start: 2022-08-10

## 2022-08-10 RX ORDER — ONDANSETRON 2 MG/ML
INJECTION INTRAMUSCULAR; INTRAVENOUS AS NEEDED
Status: DISCONTINUED | OUTPATIENT
Start: 2022-08-10 | End: 2022-08-10 | Stop reason: HOSPADM

## 2022-08-10 RX ORDER — FAMOTIDINE 20 MG/1
20 TABLET, FILM COATED ORAL ONCE
Status: COMPLETED | OUTPATIENT
Start: 2022-08-10 | End: 2022-08-10

## 2022-08-10 RX ORDER — SODIUM CHLORIDE, SODIUM LACTATE, POTASSIUM CHLORIDE, CALCIUM CHLORIDE 600; 310; 30; 20 MG/100ML; MG/100ML; MG/100ML; MG/100ML
INJECTION, SOLUTION INTRAVENOUS
Status: DISCONTINUED | OUTPATIENT
Start: 2022-08-10 | End: 2022-08-10 | Stop reason: HOSPADM

## 2022-08-10 RX ORDER — PROPOFOL 10 MG/ML
INJECTION, EMULSION INTRAVENOUS AS NEEDED
Status: DISCONTINUED | OUTPATIENT
Start: 2022-08-10 | End: 2022-08-10 | Stop reason: HOSPADM

## 2022-08-10 RX ORDER — CEFAZOLIN SODIUM 1 G/3ML
INJECTION, POWDER, FOR SOLUTION INTRAMUSCULAR; INTRAVENOUS AS NEEDED
Status: DISCONTINUED | OUTPATIENT
Start: 2022-08-10 | End: 2022-08-10 | Stop reason: HOSPADM

## 2022-08-10 RX ORDER — MAGNESIUM SULFATE 100 %
4 CRYSTALS MISCELLANEOUS AS NEEDED
Status: DISCONTINUED | OUTPATIENT
Start: 2022-08-10 | End: 2022-08-10 | Stop reason: HOSPADM

## 2022-08-10 RX ADMIN — SODIUM CHLORIDE, SODIUM LACTATE, POTASSIUM CHLORIDE, AND CALCIUM CHLORIDE: 600; 310; 30; 20 INJECTION, SOLUTION INTRAVENOUS at 08:24

## 2022-08-10 RX ADMIN — FENTANYL CITRATE 25 MCG: 50 INJECTION, SOLUTION INTRAMUSCULAR; INTRAVENOUS at 09:38

## 2022-08-10 RX ADMIN — OXYCODONE HYDROCHLORIDE AND ACETAMINOPHEN 1 TABLET: 5; 325 TABLET ORAL at 10:51

## 2022-08-10 RX ADMIN — FAMOTIDINE 20 MG: 20 TABLET ORAL at 08:12

## 2022-08-10 RX ADMIN — MIDAZOLAM HYDROCHLORIDE 2 MG: 2 INJECTION, SOLUTION INTRAMUSCULAR; INTRAVENOUS at 08:27

## 2022-08-10 RX ADMIN — CEFAZOLIN SODIUM 2 G: 1 INJECTION, POWDER, FOR SOLUTION INTRAMUSCULAR; INTRAVENOUS at 08:26

## 2022-08-10 RX ADMIN — LIDOCAINE HYDROCHLORIDE 80 MG: 20 INJECTION, SOLUTION EPIDURAL; INFILTRATION; INTRACAUDAL; PERINEURAL at 08:34

## 2022-08-10 RX ADMIN — PROPOFOL 200 MG: 10 INJECTION, EMULSION INTRAVENOUS at 08:34

## 2022-08-10 RX ADMIN — FENTANYL CITRATE 25 MCG: 50 INJECTION, SOLUTION INTRAMUSCULAR; INTRAVENOUS at 09:47

## 2022-08-10 RX ADMIN — ONDANSETRON 4 MG: 2 INJECTION INTRAMUSCULAR; INTRAVENOUS at 08:48

## 2022-08-10 NOTE — DISCHARGE INSTRUCTIONS
Discharge Instructions Following Surgery    Patient: Barba Dakins MRN: [id number]  SSN: [id number]    YOB: 1960  Age: 63 y.o. Sex: female      Activity  As tolerated, walking encourage, stairs are okay. Avoid strenuous activities - no lifting anything heavier than 15 pounds till seen in the clinic. You may shower at home after 24 hours. Diet  Regular diet after nausea from the anesthetic has passed. Pain  Take pain medication as directed by your doctor. Call your doctor if pain is NOT relieved by medication. Wound and Dressing Care  There is glue on the wounds. No need for any dressing care. Apply ice packs to the area of the surgery for the first 1 to 2 days  Apply warm compresses after 2 days for pain relieve if needed    After Anesthesia  For the first 24 hours: DO NOT Drive, Drink alcoholic beverages, or Make important decisions. Be aware of dizziness following anesthesia and while taking pain medication. Call your doctor if  Excessive bleeding that does not stop after holding mild pressure over the area. Temperature of 101 degrees F or above. Redness,excessive swelling or bruising, and/or green or yellow, smelly discharge from incision. If nausea and vomiting continues. Appointment date/time Follow-Up Phone Calls    Call the office at [phone] to make your follow-up appointment in 2 weeks after the surgery (if not already set up) . Dr. Phuc Temple cell phone number is [phone]. Please call me if you have any concerns or questions. DISCHARGE SUMMARY from Nurse    PATIENT INSTRUCTIONS:    After general anesthesia or intravenous sedation, for 24 hours or while taking prescription Narcotics:  Limit your activities  Do not drive and operate hazardous machinery  Do not make important personal or business decisions  Do  not drink alcoholic beverages  If you have not urinated within 8 hours after discharge, please contact your surgeon on call.     Report the following to your surgeon:  Excessive pain, swelling, redness or odor of or around the surgical area  Temperature over 100.5  Nausea and vomiting lasting longer than 4 hours or if unable to take medications  Any signs of decreased circulation or nerve impairment to extremity: change in color, persistent  numbness, tingling, coldness or increase pain  Any questions    What to do at Home:      *  Please give a list of your current medications to your Primary Care Provider. *  Please update this list whenever your medications are discontinued, doses are      changed, or new medications (including over-the-counter products) are added. *  Please carry medication information at all times in case of emergency situations. These are general instructions for a healthy lifestyle:    No smoking/ No tobacco products/ Avoid exposure to second hand smoke  Surgeon General's Warning:  Quitting smoking now greatly reduces serious risk to your health. Obesity, smoking, and sedentary lifestyle greatly increases your risk for illness    A healthy diet, regular physical exercise & weight monitoring are important for maintaining a healthy lifestyle    You may be retaining fluid if you have a history of heart failure or if you experience any of the following symptoms:  Weight gain of 3 pounds or more overnight or 5 pounds in a week, increased swelling in our hands or feet or shortness of breath while lying flat in bed. Please call your doctor as soon as you notice any of these symptoms; do not wait until your next office visit. The discharge information has been reviewed with the patient. The patient verbalized understanding. Discharge medications reviewed with the patient and appropriate educational materials and side effects teaching were provided.   ___________________________________________________________________________________________________________________________________

## 2022-08-10 NOTE — ANESTHESIA PREPROCEDURE EVALUATION
Relevant Problems   RESPIRATORY SYSTEM   (+) Mild intermittent asthma   (+) DOMINIQUE (obstructive sleep apnea)      CARDIOVASCULAR   (+) Essential hypertension with goal blood pressure less than 130/80      GASTROINTESTINAL   (+) GERD (gastroesophageal reflux disease)      ENDOCRINE   (+) Diabetes mellitus type 2 in obese (HCC)   (+) Morbid obesity (HCC)   (+) Type 2 diabetes mellitus with diabetic neuropathy (HCC)       Anesthetic History   No history of anesthetic complications            Review of Systems / Medical History  Patient summary reviewed and pertinent labs reviewed    Pulmonary        Sleep apnea    Asthma        Neuro/Psych              Cardiovascular    Hypertension                   GI/Hepatic/Renal     GERD           Endo/Other    Diabetes: type 2    Morbid obesity and arthritis     Other Findings              Physical Exam    Airway  Mallampati: II  TM Distance: 4 - 6 cm  Neck ROM: normal range of motion   Mouth opening: Normal     Cardiovascular    Rhythm: regular  Rate: normal         Dental    Dentition: Poor dentition     Pulmonary      Decreased breath sounds: bilateral           Abdominal  GI exam deferred       Other Findings            Anesthetic Plan    ASA: 3  Anesthesia type: general          Induction: Intravenous  Anesthetic plan and risks discussed with: Patient

## 2022-08-10 NOTE — ANESTHESIA POSTPROCEDURE EVALUATION
Procedure(s):  EXCISION OF LEFT SHOULDER MASS. general    Anesthesia Post Evaluation      Multimodal analgesia: multimodal analgesia used between 6 hours prior to anesthesia start to PACU discharge  Patient location during evaluation: bedside  Patient participation: complete - patient participated  Level of consciousness: awake  Pain management: adequate  Airway patency: patent  Anesthetic complications: no  Cardiovascular status: stable  Respiratory status: acceptable  Hydration status: acceptable  Post anesthesia nausea and vomiting:  controlled      INITIAL Post-op Vital signs:   Vitals Value Taken Time   /77 08/10/22 1014   Temp 36.9 °C (98.4 °F) 08/10/22 1004   Pulse 62 08/10/22 1021   Resp 11 08/10/22 1021   SpO2 98 % 08/10/22 1021   Vitals shown include unvalidated device data.

## 2022-08-10 NOTE — OP NOTES
[address] [place] [address]   OPERATIVE REPORT    Name:  Noam Ferrara  MR#:   [id number]  :  1960  ACCOUNT #:  [de-identified]  DATE OF SERVICE:  08/10/2022    PREOPERATIVE DIAGNOSIS:  Left shoulder mass. POSTOPERATIVE DIAGNOSIS:  Left shoulder mass. PROCEDURE PERFORMED:  Excision of a shoulder mass about 4 x 2 cm all the way to the fascial level. SURGEON:  Jayden Crow. Bev Cutler MD.    Spencer Bolanos. ANESTHESIA:  General.    COMPLICATIONS:  None. SPECIMENS REMOVED:  Shoulder mass. IMPLANTS:  None. ESTIMATED BLOOD LOSS:  Minimal.    DETAILS OF PROCEDURE:  The patient was brought to the operating room. Anesthesia was induced. Scrubbing and draping of the neck and shoulder were done in the usual manner. A time-out was performed. The mass was located in between the neck area and the shoulder in the upper part. At this point, a horizontal skin incision along the long axis of the mass was performed. It was deepened through the skin and subcutaneous tissue. Flaps were raised on both sides. The mass was identified. It looked like a lipoma. It was dissected completely. It reached all the way to the fascial level, which was completely excised and sent for permanent pathology. Hemostasis was secured with cautery. Then, the deeper tissue was approximated with 2-0 Vicryl and the skin was approximated with 4-0 Monocryl and glue.       Carlton Moulton MD      [id number]  D:  08/10/2022 9:07  T:  08/10/2022 11:02  JOB #:  [id number]

## 2022-08-10 NOTE — BRIEF OP NOTE
Brief Postoperative Note    Patient: Aj Reynolds  YOB: 1960  MRN: [id number]    Date of Procedure: 8/10/2022     Pre-Op Diagnosis: Soft tissue mass [M79.89]    Post-Op Diagnosis: Same as preoperative diagnosis. Procedure(s):  EXCISION OF LEFT SHOULDER MASS    Surgeon(s):  Peg Diaz MD    Surgical Assistant: Surg Asst-1: Pilo Upton    Anesthesia: General     Estimated Blood Loss (mL): Minimal    Complications: None    Specimens:   ID Type Source Tests Collected by Time Destination   1 : left shoulder mass Preservative   Peg Diaz MD 8/10/2022 0006 Pathology        Implants: * No implants in log *    Drains: * No LDAs found *    Findings: Left shoulder mass.      Electronically Signed by Jonel Burk MD on 8/10/2022 at 9:07 AM

## 2022-08-10 NOTE — PROGRESS NOTES
Date of Surgery Update:  Alondra Maya was seen and examined. History and physical has been reviewed. The patient has been examined.  There have been no significant clinical changes since the completion of the originally dated History and Physical. Will proceed with excision of a left shoulder soft tissue mass    Signed By: Dale Collins MD     August 10, 2022 8:09 AM

## 2022-08-23 ENCOUNTER — OFFICE VISIT (OUTPATIENT)
Dept: SURGERY | Age: 62
End: 2022-08-23
Payer: MEDICARE

## 2022-08-23 VITALS
HEIGHT: 65 IN | OXYGEN SATURATION: 91 % | SYSTOLIC BLOOD PRESSURE: 107 MMHG | HEART RATE: 78 BPM | TEMPERATURE: 97.2 F | DIASTOLIC BLOOD PRESSURE: 61 MMHG | BODY MASS INDEX: 39.49 KG/M2 | WEIGHT: 237 LBS

## 2022-08-23 DIAGNOSIS — M79.89 SOFT TISSUE MASS: Primary | ICD-10-CM

## 2022-08-23 PROCEDURE — 99024 POSTOP FOLLOW-UP VISIT: CPT | Performed by: SURGERY

## 2022-08-23 NOTE — PROGRESS NOTES
Reilly Goetz is a 63 y.o. female (: 1960) presenting to address:    Chief Complaint   Patient presents with    Surgical Follow-up     Excision of left shoulder mass 08/10/22       Medication list and allergies have been reviewed with Reilly Goetz and updated as of today's date. I have gone over all Medical, Surgical and Social History with Reilly Goetz and updated/added the information accordingly. 1. Have you been to the ER, Urgent Care or Hospitalized since your last visit? NO      2. Have you followed up with your PCP or any other Physicians since your procedure/ last office visit?    NO

## 2022-08-23 NOTE — PROGRESS NOTES
Patient seen and examined. She is doing relatively well. She stated that she still have some pain at the site of the incision but it is getting better. She denies any fever or chills or any drainage. On exam her wound is healing well with no erythema. Her pathology came back as lipoma with no evidence of malignancy. Follow-up as needed.

## 2022-09-12 ENCOUNTER — OFFICE VISIT (OUTPATIENT)
Dept: FAMILY MEDICINE CLINIC | Age: 62
End: 2022-09-12
Payer: MEDICARE

## 2022-09-12 VITALS
TEMPERATURE: 98.3 F | WEIGHT: 240 LBS | BODY MASS INDEX: 39.99 KG/M2 | RESPIRATION RATE: 20 BRPM | HEART RATE: 82 BPM | HEIGHT: 65 IN | SYSTOLIC BLOOD PRESSURE: 127 MMHG | OXYGEN SATURATION: 98 % | DIASTOLIC BLOOD PRESSURE: 62 MMHG

## 2022-09-12 DIAGNOSIS — E78.2 MIXED HYPERLIPIDEMIA: ICD-10-CM

## 2022-09-12 DIAGNOSIS — E11.40 TYPE 2 DIABETES MELLITUS WITH DIABETIC NEUROPATHY, WITHOUT LONG-TERM CURRENT USE OF INSULIN (HCC): Primary | ICD-10-CM

## 2022-09-12 DIAGNOSIS — I10 ESSENTIAL HYPERTENSION WITH GOAL BLOOD PRESSURE LESS THAN 130/80: ICD-10-CM

## 2022-09-12 DIAGNOSIS — R42 VERTIGO: ICD-10-CM

## 2022-09-12 PROCEDURE — G8432 DEP SCR NOT DOC, RNG: HCPCS | Performed by: NURSE PRACTITIONER

## 2022-09-12 PROCEDURE — G9899 SCRN MAM PERF RSLTS DOC: HCPCS | Performed by: NURSE PRACTITIONER

## 2022-09-12 PROCEDURE — G8754 DIAS BP LESS 90: HCPCS | Performed by: NURSE PRACTITIONER

## 2022-09-12 PROCEDURE — 2022F DILAT RTA XM EVC RTNOPTHY: CPT | Performed by: NURSE PRACTITIONER

## 2022-09-12 PROCEDURE — 99214 OFFICE O/P EST MOD 30 MIN: CPT | Performed by: NURSE PRACTITIONER

## 2022-09-12 PROCEDURE — G8427 DOCREV CUR MEDS BY ELIG CLIN: HCPCS | Performed by: NURSE PRACTITIONER

## 2022-09-12 PROCEDURE — G8417 CALC BMI ABV UP PARAM F/U: HCPCS | Performed by: NURSE PRACTITIONER

## 2022-09-12 PROCEDURE — 3017F COLORECTAL CA SCREEN DOC REV: CPT | Performed by: NURSE PRACTITIONER

## 2022-09-12 PROCEDURE — G8752 SYS BP LESS 140: HCPCS | Performed by: NURSE PRACTITIONER

## 2022-09-12 PROCEDURE — 3044F HG A1C LEVEL LT 7.0%: CPT | Performed by: NURSE PRACTITIONER

## 2022-09-12 NOTE — PROGRESS NOTES
[address] [place] [address]               [phone]      Barba Dakins is a 63 y.o. female and presents with Follow Up Chronic Condition, Cholesterol Problem, Hypertension, and Diabetes       Assessment/Plan:    Diagnoses and all orders for this visit:    1. Type 2 diabetes mellitus with diabetic neuropathy, without long-term current use of insulin (HCC)  -     HEMOGLOBIN A1C WITH EAG; Future  -     MICROALBUMIN, UR, RAND W/ MICROALB/CREAT RATIO; Future  Endorses medication compliance, Follow up labs prior to next visit, Denies signs and symptoms of hyper or hypoglycemia, and Diabetes controlled, A1C <7     2. Essential hypertension with goal blood pressure less than 028/83  -     METABOLIC PANEL, COMPREHENSIVE; Future  Endorses medication compliance, Follow up labs prior to next visit, and Blood pressure stable, continue lisinopril at same dose      3. Mixed hyperlipidemia  -     LIPID PANEL; Future  Endorses medication compliance, Follow up labs prior to next visit, and Denies abdominal pain or symptoms of myalgia     4. Vertigo  -     REFERRAL TO PHYSICAL THERAPY  Chronic vertigo, refer for vestibular therapy  Patient verbalized understanding and is in agreement with this plan of care      Follow-up and Dispositions    Return in about 4 months (around 1/12/2023) for DM, DM foot, HLD, HTN, 30 min, office only, w/labs prior.            Health Maintenance:   Health Maintenance   Topic Date Due    Shingrix Vaccine Age 49> (1 of 2) Never done    Foot Exam Q1  08/13/2020    Pneumococcal 0-64 years (2 - PCV) 08/13/2020    COVID-19 Vaccine (3 - Booster for Pfizer series) 10/04/2021    Flu Vaccine (1) Never done    Breast Cancer Screen Mammogram  02/16/2023    Medicare Yearly Exam  05/14/2023    Eye Exam Retinal or Dilated  05/28/2023    A1C test (Diabetic or Prediabetic)  09/09/2023    MICROALBUMIN Q1  09/09/2023    Lipid Screen  09/09/2023    Depression Screen  09/12/2023 Colorectal Cancer Screening Combo  01/09/2025    Cervical cancer screen  03/16/2025    DTaP/Tdap/Td series (2 - Td or Tdap) 06/11/2025    Hepatitis C Screening  Completed        Subjective:    Labs obtained prior to visit? YES  Reviewed with patient? Yes    DMII-   Patient reports medication compliance Daily  Diabetic diet compliance most of the time  Patient monitors blood sugars regularly  every other day   Reports am fasting sugars range  129   Denies hypoglycemic episodes yes  Denies polyuria, polydipsia, paraesthesia, vision changes? yes  Engaging in daily exercise?  No     Diabetic Foot and Eye Exam HM Status   Topic Date Due    Diabetic Foot Care  08/13/2020    Eye Exam  05/28/2023     Hemoglobin A1c   Date Value Ref Range Status   09/09/2022 5.2 4.8 - 5.6 % Final     Comment:              Prediabetes: 5.7 - 6.4           Diabetes: >6.4           Glycemic control for adults with diabetes: <7.0     ]  Creatinine, urine   Date Value Ref Range Status   09/09/2022 99.7 Not Estab. mg/dL Final     Microalb/Creat ratio (ug/mg creat.)   Date Value Ref Range Status   09/09/2022 <3 0 - 29 mg/g creat Final     Comment:                            Normal:                0 -  29                         Moderately increased: 30 - 300                         Severely increased:       >300     05/13/2022 6 0 - 29 mg/g creat Final     Comment:                            Normal:                0 -  29                         Moderately increased: 30 - 300                         Severely increased:       >300     01/07/2022 <4 0 - 29 mg/g creat Final     Comment:                            Normal:                0 -  29                         Moderately increased: 30 - 300                         Severely increased:       >300     02/15/2021 <2 0 - 29 mg/g creat Final     Comment:                            Normal:                0 -  29                         Moderately increased: 30 - 300                         Severely increased:       >300     09/30/2020 <2 0 - 29 mg/g creat Final     Comment:                            Normal:                0 -  29                         Moderately increased: 30 - 300                         Severely increased:       >300                **Please note reference interval change**       Key Antihyperglycemic Medications               Ozempic 1 mg/dose (4 mg/3 mL) pnij (Taking) INJECT 1MG  SUBCUTANEOUSLY ONCE A WEEK    pioglitazone (ACTOS) 30 mg tablet (Taking) Take 1 Tablet by mouth daily. Hypertension:  Visit Vitals  /62 (BP 1 Location: Right arm, BP Patient Position: Sitting, BP Cuff Size: Adult) Comment (BP Patient Position): feet flat on floor   Pulse 82   Temp 98.3 °F (36.8 °C) (Temporal)   Resp 20   Ht 5' 5\" (1.651 m)   Wt 240 lb (108.9 kg)   SpO2 98%   BMI 39.94 kg/m²      Patient reports taking medications as instructed. yes   Medication side effects noted. no  Headache upon wakening. no   Home BP monitoring: Does not check  Do you experience chest pain/pressure or SOB with exertion? no  Maintain a low Sodium diet? yes  Key CAD CHF Meds               lisinopriL (PRINIVIL, ZESTRIL) 5 mg tablet (Taking/Discontinued) Take 1 Tablet by mouth daily. aspirin 81 mg chewable tablet (Taking) Take 1 Tab by mouth daily. BUN   Date Value Ref Range Status   09/09/2022 9 6 - 24 mg/dL Final     Creatinine   Date Value Ref Range Status   09/09/2022 0.70 0.57 - 1.00 mg/dL Final     GFR est AA   Date Value Ref Range Status   01/07/2022 110 >59 mL/min/1.73 Final     Comment:     **In accordance with recommendations from the NKF-ASN Task force,**    [facility] is in the process of updating its eGFR calculation to the    2021 CKD-EPI creatinine equation that estimates kidney function    without a race variable.        Potassium   Date Value Ref Range Status   09/09/2022 4.8 3.5 - 5.2 mmol/L Final         HLD:  Has been compliant with meds  No: not on medications  Compliant with low-fat diet.  most of the time    Denies myalgias or other side effects. N/A  The 10-year ASCVD risk score (Fidel Abreu, et al., 2013) is: 14.2%    Cholesterol, total   Date Value Ref Range Status   2022 174 100 - 199 mg/dL Final     Triglyceride   Date Value Ref Range Status   2022 67 0 - 149 mg/dL Final     HDL Cholesterol   Date Value Ref Range Status   2022 46 >39 mg/dL Final     LDL, calculated   Date Value Ref Range Status   2022 115 (H) 0 - 99 mg/dL Final   ]  Key Antihyperlipidemia Meds       The patient is on no antihyperlipidemia meds. ROS:     ROS  As stated in HPI, otherwise all others negative. The problem list was updated as a part of today's visit. Patient Active Problem List   Diagnosis Code    Essential hypertension with goal blood pressure less than 130/80 I10    GERD (gastroesophageal reflux disease) K21.9    DOMINIQUE (obstructive sleep apnea) G47.33    Morbid obesity (HCC) E66.01    Type 2 diabetes mellitus with diabetic neuropathy (HCC) E11.40    Mixed hyperlipidemia E78.2    Bilateral primary osteoarthritis of knee M17.0    Vertigo R42    Environmental and seasonal allergies J30.89    Diabetes mellitus type 2 in obese (HCC) E11.69, E66.9    Former cigarette smoker Z87.891    Meningioma ([facility]) D32.9    Mild intermittent asthma J45.20    Primary osteoarthritis involving multiple joints M15.9       The PSH, FH were reviewed.       SH:  Social History     Tobacco Use    Smoking status: Former     Packs/day: 0.25     Years: 1.00     Pack years: 0.25     Types: Cigarettes     Start date: 1     Quit date:      Years since quittin.7    Smokeless tobacco: Never   Vaping Use    Vaping Use: Never used   Substance Use Topics    Alcohol use: No    Drug use: Not Currently     Types: Opiates       Medications/Allergies:  Current Outpatient Medications on File Prior to Visit   Medication Sig Dispense Refill    Ozempic 1 mg/dose (4 mg/3 mL) pnij INJECT 1MG SUBCUTANEOUSLY ONCE A WEEK 3 Each 3    pioglitazone (ACTOS) 30 mg tablet Take 1 Tablet by mouth daily. 90 Tablet 1    Accu-Chek Guide test strips strip USE 1 STRIP TO CHECK GLUCOSE TWICE DAILY 100 Strip 3    lancets (Accu-Chek Softclix Lancets) misc Use to check blood sugar twice a day 100 Each 11    glucose blood VI test strips (Accu-Chek Flor Plus test strp) strip Use to check blood sugar twice a day 100 Strip 1    Blood-Glucose Meter (Accu-Chek Guide Glucose Meter) misc Use to check glucose once a day 1 Each 0    phentermine (ADIPEX-P) 37.5 mg tablet Take 1 Tablet by mouth every morning. Max Daily Amount: 37.5 mg. 30 Tablet 2    meclizine (ANTIVERT) 25 mg tablet TAKE 1 TABLET BY MOUTH TWICE DAILY AS NEEDED FOR DIZZINESS 60 Tablet 1    beclomethasone (QVAR) 40 mcg/actuation aero Take 1 Puff by inhalation two (2) times a day. 8.7 g 1    dicyclomine (BENTYL) 10 mg capsule Take 1 Capsule by mouth four (4) times daily as needed for Abdominal Cramps. 90 Capsule 0    aspirin 81 mg chewable tablet Take 1 Tab by mouth daily. 180 Tab 2     No current facility-administered medications on file prior to visit. Allergies   Allergen Reactions    Atorvastatin Myalgia     Leg cramps       Objective:  Visit Vitals  /62 (BP 1 Location: Right arm, BP Patient Position: Sitting, BP Cuff Size: Adult) Comment (BP Patient Position): feet flat on floor   Pulse 82   Temp 98.3 °F (36.8 °C) (Temporal)   Resp 20   Ht 5' 5\" (1.651 m)   Wt 240 lb (108.9 kg)   SpO2 98%   BMI 39.94 kg/m²    Body mass index is 39.94 kg/m². Physical assessment  Physical Exam  Vitals and nursing note reviewed. Eyes:      Conjunctiva/sclera: Conjunctivae normal.      Pupils: Pupils are equal, round, and reactive to light. Cardiovascular:      Rate and Rhythm: Normal rate and regular rhythm. Heart sounds: Normal heart sounds. No murmur heard. No friction rub. No gallop.    Pulmonary:      Effort: Pulmonary effort is normal.      Breath sounds: Normal breath sounds. Musculoskeletal:         General: Normal range of motion. Cervical back: Normal range of motion. Skin:     General: Skin is warm and dry. Neurological:      Mental Status: She is alert. Labwork and Ancillary Studies:    CBC w/Diff  Lab Results   Component Value Date/Time    WBC 6.1 09/30/2020 12:55 PM    HGB 12.3 09/30/2020 12:55 PM    PLATELET 934 99/50/9746 12:55 PM         Basic Metabolic Profile  Lab Results   Component Value Date/Time    Sodium 141 09/09/2022 10:10 AM    Potassium 4.8 09/09/2022 10:10 AM    Chloride 105 09/09/2022 10:10 AM    CO2 23 09/09/2022 10:10 AM    Anion gap 5 07/26/2019 10:00 AM    Glucose 102 (H) 09/09/2022 10:10 AM    BUN 9 09/09/2022 10:10 AM    Creatinine 0.70 09/09/2022 10:10 AM    BUN/Creatinine ratio 13 09/09/2022 10:10 AM    GFR est  01/07/2022 08:40 AM    GFR est non-AA 96 01/07/2022 08:40 AM    Calcium 9.2 09/09/2022 10:10 AM        Cholesterol  Lab Results   Component Value Date/Time    Cholesterol, total 174 09/09/2022 10:10 AM    HDL Cholesterol 46 09/09/2022 10:10 AM    LDL, calculated 115 (H) 09/09/2022 10:10 AM    LDL, calculated 83.8 07/26/2019 10:00 AM    Triglyceride 67 09/09/2022 10:10 AM    CHOL/HDL Ratio 3.0 07/26/2019 10:00 AM           I have discussed the diagnosis with the patient and the intended plan as seen in the above orders. The patient has received an After-Visit Summary and questions were answered concerning future plans. An After Visit Summary was printed and given to the patient. All diagnosis have been discussed with the patient and all of the patient's questions have been answered. Follow-up and Dispositions    Return in about 4 months (around 1/12/2023) for DM, DM foot, HLD, HTN, 30 min, office only, w/labs prior. Patsy Hernandez, TIKIP-BC  [address] [id number] [address] [facility] [address]  [address]

## 2022-09-12 NOTE — PROGRESS NOTES
Room 7    When asked if patient has any concerns he would like to address with CHELY Haines patient states yes, no . Did patient bring someone? No    Did the patient have DME equipment? No     Did you take your medication today? Patient states I took my Ozempic and vitamins . 1. \"Have you been to the ER, urgent care clinic since your last visit? Hospitalized since your last visit? \" No    2. \"Have you seen or consulted any other health care providers outside of the [address] [place] [address] since your last visit? \" No     3. For patients aged 39-70: Has the patient had a colonoscopy / FIT/ Cologuard? No      If the patient is female:    4. For patients aged 41-77: Has the patient had a mammogram within the past 2 years? Yes - no Care Gap present      5. For patients aged 21-65: Has the patient had a pap smear?  Yes - no Care Gap present        3 most recent PHQ Screens 9/12/2022   Little interest or pleasure in doing things Several days   Feeling down, depressed, irritable, or hopeless Several days   Total Score PHQ 2 2         Health Maintenance Due   Topic Date Due    Shingrix Vaccine Age 49> (1 of 2) Never done    Foot Exam Q1  08/13/2020    Pneumococcal 0-64 years (2 - PCV) 08/13/2020    COVID-19 Vaccine (3 - Booster for Pfizer series) 10/04/2021    Flu Vaccine (1) Never done       Learning Assessment 2/9/2021   PRIMARY LEARNER Patient   HIGHEST LEVEL OF EDUCATION - PRIMARY LEARNER  DID NOT GRADUATE HIGH SCHOOL   BARRIERS PRIMARY LEARNER NONE   CO-LEARNER CAREGIVER No   PRIMARY LANGUAGE ENGLISH   LEARNER PREFERENCE PRIMARY LISTENING   ANSWERED BY Valeri FAJARDO   RELATIONSHIP SELF

## 2022-12-06 ENCOUNTER — OFFICE VISIT (OUTPATIENT)
Dept: FAMILY MEDICINE CLINIC | Age: 62
End: 2022-12-06
Payer: MEDICARE

## 2022-12-06 VITALS
WEIGHT: 241 LBS | TEMPERATURE: 98.2 F | HEART RATE: 77 BPM | OXYGEN SATURATION: 98 % | DIASTOLIC BLOOD PRESSURE: 72 MMHG | BODY MASS INDEX: 40.15 KG/M2 | SYSTOLIC BLOOD PRESSURE: 110 MMHG | RESPIRATION RATE: 18 BRPM | HEIGHT: 65 IN

## 2022-12-06 DIAGNOSIS — M54.50 ACUTE RIGHT-SIDED LOW BACK PAIN WITHOUT SCIATICA: Primary | ICD-10-CM

## 2022-12-06 DIAGNOSIS — R09.89 GLOBUS SENSATION: ICD-10-CM

## 2022-12-06 PROCEDURE — 3074F SYST BP LT 130 MM HG: CPT | Performed by: NURSE PRACTITIONER

## 2022-12-06 PROCEDURE — G9899 SCRN MAM PERF RSLTS DOC: HCPCS | Performed by: NURSE PRACTITIONER

## 2022-12-06 PROCEDURE — 3017F COLORECTAL CA SCREEN DOC REV: CPT | Performed by: NURSE PRACTITIONER

## 2022-12-06 PROCEDURE — G8417 CALC BMI ABV UP PARAM F/U: HCPCS | Performed by: NURSE PRACTITIONER

## 2022-12-06 PROCEDURE — 3078F DIAST BP <80 MM HG: CPT | Performed by: NURSE PRACTITIONER

## 2022-12-06 PROCEDURE — 99214 OFFICE O/P EST MOD 30 MIN: CPT | Performed by: NURSE PRACTITIONER

## 2022-12-06 PROCEDURE — G8752 SYS BP LESS 140: HCPCS | Performed by: NURSE PRACTITIONER

## 2022-12-06 PROCEDURE — G8427 DOCREV CUR MEDS BY ELIG CLIN: HCPCS | Performed by: NURSE PRACTITIONER

## 2022-12-06 PROCEDURE — G8432 DEP SCR NOT DOC, RNG: HCPCS | Performed by: NURSE PRACTITIONER

## 2022-12-06 PROCEDURE — G8754 DIAS BP LESS 90: HCPCS | Performed by: NURSE PRACTITIONER

## 2022-12-06 NOTE — PROGRESS NOTES
Room 8     When asked if patient has any concerns she would like to address with CHELY Haines patient states yes the day before Thanksgiving I feel and my back is bothering me really bad. Did patient bring someone? No    Did the patient have DME equipment? No     Did you take your medication today? Yes      1. \"Have you been to the ER, urgent care clinic since your last visit? Hospitalized since your last visit? \" No    2. \"Have you seen or consulted any other health care providers outside of the [address] [place] [address] since your last visit? \" No     3. For patients aged 39-70: Has the patient had a colonoscopy / FIT/ Cologuard? Yes - no Care Gap present      If the patient is female:    4. For patients aged 41-77: Has the patient had a mammogram within the past 2 years? Yes - no Care Gap present      5. For patients aged 21-65: Has the patient had a pap smear?  Yes - no Care Gap present        3 most recent PHQ Screens 12/6/2022   Little interest or pleasure in doing things Not at all   Feeling down, depressed, irritable, or hopeless Not at all   Total Score PHQ 2 0         Health Maintenance Due   Topic Date Due    Hepatitis B Vaccine (1 of 3 - Risk 3-dose series) Never done    Shingrix Vaccine Age 50> (1 of 2) Never done    Foot Exam Q1  08/13/2020    COVID-19 Vaccine (3 - Booster for Pfizer series) 06/29/2021    Flu Vaccine (1) Never done       Learning Assessment 2/9/2021   PRIMARY LEARNER Patient   HIGHEST LEVEL OF EDUCATION - PRIMARY LEARNER  DID NOT GRADUATE HIGH SCHOOL   BARRIERS PRIMARY LEARNER NONE   CO-LEARNER CAREGIVER No   PRIMARY LANGUAGE ENGLISH   LEARNER PREFERENCE PRIMARY LISTENING   ANSWERED BY Jose Raygoza   RELATIONSHIP SELF

## 2022-12-06 NOTE — PROGRESS NOTES
[address]               [phone]      Samuel Galdamez is a 63 y.o. female and presents with Back Pain and Other (Urinary incotinece)       Assessment/Plan:    Diagnoses and all orders for this visit:    1. Acute right-sided low back pain without sciatica  -     REFERRAL TO PHYSICAL THERAPY  Casandra Kitchen at work about 2 weeks ago and hurt her lower back and right hip  Will start with PT  Advised to take OTC tylenol for pain and use ice or heat as needed for comfort  Patient verbalized understanding and is in agreement with this plan of care    2. Globus sensation  -     US THYROID/PARATHYROID/SOFT TISS; Future  Check US of neck, if negative consider barium swallow or referral to ENT    Follow-up and Dispositions    Return for keep previously scheduled follow up. Health Maintenance:   Health Maintenance   Topic Date Due    Hepatitis B Vaccine (1 of 3 - Risk 3-dose series) Never done    Shingrix Vaccine Age 50> (1 of 2) Never done    Foot Exam Q1  08/13/2020    COVID-19 Vaccine (3 - Booster for Pfizer series) 06/29/2021    Flu Vaccine (1) Never done    Breast Cancer Screen Mammogram  02/16/2023    Medicare Yearly Exam  05/14/2023    Eye Exam Retinal or Dilated  05/28/2023    A1C test (Diabetic or Prediabetic)  09/09/2023    MICROALBUMIN Q1  09/09/2023    Lipid Screen  09/09/2023    Depression Screen  09/12/2023    Colorectal Cancer Screening Combo  01/09/2025    Cervical cancer screen  03/16/2025    DTaP/Tdap/Td series (2 - Td or Tdap) 06/11/2025    Hepatitis C Screening  Completed    Pneumococcal 0-64 years  Completed        Subjective:    Labs obtained prior to visit? NO  Reviewed with patient?  Not applicable    BAck pain  Casandra Kitchen at work 11/23/22  She fell on and landed on her right side  Since then has been having pain in her right shoulder and hip  Can only stand for about 7 minutes and then has to sit down due to the hip and upper lower back pain  She went to the ED on 22, during that visit she endorses chest pain and subsequently all the work up was for her chest pain and nothing was done for her back pain  She has been taking left over hydrocodone for the pain, with relief    Difficulty swallowing  Onset: since September  Characteristics: feel like she is gagging when she swallows and chews  Has not had this looked into elsewhere  The onset was abrupt  Can swallow liquids without problem  Does not vomit  Does not happen when she takes her pills unless they are big, she has to swallow several times to get it to go down    ROS:     ROS  As stated in HPI, otherwise all others negative. The problem list was updated as a part of today's visit. Patient Active Problem List   Diagnosis Code    Essential hypertension with goal blood pressure less than 130/80 I10    GERD (gastroesophageal reflux disease) K21.9    DOMINIQUE (obstructive sleep apnea) G47.33    Morbid obesity ([facility]) E66.01    Type 2 diabetes mellitus with diabetic neuropathy ([facility]) E11.40    Mixed hyperlipidemia E78.2    Bilateral primary osteoarthritis of knee M17.0    Vertigo R42    Environmental and seasonal allergies J30.89    Diabetes mellitus type 2 in obese ([facility]) E11.69, E66.9    Former cigarette smoker Z87.891    Meningioma ([facility]) D32.9    Mild intermittent asthma J45.20    Primary osteoarthritis involving multiple joints M15.9       The PSH, FH were reviewed.       SH:  Social History     Tobacco Use    Smoking status: Former     Packs/day: 0.25     Years: 1.00     Pack years: 0.25     Types: Cigarettes     Start date: 1     Quit date:      Years since quittin.9    Smokeless tobacco: Never   Vaping Use    Vaping Use: Never used   Substance Use Topics    Alcohol use: No    Drug use: Not Currently     Types: Opiates       Medications/Allergies:  Current Outpatient Medications on File Prior to Visit   Medication Sig Dispense Refill    phentermine (ADIPEX-P) 37.5 mg tablet TAKE 1 TABLET BY MOUTH IN THE MORNING MAX  1  TABLET  DAILY 30 Tablet 2    lisinopriL (PRINIVIL, ZESTRIL) 5 mg tablet Take 1 tablet by mouth once daily 90 Tablet 3    Ozempic 1 mg/dose (4 mg/3 mL) pnij INJECT 1MG  SUBCUTANEOUSLY ONCE A WEEK 3 Each 3    pioglitazone (ACTOS) 30 mg tablet Take 1 Tablet by mouth daily. 90 Tablet 1    Accu-Chek Guide test strips strip USE 1 STRIP TO CHECK GLUCOSE TWICE DAILY 100 Strip 3    lancets (Accu-Chek Softclix Lancets) misc Use to check blood sugar twice a day 100 Each 11    glucose blood VI test strips (Accu-Chek Flor Plus test strp) strip Use to check blood sugar twice a day 100 Strip 1    Blood-Glucose Meter (Accu-Chek Guide Glucose Meter) misc Use to check glucose once a day 1 Each 0    meclizine (ANTIVERT) 25 mg tablet TAKE 1 TABLET BY MOUTH TWICE DAILY AS NEEDED FOR DIZZINESS 60 Tablet 1    beclomethasone (QVAR) 40 mcg/actuation aero Take 1 Puff by inhalation two (2) times a day. 8.7 g 1    dicyclomine (BENTYL) 10 mg capsule Take 1 Capsule by mouth four (4) times daily as needed for Abdominal Cramps. 90 Capsule 0    aspirin 81 mg chewable tablet Take 1 Tab by mouth daily. 180 Tab 2     No current facility-administered medications on file prior to visit. Allergies   Allergen Reactions    Atorvastatin Myalgia     Leg cramps       Objective:  Visit Vitals  /72 (BP 1 Location: Right arm, BP Patient Position: Sitting, BP Cuff Size: Adult) Comment (BP Patient Position): feet flat on floor   Pulse 77   Temp 98.2 °F (36.8 °C) (Temporal)   Resp 18   Ht 5' 5\" (1.651 m)   Wt 241 lb (109.3 kg)   SpO2 98%   BMI 40.10 kg/m²    Body mass index is 40.1 kg/m². Physical assessment  Physical Exam  Vitals and nursing note reviewed. Eyes:      Conjunctiva/sclera: Conjunctivae normal.      Pupils: Pupils are equal, round, and reactive to light. Neck:      Thyroid: No thyroid mass, thyromegaly or thyroid tenderness.       Trachea: Trachea and phonation normal.   Cardiovascular:      Rate and Rhythm: Normal rate and regular rhythm. Heart sounds: Normal heart sounds. No murmur heard. No friction rub. No gallop. Pulmonary:      Effort: Pulmonary effort is normal.      Breath sounds: Normal breath sounds. Musculoskeletal:         General: Normal range of motion. Cervical back: Normal range of motion. Lumbar back: Tenderness and bony tenderness present. No swelling or deformity. Normal range of motion. Back:       Comments: Walking with a right sided limp   Lymphadenopathy:      Cervical: No cervical adenopathy. Skin:     General: Skin is warm and dry. Neurological:      Mental Status: She is alert. Labwork and Ancillary Studies:    CBC w/Diff  Lab Results   Component Value Date/Time    WBC 6.1 09/30/2020 12:55 PM    HGB 12.3 09/30/2020 12:55 PM    PLATELET 704 45/23/5270 12:55 PM         Basic Metabolic Profile  Lab Results   Component Value Date/Time    Sodium 141 09/09/2022 10:10 AM    Potassium 4.8 09/09/2022 10:10 AM    Chloride 105 09/09/2022 10:10 AM    CO2 23 09/09/2022 10:10 AM    Anion gap 5 07/26/2019 10:00 AM    Glucose 102 (H) 09/09/2022 10:10 AM    BUN 9 09/09/2022 10:10 AM    Creatinine 0.70 09/09/2022 10:10 AM    BUN/Creatinine ratio 13 09/09/2022 10:10 AM    GFR est  01/07/2022 08:40 AM    GFR est non-AA 96 01/07/2022 08:40 AM    Calcium 9.2 09/09/2022 10:10 AM        Cholesterol  Lab Results   Component Value Date/Time    Cholesterol, total 174 09/09/2022 10:10 AM    HDL Cholesterol 46 09/09/2022 10:10 AM    LDL, calculated 115 (H) 09/09/2022 10:10 AM    LDL, calculated 83.8 07/26/2019 10:00 AM    Triglyceride 67 09/09/2022 10:10 AM    CHOL/HDL Ratio 3.0 07/26/2019 10:00 AM           I have discussed the diagnosis with the patient and the intended plan as seen in the above orders. The patient has received an After-Visit Summary and questions were answered concerning future plans.      An After Visit Summary was printed and given to the patient. All diagnosis have been discussed with the patient and all of the patient's questions have been answered. Follow-up and Dispositions    Return for keep previously scheduled follow up. Anjelica Acuña, [facility]  [address] [facility]   [address] [facility] [address]  [address]

## 2022-12-12 ENCOUNTER — HOSPITAL ENCOUNTER (OUTPATIENT)
Dept: ULTRASOUND IMAGING | Age: 62
Discharge: HOME | End: 2022-12-12
Attending: NURSE PRACTITIONER
Payer: MEDICARE

## 2022-12-12 DIAGNOSIS — R09.89 GLOBUS SENSATION: ICD-10-CM

## 2022-12-12 PROCEDURE — 76536 US EXAM OF HEAD AND NECK: CPT

## 2022-12-15 ENCOUNTER — HOSPITAL ENCOUNTER (OUTPATIENT)
Dept: PHYSICAL THERAPY | Age: 62
Discharge: HOME | End: 2022-12-15
Payer: MEDICARE

## 2022-12-15 PROCEDURE — 97014 ELECTRIC STIMULATION THERAPY: CPT

## 2022-12-15 PROCEDURE — 97535 SELF CARE MNGMENT TRAINING: CPT

## 2022-12-15 PROCEDURE — 97162 PT EVAL MOD COMPLEX 30 MIN: CPT
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
## 2022-12-21 ENCOUNTER — TELEPHONE (OUTPATIENT)
Dept: FAMILY MEDICINE CLINIC | Age: 62
End: 2022-12-21

## 2022-12-21 DIAGNOSIS — R09.89 GLOBUS SENSATION: Primary | ICD-10-CM

## 2022-12-21 NOTE — TELEPHONE ENCOUNTER
Called to review US of thyroid: showed no significant abnormalities, no FNA or follow up needed  She continues to have globus sensation  Refer to ENT  Patient verbalized understanding and is in agreement with this plan of care

## 2022-12-22 ENCOUNTER — HOSPITAL ENCOUNTER (OUTPATIENT)
Dept: PHYSICAL THERAPY | Age: 62
Discharge: HOME | End: 2022-12-22
Payer: MEDICARE

## 2022-12-22 PROCEDURE — 97110 THERAPEUTIC EXERCISES: CPT | Performed by: PHYSICAL THERAPIST

## 2022-12-22 PROCEDURE — 97112 NEUROMUSCULAR REEDUCATION: CPT | Performed by: PHYSICAL THERAPIST

## 2022-12-22 PROCEDURE — 97530 THERAPEUTIC ACTIVITIES: CPT | Performed by: PHYSICAL THERAPIST

## 2022-12-22 PROCEDURE — 97014 ELECTRIC STIMULATION THERAPY: CPT | Performed by: PHYSICAL THERAPIST

## 2022-12-22 NOTE — PROGRESS NOTES
PT DAILY TREATMENT NOTE     Patient Name: Roxana Espitia  Date:2022  : 1960  [x]  Patient  Verified  Payor: Sadafsamantha Gill / Plan: Mauri Thomas 8141 HMO / Product Type: Managed Care Medicare /    In time:1050 am  Out time:1150 am  Total Treatment Time (min): 60min  Visit #: 2 of     Medicare/[facility] Time Tracking (below)   Total Timed Codes (min):  45 min 1:1 Treatment Time:  45min       Treatment Area: Other low back pain [M54.59]    SUBJECTIVE  Pain Level (0-10 scale): 6  Any medication changes, allergies to medications, adverse drug reactions, diagnosis change, or new procedure performed?: [x] No    [] Yes (see summary sheet for update)  Subjective functional status/changes:   [] No changes reported  My L hip area is bothering me today. OBJECTIVE  Modality rationale: decrease inflammation, decrease pain, increase tissue extensibility, and increase muscle contraction/control to improve the patients ability to ambulate and perform ADLs with increased activity tolerance.     Min Type Additional Details   15 [x] Estim: []Att   []Unatt  []TENS instruct                 [x]IFC  []Premod []NMES                       []Other:  []w/US   []w/ice   [x]w/heat  Position:  Location:    []  Traction: [] Cervical       []Lumbar                       [] Prone          []Supine                       []Intermittent   []Continuous Lbs:  [] before manual  [] after manual    []  Ultrasound: []Continuous   [] Pulsed                           []1MHz   []3MHz Location:  W/cm2:    []  Iontophoresis with dexamethasone         Location: [] Take home patch   [] In clinic    []  Ice     []  heat  []  Ice massage Position:  Location:    []  Vasopneumatic Device:  If using vaso (only need to measure limb vaso being performed on)      pre-treatment girth :       post-treatment girth :       measured at (landmark location)  Pressure: [] lo [] med [] hi   Temp: [] lo [] med [] hi   [] Skin assessment post-treatment:  []intact []redness- no adverse reaction       []redness - adverse reaction:       10 min Therapeutic Exercise:  [] See flow sheet :   Rationale: increase ROM and increase strength to improve the patients ability to perform functional mobility, ADLS, and improve activity endurance    27 min Therapeutic Activity:  [x]  See flow sheet :   Rationale: increase strength and improve coordination to improve the patients ability to perform functional mobility and improve activity  endurance    8 min Neuromuscular Re-education:  [x]  See flow sheet :   Rationale: improve coordination, improve balance, and increase proprioception  to improve the patients ability to provide l/s stability for improved postural control with ADLS and work activities. min Manual Therapy:     Rationale: decrease pain, increase ROM, increase tissue extensibility, decrease trigger points, and increase postural awareness to allow for activity endurance. [The manual therapy interventions were performed at a separate and distinct time from the therapeutic activities interventions]            x min Patient Education: [x] Review HEP    [] Progressed/Changed HEP based on:   [] positioning   [] body mechanics   [] transfers   [] heat/ice application        Other Objective/Functional Measures:   initiated POC      Pain Level (0-10 scale) post treatment: 5    ASSESSMENT/Changes in Function: Fair tolerance to treatment today with patient req 100% verbal/tactile cueing and demo for proper form/technique with all newly introduced therex. Patient significantly challenged with QL S at step but was able to perform SB roll outs without complaint. Also with pect S at door, pt c/o increased LBP and was unable to perform pelvic tilt to keep neutral pelvis. Pt reports an increase in pain after lying supine for several exercises, and pt reports difficulty getting comfortable at night sleeping. Pt requires VC for motivation to perform therex. Max TTP at Claudio SIJ during placement of e-stim. Pt reports good pain relief post e-stim and MHP. Patient will continue to benefit from skilled PT services to modify and progress therapeutic interventions, address functional mobility deficits, address ROM deficits, address strength deficits, analyze and address soft tissue restrictions, analyze and cue movement patterns, analyze and modify body mechanics/ergonomics, assess and modify postural abnormalities, address imbalance/dizziness, and instruct in home and community integration to attain remaining goals. []  See Plan of Care  []  See progress note/recertification  []  See Discharge Summary         Progress towards goals / Updated goals:  Short Term Goals: To be accomplished in  2  treatments:  1. Pt will be independent and compliant with HEP to decrease pain, increase ROM and return pt to PLOF. Status at last assessment: initiated at    Long Term Goals: To be accomplished in  8-12  treatments:  1. Pt will increase score on the FOTO to > or = 53/100 to demo an increase in functional activity tolerance. Status at last assessment: 29/100   2. Pt will be able to self-manage ave pain to < or = 4/10 ave to improve ease of posture, mobility and self-care  Status at last assessment: ranges 5-10/10 in shoulders, flank, lumbar, hips and notes anterior knee pain   3. Pt will be able to sit for > or = 40 minutes without increase In pain to restore driving, home activities and eating meals  Status at last assessment: 10 minutes with pain   4. Pt will have a 30\" STS test of > or = 12 repetitions to demo restoration of functional transitional movements  Status at last assessment: testing unable due to c/o pain  5.  Pt will have an increase (B) shoulder AROM elevation to > or = 150 deg with normal mechanics and no increase in pain to restore reaching, dressing, home care  Status at least:  L 140, R 110     PLAN  []  Upgrade activities as tolerated     []  Continue plan of care  []  Update interventions per flow sheet       []  Discharge due to:_  []  Other:_      Milad Castellon, PT 12/22/2022  8:18 AM    Future Appointments   Date Time Provider Mark Everett   12/22/2022 10:45 AM [address] [place] [address] [facility]   12/27/2022 11:30 AM [facility], PT [facility]   1/3/2023 11:00 AM [employer]ris [employer], PT [facility]   1/5/2023 10:30 AM [facility], PT [facility]   1/10/2023 10:00 AM Perkins Nip, PTA [facility]   1/12/2023 10:00 AM Camden Linares NP AMA BS AMB   1/13/2023 11:00 AM Perkins Nip, PTA [facility]   1/17/2023 10:30 AM Atif Nip, PTA [facility]   1/19/2023 10:00 AM Perkins Nip, PTA [facility]

## 2022-12-27 ENCOUNTER — APPOINTMENT (OUTPATIENT)
Dept: PHYSICAL THERAPY | Age: 62
End: 2022-12-27
Payer: MEDICARE

## 2023-01-03 ENCOUNTER — HOSPITAL ENCOUNTER (OUTPATIENT)
Dept: PHYSICAL THERAPY | Age: 63
Discharge: HOME | End: 2023-01-03
Payer: MEDICARE

## 2023-01-03 PROCEDURE — 97110 THERAPEUTIC EXERCISES: CPT

## 2023-01-03 PROCEDURE — 97530 THERAPEUTIC ACTIVITIES: CPT

## 2023-01-03 PROCEDURE — 97014 ELECTRIC STIMULATION THERAPY: CPT

## 2023-01-03 NOTE — PROGRESS NOTES
PT DAILY TREATMENT NOTE     Patient Name: Alondra Maya  Date:1/3/2023  : 1960  [x]  Patient  Verified  Payor: Mumtaz Peres / Plan: Mauri Thomas [id number] HMO / Product Type: Managed Care Medicare /    In time:11:02  Out time:11:41  Total Treatment Time (min): 39  Visit #: 3 of     Medicare/[facility] Time Tracking (below)   Total Timed Codes (min):  39 1:1 Treatment Time:  29       Treatment Area: Other low back pain [M54.59]    SUBJECTIVE  Pain Level (0-10 scale): 6/10 shoulders, 5/10 back   Any medication changes, allergies to medications, adverse drug reactions, diagnosis change, or new procedure performed?: [x] No    [] Yes (see summary sheet for update)  Subjective functional status/changes:   [] No changes reported  \"I did stretch some since it was hurting. And that helps with the pain\"   Improvements: bending forward, use of HEP to manage symptoms  Deficits: stairs; walking > 7-8 minutes     Pt notes she needs to leave early for a job interview. OBJECTIVE  Modality rationale: decrease pain and increase tissue extensibility to improve the patients ability to ambulate and perform ADLs with increased activity tolerance.     Min Type Additional Details   10 [x] Estim: []Att   []Unatt  []TENS instruct                 [x]IFC  []Premod []NMES                       []Other:  []w/US   []w/ice   [x]w/heat  Position: semireclined  Location:lumbar spine   [x] Skin assessment post-treatment:  [x]intact []redness- no adverse reaction       []redness - adverse reaction:       11 min Therapeutic Exercise:  [x] See flow sheet :    Added seated piriformis stretch with PT A and then towel A to maintain stretch position    Rationale: increase ROM and increase strength to improve the patients ability to perform functional mobility, ADLS, and improve activity endurance    8 min Therapeutic Activity:  [x]  See flow sheet :    2MWT 228 feet in 1 min 30\"     QL stretch - modified; seated with UE on head to limit shoulder strain, Torso side bending to tolerance      Rationale: increase strength and improve coordination to improve the patients ability to perform functional mobility and improve activity  endurance    10 (NC) min Neuromuscular Re-education:  [x]  See flow sheet :    Added tband extensions   Rationale: improve coordination, improve balance, and increase proprioception  to improve the patients ability to provide l/s stability for improved postural control with ADLS and work activities. NI min Manual Therapy:     Rationale: decrease pain, increase ROM, increase tissue extensibility, decrease trigger points, and increase postural awareness to allow for activity endurance. [The manual therapy interventions were performed at a separate and distinct time from the therapeutic activities interventions]            x min Patient Education: [x] Review HEP    [] Progressed/Changed HEP based on:   [] positioning   [] body mechanics   [] transfers   [] heat/ice application        Other Objective/Functional Measures:     AROM shoulder flexion: L 140deg , R 148 deg   30\" STS : TC  2MWT: 228 feet in 1 min 30 seconds     Pain Level (0-10 scale) post treatment: 5    ASSESSMENT/Changes in Function: progress towards shoulder flexion and pain goals. Demo's improved tolerance to treatment today and willingness to participate.  Set new 2MWT goal.     PLAN  [x]  Upgrade activities as tolerated     [x]  Continue plan of care  []  Update interventions per flow sheet       []  Discharge due to:_  [x]  Other:_  resume full program as able     Ady White PT 1/3/2023  8:18 AM    Future Appointments   Date Time Provider Mark Everett   1/3/2023 11:00 AM Lili Robertson PT [facility]   1/5/2023 10:30 AM Mattie Schmid [facility]   1/10/2023 10:00 AM Jeffrey Stock PTA [facility]   1/12/2023 10:00 AM LUCILLE Rothman AMB   1/13/2023 11:00 AM Jeffrey Stock PTA [facility] 1/17/2023 10:30 AM CHARLIE Faulkner [facility]   1/19/2023 10:00 AM CHARLIE Faulkner [facility]

## 2023-01-05 ENCOUNTER — HOSPITAL ENCOUNTER (OUTPATIENT)
Dept: PHYSICAL THERAPY | Age: 63
Discharge: HOME | End: 2023-01-05
Payer: MEDICARE

## 2023-01-05 PROCEDURE — 97110 THERAPEUTIC EXERCISES: CPT

## 2023-01-05 PROCEDURE — 97014 ELECTRIC STIMULATION THERAPY: CPT

## 2023-01-05 PROCEDURE — 97112 NEUROMUSCULAR REEDUCATION: CPT

## 2023-01-05 NOTE — PROGRESS NOTES
PT DAILY TREATMENT NOTE     Patient Name: Theodore Méndez  Date:2023  : 1960  [x]  Patient  Verified  Payor: Pipe Mtz / Plan: Mauri Thomas [id number] HMO / Product Type: Managed Care Medicare /    In time: 99 Out time:   Total Treatment Time (min): 41  Visit #: 4 of     Medicare/[facility] Time Tracking (below)   Total Timed Codes (min):  31 1:1 Treatment Time:  31       Treatment Area: Other low back pain [M54.59]    SUBJECTIVE  Pain Level (0-10 scale): 5/10 in L/s, 8 in T/s   Any medication changes, allergies to medications, adverse drug reactions, diagnosis change, or new procedure performed?: [x] No    [] Yes (see summary sheet for update)  Subjective functional status/changes:   [x] No changes reported  \"It's sharp pain, it burns, it's not just the walking\" Pt points to anteromedial L knee and notes doing incr reps of stairs at pt house. \"Shoulders feeling right, so far they do. \"  Pt notes she needs to leave early for her grand-daughters job interview. OBJECTIVE  Modality rationale: decrease pain and increase tissue extensibility to improve the patients ability to ambulate and perform ADLs with increased activity tolerance.     Min Type Additional Details   10 [x] Estim: []Att   []Unatt  []TENS instruct                 [x]IFC  []Premod []NMES                       []Other:  []w/US   []w/ice   [x]w/heat  Position: supine with wedge  Location:lumbar spine   [x] Skin assessment post-treatment:  [x]intact []redness- no adverse reaction       []redness - adverse reaction:       21 min Therapeutic Exercise:  [x] See flow sheet :       Rationale: increase ROM and increase strength to improve the patients ability to perform functional mobility, ADLS, and improve activity endurance    x min Therapeutic Activity:  [x]  See flow sheet :         Rationale: increase strength and improve coordination to improve the patients ability to perform functional mobility and improve activity  endurance    10 min Neuromuscular Re-education:  [x]  See flow sheet :     Rationale: improve coordination, improve balance, and increase proprioception  to improve the patients ability to provide l/s stability for improved postural control with ADLS and work activities. x min Patient Education: [x] Review HEP    [] Progressed/Changed HEP based on:   [] positioning   [] body mechanics   [] transfers   [] heat/ice application        Other Objective/Functional Measures:   Shoulder AROM: flexion in seated: B: 130 deg       Pain Level (0-10 scale) post treatment: not rated    ASSESSMENT/Changes in Function: Progress toward LTG #5 for shoulder flexion, both UE approx equal. Bed mobility antalgic, pt notes sharp pain in sit to supine transfer, req 5 min rest break in hook-lying. Glute sets and TA draw attempted but not well tolerated. Session concluded with ES per pt preference, pt observed to be teary during ES intervention. Pt walked out of clinic with tech noting she was having difficulty walking and incr shooting pain around l/s. Patient will continue to benefit from skilled PT services to modify and progress therapeutic interventions, address functional mobility deficits, address ROM deficits, address strength deficits, analyze and address soft tissue restrictions, analyze and cue movement patterns, analyze and modify body mechanics/ergonomics, assess and modify postural abnormalities, address imbalance/dizziness and instruct in home and community integration to attain remaining goals. Progress towards goals / Updated goals:  Short Term Goals: To be accomplished in  2  treatments:  1. Pt will be independent and compliant with HEP to decrease pain, increase ROM and return pt to PLOF. Status at last assessment: initiated at    Long Term Goals: To be accomplished in  8-12  treatments:  1.  Pt will increase score on the FOTO to > or = 53/100 to demo an increase in functional activity tolerance. Status at last assessment: 29/100   2. Pt will be able to self-manage ave pain to < or = 4/10 ave to improve ease of posture, mobility and self-care  Status at last assessment: ranges 5-10/10 in shoulders, flank, lumbar, hips and notes anterior knee pain   3. Pt will be able to sit for > or = 40 minutes without increase In pain to restore driving, home activities and eating meals  Status at last assessment: 10 minutes with pain   4. Pt will have a 30\" STS test of > or = 12 repetitions to demo restoration of functional transitional movements  Status at last assessment: testing unable due to c/o pain  5.  Pt will have an increase (B) shoulder AROM elevation to > or = 150 deg with normal mechanics and no increase in pain to restore reaching, dressing, home care  Status at last:  L 140, R 110   Current 1/5/23 flexion in seated: B: 130 deg        PLAN  [x]  Upgrade activities as tolerated     [x]  Continue plan of care  []  Update interventions per flow sheet       []  Discharge due to:_  [x]  Other:_  resume full program as able     Jamia Gardiner, PT 1/5/2023  8:18 AM    Future Appointments   Date Time Provider Mark Everett   1/5/2023  4:00 PM Whit Liu PT [facility]   1/10/2023 10:00 AM Keyla Busby PTA [facility]   1/12/2023 10:00 AM Jose Ross NP AMA BS AMB   1/13/2023 11:00 AM Keyla Busby PTA [facility]   1/17/2023 10:30 AM Keyla Busby PTA [facility]   1/19/2023 10:00 AM Keyla Busby PTA [facility]

## 2023-01-10 ENCOUNTER — HOSPITAL ENCOUNTER (OUTPATIENT)
Dept: PHYSICAL THERAPY | Age: 63
Discharge: HOME | End: 2023-01-10
Payer: MEDICARE

## 2023-01-10 ENCOUNTER — APPOINTMENT (OUTPATIENT)
Dept: PHYSICAL THERAPY | Age: 63
End: 2023-01-10
Payer: MEDICARE

## 2023-01-10 PROCEDURE — 97110 THERAPEUTIC EXERCISES: CPT

## 2023-01-10 PROCEDURE — 97112 NEUROMUSCULAR REEDUCATION: CPT

## 2023-01-10 PROCEDURE — 97014 ELECTRIC STIMULATION THERAPY: CPT

## 2023-01-10 NOTE — PROGRESS NOTES
PT DAILY TREATMENT NOTE     Patient Name: Grupo Joy  Date:1/10/2023  : 1960  [x]  Patient  Verified  Payor: Roosvelt Denver / Plan: Mauri Thomas 8141 HMO / Product Type: Managed Care Medicare /    In time:302  Out time:350  Total Treatment Time (min): 48  Visit #: 5 of     Medicare/BCBS Only   Total Timed Codes (min):  38 1:1 Treatment Time:  28       Treatment Area: Other low back pain [M54.59]    SUBJECTIVE  Pain Level (0-10 scale): 9/10 L/s, T/s and knees   Any medication changes, allergies to medications, adverse drug reactions, diagnosis change, or new procedure performed?: [x] No    [] Yes (see summary sheet for update)  Subjective functional status/changes:   [] No changes reported  Pt reports she tried to work last week and had to quit due to the pain. Pt reports everything hurts. OBJECTIVE    Modality rationale: decrease pain and increase tissue extensibility to improve the patients ability to ambulate and perform ADLs with increased activity tolerance.     Min Type Additional Details    10 [x] Estim: []Att   []Unatt  []TENS instruct                 [x]IFC  []Premod []NMES                       []Other:  []w/US   []w/ice   [x]w/heat  Position: supine with wedge  Location:lumbar spine    [x] Skin assessment post-treatment:  [x]intact []redness- no adverse reaction       []redness - adverse reaction:         25  1:1  13 min Therapeutic Exercise:  [x] See flow sheet :         Rationale: increase ROM and increase strength to improve the patients ability to perform functional mobility, ADLS, and improve activity endurance     x min Therapeutic Activity:  [x]  See flow sheet :            Rationale: increase strength and improve coordination to improve the patients ability to perform functional mobility and improve activity  endurance     13 min Neuromuscular Re-education:  [x]  See flow sheet :      Rationale: improve coordination, improve balance, and increase proprioception  to improve the patients ability to provide l/s stability for improved postural control with ADLS and work activities. x min Patient Education: [x] Review HEP    [] Progressed/Changed HEP based on:   [] positioning   [] body mechanics   [] transfers   [] heat/ice application        Other Objective/Functional Measures:       Pain Level (0-10 scale) post treatment: 6/10    ASSESSMENT/Changes in Function: Pt demos poor tolerance to therapeutic intervention today. Presents with high levels of pain that pt attributes to attempting to work. Requires frequent rest breaks between sets and transitions. Pt unable to tolerate glute sets or SKTC stretch today. Despite complaints documented last session, pt prefers to end session with MH and IFC to lumbar spine. Reduced overall pain post session. Patient will continue to benefit from skilled PT services to modify and progress therapeutic interventions, address functional mobility deficits, address ROM deficits, address strength deficits, analyze and address soft tissue restrictions, analyze and cue movement patterns, analyze and modify body mechanics/ergonomics, assess and modify postural abnormalities, address imbalance/dizziness, and instruct in home and community integration to attain remaining goals. []  See Plan of Care  []  See progress note/recertification  []  See Discharge Summary         Progress towards goals / Updated goals:  Short Term Goals: To be accomplished in  2  treatments:  1. Pt will be independent and compliant with HEP to decrease pain, increase ROM and return pt to PLOF. Status at last assessment: initiated at    Long Term Goals: To be accomplished in  8-12  treatments:  1. Pt will increase score on the FOTO to > or = 53/100 to demo an increase in functional activity tolerance. Status at last assessment: 29/100   2.  Pt will be able to self-manage ave pain to < or = 4/10 ave to improve ease of posture, mobility and self-care  Status at last assessment: ranges 5-10/10 in shoulders, flank, lumbar, hips and notes anterior knee pain   3. Pt will be able to sit for > or = 40 minutes without increase In pain to restore driving, home activities and eating meals  Status at last assessment: 10 minutes with pain   4. Pt will have a 30\" STS test of > or = 12 repetitions to demo restoration of functional transitional movements  Status at last assessment: testing unable due to c/o pain  5.  Pt will have an increase (B) shoulder AROM elevation to > or = 150 deg with normal mechanics and no increase in pain to restore reaching, dressing, home care  Status at last:  L 140, R 110   Current 1/5/23 flexion in seated: B: 130 deg     PLAN  []  Upgrade activities as tolerated     [x]  Continue plan of care  []  Update interventions per flow sheet       []  Discharge due to:_  []  Other:_      Panchito Collado, PT 1/10/2023  3:15 PM    Future Appointments   Date Time Provider Mark Everett   1/12/2023 10:00 AM LUCILLE Terrell BS AMB   1/13/2023 11:00 AM Anna Gee PTA [facility]   1/17/2023 10:30 AM Anna Gee PTA [facility]   1/19/2023 10:00 AM Anna Gee PTA [facility]   2/20/2023  3:30 PM LUCILLE Terrell BS AMB

## 2023-01-13 ENCOUNTER — HOSPITAL ENCOUNTER (OUTPATIENT)
Dept: PHYSICAL THERAPY | Age: 63
Discharge: HOME | End: 2023-01-13
Payer: MEDICARE

## 2023-01-13 PROCEDURE — 97110 THERAPEUTIC EXERCISES: CPT

## 2023-01-13 PROCEDURE — 97112 NEUROMUSCULAR REEDUCATION: CPT

## 2023-01-13 NOTE — PROGRESS NOTES
PT DAILY TREATMENT NOTE     Patient Name: Richy Villagran  Date:2023  : 1960  [x]  Patient  Verified  Payor: Gabriella Quezada / Plan: Mauri Thomas 8141 HMO / Product Type: Managed Care Medicare /    In time: 11:07 am          Out time: 12:00 pm  Total Treatment Time (min): 53  Visit #: 6 of     Medicare/BCBS Only   Total Timed Codes (min):  43 1:1 Treatment Time: 11:15am-11:50am (35)       Treatment Area: Other low back pain [M54.59]    SUBJECTIVE  Pain Level (0-10 scale): 6.5  Any medication changes, allergies to medications, adverse drug reactions, diagnosis change, or new procedure performed?: [x] No    [] Yes (see summary sheet for update)  Subjective functional status/changes:   [] No changes reported  Patient notes poor HEP compliance. OBJECTIVE    Modality rationale: decrease pain and increase tissue extensibility to improve the patients ability to ambulate and perform ADLs with increased activity tolerance. Min Type Additional Details    10 [] Ice                []Other:  Position: supine with wedge  Location: lumbar spine    [x] Skin assessment post-treatment:  [x]intact []redness- no adverse reaction       []redness - adverse reaction:         27 min Therapeutic Exercise:  [x] See flow sheet :         Rationale: increase ROM and increase strength to improve the patients ability to perform functional mobility, ADLS, and improve activity endurance     X min Therapeutic Activity:  [x]  See flow sheet :            Rationale: increase strength and improve coordination to improve the patients ability to perform functional mobility and improve activity  endurance     16 min Neuromuscular Re-education:  [x]  See flow sheet :      Rationale: improve coordination, improve balance, and increase proprioception  to improve the patients ability to provide l/s stability for improved postural control with ADLS and work activities.       X min Patient Education: [x] Review HEP - supine hip IR/ER, GS with cueing to avoid valsalva, partial squat     Other Objective/Functional Measures:         Pain Level (0-10 scale) post treatment: 3    ASSESSMENT/Changes in Function:   Patient req mild encouragement to increase buy-in regarding home exercise performance. Noted pain with piriformis stretching in the left knee, likely due to tibial torsion and significant hip hypomobility. Patient will continue to benefit from skilled PT services to modify and progress therapeutic interventions, address functional mobility deficits, address ROM deficits, address strength deficits, analyze and address soft tissue restrictions, analyze and cue movement patterns, analyze and modify body mechanics/ergonomics, assess and modify postural abnormalities, address imbalance/dizziness, and instruct in home and community integration to attain remaining goals. []  See Plan of Care  []  See progress note/recertification  []  See Discharge Summary         Progress towards goals / Updated goals:  Short Term Goals: To be accomplished in  2  treatments:  1. Pt will be independent and compliant with HEP to decrease pain, increase ROM and return pt to PLOF. Status at last assessment: initiated at    Long Term Goals: To be accomplished in  8-12  treatments:  1. Pt will increase score on the FOTO to > or = 53/100 to demo an increase in functional activity tolerance. Status at last assessment: 29/100   2. Pt will be able to self-manage ave pain to < or = 4/10 ave to improve ease of posture, mobility and self-care  Status at last assessment: ranges 5-10/10 in shoulders, flank, lumbar, hips and notes anterior knee pain   3. Pt will be able to sit for > or = 40 minutes without increase In pain to restore driving, home activities and eating meals  Status at last assessment: 10 minutes with pain   4.  Pt will have a 30\" STS test of > or = 12 repetitions to demo restoration of functional transitional movements  Status at last assessment: testing unable due to c/o pain  5.  Pt will have an increase (B) shoulder AROM elevation to > or = 150 deg with normal mechanics and no increase in pain to restore reaching, dressing, home care  Status at last:  L 140, R 110   Current 1/5/23 flexion in seated: B: 130 deg     PLAN  [x]  Upgrade activities as tolerated     [x]  Continue plan of care  []  Update interventions per flow sheet       []  Discharge due to:_  [x]  Other: reassessment due NV for PN     Tej Solorio PTA 1/13/2023     Future Appointments   Date Time Provider Mark Everett   1/17/2023 10:30 AM Cindy Lot, PTA [facility]   1/19/2023 10:00 AM Cindy Lot, PTA [facility]   2/20/2023  3:30 PM LUCILLE Allison AMB   3/1/2023  2:30 PM LUCILLE Allison BS AMB

## 2023-01-19 ENCOUNTER — APPOINTMENT (OUTPATIENT)
Dept: PHYSICAL THERAPY | Age: 63
End: 2023-01-19
Payer: MEDICARE

## 2023-01-23 DIAGNOSIS — E11.40 TYPE 2 DIABETES MELLITUS WITH DIABETIC NEUROPATHY, WITHOUT LONG-TERM CURRENT USE OF INSULIN (HCC): ICD-10-CM

## 2023-01-23 RX ORDER — PIOGLITAZONEHYDROCHLORIDE 30 MG/1
30 TABLET ORAL DAILY
Qty: 90 TABLET | Refills: 1 | Status: SHIPPED | OUTPATIENT
Start: 2023-01-23

## 2023-02-07 DIAGNOSIS — R42 VERTIGO: ICD-10-CM

## 2023-02-07 RX ORDER — MECLIZINE HYDROCHLORIDE 25 MG/1
TABLET ORAL
Qty: 60 TABLET | Refills: 0 | Status: SHIPPED | OUTPATIENT
Start: 2023-02-07

## 2023-02-10 ENCOUNTER — HOSPITAL ENCOUNTER (OUTPATIENT)
Dept: PHYSICAL THERAPY | Age: 63
Discharge: HOME | End: 2023-02-10

## 2023-03-01 ENCOUNTER — OFFICE VISIT (OUTPATIENT)
Facility: CLINIC | Age: 63
End: 2023-03-01
Payer: MEDICARE

## 2023-03-01 VITALS
WEIGHT: 244 LBS | DIASTOLIC BLOOD PRESSURE: 67 MMHG | TEMPERATURE: 97.7 F | SYSTOLIC BLOOD PRESSURE: 126 MMHG | OXYGEN SATURATION: 99 % | RESPIRATION RATE: 18 BRPM | BODY MASS INDEX: 40.65 KG/M2 | HEIGHT: 65 IN | HEART RATE: 77 BPM

## 2023-03-01 DIAGNOSIS — E66.01 CLASS 3 SEVERE OBESITY DUE TO EXCESS CALORIES WITH SERIOUS COMORBIDITY AND BODY MASS INDEX (BMI) OF 40.0 TO 44.9 IN ADULT (HCC): ICD-10-CM

## 2023-03-01 DIAGNOSIS — E11.40 TYPE 2 DIABETES MELLITUS WITH DIABETIC NEUROPATHY, WITHOUT LONG-TERM CURRENT USE OF INSULIN (HCC): Primary | ICD-10-CM

## 2023-03-01 DIAGNOSIS — E78.2 MIXED HYPERLIPIDEMIA: ICD-10-CM

## 2023-03-01 DIAGNOSIS — I10 ESSENTIAL HYPERTENSION WITH GOAL BLOOD PRESSURE LESS THAN 130/80: ICD-10-CM

## 2023-03-01 PROBLEM — E66.9 DIABETES MELLITUS TYPE 2 IN OBESE (HCC): Status: ACTIVE | Noted: 2020-07-31

## 2023-03-01 PROBLEM — E11.69 DIABETES MELLITUS TYPE 2 IN OBESE (HCC): Status: ACTIVE | Noted: 2020-07-31

## 2023-03-01 PROBLEM — E66.813 CLASS 3 SEVERE OBESITY DUE TO EXCESS CALORIES WITH SERIOUS COMORBIDITY AND BODY MASS INDEX (BMI) OF 40.0 TO 44.9 IN ADULT: Status: ACTIVE | Noted: 2017-03-09

## 2023-03-01 PROCEDURE — G8427 DOCREV CUR MEDS BY ELIG CLIN: HCPCS | Performed by: NURSE PRACTITIONER

## 2023-03-01 PROCEDURE — 2022F DILAT RTA XM EVC RTNOPTHY: CPT | Performed by: NURSE PRACTITIONER

## 2023-03-01 PROCEDURE — 1036F TOBACCO NON-USER: CPT | Performed by: NURSE PRACTITIONER

## 2023-03-01 PROCEDURE — G8484 FLU IMMUNIZE NO ADMIN: HCPCS | Performed by: NURSE PRACTITIONER

## 2023-03-01 PROCEDURE — 3074F SYST BP LT 130 MM HG: CPT | Performed by: NURSE PRACTITIONER

## 2023-03-01 PROCEDURE — 3078F DIAST BP <80 MM HG: CPT | Performed by: NURSE PRACTITIONER

## 2023-03-01 PROCEDURE — 3017F COLORECTAL CA SCREEN DOC REV: CPT | Performed by: NURSE PRACTITIONER

## 2023-03-01 PROCEDURE — 99214 OFFICE O/P EST MOD 30 MIN: CPT | Performed by: NURSE PRACTITIONER

## 2023-03-01 PROCEDURE — 3046F HEMOGLOBIN A1C LEVEL >9.0%: CPT | Performed by: NURSE PRACTITIONER

## 2023-03-01 PROCEDURE — G8417 CALC BMI ABV UP PARAM F/U: HCPCS | Performed by: NURSE PRACTITIONER

## 2023-03-01 RX ORDER — PHENTERMINE HYDROCHLORIDE 37.5 MG/1
37.5 TABLET ORAL
Qty: 90 TABLET | Refills: 0 | Status: SHIPPED | OUTPATIENT
Start: 2023-03-01 | End: 2023-03-06 | Stop reason: SDUPTHER

## 2023-03-01 RX ORDER — SEMAGLUTIDE 1.34 MG/ML
INJECTION, SOLUTION SUBCUTANEOUS
Qty: 9 ML | Refills: 3 | Status: SHIPPED | OUTPATIENT
Start: 2023-03-01 | End: 2023-03-06 | Stop reason: SDUPTHER

## 2023-03-01 ASSESSMENT — PATIENT HEALTH QUESTIONNAIRE - PHQ9
SUM OF ALL RESPONSES TO PHQ QUESTIONS 1-9: 2
2. FEELING DOWN, DEPRESSED OR HOPELESS: 2
1. LITTLE INTEREST OR PLEASURE IN DOING THINGS: 0
SUM OF ALL RESPONSES TO PHQ9 QUESTIONS 1 & 2: 2

## 2023-03-01 NOTE — PROGRESS NOTES
Room 7      When asked if patient has any concerns she would like to address with RYAN Mccullough patient states yes I would like to know my mammogram results and I have been feeling week . Did patient bring someone? No     Did the patient have DME equipment? No     Did you take your medication today? Yes       1. \"Have you been to the ER, urgent care clinic since your last visit? Hospitalized since your last visit? \" Patient states I went to [facility] due to feeling dizzy on 01/06/23. 2. \"Have you seen or consulted any other health care providers outside of the [address] [place] [address] since your last visit? \" No     3. For patients aged 39-70: Has the patient had a colonoscopy / FIT/ Cologuard? Yes      If the patient is female:    4. For patients aged 41-77: Has the patient had a mammogram within the past 2 years? Yes       5. For patients aged 21-65: Has the patient had a pap smear? {Cancer Care Gap present? Yes       PHQ-9  3/1/2023   Little interest or pleasure in doing things 0   Little interest or pleasure in doing things -   Feeling down, depressed, or hopeless 2   PHQ-2 Score 2   Total Score PHQ 2 -   PHQ-9 Total Score 2          Health Maintenance Due   Topic Date Due    HIV screen  Never done    Shingles vaccine (1 of 2) Never done    Cervical cancer screen  12/22/2019    Diabetic foot exam  08/13/2020    COVID-19 Vaccine (3 - Booster for Pfizer series) 06/29/2021    Diabetic retinal exam  05/28/2022    Flu vaccine (1) Never done         Who is the primary learner? Patient    What is the preferred language for health care of the primary learner? ENGLISH    How does the primary learner prefer to learn new concepts?  OTHER (COMMENT) patient states All   Answered By Patient    Relationship to Learner SELF

## 2023-03-01 NOTE — PROGRESS NOTES
[address]               [phone]      Sj Acevedo is a 63 y.o. female and presents with Other (Patient states for mammogram results and I have been to the emergency room /)       Assessment/Plan:    1. Type 2 diabetes mellitus with diabetic neuropathy, without long-term current use of insulin ([facility])  -     Semaglutide, 1 MG/DOSE, (OZEMPIC, 1 MG/DOSE,) 4 MG/3ML SOPN; INJECT 1MG  SUBCUTANEOUSLY ONCE A WEEK, Disp-9 mL, R-3Normal  -     Hemoglobin A1C; Future  -     Microalbumin / Creatinine Urine Ratio; Future  Endorses medication compliance, Refill provided, Follow up labs prior to next visit, Denies signs and symptoms of hyper or hypoglycemia, and Diabetes controlled, A1C <7  2. Essential hypertension with goal blood pressure less than 130/80  -     Comprehensive Metabolic Panel; Future  Endorses medication compliance, Follow up labs prior to next visit, and Blood pressure stable, continue lisinopril at same dose  3. Mixed hyperlipidemia  -     Lipid Panel; Future  Managed with diet and exercise  4. Class 3 severe obesity due to excess calories with serious comorbidity and body mass index (BMI) of 40.0 to 44.9 in adult ([facility])  -     phentermine (ADIPEX-P) 37.5 MG tablet; Take 1 tablet by mouth every morning (before breakfast) for 90 days. Max Daily Amount: 37.5 mg, Disp-90 tablet, R-0Normal   BMI 40  Will start her on phentermine to assist with her weight loss  Advised the phentermine works best in conjunction with diet and exercise  Patient verbalized understanding and is in agreement with this plan of care      Follow up and disposition:   Return in about 3 months (around 6/1/2023) for [address] [place] [address], DM, HTN, 30min, office, w/labsprior.       Health Maintenance:   Health Maintenance   Topic Date Due    HIV screen  Never done    Shingles vaccine (1 of 2) Never done    Cervical cancer screen  12/22/2019    Diabetic foot exam  08/13/2020    COVID-19 Vaccine (3 - Booster for Pfizer series) 06/29/2021    Diabetic retinal exam  05/28/2022    Flu vaccine (1) Never done    Annual Wellness Visit (AWV)  05/14/2023    A1C test (Diabetic or Prediabetic)  09/09/2023    Diabetic Alb to Cr ratio (uACR) test  09/09/2023    Lipids  09/09/2023    GFR test (Diabetes, CKD 3-4, OR last GFR 15-59)  09/09/2023    Depression Screen  03/01/2024    Colorectal Cancer Screen  01/09/2025    Breast cancer screen  02/06/2025    DTaP/Tdap/Td vaccine (2 - Td or Tdap) 06/11/2025    Pneumococcal 0-64 years Vaccine  Completed    Hepatitis C screen  Completed    Hepatitis A vaccine  Aged Out    Hib vaccine  Aged Out    Meningococcal (ACWY) vaccine  Aged Out        Subjective:    Labs obtained prior to visit? No  Reviewed with patient? N/A    DMII-   Patient reports medication compliance Yes  Diabetic diet compliance seldom  Patient monitors blood sugars regularly  qod   Home glucose readings: 120-145   Denies hypoglycemic episodes Yes  Denies polyuria, polydipsia, paraesthesia, vision changes? Yes  Engaging in daily exercise? None     Diabetes Management   Topic Date Due    Diabetic foot exam  08/13/2020    Diabetic retinal exam  05/28/2022     Lab Results   Component Value Date/Time    LABA1C 5.2 09/09/2022 10:10 AM   ]  Lab Results   Component Value Date/Time    MALBCR <3 09/09/2022 10:10 AM   ]  Diabetic medications:   Key Antihyperglycemic Medications            Semaglutide, 1 MG/DOSE, (OZEMPIC, 1 MG/DOSE,) 4 MG/3ML SOPN (Taking)    Sig: INJECT 1MG  SUBCUTANEOUSLY ONCE A WEEK    pioglitazone (ACTOS) 30 MG tablet (Taking)    Class: Historical Med         Hypertension:  /67 Comment: feet flat on floor  Pulse 77   Temp 97.7 °F (36.5 °C) (Temporal)   Resp 18   Ht 5' 5\" (1.651 m)   Wt 244 lb (110.7 kg)   SpO2 99%   BMI 40.60 kg/m²    Patient reports taking medications as instructed. Yes   Medication side effects noted no  Headache upon wakening. no   Home BP monitoring: no  Do you experience chest pain/pressure or SOB with exertion? no  Maintain a low Sodium diet? Yes  Lab Results   Component Value Date/Time    BUN 9 2022 10:10 AM    CREATININE 0.70 2022 10:10 AM    LABGLOM 100 2022 10:10 AM    K 4.8 2022 10:10 AM     Key Anti-Hypertensive Meds            lisinopril (PRINIVIL;ZESTRIL) 5 MG tablet (Taking)    Class: Historical Med         HLD:  Has been compliant with meds  No: managed with diet  Compliant with low-fat diet. most of the time    Denies myalgias or other side effects. N/A  The 10-year ASCVD risk score (Audie REILLY, et al., 2019) is: 14.6%    Lab Results   Component Value Date/Time    TRIG 67 2022 10:10 AM    CHOL 174 2022 10:10 AM    LDLCALC 115 2022 10:10 AM    HDL 46 2022 10:10 AM     Key Hyperlipidemia Meds       The patient is on no antihyperlipidemia meds. ROS:     Review of Systems  As stated in HPI, otherwise all others negative. The problem list was updated as a part of today's visit. Patient Active Problem List   Diagnosis    Diabetes mellitus type 2 in obese [facility])    Essential hypertension with goal blood pressure less than 130/80    Class 3 severe obesity due to excess calories with serious comorbidity and body mass index (BMI) of 40.0 to 44.9 in adult [facility])    Bilateral primary osteoarthritis of knee    Environmental and seasonal allergies    Vertigo    Mild intermittent asthma    Primary osteoarthritis involving multiple joints    Type 2 diabetes mellitus with diabetic neuropathy, without long-term current use of insulin (HCC)    Mixed hyperlipidemia    Meningioma (HCC)    GERD (gastroesophageal reflux disease)    Former cigarette smoker    PATRICK (obstructive sleep apnea)       The PSH, FH were reviewed.       SH:  Social History     Tobacco Use    Smoking status: Former     Packs/day: 0.25     Types: Cigarettes     Start date: 1978     Quit date: 1994     Years since quittin.1    Smokeless tobacco: Never   Substance Use Topics    Alcohol use: No    Drug use: Not Currently     Types: Opiates        Medications/Allergies:  Current Outpatient Medications on File Prior to Visit   Medication Sig Dispense Refill    aspirin 81 MG chewable tablet Take 81 mg by mouth daily      dicyclomine (BENTYL) 10 MG capsule Take 10 mg by mouth 4 times daily as needed      lisinopril (PRINIVIL;ZESTRIL) 5 MG tablet Take 1 tablet by mouth once daily      meclizine (ANTIVERT) 25 MG tablet TAKE 1 TABLET BY MOUTH TWICE DAILY AS NEEDED FOR DIZZINESS      pioglitazone (ACTOS) 30 MG tablet Take 30 mg by mouth daily       No current facility-administered medications on file prior to visit. Allergies   Allergen Reactions    Atorvastatin Myalgia     Leg cramps       Objective:  /67 Comment: feet flat on floor  Pulse 77   Temp 97.7 °F (36.5 °C) (Temporal)   Resp 18   Ht 5' 5\" (1.651 m)   Wt 244 lb (110.7 kg)   SpO2 99%   BMI 40.60 kg/m²  Body mass index is 40.6 kg/m². Physical assessment  Physical Exam  Vitals and nursing note reviewed. Constitutional:       Appearance: Normal appearance. HENT:      Head: Normocephalic and atraumatic. Eyes:      Pupils: Pupils are equal, round, and reactive to light. Cardiovascular:      Rate and Rhythm: Normal rate and regular rhythm. Heart sounds: Normal heart sounds. No murmur heard. No friction rub. No gallop. Pulmonary:      Effort: Pulmonary effort is normal. No respiratory distress. Breath sounds: Normal breath sounds. Neurological:      Mental Status: She is alert and oriented to person, place, and time. Psychiatric:         Mood and Affect: Mood normal.         Behavior: Behavior normal.         Thought Content: Thought content normal.         Judgment: Judgment normal.               I have discussed the diagnosis with the patient and the intended plan as seen in the above orders.   The patient has received an After-Visit Summary and questions were answered concerning future plans. An After Visit Summary was printed and given to the patient. All diagnosis have been discussed with the patient and all of the patient's questions have been answered. Chinedu Whittington, [facility]  [address] [facility]   [address] [facility] [address]  [address]

## 2023-03-06 ENCOUNTER — CARE COORDINATION (OUTPATIENT)
Facility: CLINIC | Age: 63
End: 2023-03-06

## 2023-03-06 DIAGNOSIS — E66.01 CLASS 3 SEVERE OBESITY DUE TO EXCESS CALORIES WITH SERIOUS COMORBIDITY AND BODY MASS INDEX (BMI) OF 40.0 TO 44.9 IN ADULT (HCC): ICD-10-CM

## 2023-03-06 DIAGNOSIS — E11.40 TYPE 2 DIABETES MELLITUS WITH DIABETIC NEUROPATHY, WITHOUT LONG-TERM CURRENT USE OF INSULIN (HCC): ICD-10-CM

## 2023-03-06 RX ORDER — PHENTERMINE HYDROCHLORIDE 37.5 MG/1
37.5 TABLET ORAL
Qty: 90 TABLET | Refills: 0 | Status: SHIPPED | OUTPATIENT
Start: 2023-03-06 | End: 2023-06-04

## 2023-03-06 RX ORDER — ALBUTEROL SULFATE 90 UG/1
1-2 AEROSOL, METERED RESPIRATORY (INHALATION) EVERY 4 HOURS PRN
COMMUNITY
Start: 2019-03-23

## 2023-03-06 RX ORDER — SEMAGLUTIDE 1.34 MG/ML
INJECTION, SOLUTION SUBCUTANEOUS
Qty: 9 ML | Refills: 3 | Status: SHIPPED | OUTPATIENT
Start: 2023-03-06

## 2023-03-06 RX ORDER — FAMOTIDINE 20 MG/1
20 TABLET, FILM COATED ORAL EVERY 12 HOURS
COMMUNITY
Start: 2022-11-25

## 2023-03-06 RX ORDER — ATORVASTATIN CALCIUM 40 MG/1
40 TABLET, FILM COATED ORAL
COMMUNITY

## 2023-03-06 RX ORDER — BUPROPION HYDROCHLORIDE 150 MG/1
150 TABLET, EXTENDED RELEASE ORAL 2 TIMES DAILY
COMMUNITY

## 2023-03-06 NOTE — CARE COORDINATION
.Ambulatory Care Coordination Note  3/6/2023    Patient Current Location:  Home: [address] [place] [id number]    ACM contacted the patient by telephone. Verified name and  with patient as identifiers. Provided introduction to self, and explanation of the ACM role. ACM: Filiberto Monteiro RN    Challenges to be reviewed by the provider   Additional needs identified to be addressed with provider: Yes  Patient new prescriptions were sent to incorrect pharmacy . ACM checked and the Epic merge transferred incorrect [facility]. ACM added correct pharmacy . Please resend patient's Phentermine 37.5 mg and Semaglutide / Ozempic 1 mg injections once a week . Thank you             Method of communication with provider: chart routing. Offered patient enrollment in the Remote Patient Monitoring (RPM) program for in-home monitoring: Patient declined. Ambulatory Care Coordination Assessment    Care Coordination Protocol  Referral from Primary Care Provider: No  Week 1 - Initial Assessment                             Suggested Interventions and Community Resources  Diabetes Education:  In Process (Comment: A1c 5.2 (22) Ozempic 1mg injection weekly)    Medication Assistance Program: In Process   Pharmacist: Completed                  Future Appointments   Date Time Provider Radha Mitchell   2023 11:00 AM AMA LAB AMA ELIE AMB   2023 11:30 AM SWATI Patino CNP AMA BS AMB

## 2023-03-20 DIAGNOSIS — E11.40 TYPE 2 DIABETES MELLITUS WITH DIABETIC NEUROPATHY, WITHOUT LONG-TERM CURRENT USE OF INSULIN (HCC): ICD-10-CM

## 2023-03-20 RX ORDER — SEMAGLUTIDE 1.34 MG/ML
INJECTION, SOLUTION SUBCUTANEOUS
Qty: 9 ML | Refills: 3 | Status: SHIPPED | OUTPATIENT
Start: 2023-03-20

## 2023-03-20 NOTE — TELEPHONE ENCOUNTER
Patient Pharmacy is requesting 90 day supply for Ozempic 10 1mg .  Medication is not  on patient medication list .

## 2023-04-03 PROBLEM — E11.42 DIABETIC POLYNEUROPATHY ASSOCIATED WITH TYPE 2 DIABETES MELLITUS (HCC): Status: RESOLVED | Noted: 2019-02-14 | Resolved: 2021-10-13

## 2023-04-28 RX ORDER — BLOOD-GLUCOSE METER
EACH MISCELLANEOUS
Qty: 1 KIT | Refills: 0 | Status: SHIPPED | OUTPATIENT
Start: 2023-04-28

## 2023-05-01 RX ORDER — BLOOD SUGAR DIAGNOSTIC
STRIP MISCELLANEOUS
Qty: 100 EACH | Refills: 1 | Status: SHIPPED | OUTPATIENT
Start: 2023-05-01

## 2023-05-08 ENCOUNTER — CARE COORDINATION (OUTPATIENT)
Facility: CLINIC | Age: 63
End: 2023-05-08

## 2023-05-08 RX ORDER — MECLIZINE HYDROCHLORIDE 25 MG/1
TABLET ORAL
Qty: 60 TABLET | Refills: 1 | Status: SHIPPED | OUTPATIENT
Start: 2023-05-08

## 2023-05-08 NOTE — CARE COORDINATION
.Ambulatory Care Coordination Note  2023    Patient Current Location:  Home: [address] [place] [address] Road [id number]     ACM contacted the patient by telephone. Verified name and  with patient as identifiers. Provided introduction to self, and explanation of the ACM role. Patient voicing she is well. She is taking all medications as prescribed. She is trying to exercise. Maintaining a no sweets diet, low salt ,low fat. Challenges to be reviewed by the provider   Additional needs identified to be addressed with provider: No  No needs at this time. Method of communication with provider: none. ACM: Yvette Luna RN    Offered patient enrollment in the Remote Patient Monitoring (RPM) program for in-home monitoring: NA.      Care Coordination Interventions    Referral from Primary Care Provider: No  Suggested Interventions and Community Resources  Diabetes Education: In Process (Comment: A1c 5.2 (22) Ozempic 1mg injection weekly)  Medication Assistance Program: In Process (Comment: Needs Good RX Phentermine without cost is over $100.00)  Pharmacist: Completed (Comment: Patient pharmacy was changed during EPIC Consolidation)          Goals Addressed                   This Visit's Progress     HA1C <7%   On track     Maintain A1c <7.0 ( 5.2 ( 22)  23  Pending labs 23 @ 11:00 AM        Self-schedules and keeps appointments    On track     23  PCP appointment scheduled for 23       Take all medications as ordered   On track     23  Patient verbalizing she takes all medications as prescribed>> Has pending labs and office visit  and        To decrease or maintain patient's biometrics:  HgA1c ?7 mg/dL, /80 or less. LDL of less than 100 and/or less than 70 in a patient with CAD.    On track     Follow blood glucose as instructed   23  Patient has upcoming labs 23              Future Appointments   Date Time Provider Radha Mitchell

## 2023-05-26 LAB
ALBUMIN SERPL-MCNC: 3.9 G/DL (ref 3.8–4.9)
ALBUMIN/CREAT UR: 10 MG/G CREAT (ref 0–29)
ALBUMIN/GLOB SERPL: 1.4 {RATIO} (ref 1.2–2.2)
ALP SERPL-CCNC: 85 IU/L (ref 44–121)
ALT SERPL-CCNC: 20 IU/L (ref 0–32)
AST SERPL-CCNC: 20 IU/L (ref 0–40)
BILIRUB SERPL-MCNC: 0.3 MG/DL (ref 0–1.2)
BUN SERPL-MCNC: 11 MG/DL (ref 8–27)
BUN/CREAT SERPL: 17 (ref 12–28)
CALCIUM SERPL-MCNC: 9.5 MG/DL (ref 8.7–10.3)
CHLORIDE SERPL-SCNC: 105 MMOL/L (ref 96–106)
CHOLEST SERPL-MCNC: 150 MG/DL (ref 100–199)
CO2 SERPL-SCNC: 23 MMOL/L (ref 20–29)
CREAT SERPL-MCNC: 0.66 MG/DL (ref 0.57–1)
CREAT UR-MCNC: 127.7 MG/DL
EGFRCR SERPLBLD CKD-EPI 2021: 100 ML/MIN/1.73
GLOBULIN SER CALC-MCNC: 2.7 G/DL (ref 1.5–4.5)
GLUCOSE SERPL-MCNC: 94 MG/DL (ref 70–99)
HBA1C MFR BLD: 5.4 % (ref 4.8–5.6)
HDLC SERPL-MCNC: 47 MG/DL
LDLC SERPL CALC-MCNC: 91 MG/DL (ref 0–99)
MICROALBUMIN UR-MCNC: 13 UG/ML
POTASSIUM SERPL-SCNC: 4.3 MMOL/L (ref 3.5–5.2)
PROT SERPL-MCNC: 6.6 G/DL (ref 6–8.5)
SODIUM SERPL-SCNC: 140 MMOL/L (ref 134–144)
SPECIMEN STATUS REPORT: NORMAL
TRIGL SERPL-MCNC: 56 MG/DL (ref 0–149)
VLDLC SERPL CALC-MCNC: 12 MG/DL (ref 5–40)

## 2023-06-02 ENCOUNTER — OFFICE VISIT (OUTPATIENT)
Facility: CLINIC | Age: 63
End: 2023-06-02
Payer: MEDICARE

## 2023-06-02 VITALS
HEIGHT: 65 IN | RESPIRATION RATE: 18 BRPM | OXYGEN SATURATION: 98 % | HEART RATE: 70 BPM | BODY MASS INDEX: 38.82 KG/M2 | SYSTOLIC BLOOD PRESSURE: 120 MMHG | TEMPERATURE: 97.9 F | DIASTOLIC BLOOD PRESSURE: 71 MMHG | WEIGHT: 233 LBS

## 2023-06-02 DIAGNOSIS — M25.561 CHRONIC PAIN OF BOTH KNEES: ICD-10-CM

## 2023-06-02 DIAGNOSIS — G89.29 CHRONIC PAIN OF BOTH KNEES: ICD-10-CM

## 2023-06-02 DIAGNOSIS — I10 ESSENTIAL HYPERTENSION WITH GOAL BLOOD PRESSURE LESS THAN 130/80: ICD-10-CM

## 2023-06-02 DIAGNOSIS — E78.2 MIXED HYPERLIPIDEMIA: ICD-10-CM

## 2023-06-02 DIAGNOSIS — E11.40 TYPE 2 DIABETES MELLITUS WITH DIABETIC NEUROPATHY, WITHOUT LONG-TERM CURRENT USE OF INSULIN (HCC): ICD-10-CM

## 2023-06-02 DIAGNOSIS — M25.562 CHRONIC PAIN OF BOTH KNEES: ICD-10-CM

## 2023-06-02 DIAGNOSIS — Z00.00 MEDICARE ANNUAL WELLNESS VISIT, SUBSEQUENT: Primary | ICD-10-CM

## 2023-06-02 DIAGNOSIS — Z71.89 ADVANCED CARE PLANNING/COUNSELING DISCUSSION: ICD-10-CM

## 2023-06-02 PROCEDURE — 3044F HG A1C LEVEL LT 7.0%: CPT | Performed by: NURSE PRACTITIONER

## 2023-06-02 PROCEDURE — G0439 PPPS, SUBSEQ VISIT: HCPCS | Performed by: NURSE PRACTITIONER

## 2023-06-02 PROCEDURE — 3078F DIAST BP <80 MM HG: CPT | Performed by: NURSE PRACTITIONER

## 2023-06-02 PROCEDURE — 3074F SYST BP LT 130 MM HG: CPT | Performed by: NURSE PRACTITIONER

## 2023-06-02 RX ORDER — PIOGLITAZONEHYDROCHLORIDE 15 MG/1
15 TABLET ORAL DAILY
Qty: 90 TABLET | Refills: 3 | Status: SHIPPED | OUTPATIENT
Start: 2023-06-02

## 2023-06-02 RX ORDER — ATORVASTATIN CALCIUM 40 MG/1
40 TABLET, FILM COATED ORAL DAILY
Qty: 90 TABLET | Refills: 1 | Status: SHIPPED | OUTPATIENT
Start: 2023-06-02

## 2023-06-02 SDOH — ECONOMIC STABILITY: FOOD INSECURITY: WITHIN THE PAST 12 MONTHS, YOU WORRIED THAT YOUR FOOD WOULD RUN OUT BEFORE YOU GOT MONEY TO BUY MORE.: NEVER TRUE

## 2023-06-02 SDOH — ECONOMIC STABILITY: HOUSING INSECURITY
IN THE LAST 12 MONTHS, WAS THERE A TIME WHEN YOU DID NOT HAVE A STEADY PLACE TO SLEEP OR SLEPT IN A SHELTER (INCLUDING NOW)?: NO

## 2023-06-02 SDOH — ECONOMIC STABILITY: INCOME INSECURITY: HOW HARD IS IT FOR YOU TO PAY FOR THE VERY BASICS LIKE FOOD, HOUSING, MEDICAL CARE, AND HEATING?: NOT HARD AT ALL

## 2023-06-02 SDOH — ECONOMIC STABILITY: FOOD INSECURITY: WITHIN THE PAST 12 MONTHS, THE FOOD YOU BOUGHT JUST DIDN'T LAST AND YOU DIDN'T HAVE MONEY TO GET MORE.: NEVER TRUE

## 2023-06-02 ASSESSMENT — LIFESTYLE VARIABLES
HOW MANY STANDARD DRINKS CONTAINING ALCOHOL DO YOU HAVE ON A TYPICAL DAY: PATIENT DOES NOT DRINK
HOW OFTEN DO YOU HAVE A DRINK CONTAINING ALCOHOL: NEVER

## 2023-06-02 ASSESSMENT — PATIENT HEALTH QUESTIONNAIRE - PHQ9
SUM OF ALL RESPONSES TO PHQ QUESTIONS 1-9: 0
SUM OF ALL RESPONSES TO PHQ QUESTIONS 1-9: 0
1. LITTLE INTEREST OR PLEASURE IN DOING THINGS: 0
SUM OF ALL RESPONSES TO PHQ QUESTIONS 1-9: 0
SUM OF ALL RESPONSES TO PHQ QUESTIONS 1-9: 0
SUM OF ALL RESPONSES TO PHQ9 QUESTIONS 1 & 2: 0
2. FEELING DOWN, DEPRESSED OR HOPELESS: 0

## 2023-06-02 NOTE — PROGRESS NOTES
Room 7    When asked if patient has any concerns she would like to address with RYAN Cleaning patient states yes back pain and knee pain . Did patient bring someone? No     Did the patient have DME equipment? No     Did you take your medication today? Yes       1. \"Have you been to the ER, urgent care clinic since your last visit? Hospitalized since your last visit? \" No     2. \"Have you seen or consulted any other health care providers outside of the [address] [place] [address] since your last visit? \" No     3. For patients aged 39-70: Has the patient had a colonoscopy / FIT/ Cologuard? Yes      If the patient is female:    4. For patients aged 41-77: Has the patient had a mammogram within the past 2 years? Yes       5. For patients aged 21-65: Has the patient had a pap smear? {Cancer Care Gap present? Patient states I will schedule an appointment with PCP .        PHQ-9  6/2/2023   Little interest or pleasure in doing things 0   Little interest or pleasure in doing things -   Feeling down, depressed, or hopeless 0   PHQ-2 Score 0   Total Score PHQ 2 -   PHQ-9 Total Score 0          Health Maintenance Due   Topic Date Due    HIV screen  Never done    Shingles vaccine (1 of 2) Never done    Cervical cancer screen  12/22/2019    Diabetic foot exam  08/13/2020    COVID-19 Vaccine (3 - Booster for Circular series) 06/29/2021    Annual Wellness Visit (AWV)  05/14/2023

## 2023-06-02 NOTE — PATIENT INSTRUCTIONS
For more information on your local Area Agency on Aging or [place] on Aging please visit the appropriate web site below:    [facility]: MobileCycles.pl    [facility]: https://aging. [place].gov/    [facility]: https://aging.[place].gov/    [place]: InsuranceSquad.es           Advance Directives: Care Instructions  Overview  An advance directive is a legal way to state your wishes at the end of your life. It tells your family and your doctor what to do if you can't say what you want. There are two main types of advance directives. You can change them any time your wishes change. Living will. This form tells your family and your doctor your wishes about life support and other treatment. The form is also called a declaration. Medical power of . This form lets you name a person to make treatment decisions for you when you can't speak for yourself. This person is called a health care agent (health care proxy, health care surrogate). The form is also called a durable power of  for health care. If you do not have an advance directive, decisions about your medical care may be made by a family member, or by a doctor or a  who doesn't know you. It may help to think of an advance directive as a gift to the people who care for you. If you have one, they won't have to make tough decisions by themselves. For more information, including forms for your state, see the [address] website (www.caringinfo.org/planning/advance-directives/). Follow-up care is a key part of your treatment and safety. Be sure to make and go to all appointments, and call your doctor if you are having problems. It's also a good idea to know your test results and keep a list of the medicines you take. What should you include in an advance directive? Many states have a unique advance directive form.  (It may ask you to address specific issues.) Or you might use a universal form that's approved by

## 2023-06-02 NOTE — ACP (ADVANCE CARE PLANNING)
Advance Care Planning     General Advance Care Planning (ACP) Conversation    Date of Conversation: 6/2/2023  Conducted with: Patient with Decision Making Capacity    Healthcare Decision Maker:    Primary Decision Maker: Lex Mariscal - Child - [phone]  Click here to complete Healthcare Decision Makers including selection of the Healthcare Decision Maker Relationship (ie \"Primary\"). Today we documented Decision Maker(s) consistent with Legal Next of Kin hierarchy.     Content/Action Overview:  Has NO ACP documents/care preferences - information provided, considering goals and options  Reviewed DNR/DNI and patient elects Full Code (Attempt Resuscitation)  resuscitation preferences      Length of Voluntary ACP Conversation in minutes:  <16 minutes (Non-Billable)    Sarah Kendall, APRN - CNP

## 2023-06-02 NOTE — PROGRESS NOTES
Medicare Annual Wellness Visit    Florence Elam is here for Medicare AWV, Diabetes, Hypertension, and Follow-up Chronic Condition    Assessment & Plan   Medicare annual wellness visit, subsequent  Completed today to include ETOH and depression screening  Advanced care planning/counseling discussion  Healthcare decision maker verified and ACP Discussion performed and documented in note  Type 2 diabetes mellitus with diabetic neuropathy, without long-term current use of insulin (HCC)  -     pioglitazone (ACTOS) 15 MG tablet; Take 1 tablet by mouth daily, Disp-90 tablet, R-3Normal  -     Hemoglobin A1C; Future  Endorses medication compliance, Follow up labs prior to next visit, Denies signs and symptoms of hyper or hypoglycemia, Diabetes controlled, A1C <7, and A1C 5.2%, decrease actos to 15mg a day  Essential hypertension with goal blood pressure less than 130/80  -     Comprehensive Metabolic Panel; Future  Endorses medication compliance, Follow up labs prior to next visit, and Blood pressure stable, continue lisinopril at same dose  Mixed hyperlipidemia  -     atorvastatin (LIPITOR) 40 MG tablet; Take 1 tablet by mouth daily, Disp-90 tablet, R-1Normal  -     Lipid Panel; Future  Endorses medication compliance, Follow up labs prior to next visit, and Denies abdominal pain or symptoms of myalgia  Chronic pain of both knees  -     [facility] - Tessie Murillo MD, Sports Medicine, [address] [facility])   Long standing problem that has been worsening, R>L, had PT with minimal relief  Refer to ortho for further evaluation  Patient verbalized understanding and is in agreement with this plan of care    Recommendations for Preventive Services Due: see orders and patient instructions/AVS.  Recommended screening schedule for the next 5-10 years is provided to the patient in written form: see Patient Instructions/AVS.     Return in about 3 months (around 9/2/2023) for DM, HTN, HLD, 15min, office, w/labsprior.      Subjective

## 2023-06-06 ENCOUNTER — CARE COORDINATION (OUTPATIENT)
Facility: CLINIC | Age: 63
End: 2023-06-06

## 2023-06-06 NOTE — CARE COORDINATION
.Patient has graduated from the Complex Care program on 6/6/23. Patient/family has the ability to self-manage at this time. Care management goals have been completed. No further Ambulatory Care Manager follow up scheduled. Goals Addressed                   This Visit's Progress     COMPLETED: HA1C <7%   On track     Maintain A1c <7.0 ( 5.2 ( 9/9/22)  5/8/23  Pending labs 5/25/23 @ 11:00 AM        COMPLETED: Self-schedules and keeps appointments    On track     5/8/23  PCP appointment scheduled for 6/2/23 6/6/23  Patient had AMWV on 6/2/23 with PCP       COMPLETED: Take all medications as ordered   On track     5/8/23  Patient verbalizing she takes all medications as prescribed>> Has pending labs and office visit 5/25 and 6/2       COMPLETED: To decrease or maintain patient's biometrics:  HgA1c ?7 mg/dL, /80 or less. LDL of less than 100 and/or less than 70 in a patient with CAD. On track     Follow blood glucose as instructed   5/8/23  Patient has upcoming labs 5/25/23              Patient has Ambulatory Care Manager's contact information for any further questions, concerns, or needs.   Patients upcoming visits:    Future Appointments   Date Time Provider Radha Mitchell   8/29/2023  9:15 AM AMA LAB LAWRENCE CORTES   9/5/2023 11:30 AM SWATI Hayward CNP AMB

## 2023-08-25 NOTE — PROGRESS NOTES
1. Have you been to the ER, urgent care clinic since your last visit? Hospitalized since your last visit? No    2. Have you seen or consulted any other health care providers outside of the [address] [place] [address] since your last visit? Include any pap smears or colon screening.  No      Health Maintenance Due   Topic Date Due    Foot Exam Q1  08/13/2020 none

## 2023-09-11 DIAGNOSIS — E11.40 TYPE 2 DIABETES MELLITUS WITH DIABETIC NEUROPATHY, WITHOUT LONG-TERM CURRENT USE OF INSULIN (HCC): ICD-10-CM

## 2023-09-11 DIAGNOSIS — E78.2 MIXED HYPERLIPIDEMIA: ICD-10-CM

## 2023-09-11 RX ORDER — FAMOTIDINE 20 MG/1
20 TABLET, FILM COATED ORAL EVERY 12 HOURS
Qty: 180 TABLET | Refills: 1 | Status: SHIPPED | OUTPATIENT
Start: 2023-09-11

## 2023-09-11 RX ORDER — BLOOD-GLUCOSE METER
EACH MISCELLANEOUS
Qty: 1 KIT | Refills: 0 | OUTPATIENT
Start: 2023-09-11

## 2023-09-11 RX ORDER — LISINOPRIL 5 MG/1
5 TABLET ORAL DAILY
Qty: 90 TABLET | Refills: 1 | Status: SHIPPED | OUTPATIENT
Start: 2023-09-11

## 2023-09-11 RX ORDER — PIOGLITAZONEHYDROCHLORIDE 15 MG/1
15 TABLET ORAL DAILY
Qty: 90 TABLET | Refills: 3 | Status: SHIPPED | OUTPATIENT
Start: 2023-09-11

## 2023-09-11 RX ORDER — ALBUTEROL SULFATE 90 UG/1
1-2 AEROSOL, METERED RESPIRATORY (INHALATION) EVERY 4 HOURS PRN
Qty: 18 G | Refills: 3 | Status: SHIPPED | OUTPATIENT
Start: 2023-09-11

## 2023-09-11 RX ORDER — ATORVASTATIN CALCIUM 40 MG/1
40 TABLET, FILM COATED ORAL DAILY
Qty: 90 TABLET | Refills: 1 | Status: SHIPPED | OUTPATIENT
Start: 2023-09-11

## 2023-09-11 RX ORDER — MECLIZINE HYDROCHLORIDE 25 MG/1
TABLET ORAL
Qty: 60 TABLET | Refills: 1 | Status: SHIPPED | OUTPATIENT
Start: 2023-09-11

## 2023-09-11 RX ORDER — SEMAGLUTIDE 1.34 MG/ML
INJECTION, SOLUTION SUBCUTANEOUS
Qty: 9 ML | Refills: 3 | Status: SHIPPED | OUTPATIENT
Start: 2023-09-11

## 2023-09-11 RX ORDER — BUPROPION HYDROCHLORIDE 150 MG/1
150 TABLET, EXTENDED RELEASE ORAL 2 TIMES DAILY
Qty: 180 TABLET | Refills: 1 | Status: SHIPPED | OUTPATIENT
Start: 2023-09-11

## 2023-09-11 RX ORDER — BLOOD SUGAR DIAGNOSTIC
STRIP MISCELLANEOUS
Qty: 100 EACH | Refills: 1 | Status: SHIPPED | OUTPATIENT
Start: 2023-09-11

## 2023-09-11 NOTE — TELEPHONE ENCOUNTER
Called patient in reguards of medication refill for Ozempic and to schedule follow up appointment. Patient did  not answer. Left message stating to call the office back .

## 2023-09-11 NOTE — TELEPHONE ENCOUNTER
Patient states I would like all my medication refilled due to I have to find a new provider because of my insurance .

## 2024-01-26 ENCOUNTER — HOSPITAL ENCOUNTER (OUTPATIENT)
Dept: WOMENS IMAGING | Facility: HOSPITAL | Age: 64
End: 2024-01-26
Payer: MEDICARE

## 2024-01-26 VITALS — HEIGHT: 65 IN | BODY MASS INDEX: 39.32 KG/M2 | WEIGHT: 236 LBS

## 2024-01-26 DIAGNOSIS — Z12.31 VISIT FOR SCREENING MAMMOGRAM: ICD-10-CM

## 2024-01-26 PROCEDURE — 77063 BREAST TOMOSYNTHESIS BI: CPT

## 2024-10-18 RX ORDER — LISINOPRIL 5 MG/1
5 TABLET ORAL DAILY
Qty: 90 TABLET | Refills: 0 | OUTPATIENT
Start: 2024-10-18

## 2024-12-31 RX ORDER — LISINOPRIL 5 MG/1
5 TABLET ORAL DAILY
Qty: 90 TABLET | Refills: 0 | OUTPATIENT
Start: 2024-12-31

## 2025-01-09 ENCOUNTER — OFFICE VISIT (OUTPATIENT)
Age: 65
End: 2025-01-09

## 2025-01-09 VITALS — WEIGHT: 259 LBS | HEIGHT: 64 IN | BODY MASS INDEX: 44.22 KG/M2

## 2025-01-09 DIAGNOSIS — M17.12 ARTHRITIS OF LEFT KNEE: ICD-10-CM

## 2025-01-09 DIAGNOSIS — M25.662 DECREASED RANGE OF MOTION (ROM) OF LEFT KNEE: ICD-10-CM

## 2025-01-09 DIAGNOSIS — M25.562 CHRONIC PAIN OF LEFT KNEE: Primary | ICD-10-CM

## 2025-01-09 DIAGNOSIS — G89.29 CHRONIC PAIN OF LEFT KNEE: Primary | ICD-10-CM

## 2025-01-09 DIAGNOSIS — M25.462 EFFUSION OF LEFT KNEE: ICD-10-CM

## 2025-01-09 DIAGNOSIS — M23.8X2 KNEE CREPITUS, LEFT: ICD-10-CM

## 2025-01-09 RX ORDER — BUPIVACAINE HYDROCHLORIDE 5 MG/ML
17 INJECTION, SOLUTION PERINEURAL ONCE
Status: COMPLETED | OUTPATIENT
Start: 2025-01-09 | End: 2025-01-09

## 2025-01-09 RX ORDER — TRIAMCINOLONE ACETONIDE 40 MG/ML
80 INJECTION, SUSPENSION INTRA-ARTICULAR; INTRAMUSCULAR ONCE
Status: COMPLETED | OUTPATIENT
Start: 2025-01-09 | End: 2025-01-09

## 2025-01-09 RX ADMIN — TRIAMCINOLONE ACETONIDE 80 MG: 40 INJECTION, SUSPENSION INTRA-ARTICULAR; INTRAMUSCULAR at 15:49

## 2025-01-09 RX ADMIN — BUPIVACAINE HYDROCHLORIDE 85 MG: 5 INJECTION, SOLUTION PERINEURAL at 15:48

## 2025-01-10 NOTE — PROGRESS NOTES
Psychiatric: The patient is awake, alert, and oriented to person, place and time. Behavior is normal. Thought content normal.   Cardiovascular: No clubbing, cyanosis.  No edema bilateral lower extremities.   Pulmonary: No tripoding nor accessory muscle recruitment. Breathing normally, no distress, no audible wheezing.     Distal cap refill intact at 2/2 Ant UE / LE.  Neuro intact Ant UE/LE to noxious stimuli      Ortho Specific exam:        IMAGING:  Imaging read by myself and interpreted as follows:      IMPRESSION:      ICD-10-CM    1. Chronic pain of left knee  M25.562 [53915] Knee 3V    G89.29 BUPivacaine (MARCAINE) 0.5 % injection 85 mg     triamcinolone acetonide (KENALOG-40) injection 80 mg     DRAIN/INJECT LARGE JOINT/BURSA     DME Order for (Specify) as OP     [facility] - Lora Alexander DO, Orthopedic Surgery(General/Total Joint), [place] ([facility])      2. Decreased range of motion (ROM) of left knee  M25.662       3. Knee crepitus, left  M23.8X2       4. Arthritis of left knee  M17.12       5. Effusion of left knee  M25.462            PLAN:  No follow-up provider specified.           TOTAL TIME SPENT WITH PATIENT FOR TODAY'S VISIT WAS DEVOTED TO FACE-TO-FACE COUNSELING REGARDING THE FOLLOWING:  DISCUSSED DIAGNOSIS, TREATMENT OPTIONS, AND RISKS AND BENEFITS OF TREATMENT          Special note: [employer] handout for telephonic weight loss strategies provided.      Special note: Medication management discussed and patient will begin the following per recommended dosing.    (  )     (  )     (  ) Physical therapy ordered for range of motion all modalities, stretching, range of motion of specific functional group associated     with primary treating condition,  gentle strengthening of musculature with attention to increasing endurance, gait training,balance and fine motor coordination.       Special note: I reviewed and agree with the PFSH and the ROS as well as the intake form in the chart in the record.

## 2025-01-21 ENCOUNTER — TELEPHONE (OUTPATIENT)
Age: 65
End: 2025-01-21

## 2025-01-21 NOTE — TELEPHONE ENCOUNTER
1/21/25 Order for rollator walker was faxed to [facility] today.

## 2025-01-21 NOTE — TELEPHONE ENCOUNTER
Patient is asking who to contact regarding the DME order for the bariatric rolator walker.    Please advise.    Patient can be reached at [phone].

## 2025-02-05 ENCOUNTER — TELEPHONE (OUTPATIENT)
Age: 65
End: 2025-02-05

## 2025-02-05 NOTE — TELEPHONE ENCOUNTER
Patient called office back after scheduling as referred for 4/4/25.  She wants referring provider to know that she has been falling and is having problems balancing and walking.  Patient denies treatment since falling (most recently on Saturday) and says she has not been to the ED or anywhere for treatment.  Nature of call was not definitive, patient just requests we let her provider know and is asking for a call back at [phone].

## 2025-04-04 ENCOUNTER — OFFICE VISIT (OUTPATIENT)
Age: 65
End: 2025-04-04

## 2025-04-04 VITALS — WEIGHT: 250 LBS | HEIGHT: 64 IN | BODY MASS INDEX: 42.68 KG/M2

## 2025-04-04 DIAGNOSIS — M17.12 ARTHRITIS OF LEFT KNEE: Primary | ICD-10-CM

## 2025-04-04 NOTE — PROGRESS NOTES
Patient: Anahy Pierce                MRN: [id number]       SSN: [id number]  YOB: 1960        AGE: 64 y.o.        SEX: female  BMI: Body mass index is 42.91 kg/m².    PCP: Alysha Penaloza NP-C  04/06/25    Chief Complaint: Knee Pain (Left knee )      1. Arthritis of left knee  -     SCHEDULE SURGERY      ----------------------------------------------------------------------------------------------------------------  HPI:  Anahy Pierce is a 64 y.o. female with chief complaint of   Chief Complaint   Patient presents with    Knee Pain     Left knee        Patient presents with left knee pain since October of 2024. She was given a shot by SOO Zapata in January which did help her some. This was a cortisone injection. She reports popping, cracking, and constant pain in her knee. That is 10/10 in pain. She has not had gel injections but is not interested in these. She would like a permanent solution to her pain which is why she is here today. She currently takes 800 mg of advil PRN with some relief. 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 She denies any injuries or surgeries to her knees.     Past Medical History:   Diabetes  Hx of DVT 1987  Hypertension   Asthma      Anticoagulation?   Yes, ASA 81     -----------------------------------------------------------------------------------------------------------------  IMAGING:  Imaging read by myself and interpreted as follows:    January 2025  3 view xray of the left knee including AP, lateral, and notch demonstrates tricompartmental osteoarthritis with subchondral sclerosis, subchondral cyst formation, and osteophytosis. Large osteophytes present off of medial tibial plateaus bilaterally.     ------------------------------------------------------------------------------------------------------------------  PHYSICAL EXAMINATION:  Ht 1.626 m (5' 4\")   Wt 113.4 kg (250 lb)   BMI 42.91

## 2025-04-06 RX ORDER — MELOXICAM 15 MG/1
15 TABLET ORAL DAILY
Qty: 30 TABLET | Refills: 2 | Status: SHIPPED | OUTPATIENT
Start: 2025-04-06 | End: 2025-07-05

## 2025-04-06 RX ORDER — ACETAMINOPHEN 500 MG
1000 TABLET ORAL EVERY 8 HOURS PRN
Qty: 180 TABLET | Refills: 0 | Status: SHIPPED | OUTPATIENT
Start: 2025-04-06 | End: 2025-05-06

## 2025-04-06 RX ORDER — LIDOCAINE 50 MG/G
1 PATCH TOPICAL DAILY
Qty: 10 PATCH | Refills: 0 | Status: SHIPPED | OUTPATIENT
Start: 2025-04-06 | End: 2025-04-16

## 2025-04-16 DIAGNOSIS — E78.2 MIXED HYPERLIPIDEMIA: ICD-10-CM

## 2025-04-17 RX ORDER — ATORVASTATIN CALCIUM 40 MG/1
40 TABLET, FILM COATED ORAL DAILY
Qty: 90 TABLET | Refills: 0 | OUTPATIENT
Start: 2025-04-17

## 2025-04-27 DIAGNOSIS — E78.2 MIXED HYPERLIPIDEMIA: ICD-10-CM

## 2025-04-30 RX ORDER — ATORVASTATIN CALCIUM 40 MG/1
40 TABLET, FILM COATED ORAL DAILY
Qty: 90 TABLET | Refills: 0 | OUTPATIENT
Start: 2025-04-30

## 2025-05-08 DIAGNOSIS — Z01.810 PREOP CARDIOVASCULAR EXAM: ICD-10-CM

## 2025-05-08 DIAGNOSIS — Z01.818 PREOPERATIVE TESTING: ICD-10-CM

## 2025-05-08 DIAGNOSIS — Z01.811 PRE-OP CHEST EXAM: ICD-10-CM

## 2025-05-08 DIAGNOSIS — M21.962 ACQUIRED DEFORMITY OF LEFT KNEE: ICD-10-CM

## 2025-05-08 DIAGNOSIS — M17.12 PRIMARY OSTEOARTHRITIS OF LEFT KNEE: Primary | ICD-10-CM

## 2025-05-08 NOTE — PROGRESS NOTES
1. Primary osteoarthritis of left knee  -     EKG 12 Lead; Future  -     XR CHEST (2 VW); Future  -     Urinalysis; Future  -     Protime-INR; Future  -     Hemoglobin A1C; Future  -     Comprehensive Metabolic Panel; Future  -     CBC with Auto Differential; Future  -     APTT; Future  -     Nicotine Metabolites, Urine; Future  -     Urine Drug Screen; Future  -     CT KNEE LEFT WO CONTRAST; Future  2. Acquired deformity of left knee  -     CT KNEE LEFT WO CONTRAST; Future  3. Preoperative testing  -     Urinalysis; Future  -     Protime-INR; Future  -     Hemoglobin A1C; Future  -     Comprehensive Metabolic Panel; Future  -     CBC with Auto Differential; Future  -     APTT; Future  -     Nicotine Metabolites, Urine; Future  -     Urine Drug Screen; Future  -     CT KNEE LEFT WO CONTRAST; Future  4. Pre-op chest exam  -     XR CHEST (2 VW); Future  5. Preop cardiovascular exam  -     EKG 12 Lead; Future

## (undated) DEVICE — SUTURE VCRL SZ 3-0 L27IN ABSRB UD L26MM SH 1/2 CIR J416H

## (undated) DEVICE — NEEDLE HYPO 25GA L1.5IN BVL ORIENTED ECLIPSE

## (undated) DEVICE — STRIP SKIN CLSR W0.25XL4IN WHT SPUNBOUND FBR NYL HI ADH

## (undated) DEVICE — PACK PROCEDURE SURG MAJ W/ BASIN LF

## (undated) DEVICE — SUTURE MCRYL SZ 4-0 L27IN ABSRB UD L24MM PS-1 3/8 CIR PRIM Y935H

## (undated) DEVICE — REM POLYHESIVE ADULT PATIENT RETURN ELECTRODE: Brand: VALLEYLAB

## (undated) DEVICE — GOWN,REINFORCED,POLY,AURORA,XLARGE,STRL: Brand: MEDLINE

## (undated) DEVICE — TRAY PREP DRY W/ PREM GLV 2 APPL 6 SPNG 2 UNDPD 1 OVERWRAP

## (undated) DEVICE — KIT CLN UP BON SECOURS MARYV

## (undated) DEVICE — INTENDED FOR TISSUE SEPARATION, AND OTHER PROCEDURES THAT REQUIRE A SHARP SURGICAL BLADE TO PUNCTURE OR CUT.: Brand: BARD-PARKER SAFETY BLADES SIZE 10, STERILE

## (undated) DEVICE — DERMABOND SKIN ADH 0.7ML -- DERMABOND ADVANCED 12/BX

## (undated) DEVICE — GAUZE,SPONGE,4"X4",16PLY,STRL,LF,10/TRAY: Brand: MEDLINE

## (undated) DEVICE — GAUZE,SPONGE,4"X4",12PLY,STERILE,LF,2'S: Brand: MEDLINE

## (undated) DEVICE — SHEET, DRAPE, SPLIT, STERILE: Brand: MEDLINE

## (undated) DEVICE — BLADE ASSEMB CLP HAIR FINE --

## (undated) DEVICE — 3M™ STERI-STRIP™ COMPOUND BENZOIN TINCTURE 40 BAGS/CARTON 4 CARTONS/CASE C1544: Brand: 3M™ STERI-STRIP™

## (undated) DEVICE — 3M™ TEGADERM™ TRANSPARENT FILM DRESSING FRAME STYLE, 1626W, 4 IN X 4-3/4 IN (10 CM X 12 CM), 50/CT 4CT/CASE: Brand: 3M™ TEGADERM™

## (undated) DEVICE — THREE-QUARTER SHEET: Brand: CONVERTORS

## (undated) DEVICE — DRAPE TWL SURG 16X26IN BLU ORB04] ALLCARE INC]

## (undated) DEVICE — GLOVE ORTHO 7 1/2   MSG9475